# Patient Record
Sex: FEMALE | Race: WHITE | NOT HISPANIC OR LATINO | Employment: OTHER | URBAN - NONMETROPOLITAN AREA
[De-identification: names, ages, dates, MRNs, and addresses within clinical notes are randomized per-mention and may not be internally consistent; named-entity substitution may affect disease eponyms.]

---

## 2017-02-20 ENCOUNTER — LAB (OUTPATIENT)
Dept: LAB | Facility: HOSPITAL | Age: 67
End: 2017-02-20

## 2017-02-20 DIAGNOSIS — Z11.59 NEED FOR HEPATITIS C SCREENING TEST: ICD-10-CM

## 2017-02-20 PROCEDURE — 86803 HEPATITIS C AB TEST: CPT | Performed by: GENERAL PRACTICE

## 2017-02-20 PROCEDURE — 36415 COLL VENOUS BLD VENIPUNCTURE: CPT

## 2017-02-21 ENCOUNTER — OFFICE VISIT (OUTPATIENT)
Dept: FAMILY MEDICINE CLINIC | Facility: CLINIC | Age: 67
End: 2017-02-21

## 2017-02-21 VITALS
BODY MASS INDEX: 35.35 KG/M2 | OXYGEN SATURATION: 98 % | SYSTOLIC BLOOD PRESSURE: 132 MMHG | DIASTOLIC BLOOD PRESSURE: 80 MMHG | WEIGHT: 212.2 LBS | HEIGHT: 65 IN | HEART RATE: 91 BPM

## 2017-02-21 DIAGNOSIS — E78.2 MIXED HYPERLIPIDEMIA: ICD-10-CM

## 2017-02-21 DIAGNOSIS — I10 ESSENTIAL HYPERTENSION: Primary | ICD-10-CM

## 2017-02-21 DIAGNOSIS — M15.9 PRIMARY OSTEOARTHRITIS INVOLVING MULTIPLE JOINTS: ICD-10-CM

## 2017-02-21 DIAGNOSIS — K21.9 GERD WITHOUT ESOPHAGITIS: ICD-10-CM

## 2017-02-21 DIAGNOSIS — E11.9 TYPE 2 DIABETES MELLITUS WITHOUT COMPLICATION, WITHOUT LONG-TERM CURRENT USE OF INSULIN (HCC): ICD-10-CM

## 2017-02-21 LAB — HCV AB SER DONR QL: NEGATIVE

## 2017-02-21 PROCEDURE — 99214 OFFICE O/P EST MOD 30 MIN: CPT | Performed by: GENERAL PRACTICE

## 2017-02-21 RX ORDER — PANTOPRAZOLE SODIUM 40 MG/1
40 TABLET, DELAYED RELEASE ORAL DAILY PRN
Qty: 90 TABLET | Refills: 3 | Status: SHIPPED | OUTPATIENT
Start: 2017-02-21 | End: 2017-09-18 | Stop reason: SDUPTHER

## 2017-02-21 RX ORDER — HYDROCODONE BITARTRATE AND ACETAMINOPHEN 7.5; 325 MG/1; MG/1
1 TABLET ORAL EVERY 6 HOURS PRN
Qty: 120 TABLET | Refills: 0 | Status: SHIPPED | OUTPATIENT
Start: 2017-02-21 | End: 2017-06-16 | Stop reason: SDUPTHER

## 2017-02-21 RX ORDER — CELECOXIB 200 MG/1
200 CAPSULE ORAL DAILY PRN
COMMUNITY
End: 2017-09-18 | Stop reason: SDUPTHER

## 2017-02-21 NOTE — PROGRESS NOTES
Subjective   Bertha Hyde is a 66 y.o. female.     Chief Complaint   Patient presents with   • Follow-up   • Hypertension     Diabetes   She presents for her follow-up diabetic visit. She has type 2 diabetes mellitus. Her disease course has been stable. Pertinent negatives for hypoglycemia include no dizziness, headaches or nervousness/anxiousness. There are no diabetic associated symptoms. Pertinent negatives for diabetes include no chest pain, no fatigue and no weakness. There are no hypoglycemic complications. There are no diabetic complications. Current diabetic treatment includes oral agent (dual therapy). She is compliant with treatment all of the time. Her weight is stable. She is following a generally healthy diet. Meal planning includes avoidance of concentrated sweets. She participates in exercise intermittently. There is no change in her home blood glucose trend. An ACE inhibitor/angiotensin II receptor blocker is being taken.   Hypertension   This is a chronic problem. The current episode started more than 1 year ago. The problem is unchanged. The problem is controlled. Associated symptoms include anxiety. Pertinent negatives include no chest pain, headaches, neck pain, palpitations or shortness of breath. There are no known risk factors for coronary artery disease. Past treatments include ACE inhibitors and diuretics. The current treatment provides significant improvement. There are no compliance problems.    Hyperlipidemia   This is a chronic problem. The current episode started more than 1 year ago. The problem is controlled. Recent lipid tests were reviewed and are variable. Exacerbating diseases include diabetes. Pertinent negatives include no chest pain, myalgias or shortness of breath. Current antihyperlipidemic treatment includes statins. The current treatment provides significant improvement of lipids. There are no compliance problems.    Arthritis   She complains of pain and stiffness.  "She reports no joint swelling. The symptoms have been stable. Affected locations include the right knee and left knee. Her pain is at a severity of 8/10. Pertinent negatives include no diarrhea, dysuria, fatigue, fever or rash. Compliance with total regimen is %. Compliance with medications is %.      The following portions of the patient's history were reviewed and updated as appropriate: allergies, current medications, past social history and problem list.    Current Outpatient Prescriptions:   •  celecoxib (CeleBREX) 200 MG capsule, Take 200 mg by mouth Daily As Needed., Disp: , Rfl:   •  cyclobenzaprine (FLEXERIL) 10 MG tablet, Take 10 mg by mouth every evening. AT BEDTIME AS NEEDED, Disp: , Rfl:   •  DULoxetine (CYMBALTA) 60 MG capsule, Take 1 capsule by mouth Daily., Disp: 90 capsule, Rfl: 1  •  glimepiride (AMARYL) 4 MG tablet, Take 1 tablet by mouth Every Morning Before Breakfast., Disp: 90 tablet, Rfl: 3  •  HYDROcodone-acetaminophen (NORCO) 7.5-325 MG per tablet, Take 1 tablet by mouth Every 6 (Six) Hours As Needed for moderate pain (4-6)., Disp: 120 tablet, Rfl: 0  •  hyoscyamine (OSCIMIN) 0.125 MG SL tablet, Place 0.125-0.25 mg under the tongue every 4 (four) hours as needed. (max 12 per 24 hours), Disp: , Rfl:   •  lisinopril-hydrochlorothiazide (PRINZIDE,ZESTORETIC) 20-12.5 MG per tablet, Take 1 tablet by mouth Daily. Indication: Essential (primary) hypertension, Disp: 90 tablet, Rfl: 3  •  loratadine (CLARITIN) 10 MG tablet, Take 10 mg by mouth daily as needed. Indication: Allergic rhinitis, Disp: , Rfl:   •  metFORMIN XR (GLUCOPHAGE-XR) 500 MG 24 hr tablet, Take 500 mg by mouth 2 (two) times a day. \"metformin  mg tablet,extended release 24 hr\", Disp: , Rfl:   •  Multiple Vitamins-Minerals (DAILY MULTIVITAMIN PO), Take 1 tablet by mouth daily. Indication: supplement, Disp: , Rfl:   •  pantoprazole (PROTONIX) 40 MG EC tablet, Take 1 tablet by mouth Daily As Needed (heartburn)., Disp: " 90 tablet, Rfl: 3  •  pravastatin (PRAVACHOL) 10 MG tablet, Take 10 mg by mouth every night. at bedtime, Disp: , Rfl:   •  promethazine (PHENERGAN) 25 MG tablet, Take 0.5-1 tablets by mouth Every 6 (Six) Hours As Needed for nausea., Disp: 30 tablet, Rfl: 3  •  rizatriptan MLT (MAXALT-MLT) 10 MG disintegrating tablet, Take 1 tablet by mouth daily as needed for migraine. Take 1 tab at onset of headache, can repeat x 1 in 2 hrs prn, Disp: 27 tablet, Rfl: 3    Review of Systems   Constitutional: Negative.  Negative for activity change, appetite change, chills, fatigue, fever and unexpected weight change.   HENT: Negative.  Negative for congestion, ear pain, hearing loss, nosebleeds, rhinorrhea, sinus pressure, sneezing, sore throat, tinnitus and trouble swallowing.    Eyes: Negative.  Negative for pain, discharge, redness, itching and visual disturbance.   Respiratory: Negative.  Negative for apnea, cough, chest tightness, shortness of breath and wheezing.    Cardiovascular: Negative.  Negative for chest pain, palpitations and leg swelling.   Gastrointestinal: Negative.  Negative for abdominal distention, abdominal pain, constipation, diarrhea, nausea and vomiting.   Endocrine: Negative.    Genitourinary: Negative.  Negative for dysuria, frequency and urgency.   Musculoskeletal: Positive for arthritis and stiffness. Negative for arthralgias, back pain, gait problem, joint swelling, myalgias, neck pain and neck stiffness.   Skin: Negative.  Negative for color change and rash.   Allergic/Immunologic: Negative.    Neurological: Negative.  Negative for dizziness, weakness, light-headedness, numbness and headaches.   Hematological: Negative.  Negative for adenopathy.   Psychiatric/Behavioral: Negative.  Negative for dysphoric mood and sleep disturbance. The patient is not nervous/anxious.      Objective     Visit Vitals   • /80 (BP Location: Left arm, Patient Position: Sitting, Cuff Size: Adult)   • Pulse 91   • Ht  "64.5\" (163.8 cm)   • Wt 212 lb 3.2 oz (96.3 kg)   • SpO2 98%   • BMI 35.86 kg/m2     Physical Exam   Constitutional: She is oriented to person, place, and time. She appears well-developed and well-nourished. No distress.   HENT:   Head: Normocephalic and atraumatic.   Nose: Nose normal.   Mouth/Throat: Oropharynx is clear and moist.   Eyes: Conjunctivae and EOM are normal. Pupils are equal, round, and reactive to light. Right eye exhibits no discharge. Left eye exhibits no discharge.   Neck: No thyromegaly present.   Cardiovascular: Normal rate, regular rhythm, normal heart sounds and intact distal pulses.    Pulmonary/Chest: Effort normal and breath sounds normal.   Lymphadenopathy:     She has no cervical adenopathy.   Neurological: She is alert and oriented to person, place, and time.   Skin: Skin is warm and dry.   Psychiatric: She has a normal mood and affect.   Nursing note and vitals reviewed.    Results for orders placed or performed in visit on 02/20/17   Hepatitis C Antibody   Result Value Ref Range    Hepatitis C Ab Negative Negative     Assessment/Plan     Problem List Items Addressed This Visit        Cardiovascular and Mediastinum    Essential hypertension - Primary    Relevant Orders    Hemoglobin A1c    Comprehensive Metabolic Panel    Lipid Panel    LDL Cholesterol, Direct    Urinalysis With / Culture If Indicated    MicroAlbumin, Urine, Random    Hyperlipidemia    Relevant Orders    Hemoglobin A1c    Comprehensive Metabolic Panel    Lipid Panel    LDL Cholesterol, Direct    Urinalysis With / Culture If Indicated    MicroAlbumin, Urine, Random       Endocrine    Type 2 diabetes mellitus    Relevant Orders    Basic Metabolic Panel    Hemoglobin A1c    Comprehensive Metabolic Panel    Lipid Panel    LDL Cholesterol, Direct    Urinalysis With / Culture If Indicated    MicroAlbumin, Urine, Random       Musculoskeletal and Integument    Degenerative joint disease involving multiple joints      Other " Visit Diagnoses     GERD without esophagitis        Relevant Medications    pantoprazole (PROTONIX) 40 MG EC tablet        Chris reviewed and appropriate. Not recommended to drive or operate heavy equipment while taking potentially sedating meds. Recommended weight loss and exercise.      New Medications Ordered This Visit   Medications   • celecoxib (CeleBREX) 200 MG capsule     Sig: Take 200 mg by mouth Daily As Needed.   • HYDROcodone-acetaminophen (NORCO) 7.5-325 MG per tablet     Sig: Take 1 tablet by mouth Every 6 (Six) Hours As Needed for moderate pain (4-6).     Dispense:  120 tablet     Refill:  0   • pantoprazole (PROTONIX) 40 MG EC tablet     Sig: Take 1 tablet by mouth Daily As Needed (heartburn).     Dispense:  90 tablet     Refill:  3

## 2017-03-28 ENCOUNTER — OFFICE VISIT (OUTPATIENT)
Dept: ORTHOPEDIC SURGERY | Facility: CLINIC | Age: 67
End: 2017-03-28

## 2017-03-28 VITALS — BODY MASS INDEX: 34.99 KG/M2 | WEIGHT: 210 LBS | HEIGHT: 65 IN

## 2017-03-28 DIAGNOSIS — M75.41 SHOULDER IMPINGEMENT SYNDROME, RIGHT: ICD-10-CM

## 2017-03-28 DIAGNOSIS — M25.511 CHRONIC RIGHT SHOULDER PAIN: Primary | ICD-10-CM

## 2017-03-28 DIAGNOSIS — G89.29 CHRONIC RIGHT SHOULDER PAIN: Primary | ICD-10-CM

## 2017-03-28 PROCEDURE — 99203 OFFICE O/P NEW LOW 30 MIN: CPT | Performed by: NURSE PRACTITIONER

## 2017-03-28 PROCEDURE — 20610 DRAIN/INJ JOINT/BURSA W/O US: CPT | Performed by: NURSE PRACTITIONER

## 2017-03-28 RX ORDER — TRIAMCINOLONE ACETONIDE 40 MG/ML
40 INJECTION, SUSPENSION INTRA-ARTICULAR; INTRAMUSCULAR
Status: COMPLETED | OUTPATIENT
Start: 2017-03-28 | End: 2017-03-28

## 2017-03-28 RX ORDER — LIDOCAINE HYDROCHLORIDE 10 MG/ML
2 INJECTION, SOLUTION INFILTRATION; PERINEURAL
Status: COMPLETED | OUTPATIENT
Start: 2017-03-28 | End: 2017-03-28

## 2017-03-28 RX ADMIN — TRIAMCINOLONE ACETONIDE 40 MG: 40 INJECTION, SUSPENSION INTRA-ARTICULAR; INTRAMUSCULAR at 11:37

## 2017-03-28 RX ADMIN — LIDOCAINE HYDROCHLORIDE 2 ML: 10 INJECTION, SOLUTION INFILTRATION; PERINEURAL at 11:37

## 2017-03-28 NOTE — PROGRESS NOTES
Bertha Hyde is a 66 y.o. female   Primary provider:  Belinda Finn MD       Chief Complaint   Patient presents with   • Right Shoulder - Establish Care, Pain   • Results     xrays done today in office       HISTORY OF PRESENT ILLNESS:    Pain   This is a chronic problem. The current episode started more than 1 year ago. The problem occurs constantly. The problem has been unchanged. Associated symptoms comments: Sharp pain with dull ache and tingling. Exacerbated by: use of right arm. She has tried nothing for the symptoms.        CONCURRENT MEDICAL HISTORY:    Past Medical History:   Diagnosis Date   • Acute allergic reaction    • Acute gastroenteritis    • Allergic ileitis    • Allergic rhinitis    • Amaurosis fugax     both ? 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   • Colitis    • Common cold    • Cough    • Degenerative joint disease involving multiple joints    • Depressive disorder    • Diabetes mellitus    • Diverticulitis of colon    • Encounter for immunization    • Essential hypertension    • Fatigue    • Headache    • Hemorrhoids     internal & external      • History of fracture of vertebral column     Fracture of vertebral column - T11 compression      • History of mammogram 12/13/2011    Mammogram screen (1) - Benign , negative   • History of screening mammography     Screening mammography      • History of screening mammography 05/19/2016    SCREENING MAMMOGRAPHY DIGITAL  (Medicare) (2) - Ordered By: MICA FINN ()    • Hyperglycemia    • Hyperlipidemia    • Insomnia    • Itch of skin     Itching of skin   • Left lower quadrant pain    • Menopause    • Migraine    • Nausea and vomiting    • Nausea, vomiting and diarrhea    • Need for immunization against influenza     Needs influenza immunization   • Need for  "prophylactic vaccination against Streptococcus pneumoniae (pneumococcus)    • Nuclear senile cataract of left eye     Nuclear senile cataract - left   • Osteoarthritis    • Osteopenia    • Pain of left arm     Pain in left arm - STRAIN SECONDARY TO FALL      • Posterior subcapsular polar senile cataract of right eye     Posterior subcapsular polar senile cataract - right   • Rhinitis    • Scalp laceration    • Type 2 diabetes mellitus     no BDR   • Upper respiratory infection        No Known Allergies      Current Outpatient Prescriptions:   •  celecoxib (CeleBREX) 200 MG capsule, Take 200 mg by mouth Daily As Needed., Disp: , Rfl:   •  cyclobenzaprine (FLEXERIL) 10 MG tablet, Take 10 mg by mouth every evening. AT BEDTIME AS NEEDED, Disp: , Rfl:   •  DULoxetine (CYMBALTA) 60 MG capsule, Take 1 capsule by mouth Daily., Disp: 90 capsule, Rfl: 1  •  glimepiride (AMARYL) 4 MG tablet, Take 1 tablet by mouth Every Morning Before Breakfast., Disp: 90 tablet, Rfl: 3  •  HYDROcodone-acetaminophen (NORCO) 7.5-325 MG per tablet, Take 1 tablet by mouth Every 6 (Six) Hours As Needed for moderate pain (4-6)., Disp: 120 tablet, Rfl: 0  •  hyoscyamine (OSCIMIN) 0.125 MG SL tablet, Place 0.125-0.25 mg under the tongue every 4 (four) hours as needed. (max 12 per 24 hours), Disp: , Rfl:   •  lisinopril-hydrochlorothiazide (PRINZIDE,ZESTORETIC) 20-12.5 MG per tablet, Take 1 tablet by mouth Daily. Indication: Essential (primary) hypertension, Disp: 90 tablet, Rfl: 3  •  loratadine (CLARITIN) 10 MG tablet, Take 10 mg by mouth daily as needed. Indication: Allergic rhinitis, Disp: , Rfl:   •  metFORMIN XR (GLUCOPHAGE-XR) 500 MG 24 hr tablet, Take 500 mg by mouth 2 (two) times a day. \"metformin  mg tablet,extended release 24 hr\", Disp: , Rfl:   •  Multiple Vitamins-Minerals (DAILY MULTIVITAMIN PO), Take 1 tablet by mouth daily. Indication: supplement, Disp: , Rfl:   •  pantoprazole (PROTONIX) 40 MG EC tablet, Take 1 tablet by mouth " Daily As Needed (heartburn)., Disp: 90 tablet, Rfl: 3  •  pravastatin (PRAVACHOL) 10 MG tablet, Take 10 mg by mouth every night. at bedtime, Disp: , Rfl:   •  promethazine (PHENERGAN) 25 MG tablet, Take 0.5-1 tablets by mouth Every 6 (Six) Hours As Needed for nausea., Disp: 30 tablet, Rfl: 3  •  rizatriptan MLT (MAXALT-MLT) 10 MG disintegrating tablet, Take 1 tablet by mouth daily as needed for migraine. Take 1 tab at onset of headache, can repeat x 1 in 2 hrs prn, Disp: 27 tablet, Rfl: 3    Past Surgical History:   Procedure Laterality Date   • CARPAL TUNNEL RELEASE  1984    Carpal tunnel surgery (1) - Release of transverse carpal ligament, (2) Microneurolysis, right wrist; (3) Synovectomy, flexor tendons, right wrist   • CATARACT EXTRACTION Right 2015    Remove cataract, insert lens (1) - Right eye.   •  SECTION  1976     Section (2) - Low cervical section & Goldy tubal ligation   • CHOLECYSTECTOMY  10/19/2001    Cholecystectomy, laparoscopic (1) - Acute cholecystitis, (2) right ovarian cyst   • DILATATION AND CURETTAGE  1981    D&C (2) - Dysfunctional uterine bleeding   • ENDOSCOPY  10/11/2007    Colon endoscopy 08589 (1) - Colitis of colon. Multiple random biopsies obtained. A few medium scattered diverticula in sigmoid, descending, & transverse colon. Small internal & external hemorrhoids were present.   • EXCISION LESION Right 2003    Excision, breast lesion (1) - Excision of breast mass, right   • INJECTION OF MEDICATION  2012    B12 (1) - Ordered By: MICA ROWLAND ()    • INJECTION OF MEDICATION  2015    Phenergan (7) - Ordered By: JAYA POWER ([facility])    • INJECTION OF MEDICATION  2015    Toradol (7) - Ordered By: JAYA POWER ([facility])   • INJECTION OF MEDICATION  2014    Zofran (1) - Ordered By: GEENA NUNEZ ([facility])   • OOPHORECTOMY  1981    Oophorectomy (1) - one   • OTHER SURGICAL HISTORY      Tubal ligation (1)   •  "PAP SMEAR  10/30/2007    PAP SMEAR (1) - negative   • TOTAL KNEE ARTHROPLASTY Right 06/04/2012    Total knee replacement (1) - right    • VAGINAL HYSTERECTOMY  11/11/1981    Vaginal hysterectomy (1) - recurrent dysfunctional uterine bleeding       Family History   Problem Relation Age of Onset   • Coronary artery disease Other    • Hypertension Other         Social History     Social History   • Marital status:      Spouse name: N/A   • Number of children: N/A   • Years of education: N/A     Occupational History   • Not on file.     Social History Main Topics   • Smoking status: Never Smoker   • Smokeless tobacco: Not on file   • Alcohol use No   • Drug use: Not on file   • Sexual activity: Not on file      Comment: Marital status:      Other Topics Concern   • Not on file     Social History Narrative        Review of Systems   Musculoskeletal:        Pain and stiffness in joints   Psychiatric/Behavioral: Positive for dysphoric mood.   All other systems reviewed and are negative.      PHYSICAL EXAMINATION:       Ht 64.5\" (163.8 cm)  Wt 210 lb (95.3 kg)  BMI 35.49 kg/m2    Physical Exam   Constitutional: She is oriented to person, place, and time. Vital signs are normal. She appears well-developed and well-nourished. She is cooperative.   HENT:   Head: Normocephalic and atraumatic.   Neck: Trachea normal and phonation normal.   Pulmonary/Chest: Effort normal. No respiratory distress.   Abdominal: Soft. Normal appearance. She exhibits no distension.   Neurological: She is alert and oriented to person, place, and time. GCS eye subscore is 4. GCS verbal subscore is 5. GCS motor subscore is 6.   Skin: Skin is warm, dry and intact.   Psychiatric: She has a normal mood and affect. Her speech is normal and behavior is normal. Judgment and thought content normal. Cognition and memory are normal.   Vitals reviewed.      GAIT:     [x]  Normal  []  Antalgic    Assistive device: [x]  None  []  Walker     []  " Crutches  []  Cane     []  Wheelchair  []  Stretcher    Right Shoulder Exam     Tenderness   The patient is experiencing tenderness in the acromioclavicular joint.    Range of Motion   Active Abduction: abnormal   Extension: abnormal   Forward Flexion: abnormal   External Rotation: abnormal   Internal Rotation 90 degrees: abnormal     Muscle Strength   Abduction: 4/5   Internal Rotation: 4/5   External Rotation: 4/5   Supraspinatus: 4/5     Tests   Cross Arm: positive  Drop Arm: positive  Impingement: positive    Other   Erythema: absent  Scars: absent  Sensation: normal  Pulse: present      Left Shoulder Exam   Left shoulder exam is normal.        Large Joint Arthrocentesis  Date/Time: 3/28/2017 11:37 AM  Consent given by: patient  Timeout: Immediately prior to procedure a time out was called to verify the correct patient, procedure, equipment, support staff and site/side marked as required   Supporting Documentation  Indications: pain   Procedure Details  Location: shoulder - R subacromial bursa  Preparation: Patient was prepped and draped in the usual sterile fashion  Needle size: 22 G  Medications administered: 2 mL lidocaine 1 %; 40 mg triamcinolone acetonide 40 MG/ML  Patient tolerance: patient tolerated the procedure well with no immediate complications            Xr Shoulder 2+ View Right    Result Date: 3/28/2017  Impression: 3 views of the right shoulder reveals no acute bony abnormality.  There is degenerative changes at the acromial clavicular joint.    03/28/17 at 11:39 AM by PAYTON Boswell           ASSESSMENT:    Diagnoses and all orders for this visit:    Chronic right shoulder pain  -     Large Joint Arthrocentesis    Shoulder impingement syndrome, right  -     Large Joint Arthrocentesis          PLAN  Injected the shoulder today and recommend continued HEP and f/u 1 month for recheck.   If her pain continues then we will proceed with an MRI>   No Follow-up on file.    Miles Arnold  APRN

## 2017-04-18 ENCOUNTER — TELEPHONE (OUTPATIENT)
Dept: FAMILY MEDICINE CLINIC | Facility: CLINIC | Age: 67
End: 2017-04-18

## 2017-04-18 RX ORDER — CYCLOBENZAPRINE HCL 10 MG
10 TABLET ORAL EVERY EVENING
Qty: 30 TABLET | Refills: 1 | Status: SHIPPED | OUTPATIENT
Start: 2017-04-18 | End: 2018-03-19

## 2017-04-18 RX ORDER — DULOXETIN HYDROCHLORIDE 60 MG/1
60 CAPSULE, DELAYED RELEASE ORAL DAILY
Qty: 90 CAPSULE | Refills: 1 | Status: SHIPPED | OUTPATIENT
Start: 2017-04-18 | End: 2018-03-02 | Stop reason: SDUPTHER

## 2017-04-18 RX ORDER — RIZATRIPTAN BENZOATE 10 MG/1
10 TABLET, ORALLY DISINTEGRATING ORAL DAILY PRN
Qty: 27 TABLET | Refills: 3 | Status: SHIPPED | OUTPATIENT
Start: 2017-04-18 | End: 2019-01-07 | Stop reason: SDUPTHER

## 2017-04-18 NOTE — TELEPHONE ENCOUNTER
Requested Refills Sent      ----- Message from Luna Bellamy MA sent at 4/18/2017  3:38 PM CDT -----  Regarding: FW: refill  Contact: [phone]      ----- Message -----     From: Selena Dang     Sent: 4/18/2017   3:00 PM       To: Luna Bellamy MA  Subject: refill                                           PATIENT IS ASKING FOR A REFILL ON HER CYMBALTA 60 MG, MAXALT 10 MG, AND HER FLEXERIL 10 MG. [facility]. PLEASE CALL HER [phone]

## 2017-04-20 RX ORDER — CYCLOBENZAPRINE HCL 10 MG
TABLET ORAL
Qty: 90 TABLET | OUTPATIENT
Start: 2017-04-20

## 2017-06-12 ENCOUNTER — LAB (OUTPATIENT)
Dept: LAB | Facility: HOSPITAL | Age: 67
End: 2017-06-12

## 2017-06-12 DIAGNOSIS — E78.2 MIXED HYPERLIPIDEMIA: ICD-10-CM

## 2017-06-12 DIAGNOSIS — E11.9 TYPE 2 DIABETES MELLITUS WITHOUT COMPLICATION, WITHOUT LONG-TERM CURRENT USE OF INSULIN (HCC): ICD-10-CM

## 2017-06-12 DIAGNOSIS — I10 ESSENTIAL HYPERTENSION: ICD-10-CM

## 2017-06-12 LAB
ALBUMIN SERPL-MCNC: 4.4 G/DL (ref 3.4–4.8)
ALBUMIN UR-MCNC: 1.1 MG/L
ALBUMIN/GLOB SERPL: 1.2 G/DL (ref 1.1–1.8)
ALP SERPL-CCNC: 103 U/L (ref 38–126)
ALT SERPL W P-5'-P-CCNC: 32 U/L (ref 9–52)
ANION GAP SERPL CALCULATED.3IONS-SCNC: 13 MMOL/L (ref 5–15)
ARTICHOKE IGE QN: 142 MG/DL (ref 1–129)
AST SERPL-CCNC: 28 U/L (ref 14–36)
BILIRUB SERPL-MCNC: 0.5 MG/DL (ref 0.2–1.3)
BILIRUB UR QL STRIP: NEGATIVE
BUN BLD-MCNC: 18 MG/DL (ref 7–21)
BUN/CREAT SERPL: 24.3 (ref 7–25)
CALCIUM SPEC-SCNC: 9.6 MG/DL (ref 8.4–10.2)
CHLORIDE SERPL-SCNC: 97 MMOL/L (ref 95–110)
CHOLEST SERPL-MCNC: 241 MG/DL (ref 0–199)
CLARITY UR: CLEAR
CO2 SERPL-SCNC: 29 MMOL/L (ref 22–31)
COLOR UR: YELLOW
CREAT BLD-MCNC: 0.74 MG/DL (ref 0.5–1)
GFR SERPL CREATININE-BSD FRML MDRD: 79 ML/MIN/1.73 (ref 45–104)
GLOBULIN UR ELPH-MCNC: 3.7 GM/DL (ref 2.3–3.5)
GLUCOSE BLD-MCNC: 168 MG/DL (ref 60–100)
GLUCOSE UR STRIP-MCNC: NEGATIVE MG/DL
HBA1C MFR BLD: 9.08 % (ref 4–5.6)
HDLC SERPL-MCNC: 47 MG/DL (ref 60–200)
HGB UR QL STRIP.AUTO: NEGATIVE
KETONES UR QL STRIP: NEGATIVE
LDLC/HDLC SERPL: 2.81 {RATIO} (ref 0–3.22)
LEUKOCYTE ESTERASE UR QL STRIP.AUTO: NEGATIVE
NITRITE UR QL STRIP: NEGATIVE
PH UR STRIP.AUTO: 5.5 [PH] (ref 5–9)
POTASSIUM BLD-SCNC: 4 MMOL/L (ref 3.5–5.1)
PROT SERPL-MCNC: 8.1 G/DL (ref 6.3–8.6)
PROT UR QL STRIP: NEGATIVE
SODIUM BLD-SCNC: 139 MMOL/L (ref 137–145)
SP GR UR STRIP: 1.02 (ref 1–1.03)
TRIGL SERPL-MCNC: 309 MG/DL (ref 20–199)
UROBILINOGEN UR QL STRIP: NORMAL

## 2017-06-12 PROCEDURE — 83036 HEMOGLOBIN GLYCOSYLATED A1C: CPT | Performed by: GENERAL PRACTICE

## 2017-06-12 PROCEDURE — 82043 UR ALBUMIN QUANTITATIVE: CPT | Performed by: GENERAL PRACTICE

## 2017-06-12 PROCEDURE — 81003 URINALYSIS AUTO W/O SCOPE: CPT | Performed by: GENERAL PRACTICE

## 2017-06-12 PROCEDURE — 36415 COLL VENOUS BLD VENIPUNCTURE: CPT

## 2017-06-12 PROCEDURE — 80061 LIPID PANEL: CPT | Performed by: GENERAL PRACTICE

## 2017-06-12 PROCEDURE — 80053 COMPREHEN METABOLIC PANEL: CPT | Performed by: GENERAL PRACTICE

## 2017-06-15 DIAGNOSIS — Z12.31 ENCOUNTER FOR SCREENING MAMMOGRAM FOR MALIGNANT NEOPLASM OF BREAST: Primary | ICD-10-CM

## 2017-06-16 ENCOUNTER — OFFICE VISIT (OUTPATIENT)
Dept: FAMILY MEDICINE CLINIC | Facility: CLINIC | Age: 67
End: 2017-06-16

## 2017-06-16 ENCOUNTER — TELEPHONE (OUTPATIENT)
Dept: FAMILY MEDICINE CLINIC | Facility: CLINIC | Age: 67
End: 2017-06-16

## 2017-06-16 VITALS
OXYGEN SATURATION: 98 % | HEIGHT: 63 IN | WEIGHT: 211.3 LBS | DIASTOLIC BLOOD PRESSURE: 70 MMHG | BODY MASS INDEX: 37.44 KG/M2 | HEART RATE: 92 BPM | SYSTOLIC BLOOD PRESSURE: 130 MMHG

## 2017-06-16 DIAGNOSIS — I10 ESSENTIAL HYPERTENSION: Primary | Chronic | ICD-10-CM

## 2017-06-16 DIAGNOSIS — E11.9 DIABETES MELLITUS WITHOUT COMPLICATION (HCC): Primary | ICD-10-CM

## 2017-06-16 DIAGNOSIS — E78.2 MIXED HYPERLIPIDEMIA: Chronic | ICD-10-CM

## 2017-06-16 DIAGNOSIS — E11.9 TYPE 2 DIABETES MELLITUS WITHOUT COMPLICATION, WITHOUT LONG-TERM CURRENT USE OF INSULIN (HCC): Chronic | ICD-10-CM

## 2017-06-16 PROCEDURE — 99214 OFFICE O/P EST MOD 30 MIN: CPT | Performed by: GENERAL PRACTICE

## 2017-06-16 RX ORDER — LANCETS 33 GAUGE
EACH MISCELLANEOUS
Qty: 100 EACH | Refills: 3 | Status: SHIPPED | OUTPATIENT
Start: 2017-06-16

## 2017-06-16 RX ORDER — TRAZODONE HYDROCHLORIDE 50 MG/1
50 TABLET ORAL NIGHTLY
Qty: 30 TABLET | Refills: 2 | Status: SHIPPED | OUTPATIENT
Start: 2017-06-16 | End: 2017-09-18

## 2017-06-16 RX ORDER — GLIMEPIRIDE 4 MG/1
4 TABLET ORAL
Qty: 180 TABLET | Refills: 3 | Status: SHIPPED | OUTPATIENT
Start: 2017-06-16 | End: 2017-06-16 | Stop reason: SDUPTHER

## 2017-06-16 RX ORDER — PRAVASTATIN SODIUM 20 MG
20 TABLET ORAL DAILY
Qty: 90 TABLET | Refills: 3 | Status: SHIPPED | OUTPATIENT
Start: 2017-06-16 | End: 2017-10-24

## 2017-06-16 RX ORDER — DIPHENHYDRAMINE HYDROCHLORIDE 25 MG/1
CAPSULE, LIQUID FILLED ORAL
Qty: 1 EACH | Refills: 0 | Status: SHIPPED | OUTPATIENT
Start: 2017-06-16

## 2017-06-16 RX ORDER — GLIMEPIRIDE 4 MG/1
4 TABLET ORAL 2 TIMES DAILY
Qty: 180 TABLET | Refills: 3 | Status: SHIPPED | OUTPATIENT
Start: 2017-06-16 | End: 2017-09-25 | Stop reason: SDUPTHER

## 2017-06-16 RX ORDER — HYDROCODONE BITARTRATE AND ACETAMINOPHEN 7.5; 325 MG/1; MG/1
1 TABLET ORAL EVERY 6 HOURS PRN
Qty: 120 TABLET | Refills: 0 | Status: SHIPPED | OUTPATIENT
Start: 2017-06-16 | End: 2017-09-18 | Stop reason: SDUPTHER

## 2017-06-16 NOTE — PROGRESS NOTES
Subjective   Bertha Hyde is a 66 y.o. female.     Chief Complaint   Patient presents with   • Annual Exam     labs smamo   • Hypertension   • Diabetes   • Hyperlipidemia     For review and evaluation of management of chronic medical problems. Labs reviewed. Due for mammogram. Is moving to [place], has been very stressed. Having a lot anxiety and depression. Depression screen is high as a result. Not sleeping well. Also not watching her diet.   Hypertension   This is a chronic problem. The current episode started more than 1 year ago. The problem is unchanged. The problem is controlled. Associated symptoms include anxiety. Pertinent negatives include no chest pain, headaches, neck pain, palpitations or shortness of breath. There are no known risk factors for coronary artery disease. Past treatments include ACE inhibitors and diuretics. The current treatment provides significant improvement. There are no compliance problems.    Diabetes   She presents for her follow-up diabetic visit. She has type 2 diabetes mellitus. Her disease course has been stable. Pertinent negatives for hypoglycemia include no dizziness, headaches or nervousness/anxiousness. There are no diabetic associated symptoms. Pertinent negatives for diabetes include no chest pain, no fatigue and no weakness. There are no hypoglycemic complications. There are no diabetic complications. Current diabetic treatment includes oral agent (dual therapy). She is compliant with treatment all of the time. Her weight is stable. She is following a generally healthy diet. Meal planning includes avoidance of concentrated sweets. She participates in exercise intermittently. There is no change in her home blood glucose trend. An ACE inhibitor/angiotensin II receptor blocker is being taken.   Hyperlipidemia   This is a chronic problem. The current episode started more than 1 year ago. The problem is controlled. Recent lipid tests were reviewed and are  variable. Exacerbating diseases include diabetes. Pertinent negatives include no chest pain, myalgias or shortness of breath. Current antihyperlipidemic treatment includes statins. The current treatment provides significant improvement of lipids. There are no compliance problems.    Arthritis   She complains of pain and stiffness. She reports no joint swelling. The symptoms have been stable. Affected locations include the right knee and left knee. Her pain is at a severity of 8/10. Pertinent negatives include no diarrhea, dysuria, fatigue, fever or rash. Compliance with total regimen is %. Compliance with medications is %.      The following portions of the patient's history were reviewed and updated as appropriate: allergies, current medications, past family and social history and problem list.    Current Outpatient Prescriptions:   •  celecoxib (CeleBREX) 200 MG capsule, Take 200 mg by mouth Daily As Needed., Disp: , Rfl:   •  cyclobenzaprine (FLEXERIL) 10 MG tablet, Take 1 tablet by mouth Every Evening. AT BEDTIME AS NEEDED, Disp: 30 tablet, Rfl: 1  •  DULoxetine (CYMBALTA) 60 MG capsule, Take 1 capsule by mouth Daily., Disp: 90 capsule, Rfl: 1  •  glimepiride (AMARYL) 4 MG tablet, Take 1 tablet by mouth Every Morning Before Breakfast., Disp: 180 tablet, Rfl: 3  •  HYDROcodone-acetaminophen (NORCO) 7.5-325 MG per tablet, Take 1 tablet by mouth Every 6 (Six) Hours As Needed for Moderate Pain (4-6)., Disp: 120 tablet, Rfl: 0  •  hyoscyamine (OSCIMIN) 0.125 MG SL tablet, Place 0.125-0.25 mg under the tongue every 4 (four) hours as needed. (max 12 per 24 hours), Disp: , Rfl:   •  lisinopril-hydrochlorothiazide (PRINZIDE,ZESTORETIC) 20-12.5 MG per tablet, Take 1 tablet by mouth Daily. Indication: Essential (primary) hypertension, Disp: 90 tablet, Rfl: 3  •  loratadine (CLARITIN) 10 MG tablet, Take 10 mg by mouth daily as needed. Indication: Allergic rhinitis, Disp: , Rfl:   •  metFORMIN XR (GLUCOPHAGE-XR)  "500 MG 24 hr tablet, Take 500 mg by mouth 2 (two) times a day. \"metformin  mg tablet,extended release 24 hr\", Disp: , Rfl:   •  Multiple Vitamins-Minerals (DAILY MULTIVITAMIN PO), Take 1 tablet by mouth daily. Indication: supplement, Disp: , Rfl:   •  pantoprazole (PROTONIX) 40 MG EC tablet, Take 1 tablet by mouth Daily As Needed (heartburn)., Disp: 90 tablet, Rfl: 3  •  promethazine (PHENERGAN) 25 MG tablet, Take 0.5-1 tablets by mouth Every 6 (Six) Hours As Needed for nausea., Disp: 30 tablet, Rfl: 3  •  rizatriptan MLT (MAXALT-MLT) 10 MG disintegrating tablet, Take 1 tablet by mouth Daily As Needed for migraine. Take 1 tab at onset of headache, can repeat x 1 in 2 hrs prn, Disp: 27 tablet, Rfl: 3  •  pravastatin (PRAVACHOL) 20 MG tablet, Take 1 tablet by mouth Daily., Disp: 90 tablet, Rfl: 3  •  traZODone (DESYREL) 50 MG tablet, Take 1 tablet by mouth Every Night. For sleep, Disp: 30 tablet, Rfl: 2    Review of Systems   Constitutional: Negative.  Negative for activity change, appetite change, chills, fatigue, fever and unexpected weight change.   HENT: Negative.  Negative for congestion, ear pain, hearing loss, nosebleeds, rhinorrhea, sinus pressure, sneezing, sore throat, tinnitus and trouble swallowing.    Eyes: Negative.  Negative for pain, discharge, redness, itching and visual disturbance.   Respiratory: Negative.  Negative for apnea, cough, chest tightness, shortness of breath and wheezing.    Cardiovascular: Negative.  Negative for chest pain, palpitations and leg swelling.   Gastrointestinal: Negative.  Negative for abdominal distention, abdominal pain, constipation, diarrhea, nausea and vomiting.   Endocrine: Negative.    Genitourinary: Negative.  Negative for dysuria, frequency and urgency.   Musculoskeletal: Positive for arthralgias, arthritis, back pain and stiffness. Negative for gait problem, joint swelling, myalgias, neck pain and neck stiffness.   Skin: Negative.  Negative for color change " "and rash.   Allergic/Immunologic: Negative.    Neurological: Negative.  Negative for dizziness, weakness, light-headedness, numbness and headaches.   Hematological: Negative.  Negative for adenopathy.   Psychiatric/Behavioral: Negative.  Negative for dysphoric mood and sleep disturbance. The patient is not nervous/anxious.      Objective     Visit Vitals   • /70   • Pulse 92   • Ht 63\" (160 cm)   • Wt 211 lb 4.8 oz (95.8 kg)   • SpO2 98%   • BMI 37.43 kg/m2     Physical Exam   Constitutional: She is oriented to person, place, and time. She appears well-developed and well-nourished. No distress.   HENT:   Head: Normocephalic and atraumatic.   Nose: Nose normal.   Mouth/Throat: Oropharynx is clear and moist.   Eyes: Conjunctivae and EOM are normal. Pupils are equal, round, and reactive to light. Right eye exhibits no discharge. Left eye exhibits no discharge.   Neck: No tracheal deviation present. No thyromegaly present.   Cardiovascular: Normal rate, regular rhythm, normal heart sounds and intact distal pulses.    No murmur heard.  Pulmonary/Chest: Effort normal and breath sounds normal. No respiratory distress. She has no wheezes. She has no rales. She exhibits no tenderness. Right breast exhibits no inverted nipple, no mass, no nipple discharge, no skin change and no tenderness. Left breast exhibits no inverted nipple, no mass, no nipple discharge, no skin change and no tenderness.   Abdominal: Soft. Bowel sounds are normal. She exhibits no distension and no mass. There is no tenderness. No hernia.   Musculoskeletal: Normal range of motion. She exhibits no deformity.   Lymphadenopathy:     She has no cervical adenopathy.   Neurological: She is alert and oriented to person, place, and time. She has normal reflexes.   Skin: Skin is warm and dry.   Psychiatric: She has a normal mood and affect. Her behavior is normal. Judgment and thought content normal.   Nursing note and vitals reviewed.    Results for orders " placed or performed in visit on 06/12/17   Hemoglobin A1c   Result Value Ref Range    Hemoglobin A1C 9.08 (H) 4 - 5.6 %   Comprehensive Metabolic Panel   Result Value Ref Range    Glucose 168 (H) 60 - 100 mg/dL    BUN 18 7 - 21 mg/dL    Creatinine 0.74 0.50 - 1.00 mg/dL    Sodium 139 137 - 145 mmol/L    Potassium 4.0 3.5 - 5.1 mmol/L    Chloride 97 95 - 110 mmol/L    CO2 29.0 22.0 - 31.0 mmol/L    Calcium 9.6 8.4 - 10.2 mg/dL    Total Protein 8.1 6.3 - 8.6 g/dL    Albumin 4.40 3.40 - 4.80 g/dL    ALT (SGPT) 32 9 - 52 U/L    AST (SGOT) 28 14 - 36 U/L    Alkaline Phosphatase 103 38 - 126 U/L    Total Bilirubin 0.5 0.2 - 1.3 mg/dL    eGFR Non  Amer 79 45 - 104 mL/min/1.73    Globulin 3.7 (H) 2.3 - 3.5 gm/dL    A/G Ratio 1.2 1.1 - 1.8 g/dL    BUN/Creatinine Ratio 24.3 7.0 - 25.0    Anion Gap 13.0 5.0 - 15.0 mmol/L   Lipid Panel   Result Value Ref Range    Total Cholesterol 241 (H) 0 - 199 mg/dL    Triglycerides 309 (H) 20 - 199 mg/dL    HDL Cholesterol 47 (L) 60 - 200 mg/dL    LDL Cholesterol  142 (H) 1 - 129 mg/dL    LDL/HDL Ratio 2.81 0.00 - 3.22   Urinalysis With / Culture If Indicated   Result Value Ref Range    Color, UA Yellow Yellow, Straw, Dark Yellow, Mariela    Appearance, UA Clear Clear    pH, UA 5.5 5.0 - 9.0    Specific Gravity, UA 1.022 1.003 - 1.030    Glucose, UA Negative Negative    Ketones, UA Negative Negative    Bilirubin, UA Negative Negative    Blood, UA Negative Negative    Protein, UA Negative Negative    Leuk Esterase, UA Negative Negative    Nitrite, UA Negative Negative    Urobilinogen, UA 1.0 E.U./dL 0.2 - 1.0 E.U./dL   MicroAlbumin, Urine, Random   Result Value Ref Range    Microalbumin, Urine 1.1 mg/L      Assessment/Plan   Problem List Items Addressed This Visit        Cardiovascular and Mediastinum    Essential hypertension - Primary (Chronic)    Hyperlipidemia    Relevant Medications    pravastatin (PRAVACHOL) 20 MG tablet       Endocrine    Type 2 diabetes mellitus (Chronic)     Relevant Medications    glimepiride (AMARYL) 4 MG tablet        Will notify regarding results. Increase glimepiride. Increase pravastatin. Start Trazadone. Chris reviewed and appropriate. Not recommended to drive or operate heavy equipment while taking potentially sedating meds.      New Medications Ordered This Visit   Medications   • traZODone (DESYREL) 50 MG tablet     Sig: Take 1 tablet by mouth Every Night. For sleep     Dispense:  30 tablet     Refill:  2   • glimepiride (AMARYL) 4 MG tablet     Sig: Take 1 tablet by mouth Every Morning Before Breakfast.     Dispense:  180 tablet     Refill:  3   • pravastatin (PRAVACHOL) 20 MG tablet     Sig: Take 1 tablet by mouth Daily.     Dispense:  90 tablet     Refill:  3   • HYDROcodone-acetaminophen (NORCO) 7.5-325 MG per tablet     Sig: Take 1 tablet by mouth Every 6 (Six) Hours As Needed for Moderate Pain (4-6).     Dispense:  120 tablet     Refill:  0     Return for medicare wellness visit, Recheck.

## 2017-06-16 NOTE — TELEPHONE ENCOUNTER
I have called the patient, she doesn't know which one she wants or which one her Insurance will cover.    I will send a script for a glucometer and supplies and see if her Insurance will pay.

## 2017-06-16 NOTE — TELEPHONE ENCOUNTER
Pt needs diabetic supplies sent to [facility] in [place]. She needs a meter,test strips and lancelets. She usually just buys it over the counter but was told that insurance will cover it. She saw Huma this morning but forgot to mention it. Her phone is [phone].

## 2017-07-31 RX ORDER — PROMETHAZINE HYDROCHLORIDE 25 MG/1
12.5-25 TABLET ORAL EVERY 6 HOURS PRN
Qty: 30 TABLET | Refills: 3 | Status: SHIPPED | OUTPATIENT
Start: 2017-07-31 | End: 2017-10-10 | Stop reason: SDUPTHER

## 2017-08-28 ENCOUNTER — TELEPHONE (OUTPATIENT)
Dept: FAMILY MEDICINE CLINIC | Facility: CLINIC | Age: 67
End: 2017-08-28

## 2017-08-28 RX ORDER — LISINOPRIL AND HYDROCHLOROTHIAZIDE 20; 12.5 MG/1; MG/1
1 TABLET ORAL DAILY
Qty: 90 TABLET | Refills: 3 | Status: SHIPPED | OUTPATIENT
Start: 2017-08-28 | End: 2017-10-24

## 2017-08-28 NOTE — TELEPHONE ENCOUNTER
----- Message from Kinza Fox sent at 8/28/2017  1:23 PM CDT -----  Contact: patient  Huma pt./   Bertha is using [facility] in [place] now.  Needs a new rx for her Lisinopril sent in.

## 2017-09-15 ENCOUNTER — TELEPHONE (OUTPATIENT)
Dept: FAMILY MEDICINE CLINIC | Facility: CLINIC | Age: 67
End: 2017-09-15

## 2017-09-18 ENCOUNTER — OFFICE VISIT (OUTPATIENT)
Dept: FAMILY MEDICINE CLINIC | Facility: CLINIC | Age: 67
End: 2017-09-18

## 2017-09-18 ENCOUNTER — APPOINTMENT (OUTPATIENT)
Dept: LAB | Facility: HOSPITAL | Age: 67
End: 2017-09-18

## 2017-09-18 VITALS
DIASTOLIC BLOOD PRESSURE: 70 MMHG | HEART RATE: 104 BPM | BODY MASS INDEX: 38.38 KG/M2 | OXYGEN SATURATION: 98 % | HEIGHT: 63 IN | SYSTOLIC BLOOD PRESSURE: 130 MMHG | WEIGHT: 216.6 LBS

## 2017-09-18 DIAGNOSIS — Z23 NEED FOR VACCINATION: ICD-10-CM

## 2017-09-18 DIAGNOSIS — E11.9 TYPE 2 DIABETES MELLITUS WITHOUT COMPLICATION, WITHOUT LONG-TERM CURRENT USE OF INSULIN (HCC): Chronic | ICD-10-CM

## 2017-09-18 DIAGNOSIS — Z00.00 MEDICARE ANNUAL WELLNESS VISIT, INITIAL: Primary | ICD-10-CM

## 2017-09-18 DIAGNOSIS — M15.9 PRIMARY OSTEOARTHRITIS INVOLVING MULTIPLE JOINTS: ICD-10-CM

## 2017-09-18 LAB
ALBUMIN SERPL-MCNC: 4.5 G/DL (ref 3.4–4.8)
ALBUMIN/GLOB SERPL: 1.3 G/DL (ref 1.1–1.8)
ALP SERPL-CCNC: 97 U/L (ref 38–126)
ALT SERPL W P-5'-P-CCNC: 38 U/L (ref 9–52)
ANION GAP SERPL CALCULATED.3IONS-SCNC: 14 MMOL/L (ref 5–15)
AST SERPL-CCNC: 27 U/L (ref 14–36)
BILIRUB SERPL-MCNC: 0.5 MG/DL (ref 0.2–1.3)
BUN BLD-MCNC: 23 MG/DL (ref 7–21)
BUN/CREAT SERPL: 31.1 (ref 7–25)
CALCIUM SPEC-SCNC: 9.8 MG/DL (ref 8.4–10.2)
CHLORIDE SERPL-SCNC: 95 MMOL/L (ref 95–110)
CO2 SERPL-SCNC: 28 MMOL/L (ref 22–31)
CREAT BLD-MCNC: 0.74 MG/DL (ref 0.5–1)
GFR SERPL CREATININE-BSD FRML MDRD: 79 ML/MIN/1.73 (ref 45–104)
GLOBULIN UR ELPH-MCNC: 3.6 GM/DL (ref 2.3–3.5)
GLUCOSE BLD-MCNC: 205 MG/DL (ref 60–100)
HBA1C MFR BLD: 8.9 % (ref 4–5.6)
POTASSIUM BLD-SCNC: 4.1 MMOL/L (ref 3.5–5.1)
PROT SERPL-MCNC: 8.1 G/DL (ref 6.3–8.6)
SODIUM BLD-SCNC: 137 MMOL/L (ref 137–145)

## 2017-09-18 PROCEDURE — 83036 HEMOGLOBIN GLYCOSYLATED A1C: CPT | Performed by: GENERAL PRACTICE

## 2017-09-18 PROCEDURE — 90662 IIV NO PRSV INCREASED AG IM: CPT | Performed by: GENERAL PRACTICE

## 2017-09-18 PROCEDURE — 80053 COMPREHEN METABOLIC PANEL: CPT | Performed by: GENERAL PRACTICE

## 2017-09-18 PROCEDURE — 36415 COLL VENOUS BLD VENIPUNCTURE: CPT | Performed by: GENERAL PRACTICE

## 2017-09-18 PROCEDURE — 99213 OFFICE O/P EST LOW 20 MIN: CPT | Performed by: GENERAL PRACTICE

## 2017-09-18 PROCEDURE — G0438 PPPS, INITIAL VISIT: HCPCS | Performed by: GENERAL PRACTICE

## 2017-09-18 PROCEDURE — G0008 ADMIN INFLUENZA VIRUS VAC: HCPCS | Performed by: GENERAL PRACTICE

## 2017-09-18 RX ORDER — HYDROXYZINE HYDROCHLORIDE 25 MG/1
25 TABLET, FILM COATED ORAL 3 TIMES DAILY PRN
Qty: 30 TABLET | Refills: 2 | Status: SHIPPED | OUTPATIENT
Start: 2017-09-18 | End: 2018-03-19

## 2017-09-18 RX ORDER — HYDROCODONE BITARTRATE AND ACETAMINOPHEN 7.5; 325 MG/1; MG/1
1 TABLET ORAL EVERY 6 HOURS PRN
Qty: 120 TABLET | Refills: 0 | Status: SHIPPED | OUTPATIENT
Start: 2017-09-18 | End: 2017-12-18 | Stop reason: SDUPTHER

## 2017-09-18 RX ORDER — CELECOXIB 200 MG/1
200 CAPSULE ORAL DAILY PRN
Qty: 90 CAPSULE | Refills: 3 | Status: SHIPPED | OUTPATIENT
Start: 2017-09-18 | End: 2017-10-03 | Stop reason: HOSPADM

## 2017-09-18 RX ORDER — METFORMIN HYDROCHLORIDE 500 MG/1
500 TABLET, EXTENDED RELEASE ORAL 2 TIMES DAILY
Status: CANCELLED | OUTPATIENT
Start: 2017-09-18

## 2017-09-18 RX ORDER — PANTOPRAZOLE SODIUM 40 MG/1
40 TABLET, DELAYED RELEASE ORAL DAILY PRN
Qty: 90 TABLET | Refills: 3 | Status: SHIPPED | OUTPATIENT
Start: 2017-09-18 | End: 2018-11-07 | Stop reason: SDUPTHER

## 2017-09-18 NOTE — PROGRESS NOTES
Subjective   Bertha Hyde is a 66 y.o. female.   Chief Complaint   Patient presents with   • Leg Pain   • Hypertension     medicare wellness labs   • Diabetes     For review and evaluation of management of chronic medical problems. Labs pending.   Hypertension   This is a chronic problem. The current episode started more than 1 year ago. The problem is unchanged. The problem is controlled. Associated symptoms include anxiety. Pertinent negatives include no chest pain, headaches, neck pain, palpitations or shortness of breath. There are no known risk factors for coronary artery disease. Past treatments include ACE inhibitors and diuretics. The current treatment provides significant improvement. There are no compliance problems.    Diabetes   She presents for her follow-up diabetic visit. She has type 2 diabetes mellitus. Her disease course has been stable. Pertinent negatives for hypoglycemia include no dizziness, headaches or nervousness/anxiousness. There are no diabetic associated symptoms. Pertinent negatives for diabetes include no chest pain, no fatigue and no weakness. There are no hypoglycemic complications. There are no diabetic complications. Current diabetic treatment includes oral agent (dual therapy). She is compliant with treatment all of the time. Her weight is stable. She is following a generally healthy diet. Meal planning includes avoidance of concentrated sweets. She participates in exercise intermittently. There is no change in her home blood glucose trend. An ACE inhibitor/angiotensin II receptor blocker is being taken.   Arthritis   Presents for follow-up visit. She complains of pain and stiffness. She reports no joint swelling. The symptoms have been stable. Affected locations include the right knee and left knee. Her pain is at a severity of 3/10. Pertinent negatives include no diarrhea, dysuria, fatigue, fever or rash. Compliance with total regimen is %. Compliance with  "medications is %.      The following portions of the patient's history were reviewed and updated as appropriate: allergies, current medications, past social history and problem list.    Outpatient Medications Prior to Visit   Medication Sig Dispense Refill   • Blood Glucose Monitoring Suppl (BLOOD GLUCOSE MONITOR SYSTEM) W/DEVICE kit Test Blood Sugars Once Daily 1 each 0   • cyclobenzaprine (FLEXERIL) 10 MG tablet Take 1 tablet by mouth Every Evening. AT BEDTIME AS NEEDED 30 tablet 1   • DULoxetine (CYMBALTA) 60 MG capsule Take 1 capsule by mouth Daily. 90 capsule 1   • glimepiride (AMARYL) 4 MG tablet Take 1 tablet by mouth 2 (Two) Times a Day. 180 tablet 3   • glucose blood test strip Test Blood Sugars Once Daily 100 each 3   • hyoscyamine (OSCIMIN) 0.125 MG SL tablet Place 0.125-0.25 mg under the tongue every 4 (four) hours as needed. (max 12 per 24 hours)     • LANCETS MICRO THIN 33G misc Test Blood Sugars Once Daily 100 each 3   • lisinopril-hydrochlorothiazide (PRINZIDE,ZESTORETIC) 20-12.5 MG per tablet Take 1 tablet by mouth Daily. Indication: Essential (primary) hypertension 90 tablet 3   • loratadine (CLARITIN) 10 MG tablet Take 10 mg by mouth daily as needed. Indication: Allergic rhinitis     • metFORMIN XR (GLUCOPHAGE-XR) 500 MG 24 hr tablet Take 500 mg by mouth 2 (two) times a day. \"metformin  mg tablet,extended release 24 hr\"     • Multiple Vitamins-Minerals (DAILY MULTIVITAMIN PO) Take 1 tablet by mouth daily. Indication: supplement     • pravastatin (PRAVACHOL) 20 MG tablet Take 1 tablet by mouth Daily. 90 tablet 3   • promethazine (PHENERGAN) 25 MG tablet Take 0.5-1 tablets by mouth Every 6 (Six) Hours As Needed for Nausea. 30 tablet 3   • rizatriptan MLT (MAXALT-MLT) 10 MG disintegrating tablet Take 1 tablet by mouth Daily As Needed for migraine. Take 1 tab at onset of headache, can repeat x 1 in 2 hrs prn 27 tablet 3   • celecoxib (CeleBREX) 200 MG capsule Take 200 mg by mouth Daily As " "Needed.     • HYDROcodone-acetaminophen (NORCO) 7.5-325 MG per tablet Take 1 tablet by mouth Every 6 (Six) Hours As Needed for Moderate Pain (4-6). 120 tablet 0   • pantoprazole (PROTONIX) 40 MG EC tablet Take 1 tablet by mouth Daily As Needed (heartburn). 90 tablet 3   • traZODone (DESYREL) 50 MG tablet Take 1 tablet by mouth Every Night. For sleep 30 tablet 2     No facility-administered medications prior to visit.        Review of Systems   Constitutional: Negative.  Negative for chills, fatigue, fever and unexpected weight change.   HENT: Negative.  Negative for congestion, ear pain, hearing loss, nosebleeds, rhinorrhea, sneezing, sore throat and tinnitus.    Eyes: Negative.  Negative for discharge.   Respiratory: Negative.  Negative for cough, shortness of breath and wheezing.    Cardiovascular: Negative.  Negative for chest pain and palpitations.   Gastrointestinal: Negative.  Negative for abdominal pain, constipation, diarrhea, nausea and vomiting.   Endocrine: Negative.    Genitourinary: Negative.  Negative for dysuria, frequency and urgency.   Musculoskeletal: Positive for arthritis and stiffness. Negative for arthralgias, back pain, joint swelling, myalgias and neck pain.   Skin: Negative.  Negative for rash.   Allergic/Immunologic: Negative.    Neurological: Negative.  Negative for dizziness, weakness, numbness and headaches.   Hematological: Negative.  Negative for adenopathy.   Psychiatric/Behavioral: Negative.  Negative for dysphoric mood and sleep disturbance. The patient is not nervous/anxious.        Objective   Visit Vitals   • /70   • Pulse 104   • Ht 63\" (160 cm)   • Wt 216 lb 9.6 oz (98.2 kg)   • SpO2 98%   • BMI 38.37 kg/m2     Physical Exam   Constitutional: She is oriented to person, place, and time. She appears well-developed and well-nourished. No distress.   HENT:   Head: Normocephalic and atraumatic.   Nose: Nose normal.   Mouth/Throat: Oropharynx is clear and moist.   Eyes: " Conjunctivae and EOM are normal. Pupils are equal, round, and reactive to light. Right eye exhibits no discharge. Left eye exhibits no discharge.   Neck: No thyromegaly present.   Cardiovascular: Normal rate, regular rhythm, normal heart sounds and intact distal pulses.    Pulmonary/Chest: Effort normal and breath sounds normal.   Musculoskeletal:        Right knee: Tenderness found. Medial joint line and lateral joint line tenderness noted.        Left knee: Tenderness found. Medial joint line and lateral joint line tenderness noted.   Lymphadenopathy:     She has no cervical adenopathy.   Neurological: She is alert and oriented to person, place, and time.   Skin: Skin is warm and dry.   Psychiatric: She has a normal mood and affect.   Nursing note and vitals reviewed.      Assessment/Plan   Problem List Items Addressed This Visit        Endocrine    Type 2 diabetes mellitus (Chronic)    Relevant Orders    Hemoglobin A1c    Comprehensive Metabolic Panel    Hemoglobin A1c    Comprehensive Metabolic Panel    LDL Cholesterol, Direct       Musculoskeletal and Integument    Degenerative joint disease involving multiple joints      Other Visit Diagnoses     Medicare annual wellness visit, initial    -  Primary    Need for vaccination              Will notify regarding results. Recommended weight loss and exercise.      New Medications Ordered This Visit   Medications   • HYDROcodone-acetaminophen (NORCO) 7.5-325 MG per tablet     Sig: Take 1 tablet by mouth Every 6 (Six) Hours As Needed for Moderate Pain .     Dispense:  120 tablet     Refill:  0   • celecoxib (CeleBREX) 200 MG capsule     Sig: Take 1 capsule by mouth Daily As Needed for Moderate Pain .     Dispense:  90 capsule     Refill:  3   • pantoprazole (PROTONIX) 40 MG EC tablet     Sig: Take 1 tablet by mouth Daily As Needed (heartburn).     Dispense:  90 tablet     Refill:  3   • hydrOXYzine (ATARAX) 25 MG tablet     Sig: Take 1 tablet by mouth 3 (Three) Times  a Day As Needed for Anxiety.     Dispense:  30 tablet     Refill:  2     Return in about 3 months (around 12/18/2017) for Recheck.

## 2017-09-18 NOTE — PATIENT INSTRUCTIONS
Medicare Wellness  Personal Prevention Plan of Service     Date of Office Visit:  2017  Encounter Provider:  Belinda Finn MD  Place of Service:  [facility]  Patient Name: Bertha Hyde  :  1950    As part of the Medicare Wellness portion of your visit today, we are providing you with this personalized preventive plan of services (PPPS). This plan is based upon recommendations of the [place] Preventive Services Task Force (USPSTF) and the Advisory Committee on Immunization Practices (ACIP).    This lists the preventive care services that should be considered, and provides dates of when you are due. Items listed as completed are up-to-date and do not require any further intervention.    Health Maintenance   Topic Date Due   • INFLUENZA VACCINE  2017   • DIABETIC FOOT EXAM  2017   • DIABETIC EYE EXAM  10/05/2017   • COLONOSCOPY  10/11/2017   • HEMOGLOBIN A1C  2017   • LIPID PANEL  2018   • URINE MICROALBUMIN  2018   • MEDICARE ANNUAL WELLNESS  2018   • DXA SCAN  2019   • MAMMOGRAM  2019   • PNEUMOCOCCAL VACCINES (65+ LOW/MEDIUM RISK) (2 of 2 - PPSV23) 2020   • TDAP/TD VACCINES (2 - Td) 2027   • HEPATITIS C SCREENING  Completed   • ZOSTER VACCINE  Addressed       Orders Placed This Encounter   Procedures   • Hemoglobin A1c   • Comprehensive Metabolic Panel       Return in about 3 months (around 2017) for Recheck.

## 2017-09-18 NOTE — PROGRESS NOTES
QUICK REFERENCE INFORMATION:  The ABCs of the Annual Wellness Visit    Initial Medicare Wellness Visit    HEALTH RISK ASSESSMENT    1950    Recent Hospitalizations:  No hospitalization(s) within the last year..    Current Medical Providers:  Patient Care Team:  Belinda Finn MD as PCP - General  Belinda Finn MD as PCP - Claims Attributed  Yonatan rGeen MD as Consulting Physician (Ophthalmology)    Smoking Status:  History   Smoking Status   • Never Smoker   Smokeless Tobacco   • Never Used     Alcohol Consumption:  History   Alcohol Use No     Depression Screen:   PHQ-2/PHQ-9 Depression Screening 9/18/2017   Little interest or pleasure in doing things 2   Feeling down, depressed, or hopeless 2   Trouble falling or staying asleep, or sleeping too much 2   Feeling tired or having little energy 2   Poor appetite or overeating 1   Feeling bad about yourself - or that you are a failure or have let yourself or your family down 1   Trouble concentrating on things, such as reading the newspaper or watching television 1   Moving or speaking so slowly that other people could have noticed. Or the opposite - being so fidgety or restless that you have been moving around a lot more than usual 0   Thoughts that you would be better off dead, or of hurting yourself in some way 0   Total Score 11     Health Habits and Functional and Cognitive Screening:  Functional & Cognitive Status 9/18/2017   Do you have difficulty preparing food and eating? No   Do you have difficulty bathing yourself? No   Do you have difficulty getting dressed? No   Do you have difficulty using the toilet? No   Do you have difficulty moving around from place to place? No   In the past year have you fallen or experienced a near fall? Yes   Do you need help using the phone?  No   Are you deaf or do you have serious difficulty hearing?  No   Do you need help with transportation? No   Do you need help shopping? No   Do you need help  preparing meals?  No   Do you need help with housework?  No   Do you need help with laundry? No   Do you need help taking your medications? No   Do you need help managing money? No   Do you have difficulty concentrating, remembering or making decisions? No     Health Habits  Current Diet: Diabetic Diet  Dental Exam: Up to date  Eye Exam: Up to date  Exercise (times per week): 6 times per week  Current Exercise Activities Include: Walking    Does the patient have evidence of cognitive impairment? No    Asiprin use counseling: Taking ASA appropriately as indicated    Recent Lab Results:    Visual Acuity:  No exam data present    Age-appropriate Screening Schedule:  Refer to the list below for future screening recommendations based on patient's age, sex and/or medical conditions. Orders for these recommended tests are listed in the plan section. The patient has been provided with a written plan.    Health Maintenance   Topic Date Due   • INFLUENZA VACCINE  08/01/2017   • DIABETIC FOOT EXAM  08/18/2017   • DIABETIC EYE EXAM  10/05/2017   • COLONOSCOPY  10/11/2017   • HEMOGLOBIN A1C  12/12/2017   • LIPID PANEL  06/12/2018   • URINE MICROALBUMIN  06/12/2018   • DXA SCAN  01/31/2019   • MAMMOGRAM  06/16/2019   • PNEUMOCOCCAL VACCINES (65+ LOW/MEDIUM RISK) (2 of 2 - PPSV23) 05/06/2020   • TDAP/TD VACCINES (2 - Td) 06/16/2027   • ZOSTER VACCINE  Addressed        Subjective   History of Present Illness    Bertha Hyde is a 66 y.o. female who presents for an Annual Wellness Visit.    The following portions of the patient's history were reviewed and updated as appropriate: allergies, current medications, past family history, past medical history, past social history, past surgical history and problem list.    Outpatient Medications Prior to Visit   Medication Sig Dispense Refill   • Blood Glucose Monitoring Suppl (BLOOD GLUCOSE MONITOR SYSTEM) W/DEVICE kit Test Blood Sugars Once Daily 1 each 0   • cyclobenzaprine  "(FLEXERIL) 10 MG tablet Take 1 tablet by mouth Every Evening. AT BEDTIME AS NEEDED 30 tablet 1   • DULoxetine (CYMBALTA) 60 MG capsule Take 1 capsule by mouth Daily. 90 capsule 1   • glimepiride (AMARYL) 4 MG tablet Take 1 tablet by mouth 2 (Two) Times a Day. 180 tablet 3   • glucose blood test strip Test Blood Sugars Once Daily 100 each 3   • hyoscyamine (OSCIMIN) 0.125 MG SL tablet Place 0.125-0.25 mg under the tongue every 4 (four) hours as needed. (max 12 per 24 hours)     • LANCETS MICRO THIN 33G misc Test Blood Sugars Once Daily 100 each 3   • lisinopril-hydrochlorothiazide (PRINZIDE,ZESTORETIC) 20-12.5 MG per tablet Take 1 tablet by mouth Daily. Indication: Essential (primary) hypertension 90 tablet 3   • loratadine (CLARITIN) 10 MG tablet Take 10 mg by mouth daily as needed. Indication: Allergic rhinitis     • metFORMIN XR (GLUCOPHAGE-XR) 500 MG 24 hr tablet Take 500 mg by mouth 2 (two) times a day. \"metformin  mg tablet,extended release 24 hr\"     • Multiple Vitamins-Minerals (DAILY MULTIVITAMIN PO) Take 1 tablet by mouth daily. Indication: supplement     • pravastatin (PRAVACHOL) 20 MG tablet Take 1 tablet by mouth Daily. 90 tablet 3   • promethazine (PHENERGAN) 25 MG tablet Take 0.5-1 tablets by mouth Every 6 (Six) Hours As Needed for Nausea. 30 tablet 3   • rizatriptan MLT (MAXALT-MLT) 10 MG disintegrating tablet Take 1 tablet by mouth Daily As Needed for migraine. Take 1 tab at onset of headache, can repeat x 1 in 2 hrs prn 27 tablet 3   • celecoxib (CeleBREX) 200 MG capsule Take 200 mg by mouth Daily As Needed.     • HYDROcodone-acetaminophen (NORCO) 7.5-325 MG per tablet Take 1 tablet by mouth Every 6 (Six) Hours As Needed for Moderate Pain (4-6). 120 tablet 0   • pantoprazole (PROTONIX) 40 MG EC tablet Take 1 tablet by mouth Daily As Needed (heartburn). 90 tablet 3   • traZODone (DESYREL) 50 MG tablet Take 1 tablet by mouth Every Night. For sleep 30 tablet 2     No facility-administered " "medications prior to visit.        Patient Active Problem List   Diagnosis   • Degenerative joint disease involving multiple joints   • Depressive disorder   • Essential hypertension   • Hyperlipidemia   • Insomnia   • Migraine   • Type 2 diabetes mellitus   • Open-angle glaucoma   • Cataract   • Open-angle glaucoma of right eye   • Chronic right shoulder pain       Advance Care Planning:  has NO advance directive - information provided to the patient today    Identification of Risk Factors:  Risk factors include: weight , unhealthy diet, cardiovascular risk, inactivity, increased fall risk, chronic pain and depression.    Review of Systems    Compared to one year ago, the patient feels her physical health is the same.  Compared to one year ago, the patient feels her mental health is better.    Objective     Physical Exam   Constitutional: She is oriented to person, place, and time. She appears well-developed and well-nourished. No distress.   HENT:   Head: Normocephalic and atraumatic.   Nose: Nose normal.   Mouth/Throat: Oropharynx is clear and moist.   Eyes: Conjunctivae and EOM are normal. Pupils are equal, round, and reactive to light. Right eye exhibits no discharge. Left eye exhibits no discharge.   Neck: No thyromegaly present.   Cardiovascular: Normal rate, regular rhythm, normal heart sounds and intact distal pulses.    Pulmonary/Chest: Effort normal and breath sounds normal.   Lymphadenopathy:     She has no cervical adenopathy.   Neurological: She is alert and oriented to person, place, and time.   Skin: Skin is warm and dry.   Psychiatric: She has a normal mood and affect.   Nursing note and vitals reviewed.      Vitals:    09/18/17 1059   BP: 130/70   Pulse: 104   SpO2: 98%   Weight: 216 lb 9.6 oz (98.2 kg)   Height: 63\" (160 cm)   PainSc:   3   PainLoc: Leg       Body mass index is 38.37 kg/(m^2).  Discussed the patient's BMI with her. The BMI is above average; BMI management plan is " completed.    Assessment/Plan   Patient Self-Management and Personalized Health Advice  The patient has been provided with information about: diet, exercise, weight management, fall prevention and mental health concerns and preventive services including:   · Exercise counseling provided, Fall Risk assessment done, Fall Risk plan of care done, Influenza vaccine.    Visit Diagnoses:    ICD-10-CM ICD-9-CM   1. Medicare annual wellness visit, initial Z00.00 V70.0   2. Type 2 diabetes mellitus without complication, without long-term current use of insulin E11.9 250.00   3. Need for vaccination Z23 V05.9   4. Primary osteoarthritis involving multiple joints M15.0 715.09       Orders Placed This Encounter   Procedures   • Hemoglobin A1c   • Comprehensive Metabolic Panel   • Hemoglobin A1c     Standing Status:   Future     Standing Expiration Date:   9/18/2018   • Comprehensive Metabolic Panel     Standing Status:   Future     Standing Expiration Date:   9/18/2018   • LDL Cholesterol, Direct     Standing Status:   Future     Standing Expiration Date:   9/18/2018       Outpatient Encounter Prescriptions as of 9/18/2017   Medication Sig Dispense Refill   • Blood Glucose Monitoring Suppl (BLOOD GLUCOSE MONITOR SYSTEM) W/DEVICE kit Test Blood Sugars Once Daily 1 each 0   • celecoxib (CeleBREX) 200 MG capsule Take 1 capsule by mouth Daily As Needed for Moderate Pain . 90 capsule 3   • cyclobenzaprine (FLEXERIL) 10 MG tablet Take 1 tablet by mouth Every Evening. AT BEDTIME AS NEEDED 30 tablet 1   • DULoxetine (CYMBALTA) 60 MG capsule Take 1 capsule by mouth Daily. 90 capsule 1   • glimepiride (AMARYL) 4 MG tablet Take 1 tablet by mouth 2 (Two) Times a Day. 180 tablet 3   • glucose blood test strip Test Blood Sugars Once Daily 100 each 3   • HYDROcodone-acetaminophen (NORCO) 7.5-325 MG per tablet Take 1 tablet by mouth Every 6 (Six) Hours As Needed for Moderate Pain . 120 tablet 0   • hyoscyamine (OSCIMIN) 0.125 MG SL tablet Place  "0.125-0.25 mg under the tongue every 4 (four) hours as needed. (max 12 per 24 hours)     • LANCETS MICRO THIN 33G misc Test Blood Sugars Once Daily 100 each 3   • lisinopril-hydrochlorothiazide (PRINZIDE,ZESTORETIC) 20-12.5 MG per tablet Take 1 tablet by mouth Daily. Indication: Essential (primary) hypertension 90 tablet 3   • loratadine (CLARITIN) 10 MG tablet Take 10 mg by mouth daily as needed. Indication: Allergic rhinitis     • metFORMIN XR (GLUCOPHAGE-XR) 500 MG 24 hr tablet Take 500 mg by mouth 2 (two) times a day. \"metformin  mg tablet,extended release 24 hr\"     • Multiple Vitamins-Minerals (DAILY MULTIVITAMIN PO) Take 1 tablet by mouth daily. Indication: supplement     • pantoprazole (PROTONIX) 40 MG EC tablet Take 1 tablet by mouth Daily As Needed (heartburn). 90 tablet 3   • pravastatin (PRAVACHOL) 20 MG tablet Take 1 tablet by mouth Daily. 90 tablet 3   • promethazine (PHENERGAN) 25 MG tablet Take 0.5-1 tablets by mouth Every 6 (Six) Hours As Needed for Nausea. 30 tablet 3   • rizatriptan MLT (MAXALT-MLT) 10 MG disintegrating tablet Take 1 tablet by mouth Daily As Needed for migraine. Take 1 tab at onset of headache, can repeat x 1 in 2 hrs prn 27 tablet 3   • [DISCONTINUED] celecoxib (CeleBREX) 200 MG capsule Take 200 mg by mouth Daily As Needed.     • [DISCONTINUED] HYDROcodone-acetaminophen (NORCO) 7.5-325 MG per tablet Take 1 tablet by mouth Every 6 (Six) Hours As Needed for Moderate Pain (4-6). 120 tablet 0   • [DISCONTINUED] pantoprazole (PROTONIX) 40 MG EC tablet Take 1 tablet by mouth Daily As Needed (heartburn). 90 tablet 3   • hydrOXYzine (ATARAX) 25 MG tablet Take 1 tablet by mouth 3 (Three) Times a Day As Needed for Anxiety. 30 tablet 2   • [DISCONTINUED] traZODone (DESYREL) 50 MG tablet Take 1 tablet by mouth Every Night. For sleep 30 tablet 2     No facility-administered encounter medications on file as of 9/18/2017.        Reviewed use of high risk medication in the elderly: " yes  Reviewed for potential of harmful drug interactions in the elderly: not applicable    Follow Up:  Return in about 3 months (around 12/18/2017) for Recheck.     An After Visit Summary and PPPS with all of these plans were given to the patient.   Information has been scanned into chart.  Discussed importance of taking medications as prescribed. Encouraged healthy eating habits with low fat, low salt choices and working towards maintaining a healthy weight. Recommended regular exercise if able as well as care to prevent falls.

## 2017-09-25 ENCOUNTER — TELEPHONE (OUTPATIENT)
Dept: FAMILY MEDICINE CLINIC | Facility: CLINIC | Age: 67
End: 2017-09-25

## 2017-09-25 RX ORDER — GLIMEPIRIDE 4 MG/1
4 TABLET ORAL 2 TIMES DAILY
Qty: 180 TABLET | Refills: 3 | Status: SHIPPED | OUTPATIENT
Start: 2017-09-25 | End: 2017-10-10 | Stop reason: ALTCHOICE

## 2017-09-25 NOTE — TELEPHONE ENCOUNTER
Pr Dr. Finn, Ms. Hyde has been called with her recent lab results & recommendations.  Continue her current medications and follow-up as planned or sooner if any problems.    New Script Sent

## 2017-09-25 NOTE — TELEPHONE ENCOUNTER
Pr Dr. Finn, Ms. Hyde has been called with her recent lab results & recommendations.  Continue her current medications and follow-up as planned or sooner if any problems.    New Script Sent      ----- Message from Belinda Finn MD sent at 9/20/2017  3:57 PM CDT -----  Call and tell A1c is still high at 8.9, we need to add a medication to her metformin for her diabetes.  She should start on glimepiride 4 mg daily, send a prescription for her for 6 months

## 2017-09-25 NOTE — TELEPHONE ENCOUNTER
----- Message from Belinda Finn MD sent at 9/20/2017  3:57 PM CDT -----  Call and tell A1c is still high at 8.9, we need to add a medication to her metformin for her diabetes.  She should start on glimepiride 4 mg daily, send a prescription for her for 6 months

## 2017-09-27 ENCOUNTER — APPOINTMENT (OUTPATIENT)
Dept: CT IMAGING | Facility: HOSPITAL | Age: 67
End: 2017-09-27

## 2017-09-27 ENCOUNTER — APPOINTMENT (OUTPATIENT)
Dept: GENERAL RADIOLOGY | Facility: HOSPITAL | Age: 67
End: 2017-09-27

## 2017-09-27 ENCOUNTER — HOSPITAL ENCOUNTER (INPATIENT)
Facility: HOSPITAL | Age: 67
LOS: 6 days | Discharge: HOME HEALTH | End: 2017-10-03
Attending: PHYSICIAN ASSISTANT | Admitting: INTERNAL MEDICINE

## 2017-09-27 DIAGNOSIS — Z74.09 IMPAIRED PHYSICAL MOBILITY: ICD-10-CM

## 2017-09-27 DIAGNOSIS — K52.9 COLITIS: Primary | ICD-10-CM

## 2017-09-27 DIAGNOSIS — A41.9 SEPSIS, DUE TO UNSPECIFIED ORGANISM: ICD-10-CM

## 2017-09-27 DIAGNOSIS — Z78.9 IMPAIRED MOBILITY AND ADLS: ICD-10-CM

## 2017-09-27 DIAGNOSIS — M15.9 OSTEOARTHRITIS OF MULTIPLE JOINTS, UNSPECIFIED OSTEOARTHRITIS TYPE: ICD-10-CM

## 2017-09-27 DIAGNOSIS — Z74.09 IMPAIRED MOBILITY AND ADLS: ICD-10-CM

## 2017-09-27 LAB
ALBUMIN SERPL-MCNC: 3.6 G/DL (ref 3.4–4.8)
ALBUMIN/GLOB SERPL: 1.4 G/DL (ref 1.1–1.8)
ALP SERPL-CCNC: 105 U/L (ref 38–126)
ALT SERPL W P-5'-P-CCNC: 67 U/L (ref 9–52)
ANION GAP SERPL CALCULATED.3IONS-SCNC: 15 MMOL/L (ref 5–15)
ANION GAP SERPL CALCULATED.3IONS-SCNC: 17 MMOL/L (ref 5–15)
ARTERIAL PATENCY WRIST A: ABNORMAL
AST SERPL-CCNC: 40 U/L (ref 14–36)
ATMOSPHERIC PRESS: ABNORMAL MMHG
BACTERIA UR QL AUTO: ABNORMAL /HPF
BASE EXCESS BLDA CALC-SCNC: 1.9 MMOL/L (ref -2.4–2.4)
BASOPHILS # BLD AUTO: 0.01 10*3/MM3 (ref 0–0.2)
BASOPHILS NFR BLD AUTO: 0.1 % (ref 0–2)
BDY SITE: ABNORMAL
BILIRUB SERPL-MCNC: 0.7 MG/DL (ref 0.2–1.3)
BILIRUB UR QL STRIP: NEGATIVE
BUN BLD-MCNC: 20 MG/DL (ref 7–21)
BUN BLD-MCNC: 21 MG/DL (ref 7–21)
BUN/CREAT SERPL: 19.6 (ref 7–25)
BUN/CREAT SERPL: 19.8 (ref 7–25)
CA-I BLD-MCNC: 4.1 MG/DL (ref 4.5–4.9)
CALCIUM SPEC-SCNC: 7.9 MG/DL (ref 8.4–10.2)
CALCIUM SPEC-SCNC: 8.5 MG/DL (ref 8.4–10.2)
CHLORIDE SERPL-SCNC: 90 MMOL/L (ref 95–110)
CHLORIDE SERPL-SCNC: 96 MMOL/L (ref 95–110)
CLARITY UR: CLEAR
CO2 BLDA-SCNC: 25.9 MMOL/L (ref 23–27)
CO2 SERPL-SCNC: 21 MMOL/L (ref 22–31)
CO2 SERPL-SCNC: 22 MMOL/L (ref 22–31)
COLOR UR: YELLOW
CREAT BLD-MCNC: 1.02 MG/DL (ref 0.5–1)
CREAT BLD-MCNC: 1.06 MG/DL (ref 0.5–1)
D-LACTATE SERPL-SCNC: 2.5 MMOL/L (ref 0.5–2)
D-LACTATE SERPL-SCNC: 4.7 MMOL/L (ref 0.5–2)
DEPRECATED RDW RBC AUTO: 45.5 FL (ref 36.4–46.3)
EOSINOPHIL # BLD AUTO: 0 10*3/MM3 (ref 0–0.7)
EOSINOPHIL NFR BLD AUTO: 0 % (ref 0–7)
ERYTHROCYTE [DISTWIDTH] IN BLOOD BY AUTOMATED COUNT: 14 % (ref 11.5–14.5)
GFR SERPL CREATININE-BSD FRML MDRD: 52 ML/MIN/1.73 (ref 45–104)
GFR SERPL CREATININE-BSD FRML MDRD: 54 ML/MIN/1.73 (ref 45–104)
GLOBULIN UR ELPH-MCNC: 2.6 GM/DL (ref 2.3–3.5)
GLUCOSE BLD-MCNC: 338 MG/DL (ref 60–100)
GLUCOSE BLD-MCNC: 614 MG/DL (ref 60–100)
GLUCOSE BLDA-MCNC: 375 MMOL/L
GLUCOSE BLDC GLUCOMTR-MCNC: 267 MG/DL (ref 70–130)
GLUCOSE BLDC GLUCOMTR-MCNC: 330 MG/DL (ref 70–130)
GLUCOSE BLDC GLUCOMTR-MCNC: 330 MG/DL (ref 70–130)
GLUCOSE BLDC GLUCOMTR-MCNC: 371 MG/DL (ref 70–130)
GLUCOSE UR STRIP-MCNC: ABNORMAL MG/DL
HCO3 BLDA-SCNC: 24.9 MMOL/L (ref 22–26)
HCT VFR BLD AUTO: 37.5 % (ref 35–45)
HCT VFR BLD CALC: 36 % (ref 38–47)
HGB BLD-MCNC: 13.1 G/DL (ref 12–15.5)
HGB BLDA-MCNC: 12.2 G/DL (ref 12–16)
HGB UR QL STRIP.AUTO: ABNORMAL
HOLD SPECIMEN: NORMAL
HOLD SPECIMEN: NORMAL
HYALINE CASTS UR QL AUTO: ABNORMAL /LPF
IMM GRANULOCYTES # BLD: 0.23 10*3/MM3 (ref 0–0.02)
IMM GRANULOCYTES NFR BLD: 1.2 % (ref 0–0.5)
KETONES UR QL STRIP: NEGATIVE
LEUKOCYTE ESTERASE UR QL STRIP.AUTO: NEGATIVE
LIPASE SERPL-CCNC: <10 U/L (ref 23–300)
LYMPHOCYTES # BLD AUTO: 0.67 10*3/MM3 (ref 0.6–4.2)
LYMPHOCYTES NFR BLD AUTO: 3.4 % (ref 10–50)
MCH RBC QN AUTO: 30.8 PG (ref 26.5–34)
MCHC RBC AUTO-ENTMCNC: 34.9 G/DL (ref 31.4–36)
MCV RBC AUTO: 88.2 FL (ref 80–98)
MODALITY: ABNORMAL
MONOCYTES # BLD AUTO: 0.91 10*3/MM3 (ref 0–0.9)
MONOCYTES NFR BLD AUTO: 4.7 % (ref 0–12)
NEUTROPHILS # BLD AUTO: 17.71 10*3/MM3 (ref 2–8.6)
NEUTROPHILS NFR BLD AUTO: 90.6 % (ref 37–80)
NITRITE UR QL STRIP: NEGATIVE
NRBC BLD MANUAL-RTO: 0 /100 WBC (ref 0–0)
PCO2 BLDA: 33.6 MM HG (ref 35–45)
PH BLDA: 7.49 PH UNITS (ref 7.35–7.45)
PH UR STRIP.AUTO: 5.5 [PH] (ref 5–9)
PLATELET # BLD AUTO: 236 10*3/MM3 (ref 150–450)
PMV BLD AUTO: 10.3 FL (ref 8–12)
PO2 BLDA: 58 MM HG (ref 80–105)
POTASSIUM BLD-SCNC: 3.5 MMOL/L (ref 3.5–5.1)
POTASSIUM BLD-SCNC: 3.8 MMOL/L (ref 3.5–5.1)
POTASSIUM BLDA-SCNC: 3.53 MMOL/L (ref 3.6–4.9)
PROT SERPL-MCNC: 6.2 G/DL (ref 6.3–8.6)
PROT UR QL STRIP: ABNORMAL
RBC # BLD AUTO: 4.25 10*6/MM3 (ref 3.77–5.16)
RBC # UR: ABNORMAL /HPF
REF LAB TEST METHOD: ABNORMAL
SAO2 % BLDCOA: 92 % (ref 94–100)
SODIUM BLD-SCNC: 128 MMOL/L (ref 137–145)
SODIUM BLD-SCNC: 133 MMOL/L (ref 137–145)
SODIUM BLDA-SCNC: 134.1 MMOL/L (ref 138–146)
SP GR UR STRIP: 1.05 (ref 1–1.03)
SQUAMOUS #/AREA URNS HPF: ABNORMAL /HPF
UROBILINOGEN UR QL STRIP: ABNORMAL
WBC NRBC COR # BLD: 19.53 10*3/MM3 (ref 3.2–9.8)
WBC UR QL AUTO: ABNORMAL /HPF
WHOLE BLOOD HOLD SPECIMEN: NORMAL
WHOLE BLOOD HOLD SPECIMEN: NORMAL

## 2017-09-27 PROCEDURE — 82803 BLOOD GASES ANY COMBINATION: CPT | Performed by: FAMILY MEDICINE

## 2017-09-27 PROCEDURE — 36600 WITHDRAWAL OF ARTERIAL BLOOD: CPT

## 2017-09-27 PROCEDURE — 82962 GLUCOSE BLOOD TEST: CPT

## 2017-09-27 PROCEDURE — 71010 HC CHEST PA OR AP: CPT

## 2017-09-27 PROCEDURE — 25010000002 ONDANSETRON PER 1 MG: Performed by: INTERNAL MEDICINE

## 2017-09-27 PROCEDURE — 25010000002 LEVOFLOXACIN PER 250 MG: Performed by: PHYSICIAN ASSISTANT

## 2017-09-27 PROCEDURE — 87040 BLOOD CULTURE FOR BACTERIA: CPT | Performed by: EMERGENCY MEDICINE

## 2017-09-27 PROCEDURE — 0 IOPAMIDOL 61 % SOLUTION: Performed by: EMERGENCY MEDICINE

## 2017-09-27 PROCEDURE — 83690 ASSAY OF LIPASE: CPT | Performed by: EMERGENCY MEDICINE

## 2017-09-27 PROCEDURE — 81001 URINALYSIS AUTO W/SCOPE: CPT | Performed by: EMERGENCY MEDICINE

## 2017-09-27 PROCEDURE — 85025 COMPLETE CBC W/AUTO DIFF WBC: CPT | Performed by: EMERGENCY MEDICINE

## 2017-09-27 PROCEDURE — 83605 ASSAY OF LACTIC ACID: CPT | Performed by: EMERGENCY MEDICINE

## 2017-09-27 PROCEDURE — 25010000002 MORPHINE PER 10 MG: Performed by: EMERGENCY MEDICINE

## 2017-09-27 PROCEDURE — 94760 N-INVAS EAR/PLS OXIMETRY 1: CPT

## 2017-09-27 PROCEDURE — 87186 SC STD MICRODIL/AGAR DIL: CPT | Performed by: EMERGENCY MEDICINE

## 2017-09-27 PROCEDURE — 80053 COMPREHEN METABOLIC PANEL: CPT | Performed by: EMERGENCY MEDICINE

## 2017-09-27 PROCEDURE — 63710000001 INSULIN REGULAR HUMAN PER 5 UNITS: Performed by: FAMILY MEDICINE

## 2017-09-27 PROCEDURE — 25010000002 ONDANSETRON PER 1 MG: Performed by: EMERGENCY MEDICINE

## 2017-09-27 PROCEDURE — 83036 HEMOGLOBIN GLYCOSYLATED A1C: CPT | Performed by: FAMILY MEDICINE

## 2017-09-27 PROCEDURE — 74177 CT ABD & PELVIS W/CONTRAST: CPT

## 2017-09-27 PROCEDURE — 94799 UNLISTED PULMONARY SVC/PX: CPT

## 2017-09-27 PROCEDURE — 25010000002 CEFTRIAXONE: Performed by: FAMILY MEDICINE

## 2017-09-27 PROCEDURE — 25810000003 POTASSIUM CHLORIDE PER 2 MEQ: Performed by: FAMILY MEDICINE

## 2017-09-27 PROCEDURE — 99284 EMERGENCY DEPT VISIT MOD MDM: CPT

## 2017-09-27 PROCEDURE — 87077 CULTURE AEROBIC IDENTIFY: CPT | Performed by: EMERGENCY MEDICINE

## 2017-09-27 PROCEDURE — 87150 DNA/RNA AMPLIFIED PROBE: CPT | Performed by: EMERGENCY MEDICINE

## 2017-09-27 PROCEDURE — 25010000002 HEPARIN (PORCINE) PER 1000 UNITS: Performed by: FAMILY MEDICINE

## 2017-09-27 PROCEDURE — 87086 URINE CULTURE/COLONY COUNT: CPT | Performed by: EMERGENCY MEDICINE

## 2017-09-27 PROCEDURE — 63710000001 INSULIN REGULAR HUMAN PER 5 UNITS: Performed by: PHYSICIAN ASSISTANT

## 2017-09-27 RX ORDER — MORPHINE SULFATE 4 MG/ML
4 INJECTION, SOLUTION INTRAMUSCULAR; INTRAVENOUS ONCE
Status: COMPLETED | OUTPATIENT
Start: 2017-09-27 | End: 2017-09-27

## 2017-09-27 RX ORDER — SODIUM CHLORIDE 9 MG/ML
500 INJECTION, SOLUTION INTRAVENOUS ONCE
Status: COMPLETED | OUTPATIENT
Start: 2017-09-27 | End: 2017-09-27

## 2017-09-27 RX ORDER — ACETAMINOPHEN 500 MG
1000 TABLET ORAL ONCE
Status: COMPLETED | OUTPATIENT
Start: 2017-09-27 | End: 2017-09-27

## 2017-09-27 RX ORDER — POTASSIUM CHLORIDE 750 MG/1
20 CAPSULE, EXTENDED RELEASE ORAL AS NEEDED
Status: DISCONTINUED | OUTPATIENT
Start: 2017-09-27 | End: 2017-10-03 | Stop reason: HOSPADM

## 2017-09-27 RX ORDER — ONDANSETRON 2 MG/ML
4 INJECTION INTRAMUSCULAR; INTRAVENOUS EVERY 4 HOURS PRN
Status: DISCONTINUED | OUTPATIENT
Start: 2017-09-27 | End: 2017-10-03 | Stop reason: HOSPADM

## 2017-09-27 RX ORDER — DEXTROSE MONOHYDRATE 25 G/50ML
12.5 INJECTION, SOLUTION INTRAVENOUS
Status: DISCONTINUED | OUTPATIENT
Start: 2017-09-27 | End: 2017-10-03 | Stop reason: HOSPADM

## 2017-09-27 RX ORDER — SODIUM CHLORIDE 450 MG/100ML
250 INJECTION, SOLUTION INTRAVENOUS CONTINUOUS
Status: DISCONTINUED | OUTPATIENT
Start: 2017-09-27 | End: 2017-09-28

## 2017-09-27 RX ORDER — ONDANSETRON 2 MG/ML
4 INJECTION INTRAMUSCULAR; INTRAVENOUS ONCE
Status: COMPLETED | OUTPATIENT
Start: 2017-09-27 | End: 2017-09-27

## 2017-09-27 RX ORDER — DEXTROSE, SODIUM CHLORIDE, AND POTASSIUM CHLORIDE 5; .45; .15 G/100ML; G/100ML; G/100ML
150 INJECTION INTRAVENOUS CONTINUOUS PRN
Status: DISCONTINUED | OUTPATIENT
Start: 2017-09-27 | End: 2017-09-28

## 2017-09-27 RX ORDER — SODIUM CHLORIDE 0.9 % (FLUSH) 0.9 %
10 SYRINGE (ML) INJECTION AS NEEDED
Status: DISCONTINUED | OUTPATIENT
Start: 2017-09-27 | End: 2017-10-03 | Stop reason: HOSPADM

## 2017-09-27 RX ORDER — POTASSIUM CHLORIDE 1.5 G/1.77G
10 POWDER, FOR SOLUTION ORAL AS NEEDED
Status: DISCONTINUED | OUTPATIENT
Start: 2017-09-27 | End: 2017-10-03 | Stop reason: HOSPADM

## 2017-09-27 RX ORDER — POTASSIUM CHLORIDE 750 MG/1
40 CAPSULE, EXTENDED RELEASE ORAL AS NEEDED
Status: DISCONTINUED | OUTPATIENT
Start: 2017-09-27 | End: 2017-10-03 | Stop reason: HOSPADM

## 2017-09-27 RX ORDER — SODIUM CHLORIDE 0.9 % (FLUSH) 0.9 %
30 SYRINGE (ML) INJECTION ONCE AS NEEDED
Status: COMPLETED | OUTPATIENT
Start: 2017-09-27 | End: 2017-09-28

## 2017-09-27 RX ORDER — POTASSIUM CHLORIDE 1.5 G/1.77G
20 POWDER, FOR SOLUTION ORAL AS NEEDED
Status: DISCONTINUED | OUTPATIENT
Start: 2017-09-27 | End: 2017-10-03 | Stop reason: HOSPADM

## 2017-09-27 RX ORDER — ACETAMINOPHEN 650 MG/1
650 SUPPOSITORY RECTAL EVERY 4 HOURS PRN
Status: DISCONTINUED | OUTPATIENT
Start: 2017-09-27 | End: 2017-10-03 | Stop reason: HOSPADM

## 2017-09-27 RX ORDER — SODIUM CHLORIDE 9 MG/ML
30 INJECTION, SOLUTION INTRAVENOUS CONTINUOUS PRN
Status: DISCONTINUED | OUTPATIENT
Start: 2017-09-27 | End: 2017-09-27

## 2017-09-27 RX ORDER — POTASSIUM CHLORIDE 1.5 G/1.77G
40 POWDER, FOR SOLUTION ORAL AS NEEDED
Status: DISCONTINUED | OUTPATIENT
Start: 2017-09-27 | End: 2017-10-03 | Stop reason: HOSPADM

## 2017-09-27 RX ORDER — SODIUM CHLORIDE AND POTASSIUM CHLORIDE 150; 450 MG/100ML; MG/100ML
250 INJECTION, SOLUTION INTRAVENOUS CONTINUOUS PRN
Status: DISCONTINUED | OUTPATIENT
Start: 2017-09-27 | End: 2017-09-28

## 2017-09-27 RX ORDER — LEVOFLOXACIN 5 MG/ML
500 INJECTION, SOLUTION INTRAVENOUS ONCE
Status: COMPLETED | OUTPATIENT
Start: 2017-09-27 | End: 2017-09-27

## 2017-09-27 RX ORDER — POTASSIUM CHLORIDE 7.45 MG/ML
10 INJECTION INTRAVENOUS
Status: DISCONTINUED | OUTPATIENT
Start: 2017-09-27 | End: 2017-10-03 | Stop reason: HOSPADM

## 2017-09-27 RX ORDER — POTASSIUM CHLORIDE 750 MG/1
10 CAPSULE, EXTENDED RELEASE ORAL AS NEEDED
Status: DISCONTINUED | OUTPATIENT
Start: 2017-09-27 | End: 2017-10-03 | Stop reason: HOSPADM

## 2017-09-27 RX ORDER — SODIUM CHLORIDE 0.9 % (FLUSH) 0.9 %
1-10 SYRINGE (ML) INJECTION AS NEEDED
Status: DISCONTINUED | OUTPATIENT
Start: 2017-09-27 | End: 2017-10-03 | Stop reason: HOSPADM

## 2017-09-27 RX ORDER — HEPARIN SODIUM 5000 [USP'U]/ML
5000 INJECTION, SOLUTION INTRAVENOUS; SUBCUTANEOUS EVERY 8 HOURS SCHEDULED
Status: DISCONTINUED | OUTPATIENT
Start: 2017-09-27 | End: 2017-10-03 | Stop reason: HOSPADM

## 2017-09-27 RX ORDER — SODIUM CHLORIDE 9 MG/ML
125 INJECTION, SOLUTION INTRAVENOUS CONTINUOUS PRN
Status: DISCONTINUED | OUTPATIENT
Start: 2017-09-27 | End: 2017-09-28

## 2017-09-27 RX ORDER — DEXTROSE AND SODIUM CHLORIDE 5; .45 G/100ML; G/100ML
150 INJECTION, SOLUTION INTRAVENOUS CONTINUOUS PRN
Status: DISCONTINUED | OUTPATIENT
Start: 2017-09-27 | End: 2017-09-28

## 2017-09-27 RX ADMIN — METRONIDAZOLE 500 MG: 500 INJECTION, SOLUTION INTRAVENOUS at 14:06

## 2017-09-27 RX ADMIN — SODIUM CHLORIDE 2721 ML: 900 INJECTION, SOLUTION INTRAVENOUS at 15:28

## 2017-09-27 RX ADMIN — ACETAMINOPHEN 1000 MG: 500 TABLET ORAL at 14:31

## 2017-09-27 RX ADMIN — HEPARIN SODIUM 5000 UNITS: 5000 INJECTION, SOLUTION INTRAVENOUS; SUBCUTANEOUS at 23:20

## 2017-09-27 RX ADMIN — MORPHINE SULFATE 4 MG: 4 INJECTION, SOLUTION INTRAMUSCULAR; INTRAVENOUS at 15:36

## 2017-09-27 RX ADMIN — ONDANSETRON 4 MG: 2 INJECTION INTRAMUSCULAR; INTRAVENOUS at 13:05

## 2017-09-27 RX ADMIN — IOPAMIDOL 95 ML: 612 INJECTION, SOLUTION INTRAVENOUS at 14:39

## 2017-09-27 RX ADMIN — CEFTRIAXONE 1 G: 1 INJECTION, POWDER, FOR SOLUTION INTRAMUSCULAR; INTRAVENOUS at 21:36

## 2017-09-27 RX ADMIN — ONDANSETRON 4 MG: 2 INJECTION INTRAMUSCULAR; INTRAVENOUS at 21:49

## 2017-09-27 RX ADMIN — SODIUM CHLORIDE 0.1 UNITS/KG/HR: 9 INJECTION, SOLUTION INTRAVENOUS at 21:57

## 2017-09-27 RX ADMIN — SODIUM CHLORIDE 1000 ML: 9 INJECTION, SOLUTION INTRAVENOUS at 21:47

## 2017-09-27 RX ADMIN — HUMAN INSULIN 10 UNITS: 100 INJECTION, SOLUTION SUBCUTANEOUS at 13:53

## 2017-09-27 RX ADMIN — HUMAN INSULIN 10 UNITS: 100 INJECTION, SOLUTION SUBCUTANEOUS at 21:52

## 2017-09-27 RX ADMIN — SODIUM CHLORIDE 500 ML: 9 INJECTION, SOLUTION INTRAVENOUS at 13:05

## 2017-09-27 RX ADMIN — LEVOFLOXACIN 500 MG: 5 INJECTION, SOLUTION INTRAVENOUS at 15:28

## 2017-09-27 RX ADMIN — SODIUM CHLORIDE 1000 ML: 9 INJECTION, SOLUTION INTRAVENOUS at 13:50

## 2017-09-27 RX ADMIN — POTASSIUM CHLORIDE AND SODIUM CHLORIDE 250 ML/HR: 450; 150 INJECTION, SOLUTION INTRAVENOUS at 21:35

## 2017-09-27 RX ADMIN — SODIUM CHLORIDE 1000 ML/HR: 9 INJECTION, SOLUTION INTRAVENOUS at 14:19

## 2017-09-27 RX ADMIN — ACETAMINOPHEN 650 MG: 650 SUPPOSITORY RECTAL at 22:09

## 2017-09-28 ENCOUNTER — APPOINTMENT (OUTPATIENT)
Dept: ULTRASOUND IMAGING | Facility: HOSPITAL | Age: 67
End: 2017-09-28

## 2017-09-28 ENCOUNTER — APPOINTMENT (OUTPATIENT)
Dept: GENERAL RADIOLOGY | Facility: HOSPITAL | Age: 67
End: 2017-09-28

## 2017-09-28 ENCOUNTER — APPOINTMENT (OUTPATIENT)
Dept: CT IMAGING | Facility: HOSPITAL | Age: 67
End: 2017-09-28

## 2017-09-28 ENCOUNTER — APPOINTMENT (OUTPATIENT)
Dept: INTERVENTIONAL RADIOLOGY/VASCULAR | Facility: HOSPITAL | Age: 67
End: 2017-09-28

## 2017-09-28 LAB
ANION GAP SERPL CALCULATED.3IONS-SCNC: 11 MMOL/L (ref 5–15)
BACTERIA BLD CULT: ABNORMAL
BUN BLD-MCNC: 18 MG/DL (ref 7–21)
BUN BLD-MCNC: 20 MG/DL (ref 7–21)
BUN BLD-MCNC: 21 MG/DL (ref 7–21)
BUN/CREAT SERPL: 19.4 (ref 7–25)
BUN/CREAT SERPL: 20 (ref 7–25)
BUN/CREAT SERPL: 20.5 (ref 7–25)
CA-I BLD-MCNC: 3.7 MG/DL (ref 4.5–4.9)
CA-I BLD-MCNC: 3.7 MG/DL (ref 4.5–4.9)
CA-I BLD-MCNC: 3.8 MG/DL (ref 4.5–4.9)
CA-I BLD-MCNC: 4 MG/DL (ref 4.5–4.9)
CALCIUM SPEC-SCNC: 6.5 MG/DL (ref 8.4–10.2)
CALCIUM SPEC-SCNC: 7.4 MG/DL (ref 8.4–10.2)
CALCIUM SPEC-SCNC: 7.5 MG/DL (ref 8.4–10.2)
CHLORIDE SERPL-SCNC: 100 MMOL/L (ref 95–110)
CHLORIDE SERPL-SCNC: 104 MMOL/L (ref 95–110)
CHLORIDE SERPL-SCNC: 104 MMOL/L (ref 95–110)
CO2 SERPL-SCNC: 18 MMOL/L (ref 22–31)
CO2 SERPL-SCNC: 22 MMOL/L (ref 22–31)
CO2 SERPL-SCNC: 24 MMOL/L (ref 22–31)
CREAT BLD-MCNC: 0.88 MG/DL (ref 0.5–1)
CREAT BLD-MCNC: 1 MG/DL (ref 0.5–1)
CREAT BLD-MCNC: 1.08 MG/DL (ref 0.5–1)
DEPRECATED RDW RBC AUTO: 47.1 FL (ref 36.4–46.3)
ERYTHROCYTE [DISTWIDTH] IN BLOOD BY AUTOMATED COUNT: 14.3 % (ref 11.5–14.5)
GFR SERPL CREATININE-BSD FRML MDRD: 51 ML/MIN/1.73 (ref 60–104)
GFR SERPL CREATININE-BSD FRML MDRD: 55 ML/MIN/1.73 (ref 60–104)
GFR SERPL CREATININE-BSD FRML MDRD: 64 ML/MIN/1.73 (ref 60–104)
GLUCOSE BLD-MCNC: 153 MG/DL (ref 60–100)
GLUCOSE BLD-MCNC: 304 MG/DL (ref 60–100)
GLUCOSE BLD-MCNC: 331 MG/DL (ref 60–100)
GLUCOSE BLDC GLUCOMTR-MCNC: 157 MG/DL (ref 70–130)
GLUCOSE BLDC GLUCOMTR-MCNC: 166 MG/DL (ref 70–130)
GLUCOSE BLDC GLUCOMTR-MCNC: 167 MG/DL (ref 70–130)
GLUCOSE BLDC GLUCOMTR-MCNC: 196 MG/DL (ref 70–130)
GLUCOSE BLDC GLUCOMTR-MCNC: 254 MG/DL (ref 70–130)
GLUCOSE BLDC GLUCOMTR-MCNC: 296 MG/DL (ref 70–130)
GLUCOSE BLDC GLUCOMTR-MCNC: 304 MG/DL (ref 70–130)
GLUCOSE BLDC GLUCOMTR-MCNC: 360 MG/DL (ref 70–130)
HBA1C MFR BLD: 8.8 % (ref 4–5.6)
HCT VFR BLD AUTO: 32.2 % (ref 35–45)
HGB BLD-MCNC: 11.1 G/DL (ref 12–15.5)
HOLD SPECIMEN: NORMAL
HOLD SPECIMEN: NORMAL
MAGNESIUM SERPL-MCNC: 1.4 MG/DL (ref 1.6–2.3)
MAGNESIUM SERPL-MCNC: 1.8 MG/DL (ref 1.6–2.3)
MAGNESIUM SERPL-MCNC: 2.1 MG/DL (ref 1.6–2.3)
MCH RBC QN AUTO: 30.7 PG (ref 26.5–34)
MCHC RBC AUTO-ENTMCNC: 34.5 G/DL (ref 31.4–36)
MCV RBC AUTO: 89 FL (ref 80–98)
PHOSPHATE SERPL-MCNC: 1.2 MG/DL (ref 2.4–4.4)
PHOSPHATE SERPL-MCNC: 1.7 MG/DL (ref 2.4–4.4)
PHOSPHATE SERPL-MCNC: 2 MG/DL (ref 2.4–4.4)
PLATELET # BLD AUTO: 171 10*3/MM3 (ref 150–450)
PMV BLD AUTO: 10.3 FL (ref 8–12)
POTASSIUM BLD-SCNC: 3.4 MMOL/L (ref 3.5–5.1)
POTASSIUM BLD-SCNC: 3.6 MMOL/L (ref 3.5–5.1)
POTASSIUM BLD-SCNC: 3.8 MMOL/L (ref 3.5–5.1)
RBC # BLD AUTO: 3.62 10*6/MM3 (ref 3.77–5.16)
SODIUM BLD-SCNC: 133 MMOL/L (ref 137–145)
SODIUM BLD-SCNC: 133 MMOL/L (ref 137–145)
SODIUM BLD-SCNC: 139 MMOL/L (ref 137–145)
WBC NRBC COR # BLD: 23.97 10*3/MM3 (ref 3.2–9.8)
WHOLE BLOOD HOLD SPECIMEN: NORMAL
WHOLE BLOOD HOLD SPECIMEN: NORMAL

## 2017-09-28 PROCEDURE — 25010000002 CALCIUM GLUCONATE PER 10 ML: Performed by: FAMILY MEDICINE

## 2017-09-28 PROCEDURE — 82962 GLUCOSE BLOOD TEST: CPT

## 2017-09-28 PROCEDURE — 80048 BASIC METABOLIC PNL TOTAL CA: CPT | Performed by: INTERNAL MEDICINE

## 2017-09-28 PROCEDURE — B548ZZA ULTRASONOGRAPHY OF SUPERIOR VENA CAVA, GUIDANCE: ICD-10-PCS | Performed by: INTERNAL MEDICINE

## 2017-09-28 PROCEDURE — C1751 CATH, INF, PER/CENT/MIDLINE: HCPCS

## 2017-09-28 PROCEDURE — 63710000001 INSULIN ASPART PER 5 UNITS: Performed by: FAMILY MEDICINE

## 2017-09-28 PROCEDURE — 25010000002 ONDANSETRON PER 1 MG: Performed by: INTERNAL MEDICINE

## 2017-09-28 PROCEDURE — 94799 UNLISTED PULMONARY SVC/PX: CPT

## 2017-09-28 PROCEDURE — 76937 US GUIDE VASCULAR ACCESS: CPT

## 2017-09-28 PROCEDURE — 85027 COMPLETE CBC AUTOMATED: CPT | Performed by: FAMILY MEDICINE

## 2017-09-28 PROCEDURE — 25010000003 POTASSIUM CHLORIDE 10 MEQ/100ML SOLUTION: Performed by: FAMILY MEDICINE

## 2017-09-28 PROCEDURE — 82330 ASSAY OF CALCIUM: CPT | Performed by: INTERNAL MEDICINE

## 2017-09-28 PROCEDURE — 94760 N-INVAS EAR/PLS OXIMETRY 1: CPT

## 2017-09-28 PROCEDURE — 83735 ASSAY OF MAGNESIUM: CPT | Performed by: INTERNAL MEDICINE

## 2017-09-28 PROCEDURE — 25010000002 PIPERACILLIN SOD-TAZOBACTAM PER 1 G: Performed by: FAMILY MEDICINE

## 2017-09-28 PROCEDURE — 63710000001 INSULIN ASPART PER 5 UNITS: Performed by: INTERNAL MEDICINE

## 2017-09-28 PROCEDURE — 25010000002 MAGNESIUM SULFATE 2 GM/50ML SOLUTION: Performed by: FAMILY MEDICINE

## 2017-09-28 PROCEDURE — 25010000002 MAGNESIUM SULFATE IN D5W 1G/100ML (PREMIX) 1-5 GM/100ML-% SOLUTION: Performed by: INTERNAL MEDICINE

## 2017-09-28 PROCEDURE — 63710000001 INSULIN DETEMIR PER 5 UNITS: Performed by: FAMILY MEDICINE

## 2017-09-28 PROCEDURE — 72125 CT NECK SPINE W/O DYE: CPT

## 2017-09-28 PROCEDURE — 02HV33Z INSERTION OF INFUSION DEVICE INTO SUPERIOR VENA CAVA, PERCUTANEOUS APPROACH: ICD-10-PCS | Performed by: INTERNAL MEDICINE

## 2017-09-28 PROCEDURE — 25010000002 HEPARIN (PORCINE) PER 1000 UNITS: Performed by: FAMILY MEDICINE

## 2017-09-28 PROCEDURE — 84100 ASSAY OF PHOSPHORUS: CPT | Performed by: INTERNAL MEDICINE

## 2017-09-28 RX ORDER — DEXTROSE MONOHYDRATE 25 G/50ML
25 INJECTION, SOLUTION INTRAVENOUS
Status: DISCONTINUED | OUTPATIENT
Start: 2017-09-28 | End: 2017-10-03 | Stop reason: HOSPADM

## 2017-09-28 RX ORDER — SODIUM CHLORIDE 9 MG/ML
125 INJECTION, SOLUTION INTRAVENOUS CONTINUOUS
Status: DISCONTINUED | OUTPATIENT
Start: 2017-09-28 | End: 2017-10-03 | Stop reason: HOSPADM

## 2017-09-28 RX ORDER — CALCIUM GLUCONATE 94 MG/ML
1 INJECTION, SOLUTION INTRAVENOUS ONCE
Status: DISCONTINUED | OUTPATIENT
Start: 2017-09-28 | End: 2017-09-28

## 2017-09-28 RX ORDER — MAGNESIUM SULFATE HEPTAHYDRATE 40 MG/ML
2 INJECTION, SOLUTION INTRAVENOUS ONCE
Status: COMPLETED | OUTPATIENT
Start: 2017-09-28 | End: 2017-09-28

## 2017-09-28 RX ORDER — DEXTROSE MONOHYDRATE 25 G/50ML
25 INJECTION, SOLUTION INTRAVENOUS
Status: DISCONTINUED | OUTPATIENT
Start: 2017-09-28 | End: 2017-09-28 | Stop reason: SDUPTHER

## 2017-09-28 RX ORDER — NICOTINE POLACRILEX 4 MG
15 LOZENGE BUCCAL
Status: DISCONTINUED | OUTPATIENT
Start: 2017-09-28 | End: 2017-10-03 | Stop reason: HOSPADM

## 2017-09-28 RX ORDER — HYDROCODONE BITARTRATE AND ACETAMINOPHEN 7.5; 325 MG/1; MG/1
1 TABLET ORAL EVERY 6 HOURS PRN
Status: DISCONTINUED | OUTPATIENT
Start: 2017-09-28 | End: 2017-10-03 | Stop reason: HOSPADM

## 2017-09-28 RX ORDER — MAGNESIUM SULFATE 1 G/100ML
1 INJECTION INTRAVENOUS ONCE
Status: COMPLETED | OUTPATIENT
Start: 2017-09-28 | End: 2017-09-28

## 2017-09-28 RX ORDER — CALCIUM GLUCONATE 94 MG/ML
1 INJECTION, SOLUTION INTRAVENOUS ONCE
Status: COMPLETED | OUTPATIENT
Start: 2017-09-28 | End: 2017-09-28

## 2017-09-28 RX ORDER — NICOTINE POLACRILEX 4 MG
15 LOZENGE BUCCAL
Status: DISCONTINUED | OUTPATIENT
Start: 2017-09-28 | End: 2017-09-28 | Stop reason: SDUPTHER

## 2017-09-28 RX ADMIN — SODIUM CHLORIDE 125 ML/HR: 900 INJECTION, SOLUTION INTRAVENOUS at 18:35

## 2017-09-28 RX ADMIN — Medication 30 ML: at 08:15

## 2017-09-28 RX ADMIN — POTASSIUM PHOSPHATE, MONOBASIC AND POTASSIUM PHOSPHATE, DIBASIC 30 MMOL: 224; 236 INJECTION, SOLUTION INTRAVENOUS at 03:46

## 2017-09-28 RX ADMIN — MAGNESIUM SULFATE HEPTAHYDRATE 1 G: 1 INJECTION, SOLUTION INTRAVENOUS at 03:47

## 2017-09-28 RX ADMIN — HYDROCODONE BITARTRATE AND ACETAMINOPHEN 1 TABLET: 7.5; 325 TABLET ORAL at 17:37

## 2017-09-28 RX ADMIN — HEPARIN SODIUM 5000 UNITS: 5000 INJECTION, SOLUTION INTRAVENOUS; SUBCUTANEOUS at 13:00

## 2017-09-28 RX ADMIN — CALCIUM GLUCONATE 1 G: 94 INJECTION, SOLUTION INTRAVENOUS at 12:59

## 2017-09-28 RX ADMIN — INSULIN ASPART 7 UNITS: 100 INJECTION, SOLUTION INTRAVENOUS; SUBCUTANEOUS at 11:51

## 2017-09-28 RX ADMIN — HEPARIN SODIUM 5000 UNITS: 5000 INJECTION, SOLUTION INTRAVENOUS; SUBCUTANEOUS at 05:26

## 2017-09-28 RX ADMIN — TAZOBACTAM SODIUM AND PIPERACILLIN SODIUM 4.5 G: 500; 4 INJECTION, SOLUTION INTRAVENOUS at 20:59

## 2017-09-28 RX ADMIN — TAZOBACTAM SODIUM AND PIPERACILLIN SODIUM 4.5 G: 500; 4 INJECTION, SOLUTION INTRAVENOUS at 12:59

## 2017-09-28 RX ADMIN — INSULIN ASPART 2 UNITS: 100 INJECTION, SOLUTION INTRAVENOUS; SUBCUTANEOUS at 08:30

## 2017-09-28 RX ADMIN — HEPARIN SODIUM 5000 UNITS: 5000 INJECTION, SOLUTION INTRAVENOUS; SUBCUTANEOUS at 21:00

## 2017-09-28 RX ADMIN — INSULIN DETEMIR 20 UNITS: 100 INJECTION, SOLUTION SUBCUTANEOUS at 21:01

## 2017-09-28 RX ADMIN — ONDANSETRON 4 MG: 2 INJECTION INTRAMUSCULAR; INTRAVENOUS at 03:39

## 2017-09-28 RX ADMIN — HYDROCODONE BITARTRATE AND ACETAMINOPHEN 1 TABLET: 7.5; 325 TABLET ORAL at 11:51

## 2017-09-28 RX ADMIN — MAGNESIUM SULFATE HEPTAHYDRATE 2 G: 40 INJECTION, SOLUTION INTRAVENOUS at 12:59

## 2017-09-28 RX ADMIN — POTASSIUM CHLORIDE, DEXTROSE MONOHYDRATE AND SODIUM CHLORIDE 150 ML/HR: 150; 5; 450 INJECTION, SOLUTION INTRAVENOUS at 01:24

## 2017-09-28 RX ADMIN — ONDANSETRON 4 MG: 2 INJECTION INTRAMUSCULAR; INTRAVENOUS at 17:37

## 2017-09-28 RX ADMIN — INSULIN ASPART 20 UNITS: 100 INJECTION, SOLUTION INTRAVENOUS; SUBCUTANEOUS at 21:01

## 2017-09-28 RX ADMIN — POTASSIUM CHLORIDE 10 MEQ: 7.46 INJECTION, SOLUTION INTRAVENOUS at 00:11

## 2017-09-28 RX ADMIN — ONDANSETRON 4 MG: 2 INJECTION INTRAMUSCULAR; INTRAVENOUS at 07:18

## 2017-09-29 LAB
ALBUMIN SERPL-MCNC: 2.5 G/DL (ref 3.4–4.8)
ALBUMIN/GLOB SERPL: 0.9 G/DL (ref 1.1–1.8)
ALP SERPL-CCNC: 64 U/L (ref 38–126)
ALT SERPL W P-5'-P-CCNC: 36 U/L (ref 9–52)
ANION GAP SERPL CALCULATED.3IONS-SCNC: 11 MMOL/L (ref 5–15)
AST SERPL-CCNC: 21 U/L (ref 14–36)
BACTERIA SPEC AEROBE CULT: NORMAL
BILIRUB SERPL-MCNC: 0.5 MG/DL (ref 0.2–1.3)
BUN BLD-MCNC: 17 MG/DL (ref 7–21)
BUN/CREAT SERPL: 20 (ref 7–25)
CALCIUM SPEC-SCNC: 6.5 MG/DL (ref 8.4–10.2)
CHLORIDE SERPL-SCNC: 106 MMOL/L (ref 95–110)
CO2 SERPL-SCNC: 20 MMOL/L (ref 22–31)
CREAT BLD-MCNC: 0.85 MG/DL (ref 0.5–1)
CRP SERPL-MCNC: 3 MG/DL (ref 0–1)
DEPRECATED RDW RBC AUTO: 46.1 FL (ref 36.4–46.3)
ERYTHROCYTE [DISTWIDTH] IN BLOOD BY AUTOMATED COUNT: 14.2 % (ref 11.5–14.5)
GFR SERPL CREATININE-BSD FRML MDRD: 67 ML/MIN/1.73 (ref 60–104)
GLOBULIN UR ELPH-MCNC: 2.8 GM/DL (ref 2.3–3.5)
GLUCOSE BLD-MCNC: 186 MG/DL (ref 60–100)
GLUCOSE BLDC GLUCOMTR-MCNC: 160 MG/DL (ref 70–130)
GLUCOSE BLDC GLUCOMTR-MCNC: 168 MG/DL (ref 70–130)
GLUCOSE BLDC GLUCOMTR-MCNC: 231 MG/DL (ref 70–130)
GLUCOSE BLDC GLUCOMTR-MCNC: 239 MG/DL (ref 70–130)
GLUCOSE BLDC GLUCOMTR-MCNC: 400 MG/DL (ref 70–130)
HCT VFR BLD AUTO: 26.6 % (ref 35–45)
HCT VFR BLD AUTO: 30.5 % (ref 35–45)
HGB BLD-MCNC: 10.3 G/DL (ref 12–15.5)
HGB BLD-MCNC: 9.1 G/DL (ref 12–15.5)
HOLD SPECIMEN: NORMAL
MCH RBC QN AUTO: 30.1 PG (ref 26.5–34)
MCHC RBC AUTO-ENTMCNC: 34.2 G/DL (ref 31.4–36)
MCV RBC AUTO: 88.1 FL (ref 80–98)
PLATELET # BLD AUTO: 158 10*3/MM3 (ref 150–450)
PMV BLD AUTO: 10.2 FL (ref 8–12)
POTASSIUM BLD-SCNC: 3.4 MMOL/L (ref 3.5–5.1)
POTASSIUM BLD-SCNC: 4 MMOL/L (ref 3.5–5.1)
PROT SERPL-MCNC: 5.3 G/DL (ref 6.3–8.6)
RBC # BLD AUTO: 3.02 10*6/MM3 (ref 3.77–5.16)
SODIUM BLD-SCNC: 137 MMOL/L (ref 137–145)
WBC NRBC COR # BLD: 12.19 10*3/MM3 (ref 3.2–9.8)
WHOLE BLOOD HOLD SPECIMEN: NORMAL

## 2017-09-29 PROCEDURE — 63710000001 INSULIN DETEMIR PER 5 UNITS: Performed by: FAMILY MEDICINE

## 2017-09-29 PROCEDURE — 80053 COMPREHEN METABOLIC PANEL: CPT | Performed by: FAMILY MEDICINE

## 2017-09-29 PROCEDURE — 87040 BLOOD CULTURE FOR BACTERIA: CPT | Performed by: FAMILY MEDICINE

## 2017-09-29 PROCEDURE — 82962 GLUCOSE BLOOD TEST: CPT

## 2017-09-29 PROCEDURE — 63710000001 INSULIN ASPART PER 5 UNITS: Performed by: FAMILY MEDICINE

## 2017-09-29 PROCEDURE — 85018 HEMOGLOBIN: CPT | Performed by: FAMILY MEDICINE

## 2017-09-29 PROCEDURE — 25010000002 HEPARIN (PORCINE) PER 1000 UNITS: Performed by: FAMILY MEDICINE

## 2017-09-29 PROCEDURE — 85027 COMPLETE CBC AUTOMATED: CPT | Performed by: FAMILY MEDICINE

## 2017-09-29 PROCEDURE — 84132 ASSAY OF SERUM POTASSIUM: CPT | Performed by: FAMILY MEDICINE

## 2017-09-29 PROCEDURE — 25010000002 PIPERACILLIN SOD-TAZOBACTAM PER 1 G: Performed by: FAMILY MEDICINE

## 2017-09-29 PROCEDURE — 85014 HEMATOCRIT: CPT | Performed by: FAMILY MEDICINE

## 2017-09-29 PROCEDURE — 25010000002 ONDANSETRON PER 1 MG: Performed by: INTERNAL MEDICINE

## 2017-09-29 PROCEDURE — 86140 C-REACTIVE PROTEIN: CPT | Performed by: FAMILY MEDICINE

## 2017-09-29 RX ADMIN — TAZOBACTAM SODIUM AND PIPERACILLIN SODIUM 4.5 G: 500; 4 INJECTION, SOLUTION INTRAVENOUS at 21:55

## 2017-09-29 RX ADMIN — INSULIN DETEMIR 20 UNITS: 100 INJECTION, SOLUTION SUBCUTANEOUS at 21:55

## 2017-09-29 RX ADMIN — HYDROCODONE BITARTRATE AND ACETAMINOPHEN 1 TABLET: 7.5; 325 TABLET ORAL at 00:09

## 2017-09-29 RX ADMIN — TAZOBACTAM SODIUM AND PIPERACILLIN SODIUM 4.5 G: 500; 4 INJECTION, SOLUTION INTRAVENOUS at 05:15

## 2017-09-29 RX ADMIN — HEPARIN SODIUM 5000 UNITS: 5000 INJECTION, SOLUTION INTRAVENOUS; SUBCUTANEOUS at 21:54

## 2017-09-29 RX ADMIN — HEPARIN SODIUM 5000 UNITS: 5000 INJECTION, SOLUTION INTRAVENOUS; SUBCUTANEOUS at 13:30

## 2017-09-29 RX ADMIN — POTASSIUM CHLORIDE 20 MEQ: 750 CAPSULE, EXTENDED RELEASE ORAL at 05:14

## 2017-09-29 RX ADMIN — INSULIN ASPART 8 UNITS: 100 INJECTION, SOLUTION INTRAVENOUS; SUBCUTANEOUS at 18:00

## 2017-09-29 RX ADMIN — INSULIN ASPART 4 UNITS: 100 INJECTION, SOLUTION INTRAVENOUS; SUBCUTANEOUS at 11:09

## 2017-09-29 RX ADMIN — ONDANSETRON 4 MG: 2 INJECTION INTRAMUSCULAR; INTRAVENOUS at 00:14

## 2017-09-29 RX ADMIN — HEPARIN SODIUM 5000 UNITS: 5000 INJECTION, SOLUTION INTRAVENOUS; SUBCUTANEOUS at 05:14

## 2017-09-29 RX ADMIN — Medication 10 ML: at 08:35

## 2017-09-29 RX ADMIN — HYDROCODONE BITARTRATE AND ACETAMINOPHEN 1 TABLET: 7.5; 325 TABLET ORAL at 08:41

## 2017-09-29 RX ADMIN — INSULIN ASPART 4 UNITS: 100 INJECTION, SOLUTION INTRAVENOUS; SUBCUTANEOUS at 08:35

## 2017-09-29 RX ADMIN — POTASSIUM CHLORIDE 20 MEQ: 750 CAPSULE, EXTENDED RELEASE ORAL at 01:35

## 2017-09-29 RX ADMIN — TAZOBACTAM SODIUM AND PIPERACILLIN SODIUM 4.5 G: 500; 4 INJECTION, SOLUTION INTRAVENOUS at 13:30

## 2017-09-29 RX ADMIN — HYDROCODONE BITARTRATE AND ACETAMINOPHEN 1 TABLET: 7.5; 325 TABLET ORAL at 16:26

## 2017-09-29 RX ADMIN — SODIUM CHLORIDE 125 ML/HR: 900 INJECTION, SOLUTION INTRAVENOUS at 08:35

## 2017-09-29 RX ADMIN — INSULIN ASPART 4 UNITS: 100 INJECTION, SOLUTION INTRAVENOUS; SUBCUTANEOUS at 21:54

## 2017-09-30 LAB
ALBUMIN SERPL-MCNC: 2.8 G/DL (ref 3.4–4.8)
ALBUMIN/GLOB SERPL: 0.9 G/DL (ref 1.1–1.8)
ALP SERPL-CCNC: 129 U/L (ref 38–126)
ALT SERPL W P-5'-P-CCNC: 41 U/L (ref 9–52)
ANION GAP SERPL CALCULATED.3IONS-SCNC: 9 MMOL/L (ref 5–15)
AST SERPL-CCNC: 30 U/L (ref 14–36)
BACTERIA SPEC AEROBE CULT: ABNORMAL
BILIRUB SERPL-MCNC: 0.6 MG/DL (ref 0.2–1.3)
BUN BLD-MCNC: 17 MG/DL (ref 7–21)
BUN/CREAT SERPL: 17.7 (ref 7–25)
CALCIUM SPEC-SCNC: 7.6 MG/DL (ref 8.4–10.2)
CHLORIDE SERPL-SCNC: 103 MMOL/L (ref 95–110)
CO2 SERPL-SCNC: 22 MMOL/L (ref 22–31)
CREAT BLD-MCNC: 0.96 MG/DL (ref 0.5–1)
DEPRECATED RDW RBC AUTO: 46.7 FL (ref 36.4–46.3)
ERYTHROCYTE [DISTWIDTH] IN BLOOD BY AUTOMATED COUNT: 14.3 % (ref 11.5–14.5)
GFR SERPL CREATININE-BSD FRML MDRD: 58 ML/MIN/1.73 (ref 45–104)
GLOBULIN UR ELPH-MCNC: 3 GM/DL (ref 2.3–3.5)
GLUCOSE BLD-MCNC: 215 MG/DL (ref 60–100)
GLUCOSE BLDC GLUCOMTR-MCNC: 137 MG/DL (ref 70–130)
GLUCOSE BLDC GLUCOMTR-MCNC: 155 MG/DL (ref 70–130)
GLUCOSE BLDC GLUCOMTR-MCNC: 192 MG/DL (ref 70–130)
GLUCOSE BLDC GLUCOMTR-MCNC: 233 MG/DL (ref 70–130)
GRAM STN SPEC: ABNORMAL
HCT VFR BLD AUTO: 28.5 % (ref 35–45)
HGB BLD-MCNC: 9.5 G/DL (ref 12–15.5)
HOLD SPECIMEN: NORMAL
ISOLATED FROM: ABNORMAL
MCH RBC QN AUTO: 29.4 PG (ref 26.5–34)
MCHC RBC AUTO-ENTMCNC: 33.3 G/DL (ref 31.4–36)
MCV RBC AUTO: 88.2 FL (ref 80–98)
PLATELET # BLD AUTO: 175 10*3/MM3 (ref 150–450)
PMV BLD AUTO: 10.4 FL (ref 8–12)
POTASSIUM BLD-SCNC: 3.5 MMOL/L (ref 3.5–5.1)
PROT SERPL-MCNC: 5.8 G/DL (ref 6.3–8.6)
RBC # BLD AUTO: 3.23 10*6/MM3 (ref 3.77–5.16)
SODIUM BLD-SCNC: 134 MMOL/L (ref 137–145)
WBC NRBC COR # BLD: 10.96 10*3/MM3 (ref 3.2–9.8)

## 2017-09-30 PROCEDURE — 80053 COMPREHEN METABOLIC PANEL: CPT | Performed by: FAMILY MEDICINE

## 2017-09-30 PROCEDURE — 25010000002 PIPERACILLIN SOD-TAZOBACTAM PER 1 G: Performed by: FAMILY MEDICINE

## 2017-09-30 PROCEDURE — 82962 GLUCOSE BLOOD TEST: CPT

## 2017-09-30 PROCEDURE — 63710000001 INSULIN ASPART PER 5 UNITS: Performed by: FAMILY MEDICINE

## 2017-09-30 PROCEDURE — 25010000002 HEPARIN (PORCINE) PER 1000 UNITS: Performed by: FAMILY MEDICINE

## 2017-09-30 PROCEDURE — 85027 COMPLETE CBC AUTOMATED: CPT | Performed by: FAMILY MEDICINE

## 2017-09-30 PROCEDURE — 99222 1ST HOSP IP/OBS MODERATE 55: CPT | Performed by: INTERNAL MEDICINE

## 2017-09-30 PROCEDURE — 63710000001 INSULIN DETEMIR PER 5 UNITS: Performed by: FAMILY MEDICINE

## 2017-09-30 RX ORDER — LANCING DEVICE
EACH MISCELLANEOUS
Qty: 1 EACH | Refills: 1 | Status: SHIPPED | OUTPATIENT
Start: 2017-09-30 | End: 2018-03-19 | Stop reason: SDUPTHER

## 2017-09-30 RX ORDER — GLUCOSAMINE HCL/CHONDROITIN SU 500-400 MG
CAPSULE ORAL
Qty: 120 EACH | Refills: 11 | Status: SHIPPED | OUTPATIENT
Start: 2017-09-30 | End: 2018-03-19 | Stop reason: SDUPTHER

## 2017-09-30 RX ADMIN — HYDROCODONE BITARTRATE AND ACETAMINOPHEN 1 TABLET: 7.5; 325 TABLET ORAL at 23:41

## 2017-09-30 RX ADMIN — TAZOBACTAM SODIUM AND PIPERACILLIN SODIUM 4.5 G: 500; 4 INJECTION, SOLUTION INTRAVENOUS at 13:30

## 2017-09-30 RX ADMIN — HEPARIN SODIUM 5000 UNITS: 5000 INJECTION, SOLUTION INTRAVENOUS; SUBCUTANEOUS at 21:57

## 2017-09-30 RX ADMIN — TAZOBACTAM SODIUM AND PIPERACILLIN SODIUM 4.5 G: 500; 4 INJECTION, SOLUTION INTRAVENOUS at 05:02

## 2017-09-30 RX ADMIN — SODIUM CHLORIDE 125 ML/HR: 900 INJECTION, SOLUTION INTRAVENOUS at 04:46

## 2017-09-30 RX ADMIN — HYDROCODONE BITARTRATE AND ACETAMINOPHEN 1 TABLET: 7.5; 325 TABLET ORAL at 00:01

## 2017-09-30 RX ADMIN — HYDROCODONE BITARTRATE AND ACETAMINOPHEN 1 TABLET: 7.5; 325 TABLET ORAL at 17:41

## 2017-09-30 RX ADMIN — HEPARIN SODIUM 5000 UNITS: 5000 INJECTION, SOLUTION INTRAVENOUS; SUBCUTANEOUS at 06:19

## 2017-09-30 RX ADMIN — HYDROCODONE BITARTRATE AND ACETAMINOPHEN 1 TABLET: 7.5; 325 TABLET ORAL at 08:33

## 2017-09-30 RX ADMIN — TAZOBACTAM SODIUM AND PIPERACILLIN SODIUM 4.5 G: 500; 4 INJECTION, SOLUTION INTRAVENOUS at 20:18

## 2017-09-30 RX ADMIN — INSULIN DETEMIR 20 UNITS: 100 INJECTION, SOLUTION SUBCUTANEOUS at 20:17

## 2017-09-30 RX ADMIN — INSULIN ASPART 4 UNITS: 100 INJECTION, SOLUTION INTRAVENOUS; SUBCUTANEOUS at 08:34

## 2017-09-30 RX ADMIN — INSULIN ASPART 8 UNITS: 100 INJECTION, SOLUTION INTRAVENOUS; SUBCUTANEOUS at 20:18

## 2017-10-01 LAB
ALBUMIN SERPL-MCNC: 2.9 G/DL (ref 3.4–4.8)
ALBUMIN/GLOB SERPL: 0.9 G/DL (ref 1.1–1.8)
ALP SERPL-CCNC: 83 U/L (ref 38–126)
ALT SERPL W P-5'-P-CCNC: 41 U/L (ref 9–52)
ANION GAP SERPL CALCULATED.3IONS-SCNC: 13 MMOL/L (ref 5–15)
AST SERPL-CCNC: 41 U/L (ref 14–36)
BACTERIA UR QL AUTO: ABNORMAL /HPF
BILIRUB SERPL-MCNC: 0.8 MG/DL (ref 0.2–1.3)
BILIRUB UR QL STRIP: NEGATIVE
BUN BLD-MCNC: 14 MG/DL (ref 7–21)
BUN/CREAT SERPL: 15.9 (ref 7–25)
CALCIUM SPEC-SCNC: 7.7 MG/DL (ref 8.4–10.2)
CHLORIDE SERPL-SCNC: 103 MMOL/L (ref 95–110)
CLARITY UR: CLEAR
CO2 SERPL-SCNC: 21 MMOL/L (ref 22–31)
COLOR UR: YELLOW
CREAT BLD-MCNC: 0.88 MG/DL (ref 0.5–1)
CRP SERPL-MCNC: 4.5 MG/DL (ref 0–1)
DEPRECATED RDW RBC AUTO: 46.3 FL (ref 36.4–46.3)
ERYTHROCYTE [DISTWIDTH] IN BLOOD BY AUTOMATED COUNT: 14.2 % (ref 11.5–14.5)
GFR SERPL CREATININE-BSD FRML MDRD: 64 ML/MIN/1.73 (ref 60–104)
GLOBULIN UR ELPH-MCNC: 3.3 GM/DL (ref 2.3–3.5)
GLUCOSE BLD-MCNC: 170 MG/DL (ref 60–100)
GLUCOSE BLDC GLUCOMTR-MCNC: 127 MG/DL (ref 70–130)
GLUCOSE BLDC GLUCOMTR-MCNC: 128 MG/DL (ref 70–130)
GLUCOSE BLDC GLUCOMTR-MCNC: 157 MG/DL (ref 70–130)
GLUCOSE BLDC GLUCOMTR-MCNC: 166 MG/DL (ref 70–130)
GLUCOSE BLDC GLUCOMTR-MCNC: 201 MG/DL (ref 70–130)
GLUCOSE UR STRIP-MCNC: ABNORMAL MG/DL
HCT VFR BLD AUTO: 28.5 % (ref 35–45)
HEMOCCULT STL QL: NEGATIVE
HGB BLD-MCNC: 9.7 G/DL (ref 12–15.5)
HGB UR QL STRIP.AUTO: NEGATIVE
HYALINE CASTS UR QL AUTO: ABNORMAL /LPF
KETONES UR QL STRIP: NEGATIVE
LEUKOCYTE ESTERASE UR QL STRIP.AUTO: ABNORMAL
MCH RBC QN AUTO: 29.9 PG (ref 26.5–34)
MCHC RBC AUTO-ENTMCNC: 34 G/DL (ref 31.4–36)
MCV RBC AUTO: 88 FL (ref 80–98)
NITRITE UR QL STRIP: NEGATIVE
PH UR STRIP.AUTO: 6 [PH] (ref 5–9)
PLATELET # BLD AUTO: 219 10*3/MM3 (ref 150–450)
PMV BLD AUTO: 9.9 FL (ref 8–12)
POTASSIUM BLD-SCNC: 3.2 MMOL/L (ref 3.5–5.1)
PROT SERPL-MCNC: 6.2 G/DL (ref 6.3–8.6)
PROT UR QL STRIP: ABNORMAL
RBC # BLD AUTO: 3.24 10*6/MM3 (ref 3.77–5.16)
RBC # UR: ABNORMAL /HPF
REF LAB TEST METHOD: ABNORMAL
SODIUM BLD-SCNC: 137 MMOL/L (ref 137–145)
SP GR UR STRIP: 1.01 (ref 1–1.03)
SQUAMOUS #/AREA URNS HPF: ABNORMAL /HPF
UROBILINOGEN UR QL STRIP: ABNORMAL
WBC NRBC COR # BLD: 14.96 10*3/MM3 (ref 3.2–9.8)
WBC UR QL AUTO: ABNORMAL /HPF

## 2017-10-01 PROCEDURE — 97162 PT EVAL MOD COMPLEX 30 MIN: CPT

## 2017-10-01 PROCEDURE — 97116 GAIT TRAINING THERAPY: CPT

## 2017-10-01 PROCEDURE — 85027 COMPLETE CBC AUTOMATED: CPT | Performed by: FAMILY MEDICINE

## 2017-10-01 PROCEDURE — 81001 URINALYSIS AUTO W/SCOPE: CPT | Performed by: FAMILY MEDICINE

## 2017-10-01 PROCEDURE — 25010000002 LEVOFLOXACIN PER 250 MG: Performed by: FAMILY MEDICINE

## 2017-10-01 PROCEDURE — 25010000002 PIPERACILLIN SOD-TAZOBACTAM PER 1 G: Performed by: FAMILY MEDICINE

## 2017-10-01 PROCEDURE — 80053 COMPREHEN METABOLIC PANEL: CPT | Performed by: FAMILY MEDICINE

## 2017-10-01 PROCEDURE — 63710000001 INSULIN ASPART PER 5 UNITS: Performed by: INTERNAL MEDICINE

## 2017-10-01 PROCEDURE — 86140 C-REACTIVE PROTEIN: CPT | Performed by: FAMILY MEDICINE

## 2017-10-01 PROCEDURE — G8979 MOBILITY GOAL STATUS: HCPCS

## 2017-10-01 PROCEDURE — 82962 GLUCOSE BLOOD TEST: CPT

## 2017-10-01 PROCEDURE — 97530 THERAPEUTIC ACTIVITIES: CPT

## 2017-10-01 PROCEDURE — 25010000002 HEPARIN (PORCINE) PER 1000 UNITS: Performed by: FAMILY MEDICINE

## 2017-10-01 PROCEDURE — 87086 URINE CULTURE/COLONY COUNT: CPT | Performed by: FAMILY MEDICINE

## 2017-10-01 PROCEDURE — 82272 OCCULT BLD FECES 1-3 TESTS: CPT | Performed by: FAMILY MEDICINE

## 2017-10-01 PROCEDURE — G8978 MOBILITY CURRENT STATUS: HCPCS

## 2017-10-01 PROCEDURE — 63710000001 INSULIN DETEMIR PER 5 UNITS: Performed by: FAMILY MEDICINE

## 2017-10-01 PROCEDURE — 63710000001 INSULIN ASPART PER 5 UNITS: Performed by: FAMILY MEDICINE

## 2017-10-01 RX ORDER — PANTOPRAZOLE SODIUM 40 MG/1
40 TABLET, DELAYED RELEASE ORAL
Status: DISCONTINUED | OUTPATIENT
Start: 2017-10-01 | End: 2017-10-03 | Stop reason: HOSPADM

## 2017-10-01 RX ORDER — HYDROXYZINE PAMOATE 25 MG/1
25 CAPSULE ORAL 3 TIMES DAILY PRN
Status: DISCONTINUED | OUTPATIENT
Start: 2017-10-01 | End: 2017-10-03 | Stop reason: HOSPADM

## 2017-10-01 RX ORDER — FERROUS SULFATE TAB EC 324 MG (65 MG FE EQUIVALENT) 324 (65 FE) MG
324 TABLET DELAYED RESPONSE ORAL
Status: DISCONTINUED | OUTPATIENT
Start: 2017-10-01 | End: 2017-10-03 | Stop reason: HOSPADM

## 2017-10-01 RX ORDER — POTASSIUM CHLORIDE 1.5 G/1.77G
40 POWDER, FOR SOLUTION ORAL ONCE
Status: COMPLETED | OUTPATIENT
Start: 2017-10-01 | End: 2017-10-01

## 2017-10-01 RX ORDER — CYCLOBENZAPRINE HCL 10 MG
10 TABLET ORAL NIGHTLY PRN
Status: DISCONTINUED | OUTPATIENT
Start: 2017-10-01 | End: 2017-10-03 | Stop reason: HOSPADM

## 2017-10-01 RX ORDER — LEVOFLOXACIN 5 MG/ML
500 INJECTION, SOLUTION INTRAVENOUS EVERY 24 HOURS
Status: DISCONTINUED | OUTPATIENT
Start: 2017-10-01 | End: 2017-10-03 | Stop reason: HOSPADM

## 2017-10-01 RX ADMIN — SODIUM CHLORIDE 125 ML/HR: 900 INJECTION, SOLUTION INTRAVENOUS at 04:36

## 2017-10-01 RX ADMIN — TAZOBACTAM SODIUM AND PIPERACILLIN SODIUM 4.5 G: 500; 4 INJECTION, SOLUTION INTRAVENOUS at 13:50

## 2017-10-01 RX ADMIN — INSULIN DETEMIR 20 UNITS: 100 INJECTION, SOLUTION SUBCUTANEOUS at 20:14

## 2017-10-01 RX ADMIN — CYCLOBENZAPRINE HYDROCHLORIDE 10 MG: 10 TABLET, FILM COATED ORAL at 23:34

## 2017-10-01 RX ADMIN — HYDROXYZINE PAMOATE 25 MG: 25 CAPSULE ORAL at 22:12

## 2017-10-01 RX ADMIN — HEPARIN SODIUM 5000 UNITS: 5000 INJECTION, SOLUTION INTRAVENOUS; SUBCUTANEOUS at 20:20

## 2017-10-01 RX ADMIN — HYDROCODONE BITARTRATE AND ACETAMINOPHEN 1 TABLET: 7.5; 325 TABLET ORAL at 13:50

## 2017-10-01 RX ADMIN — INSULIN ASPART 3 UNITS: 100 INJECTION, SOLUTION INTRAVENOUS; SUBCUTANEOUS at 18:33

## 2017-10-01 RX ADMIN — INSULIN ASPART 8 UNITS: 100 INJECTION, SOLUTION INTRAVENOUS; SUBCUTANEOUS at 13:12

## 2017-10-01 RX ADMIN — HYDROCODONE BITARTRATE AND ACETAMINOPHEN 1 TABLET: 7.5; 325 TABLET ORAL at 20:13

## 2017-10-01 RX ADMIN — TAZOBACTAM SODIUM AND PIPERACILLIN SODIUM 4.5 G: 500; 4 INJECTION, SOLUTION INTRAVENOUS at 20:13

## 2017-10-01 RX ADMIN — HEPARIN SODIUM 5000 UNITS: 5000 INJECTION, SOLUTION INTRAVENOUS; SUBCUTANEOUS at 13:50

## 2017-10-01 RX ADMIN — LEVOFLOXACIN 500 MG: 5 INJECTION, SOLUTION INTRAVENOUS at 09:53

## 2017-10-01 RX ADMIN — PANTOPRAZOLE SODIUM 40 MG: 40 TABLET, DELAYED RELEASE ORAL at 22:12

## 2017-10-01 RX ADMIN — INSULIN ASPART 3 UNITS: 100 INJECTION, SOLUTION INTRAVENOUS; SUBCUTANEOUS at 13:12

## 2017-10-01 RX ADMIN — HYDROCODONE BITARTRATE AND ACETAMINOPHEN 1 TABLET: 7.5; 325 TABLET ORAL at 06:43

## 2017-10-01 RX ADMIN — HEPARIN SODIUM 5000 UNITS: 5000 INJECTION, SOLUTION INTRAVENOUS; SUBCUTANEOUS at 05:08

## 2017-10-01 RX ADMIN — INSULIN ASPART 3 UNITS: 100 INJECTION, SOLUTION INTRAVENOUS; SUBCUTANEOUS at 08:10

## 2017-10-01 RX ADMIN — TAZOBACTAM SODIUM AND PIPERACILLIN SODIUM 4.5 G: 500; 4 INJECTION, SOLUTION INTRAVENOUS at 05:08

## 2017-10-01 RX ADMIN — INSULIN ASPART 4 UNITS: 100 INJECTION, SOLUTION INTRAVENOUS; SUBCUTANEOUS at 20:13

## 2017-10-01 RX ADMIN — FERROUS SULFATE TAB EC 324 MG (65 MG FE EQUIVALENT) 324 MG: 324 (65 FE) TABLET DELAYED RESPONSE at 13:51

## 2017-10-01 RX ADMIN — POTASSIUM CHLORIDE 40 MEQ: 1.5 POWDER, FOR SOLUTION ORAL at 13:57

## 2017-10-01 NOTE — PLAN OF CARE
Problem: Patient Care Overview (Adult)  Goal: Plan of Care Review  Outcome: Ongoing (interventions implemented as appropriate)    10/01/17 1340   Coping/Psychosocial Response Interventions   Plan Of Care Reviewed With patient   Outcome Evaluation   Outcome Summary/Follow up Plan Initial PT evaluation complete. Patient is alert and cooperative. Patient demonstrates (I) with bed mobility with HOB elevated, requires CGA with transfers and gait, ambulating 10'x1 with a FWW. Tinetti assessed: 20/28 or moderate fall risk, poor stepping response. Bed alarm activated, patient encouraged to call for assistance. Continue skilled PT.          Problem: Inpatient Physical Therapy  Goal: Bed Mobility Goal STG- PT  Outcome: Ongoing (interventions implemented as appropriate)    10/01/17 1340   Bed Mobility PT STG   Bed Mobility PT STG, Date Established 10/01/17   Bed Mobility PT STG, Time to Achieve 2 days   Bed Mobility PT STG, Activity Type all bed mobility   Bed Mobility PT STG, [place] Level conditional independence   Bed Mobility PT STG, Additional Goal HOB flat, no bed rails.    Bed Mobility PT STG, Date Goal Reviewed 10/01/17   Bed Mobility PT STG, Outcome goal ongoing       Goal: Transfer Training Goal 1 STG- PT  Outcome: Ongoing (interventions implemented as appropriate)    10/01/17 1340   Transfer Training PT STG   Transfer Training PT STG, Date Established 10/01/17   Transfer Training PT STG, Time to Achieve 2 days   Transfer Training PT STG, Activity Type bed to chair /chair to bed;sit to stand/stand to sit   Transfer Training PT STG, [place] Level conditional independence   Transfer Training PT STG, Assist Device walker, rolling   Transfer Training PT STG, Date Goal Reviewed 10/01/17   Transfer Training PT STG, Outcome goal ongoing       Goal: Gait Training Goal LTG- PT  Outcome: Ongoing (interventions implemented as appropriate)    10/01/17 1340   Gait Training PT LTG   Gait Training Goal PT LTG, Date  Established 10/01/17   Gait Training Goal PT LTG, Time to Achieve by discharge   Gait Training Goal PT LTG, [place] Level conditional independence   Gait Training Goal PT LTG, Assist Device walker, rolling   Gait Training Goal PT LTG, Distance to Achieve 150' x 1   Gait Training Goal PT LTG, Date Goal Reviewed 10/01/17   Gait Training Goal PT LTG, Outcome goal ongoing       Goal: Physical Therapy Goal LTG- PT  Outcome: Ongoing (interventions implemented as appropriate)    10/01/17 1340   Physical Therapy PT LTG   Physical Therapy PT LTG, Date Established 10/01/17   Physical Therapy PT LTG, Time to Achieve by discharge   Physical Therapy PT LTG, Activity Type Tinetti fall risk assessment.    Physical Therapy PT LTG, Addisional Goal Score 24/28 or low fall risk.    Physical Therapy PT LTG, Date Goal Reviewed 10/01/17   Physical Therapy PT LTG, Outcome goal ongoing

## 2017-10-01 NOTE — PLAN OF CARE
Problem: Patient Care Overview (Adult)  Goal: Plan of Care Review  Outcome: Ongoing (interventions implemented as appropriate)    10/01/17 1449   Coping/Psychosocial Response Interventions   Plan Of Care Reviewed With patient   Patient Care Overview   Progress no change   Outcome Evaluation   Outcome Summary/Follow up Plan pt. started on leavquin' urine samplesent to lab       Goal: Adult Individualization and Mutuality  Outcome: Ongoing (interventions implemented as appropriate)  Goal: Discharge Needs Assessment  Outcome: Ongoing (interventions implemented as appropriate)    Problem: Diabetes, Type 2 (Adult)  Goal: Signs and Symptoms of Listed Potential Problems Will be Absent or Manageable (Diabetes, Type 2)  Outcome: Ongoing (interventions implemented as appropriate)    Problem: Sepsis (Adult)  Goal: Signs and Symptoms of Listed Potential Problems Will be Absent or Manageable (Sepsis)  Outcome: Ongoing (interventions implemented as appropriate)

## 2017-10-01 NOTE — THERAPY EVALUATION
Acute Care - Physical Therapy Initial Evaluation  [facility]     Patient Name: Bertha Hyde  : 1950  MRN: [id number]  Today's Date: 10/1/2017   Onset of Illness/Injury or Date of Surgery Date: 17  Date of Referral to PT: 10/01/17  Referring Physician: DEEPTHI Jean-Baptiste MD.      Admit Date: 2017     Visit Dx:    ICD-10-CM ICD-9-CM   1. Colitis K52.9 558.9   2. Impaired physical mobility Z74.09 781.99     Patient Active Problem List   Diagnosis   • Degenerative joint disease involving multiple joints   • Depressive disorder   • Essential hypertension   • Hyperlipidemia   • Insomnia   • Migraine   • Type 2 diabetes mellitus with hyperglycemia, without long-term current use of insulin   • Open-angle glaucoma   • Cataract   • Open-angle glaucoma of right eye   • Chronic right shoulder pain   • Colitis     Past Medical History:   Diagnosis Date   • Acute allergic reaction    • Acute gastroenteritis    • Allergic ileitis    • Allergic rhinitis    • Amaurosis fugax     both ? 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   • Colitis    • Common cold    • Cough    • Degenerative joint disease involving multiple joints    • Depressive disorder    • Diabetes mellitus    • Diverticulitis of colon    • Encounter for immunization    • Essential hypertension    • Fatigue    • Headache    • Hemorrhoids     internal & external      • History of fracture of vertebral column     Fracture of vertebral column - T11 compression      • History of mammogram 2011    Mammogram screen (1) - Benign , negative   • History of screening mammography     Screening mammography      • History of screening mammography 2016    SCREENING MAMMOGRAPHY DIGITAL  (Medicare) (2) - Ordered By: MICA ROWLAND (HUMAIRA)    • History of transfusion    •  Hyperglycemia    • Hyperlipidemia    • Insomnia    • Itch of skin     Itching of skin   • Left lower quadrant pain    • Menopause    • Migraine    • Nausea and vomiting    • Nausea, vomiting and diarrhea    • Need for immunization against influenza     Needs influenza immunization   • Need for prophylactic vaccination against Streptococcus pneumoniae (pneumococcus)    • Nuclear senile cataract of left eye     Nuclear senile cataract - left   • Osteoarthritis    • Osteopenia    • Pain of left arm     Pain in left arm - STRAIN SECONDARY TO FALL      • Posterior subcapsular polar senile cataract of right eye     Posterior subcapsular polar senile cataract - right   • Rhinitis    • Scalp laceration    • Type 2 diabetes mellitus     no BDR   • Upper respiratory infection      Past Surgical History:   Procedure Laterality Date   • BREAST BIOPSY     • CARPAL TUNNEL RELEASE  1984    Carpal tunnel surgery (1) - Release of transverse carpal ligament, (2) Microneurolysis, right wrist; (3) Synovectomy, flexor tendons, right wrist   • CATARACT EXTRACTION Right 2015    Remove cataract, insert lens (1) - Right eye.   •  SECTION  1976     Section (2) - Low cervical section & Goldy tubal ligation   • CHOLECYSTECTOMY  10/19/2001    Cholecystectomy, laparoscopic (1) - Acute cholecystitis, (2) right ovarian cyst   • DILATATION AND CURETTAGE  1981    D&C (2) - Dysfunctional uterine bleeding   • ENDOSCOPY  10/11/2007    Colon endoscopy 63023 (1) - Colitis of colon. Multiple random biopsies obtained. A few medium scattered diverticula in sigmoid, descending, & transverse colon. Small internal & external hemorrhoids were present.   • EXCISION LESION Right 2003    Excision, breast lesion (1) - Excision of breast mass, right   • INJECTION OF MEDICATION  2012    B12 (1) - Ordered By: MICA ROWLAND ()    • INJECTION OF MEDICATION  2015    Phenergan (7) - Ordered By: JAYA P[facility] ([facility])    • INJECTION OF MEDICATION  01/23/2015    Toradol (7) - Ordered By: JAYA POWER ([facility]   • INJECTION OF MEDICATION  05/31/2014    Zofran (1) - Ordered By: GEENA NUNEZ ([facility]   • OOPHORECTOMY  11/11/1981    Oophorectomy (1) - one   • OTHER SURGICAL HISTORY      Tubal ligation (1)   • PAP SMEAR  10/30/2007    PAP SMEAR (1) - negative   • TOTAL KNEE ARTHROPLASTY Right 06/04/2012    Total knee replacement (1) - right    • VAGINAL HYSTERECTOMY  11/11/1981    Vaginal hysterectomy (1) - recurrent dysfunctional uterine bleeding          PT ASSESSMENT (last 72 hours)      PT Evaluation       10/01/17 1340       Rehab Evaluation    Document Type evaluation  -CZ     Subjective Information agree to therapy;complains of;pain  -CZ     Patient Effort, Rehab Treatment good  -CZ     Symptoms Noted During/After Treatment none  -CZ     General Information    Patient Profile Review yes  -CZ     Onset of Illness/Injury or Date of Surgery Date 09/27/17  -CZ     Referring Physician DEEPTHI Jean-Baptiste MD.  -CZ     General Observations Supine in bed, alert, cooperative, on RA, cardiac monitor, no apparent distress.   -CZ     Pertinent History Of Current Problem To ED with nausea and vomiting x 1 week.  -CZ     Precautions/Limitations fall precautions  -CZ     Prior Level of Function independent:;all household mobility;community mobility  -CZ     Equipment Currently Used at Home none  -CZ     Plans/Goals Discussed With patient  -CZ     Benefits Reviewed patient:;improve function;increase independence;increase strength;increase balance  -CZ     Living Environment    Lives With alone  -CZ     Living Arrangements apartment  -CZ     Home Accessibility no concerns   has elevator  -CZ     Living Environment Comment Lives in a halfway community  -CZ     Clinical Impression    Date of Referral to PT 10/01/17  -CZ     PT Diagnosis impaired physical mobility  -CZ     Prognosis good  -CZ     Functional Level At Time Of Evaluation (I)  with bed, CGA with transfers and gait.  -CZ     Criteria for Skilled Therapeutic Interventions Met yes;treatment indicated  -CZ     Pathology/Pathophysiology Noted (Describe Specifically for Each System) musculoskeletal  -CZ     Impairments Found (describe specific impairments) aerobic capacity/endurance;gait, locomotion, and balance  -CZ     Rehab Potential good, to achieve stated therapy goals  -CZ     Predicted Duration of Therapy Intervention (days/wks) 1-3 days  -CZ     Vital Signs    Pre Systolic BP Rehab 137  -CZ     Pre Treatment Diastolic BP 61  -CZ     Post Systolic BP Rehab 150  -CZ     Post Treatment Diastolic BP 70  -CZ     Pretreatment Heart Rate (beats/min) 81  -CZ     Posttreatment Heart Rate (beats/min) 79  -CZ     Pre SpO2 (%) 99  -CZ     O2 Delivery Pre Treatment room air  -CZ     Post SpO2 (%) 96  -CZ     O2 Delivery Post Treatment room air  -CZ     Pre Patient Position Supine  -CZ     Post Patient Position Supine  -CZ     Pain Assessment    Pain Assessment 0-10  -CZ     Pain Score 8  -CZ     Post Pain Score 7  -CZ     Pain Type Chronic pain  -CZ     Pain Location Back  -CZ     Pain Intervention(s) Medication (See MAR);Repositioned;Distraction  -CZ     Vision Assessment/Intervention    Visual Impairment WFL  -CZ     Cognitive Assessment/Intervention    Current Cognitive/Communication Assessment functional  -CZ     Orientation Status oriented x 4  -CZ     Follows Commands/Answers Questions 100% of the time  -CZ     Personal Safety WNL/WFL  -CZ     Bed Mobility, Assessment/Treatment    Bed Mobility, Assistive Device bed rails;head of bed elevated  -CZ     Bed Mob, Supine to Sit, [place] conditional independence  -CZ     Bed Mob, Sit to Supine, [place] conditional independence  -CZ     Bed Mobility, Comment has flat bed at home  -CZ     Transfer Assessment/Treatment    Transfers, Sit-Stand [place] contact guard assist  -CZ     Transfers, Stand-Sit [place] contact guard assist   -CZ     Transfers, Sit-Stand-Sit, Assist Device rolling walker  -CZ     Gait Assessment/Treatment    Gait, [place] Level contact guard assist  -CZ     Gait, Assistive Device rolling walker  -CZ     Gait, Distance (Feet) 10  -CZ     Gait, Gait Pattern Analysis swing-through gait  -CZ     Sensory Assessment/Intervention    Light Touch LLE;RLE  -CZ     LLE Light Touch mild impairment  -CZ     RLE Light Touch mild impairment  -CZ     Positioning and Restraints    Pre-Treatment Position in bed  -CZ     Post Treatment Position bed  -CZ     In Bed supine;call light within reach;exit alarm on  -CZ       User Key  (r) = Recorded By, (t) = Taken By, (c) = Cosigned By    Initials Name Provider Type    CZ Bassam Dickey, MARCEL Physical Therapist          Physical Therapy Education     Title: PT OT SLP Therapies (Active)     Topic: Physical Therapy (Active)     Point: Precautions (Active)    Learning Progress Summary    Learner Readiness Method Response Comment Documented by Status   Patient Acceptance E NR Tinetti assessed: 20/28 or moderate fall risk. Encouraged patient to utilize call light for assistance to maintain her safety.  Bed alarm activated.  10/01/17 1505 Active                      User Key     Initials Effective Dates Name Provider Type Discipline     02/17/17 -  Bassam Dickey, MARCEL Physical Therapist PT                PT Recommendation and Plan  Anticipated Discharge Disposition: home with home health  Planned Therapy Interventions: balance training, bed mobility training, gait training, home exercise program, strengthening, transfer training  PT Frequency: other (see comments) (5-14x/week)  Plan of Care Review  Plan Of Care Reviewed With: patient  Outcome Summary/Follow up Plan: Initial PT evaluation complete. Patient is alert and cooperative. Patient demonstrates (I) with bed mobility with HOB elevated, requires CGA with transfers and gait, ambulating 10'x1 with a FWW. Tinetti assessed: 20/28 or moderate  fall risk, poor stepping response. Bed alarm activated, patient encouraged to call for assistance.  Continue skilled PT.           IP PT Goals       10/01/17 1340          Bed Mobility PT STG    Bed Mobility PT STG, Date Established 10/01/17  -CZ      Bed Mobility PT STG, Time to Achieve 2 days  -CZ      Bed Mobility PT STG, Activity Type all bed mobility  -CZ      Bed Mobility PT STG, [place] Level conditional independence  -CZ      Bed Mobility PT STG, Additional Goal HOB flat, no bed rails.   -CZ      Bed Mobility PT STG, Date Goal Reviewed 10/01/17  -CZ      Bed Mobility PT STG, Outcome goal ongoing  -CZ      Transfer Training PT STG    Transfer Training PT STG, Date Established 10/01/17  -CZ      Transfer Training PT STG, Time to Achieve 2 days  -CZ      Transfer Training PT STG, Activity Type bed to chair /chair to bed;sit to stand/stand to sit  -CZ      Transfer Training PT STG, [place] Level conditional independence  -CZ      Transfer Training PT STG, Assist Device walker, rolling  -CZ      Transfer Training PT STG, Date Goal Reviewed 10/01/17  -CZ      Transfer Training PT STG, Outcome goal ongoing  -CZ      Gait Training PT LTG    Gait Training Goal PT LTG, Date Established 10/01/17  -CZ      Gait Training Goal PT LTG, Time to Achieve by discharge  -CZ      Gait Training Goal PT LTG, [place] Level conditional independence  -CZ      Gait Training Goal PT LTG, Assist Device walker, rolling  -CZ      Gait Training Goal PT LTG, Distance to Achieve 150' x 1  -CZ      Gait Training Goal PT LTG, Date Goal Reviewed 10/01/17  -CZ      Gait Training Goal PT LTG, Outcome goal ongoing  -CZ      Physical Therapy PT LTG    Physical Therapy PT LTG, Date Established 10/01/17  -CZ      Physical Therapy PT LTG, Time to Achieve by discharge  -CZ      Physical Therapy PT LTG, Activity Type Tinetti fall risk assessment.   -CZ      Physical Therapy PT LTG, Addisional Goal Score 24/28 or low fall risk.   -CZ       "Physical Therapy PT LTG, Date Goal Reviewed 10/01/17  -CZ      Physical Therapy PT LTG, Outcome goal ongoing  -CZ        User Key  (r) = Recorded By, (t) = Taken By, (c) = Cosigned By    Initials Name Provider Type    CZ Bassam Dickey, PT Physical Therapist                Outcome Measures       10/01/17 1340          How much help from another person do you currently need...    Turning from your back to your side while in flat bed without using bedrails? 4  -CZ      Moving from lying on back to sitting on the side of a flat bed without bedrails? 4  -CZ      Moving to and from a bed to a chair (including a wheelchair)? 3  -CZ      Standing up from a chair using your arms (e.g., wheelchair, bedside chair)? 3  -CZ      Climbing 3-5 steps with a railing? 2  -CZ      To walk in hospital room? 3  -CZ      AM-PAC 6 Clicks Score 19  -CZ      Tinetti Assessment    Tinetti Assessment yes  -CZ      Sitting Balance 1  -CZ      Arises 2  -CZ      Attempts to Rise 2  -CZ      Immediate Standing Balance (first 5 sec) 2  -CZ      Standing Balance 1  -CZ      Sternal Nudge (feet close together) 0  -CZ      Eyes Closed (feet close together) 0  -CZ      Turning 360 Degrees- Steps 1  -CZ      Turning 360 Degrees- Steadiness 1  -CZ      Sitting Down 2  -CZ      Tinetti Balance Score 12  -CZ      Gait Initiation (immediate after told \"go\") 1  -CZ      Step Length- Right Swing 1  -CZ      Step Length- Left Swing 1  -CZ      Foot Clearance- Right Foot 1  -CZ      Foot Clearance- Left Foot 1  -CZ      Step Symmetry 1  -CZ      Step Continuity 1  -CZ      Path (excursion) 1  -CZ      Trunk 0  -CZ      Base of Support 0  -CZ      Gait Score 8  -CZ      Tinetti Total Score 20  -CZ      Tinetti Assistive Device rolling walker  -CZ      Tinetti Assessment Comments poor stepping response  -CZ      Functional Assessment    Outcome Measure Options Tinetti;AM-PAC 6 Clicks Basic Mobility (PT)  -CZ        User Key  (r) = Recorded By, (t) = Taken By, " (c) = Cosigned By    Initials Name Provider Type    CZ Bassam Dickey, PT Physical Therapist           Time Calculation:         PT Charges       10/01/17 1514          Time Calculation    Start Time 1340  -CZ      Stop Time 1418  -CZ      Time Calculation (min) 38 min  -CZ      PT Received On 10/01/17  -CZ      PT Goal Re-Cert Due Date 10/14/17  -CZ      Time Calculation- PT    Total Timed Code Minutes- PT 23 minute(s)  -CZ        User Key  (r) = Recorded By, (t) = Taken By, (c) = Cosigned By    Initials Name Provider Type    CZ Bassam Dickey, PT Physical Therapist          Therapy Charges for Today     Code Description Service Date Service Provider Modifiers Qty    85428234472 HC PT MOBILITY CURRENT 10/1/2017 Bassam Dickey, PT GP, CJ 1    70724545572 HC PT MOBILITY PROJECTED 10/1/2017 Bassam Dickey, PT GP, CI 1    92393349303 HC PT THERAPEUTIC ACT EA 15 MIN 10/1/2017 Bassam Dickey, PT GP 1    54555664266 HC PT EVAL MOD COMPLEXITY 1 10/1/2017 Bassam Dickey, PT GP 1    60562478857 HC GAIT TRAINING EA 15 MIN 10/1/2017 Bassam Dickey, PT GP 1          PT G-Codes  PT Professional Judgement Used?: Yes  Outcome Measure Options: ANDREY Alvarado 6 Clicks Basic Mobility (PT)  Score: 20  Functional Limitation: Mobility: Walking and moving around  Mobility: Walking and Moving Around Current Status (): At least 20 percent but less than 40 percent impaired, limited or restricted  Mobility: Walking and Moving Around Goal Status (): At least 1 percent but less than 20 percent impaired, limited or restricted      Bassam Dickey, PT  10/1/2017

## 2017-10-01 NOTE — PLAN OF CARE
Problem: Patient Care Overview (Adult)  Goal: Plan of Care Review  Outcome: Ongoing (interventions implemented as appropriate)    09/30/17 1031 09/30/17 2024 10/01/17 0338   Coping/Psychosocial Response Interventions   Plan Of Care Reviewed With --  patient --    Patient Care Overview   Progress no change --  --    Outcome Evaluation   Outcome Summary/Follow up Plan --  --  Pt rested well most of night.       Goal: Adult Individualization and Mutuality  Outcome: Ongoing (interventions implemented as appropriate)  Goal: Discharge Needs Assessment  Outcome: Ongoing (interventions implemented as appropriate)    Problem: Diabetes, Type 2 (Adult)  Goal: Signs and Symptoms of Listed Potential Problems Will be Absent or Manageable (Diabetes, Type 2)  Outcome: Ongoing (interventions implemented as appropriate)    Problem: Sepsis (Adult)  Goal: Signs and Symptoms of Listed Potential Problems Will be Absent or Manageable (Sepsis)  Outcome: Ongoing (interventions implemented as appropriate)

## 2017-10-01 NOTE — PROGRESS NOTES
[facility] Medicine Services  INPATIENT PROGRESS NOTE    Length of Stay: 4  Date of Admission: 9/27/2017  Primary Care Physician: Belinda Finn MD    Subjective     Chief Complaint   Patient presents with   • Vomiting     HPI:  A 66-year-old female with a history of type 2 diabetes name mellitus uncontrolled presented to the ER with 1 week history of nausea and vomiting.  Patient said that she has no history of recent travel, no sick contacts.  Patient said that she has vomited multiple times to the point that she cannot even count.  There was no blood in the vomit.  Patient also had some episodes of diarrhea as well.  Patient is status post cholecystectomy cystectomy, 3 C-sections and hysterectomy.  Patient said the last time she was a able to eat was 1 week ago.    9/29/2017: Pt is still having fever, however she is feeling better.     9/30 asymptomatic . Feels better and wants to eat . Denies abd pain N/V/D     10/1 patient feels much better, tolerated full liquids and wishes to try soft diet.denies abd pain N/v/D    Review of Systems   Constitutional: Negative for chills, fatigue and fever.   HENT: Negative for sinus pressure.    Cardiovascular: Negative for chest pain, palpitations and leg swelling.   Gastrointestinal: Negative for abdominal pain.   Genitourinary: Negative for dysuria and urgency.   Neurological: Negative for dizziness and light-headedness.   Hematological: Negative for adenopathy. Does not bruise/bleed easily.   Psychiatric/Behavioral: Negative for agitation.        All pertinent negatives and positives are as above. All other systems have been reviewed and are negative unless otherwise stated.     Objective      Vital Sign Min/Max for last 24 hours  Temp  Min: 97.8 °F (36.6 °C)  Max: 100.1 °F (37.8 °C)   BP  Min: 132/67  Max: 154/72   Pulse  Min: 76  Max: 90   Resp  Min: 18  Max: 18   SpO2  Min: 93 %  Max: 99 %   No Data Recorded   No Data  Recorded         Physical Exam   Constitutional: She is oriented to person, place, and time. She appears well-developed.   HENT:   Head: Normocephalic.   Eyes: EOM are normal. Pupils are equal, round, and reactive to light.   Neck: Normal range of motion.   Cardiovascular: Normal rate, regular rhythm and normal heart sounds.    Pulmonary/Chest: Effort normal and breath sounds normal.   Abdominal: Soft. There is no tenderness.   Musculoskeletal: Normal range of motion.   Neurological: She is alert and oriented to person, place, and time.   Skin: Skin is warm.   Vitals reviewed.      Current Facility-Administered Medications   Medication Dose Route Frequency Provider Last Rate Last Dose   • acetaminophen (TYLENOL) suppository 650 mg  650 mg Rectal Q4H PRN Paige Donahue MD   650 mg at 09/27/17 2209   • dextrose (D50W) solution 12.5 g  12.5 g Intravenous Q15 Min PRN Lance Augustin MD       • dextrose (D50W) solution 25 g  25 g Intravenous Q15 Min PRN Paige Donahue MD       • dextrose (D50W) solution 25 g  25 g Intravenous Q15 Min PRN Lance Augustin MD       • dextrose (GLUTOSE) oral gel 15 g  15 g Oral Q15 Min PRN Paige Donahue MD       • dextrose (GLUTOSE) oral gel 15 g  15 g Oral Q15 Min PRN Lance Augustin MD       • ferrous sulfate EC tablet 324 mg  324 mg Oral Daily With Breakfast Eugene Jean-Baptiste MD       • glucagon (human recombinant) (GLUCAGEN DIAGNOSTIC) injection 1 mg  1 mg Subcutaneous Q15 Min PRN Paige Donahue MD       • glucagon (human recombinant) (GLUCAGEN DIAGNOSTIC) injection 1 mg  1 mg Subcutaneous Q15 Min PRN Lance Augustin MD       • heparin (porcine) 5000 UNIT/ML injection 5,000 Units  5,000 Units Subcutaneous Q8H Lance Augustin MD   5,000 Units at 10/01/17 0508   • HYDROcodone-acetaminophen (NORCO) 7.5-325 MG per tablet 1 tablet  1 tablet Oral Q6H PRN Lance Augustin MD   1 tablet at 10/01/17 0643   • insulin aspart (novoLOG) injection 0-24 Units  0-24  Units Subcutaneous 4x Daily AC & at Bedtime Lance Augustin MD   8 Units at 09/30/17 2018   • insulin aspart (novoLOG) injection 2-8 Units  2-8 Units Subcutaneous TID With Meals Richar Haney MD   3 Units at 10/01/17 0810   • insulin detemir (LEVEMIR) injection 20 Units  20 Units Subcutaneous Nightly Lance Augustin MD   20 Units at 09/30/17 2017   • levoFLOXacin (LEVAQUIN) 500 mg/100 mL D5W (premix) (LEVAQUIN) 500 mg  500 mg Intravenous Q24H Eugene Jean-Baptiste MD   500 mg at 10/01/17 0953   • ondansetron (ZOFRAN) injection 4 mg  4 mg Intravenous Q4H PRN Paige Donahue MD   4 mg at 09/29/17 0014   • Pharmacy to Dose Zosyn   Does not apply Continuous PRN Lance Augustin MD       • piperacillin-tazobactam (ZOSYN) 4.5 g in iso-osmotic dextrose 100 mL IVPB (premix)  4.5 g Intravenous Q8H Lance Augustin MD   4.5 g at 10/01/17 0508   • potassium chloride (MICRO-K) CR capsule 10 mEq  10 mEq Oral PRN Lance Augustin MD        Or   • potassium chloride (KLOR-CON) packet 10 mEq  10 mEq Oral PRN Lance Augustin MD        Or   • potassium chloride 10 mEq in 100 mL IVPB  10 mEq Intravenous Q1H PRN Lance Augustin MD       • potassium chloride (MICRO-K) CR capsule 20 mEq  20 mEq Oral PRN Lance Augustin MD   20 mEq at 09/29/17 0514    Or   • potassium chloride (KLOR-CON) packet 20 mEq  20 mEq Oral PRN Lance Augustin MD        Or   • potassium chloride 10 mEq in 100 mL IVPB  10 mEq Intravenous Q1H PRN Lance Augustin  mL/hr at 09/28/17 0011 10 mEq at 09/28/17 0011   • potassium chloride (MICRO-K) CR capsule 40 mEq  40 mEq Oral PRN Lance Augustin MD        Or   • potassium chloride (KLOR-CON) packet 40 mEq  40 mEq Oral PRN Lance Augustin MD        Or   • potassium chloride 10 mEq in 100 mL IVPB  10 mEq Intravenous Q1H PRN Lance Augustin MD       • sodium chloride 0.9 % flush 1-10 mL  1-10 mL Intravenous PRN Lance Augustin MD       • sodium  chloride 0.9 % flush 10 mL  10 mL Intravenous PRN Talon Rivera MD   10 mL at 09/29/17 0835   • sodium chloride 0.9 % infusion  125 mL/hr Intravenous Continuous Lance Augustin  mL/hr at 10/01/17 0436 125 mL/hr at 10/01/17 0436           Results Review:  I have reviewed the labs, radiology results, and diagnostic studies.    Laboratory Data:     Results from last 7 days  Lab Units 10/01/17  0800 09/30/17  0546 09/29/17  0647 09/29/17  0250   SODIUM mmol/L 137 134*  --  137   POTASSIUM mmol/L 3.2* 3.5 4.0 3.4*   CHLORIDE mmol/L 103 103  --  106   CO2 mmol/L 21.0* 22.0  --  20.0*   BUN mg/dL 14 17  --  17   CREATININE mg/dL 0.88 0.96  --  0.85   GLUCOSE mg/dL 170* 215*  --  186*   CALCIUM mg/dL 7.7* 7.6*  --  6.5*   BILIRUBIN mg/dL 0.8 0.6  --  0.5   ALK PHOS U/L 83 129*  --  64   ALT (SGPT) U/L 41 41  --  36   AST (SGOT) U/L 41* 30  --  21   ANION GAP mmol/L 13.0 9.0  --  11.0     Estimated Creatinine Clearance: 75.6 mL/min (by C-G formula based on Cr of 0.88).    Results from last 7 days  Lab Units 09/28/17  1926 09/28/17  1641 09/28/17  0206   MAGNESIUM mg/dL 1.8 2.1 1.4*   PHOSPHORUS mg/dL 2.0* 1.7* 1.2*           Results from last 7 days  Lab Units 10/01/17  0800 09/30/17  0546 09/29/17  2201 09/29/17  0250 09/28/17  0703 09/27/17  1233   WBC 10*3/mm3 14.96* 10.96*  --  12.19* 23.97* 19.53*   HEMOGLOBIN g/dL 9.7* 9.5* 10.3* 9.1* 11.1* 13.1   HEMATOCRIT % 28.5* 28.5* 30.5* 26.6* 32.2* 37.5   PLATELETS 10*3/mm3 219 175  --  158 171 236           Results from last 7 days  Lab Units 09/29/17  0647   CRP mg/dL 3.00*       Culture Data:   Blood Culture   Date Value Ref Range Status   09/29/2017 No growth at 24 hours  Preliminary     No results found for: URINECX  No results found for: RESPCX  No results found for: WOUNDCX  No results found for: STOOLCX  No components found for: BODYFLD      Radiology Data:   Imaging Results (last 24 hours)     ** No results found for the last 24 hours. **          I have  reviewed the patient current medications.     Assessment/Plan     Active Hospital Problems (** Indicates Principal Problem)    Diagnosis Date Noted   • Colitis [K52.9] 09/27/2017      Resolved Hospital Problems    Diagnosis Date Noted Date Resolved   No resolved problems to display.     -- Sepsis and Escherichia coli bacteremia   Likely secondry to pyelonephritis and colitis   Clinically improving   WBC 23.9--12.19--10.9--14.9  Continue with Zosyn.  Add IV levaquin today  Repeat UA  Will dc on oral Levaquin possible in 1- 2 days    --colitis  Improving  WBC as above  Tolerated Full liquids. Advance to soft    --pyelonephrisits  CT abdomen and pelvis as above  Resolving  IVAB as above    --hypokalemia  3.2  Likely due to decrease oral intake  Kdur 40meq x 1     --Hyperglycemia secondary to diabetes mellitus type 2: Currently blood sugar is well-controlled.  Patient is currently on insulin doing very well.    -- Acute kidney injury:  resolved    --Hypertension:  Continue with labetalol.  Blood pressure well controlled.    --  Anemia iron deficiency:  Likely delusional.     DVT Prophylaxis: heparin     This document has been electronically signed by Eugene Jean-Baptiste MD on October 1, 2017 12:32 PM

## 2017-10-02 ENCOUNTER — TELEPHONE (OUTPATIENT)
Dept: ENDOCRINOLOGY | Facility: CLINIC | Age: 67
End: 2017-10-02

## 2017-10-02 LAB
ALBUMIN SERPL-MCNC: 2.5 G/DL (ref 3.4–4.8)
ALBUMIN/GLOB SERPL: 0.8 G/DL (ref 1.1–1.8)
ALP SERPL-CCNC: 95 U/L (ref 38–126)
ALT SERPL W P-5'-P-CCNC: 28 U/L (ref 9–52)
ANION GAP SERPL CALCULATED.3IONS-SCNC: 11 MMOL/L (ref 5–15)
ANISOCYTOSIS BLD QL: ABNORMAL
AST SERPL-CCNC: 29 U/L (ref 14–36)
BACTERIA SPEC AEROBE CULT: NORMAL
BACTERIA SPEC AEROBE CULT: NORMAL
BASOPHILS # BLD AUTO: 0.16 10*3/MM3 (ref 0–0.2)
BASOPHILS NFR BLD AUTO: 1.1 % (ref 0–2)
BILIRUB SERPL-MCNC: 0.4 MG/DL (ref 0.2–1.3)
BUN BLD-MCNC: 15 MG/DL (ref 7–21)
BUN/CREAT SERPL: 16.9 (ref 7–25)
CALCIUM SPEC-SCNC: 7.7 MG/DL (ref 8.4–10.2)
CHLORIDE SERPL-SCNC: 105 MMOL/L (ref 95–110)
CO2 SERPL-SCNC: 23 MMOL/L (ref 22–31)
CREAT BLD-MCNC: 0.89 MG/DL (ref 0.5–1)
DEPRECATED RDW RBC AUTO: 48.6 FL (ref 36.4–46.3)
EOSINOPHIL # BLD AUTO: 0.14 10*3/MM3 (ref 0–0.7)
EOSINOPHIL NFR BLD AUTO: 1 % (ref 0–7)
ERYTHROCYTE [DISTWIDTH] IN BLOOD BY AUTOMATED COUNT: 15 % (ref 11.5–14.5)
GFR SERPL CREATININE-BSD FRML MDRD: 63 ML/MIN/1.73 (ref 60–104)
GLOBULIN UR ELPH-MCNC: 3 GM/DL (ref 2.3–3.5)
GLUCOSE BLD-MCNC: 160 MG/DL (ref 60–100)
GLUCOSE BLDC GLUCOMTR-MCNC: 129 MG/DL (ref 70–130)
GLUCOSE BLDC GLUCOMTR-MCNC: 138 MG/DL (ref 70–130)
GLUCOSE BLDC GLUCOMTR-MCNC: 161 MG/DL (ref 70–130)
GLUCOSE BLDC GLUCOMTR-MCNC: 170 MG/DL (ref 70–130)
GLUCOSE BLDC GLUCOMTR-MCNC: 181 MG/DL (ref 70–130)
HCT VFR BLD AUTO: 26.4 % (ref 35–45)
HGB BLD-MCNC: 9 G/DL (ref 12–15.5)
IMM GRANULOCYTES # BLD: 0.59 10*3/MM3 (ref 0–0.02)
IMM GRANULOCYTES NFR BLD: 4 % (ref 0–0.5)
IRON 24H UR-MRATE: <10 MCG/DL (ref 37–170)
IRON SATN MFR SERPL: ABNORMAL % (ref 15–50)
LYMPHOCYTES # BLD AUTO: 2.81 10*3/MM3 (ref 0.6–4.2)
LYMPHOCYTES # BLD MANUAL: 1.91 10*3/MM3 (ref 0.6–4.2)
LYMPHOCYTES NFR BLD AUTO: 19.1 % (ref 10–50)
LYMPHOCYTES NFR BLD MANUAL: 13 % (ref 10–50)
LYMPHOCYTES NFR BLD MANUAL: 6 % (ref 0–12)
MCH RBC QN AUTO: 30 PG (ref 26.5–34)
MCHC RBC AUTO-ENTMCNC: 34.1 G/DL (ref 31.4–36)
MCV RBC AUTO: 88 FL (ref 80–98)
METAMYELOCYTES NFR BLD MANUAL: 1 % (ref 0–0)
MONOCYTES # BLD AUTO: 0.88 10*3/MM3 (ref 0–0.9)
MONOCYTES # BLD AUTO: 1.09 10*3/MM3 (ref 0–0.9)
MONOCYTES NFR BLD AUTO: 7.4 % (ref 0–12)
MYELOCYTES NFR BLD MANUAL: 1 % (ref 0–0)
NEUTROPHILS # BLD AUTO: 10.74 10*3/MM3 (ref 2–8.6)
NEUTROPHILS # BLD AUTO: 9.92 10*3/MM3 (ref 2–8.6)
NEUTROPHILS NFR BLD AUTO: 67.4 % (ref 37–80)
NEUTROPHILS NFR BLD MANUAL: 70 % (ref 37–80)
NEUTS BAND NFR BLD MANUAL: 3 % (ref 0–5)
NRBC BLD MANUAL-RTO: 0 /100 WBC (ref 0–0)
PLATELET # BLD AUTO: 339 10*3/MM3 (ref 150–450)
PMV BLD AUTO: 9.7 FL (ref 8–12)
POTASSIUM BLD-SCNC: 3.2 MMOL/L (ref 3.5–5.1)
POTASSIUM BLD-SCNC: 3.4 MMOL/L (ref 3.5–5.1)
PROT SERPL-MCNC: 5.5 G/DL (ref 6.3–8.6)
RBC # BLD AUTO: 3 10*6/MM3 (ref 3.77–5.16)
SMALL PLATELETS BLD QL SMEAR: ADEQUATE
SODIUM BLD-SCNC: 139 MMOL/L (ref 137–145)
TIBC SERPL-MCNC: 173 MCG/DL (ref 265–497)
VARIANT LYMPHS NFR BLD MANUAL: 6 % (ref 0–5)
WBC MORPH BLD: NORMAL
WBC NRBC COR # BLD: 14.71 10*3/MM3 (ref 3.2–9.8)

## 2017-10-02 PROCEDURE — 80053 COMPREHEN METABOLIC PANEL: CPT | Performed by: FAMILY MEDICINE

## 2017-10-02 PROCEDURE — 25010000002 PIPERACILLIN SOD-TAZOBACTAM PER 1 G: Performed by: FAMILY MEDICINE

## 2017-10-02 PROCEDURE — 25010000002 HEPARIN (PORCINE) PER 1000 UNITS: Performed by: FAMILY MEDICINE

## 2017-10-02 PROCEDURE — 97535 SELF CARE MNGMENT TRAINING: CPT

## 2017-10-02 PROCEDURE — 97166 OT EVAL MOD COMPLEX 45 MIN: CPT

## 2017-10-02 PROCEDURE — G8988 SELF CARE GOAL STATUS: HCPCS

## 2017-10-02 PROCEDURE — 97110 THERAPEUTIC EXERCISES: CPT

## 2017-10-02 PROCEDURE — 83550 IRON BINDING TEST: CPT | Performed by: FAMILY MEDICINE

## 2017-10-02 PROCEDURE — 82962 GLUCOSE BLOOD TEST: CPT

## 2017-10-02 PROCEDURE — 85007 BL SMEAR W/DIFF WBC COUNT: CPT | Performed by: FAMILY MEDICINE

## 2017-10-02 PROCEDURE — 84132 ASSAY OF SERUM POTASSIUM: CPT | Performed by: FAMILY MEDICINE

## 2017-10-02 PROCEDURE — G8987 SELF CARE CURRENT STATUS: HCPCS

## 2017-10-02 PROCEDURE — 83540 ASSAY OF IRON: CPT | Performed by: FAMILY MEDICINE

## 2017-10-02 PROCEDURE — 63710000001 INSULIN ASPART PER 5 UNITS: Performed by: FAMILY MEDICINE

## 2017-10-02 PROCEDURE — 25010000002 LEVOFLOXACIN PER 250 MG: Performed by: FAMILY MEDICINE

## 2017-10-02 PROCEDURE — 63710000001 INSULIN ASPART PER 5 UNITS: Performed by: INTERNAL MEDICINE

## 2017-10-02 PROCEDURE — 63710000001 INSULIN DETEMIR PER 5 UNITS: Performed by: FAMILY MEDICINE

## 2017-10-02 PROCEDURE — 85025 COMPLETE CBC W/AUTO DIFF WBC: CPT | Performed by: FAMILY MEDICINE

## 2017-10-02 RX ADMIN — INSULIN ASPART 4 UNITS: 100 INJECTION, SOLUTION INTRAVENOUS; SUBCUTANEOUS at 11:53

## 2017-10-02 RX ADMIN — POTASSIUM CHLORIDE 20 MEQ: 750 CAPSULE, EXTENDED RELEASE ORAL at 09:57

## 2017-10-02 RX ADMIN — HEPARIN SODIUM 5000 UNITS: 5000 INJECTION, SOLUTION INTRAVENOUS; SUBCUTANEOUS at 13:44

## 2017-10-02 RX ADMIN — TAZOBACTAM SODIUM AND PIPERACILLIN SODIUM 4.5 G: 500; 4 INJECTION, SOLUTION INTRAVENOUS at 21:12

## 2017-10-02 RX ADMIN — CYCLOBENZAPRINE HYDROCHLORIDE 10 MG: 10 TABLET, FILM COATED ORAL at 18:56

## 2017-10-02 RX ADMIN — HYDROCODONE BITARTRATE AND ACETAMINOPHEN 1 TABLET: 7.5; 325 TABLET ORAL at 13:45

## 2017-10-02 RX ADMIN — POTASSIUM CHLORIDE 40 MEQ: 750 CAPSULE, EXTENDED RELEASE ORAL at 21:06

## 2017-10-02 RX ADMIN — INSULIN DETEMIR 20 UNITS: 100 INJECTION, SOLUTION SUBCUTANEOUS at 21:12

## 2017-10-02 RX ADMIN — TAZOBACTAM SODIUM AND PIPERACILLIN SODIUM 4.5 G: 500; 4 INJECTION, SOLUTION INTRAVENOUS at 05:50

## 2017-10-02 RX ADMIN — INSULIN ASPART 2 UNITS: 100 INJECTION, SOLUTION INTRAVENOUS; SUBCUTANEOUS at 08:46

## 2017-10-02 RX ADMIN — HYDROCODONE BITARTRATE AND ACETAMINOPHEN 1 TABLET: 7.5; 325 TABLET ORAL at 06:34

## 2017-10-02 RX ADMIN — HEPARIN SODIUM 5000 UNITS: 5000 INJECTION, SOLUTION INTRAVENOUS; SUBCUTANEOUS at 05:49

## 2017-10-02 RX ADMIN — SODIUM CHLORIDE 125 ML/HR: 900 INJECTION, SOLUTION INTRAVENOUS at 01:21

## 2017-10-02 RX ADMIN — LEVOFLOXACIN 500 MG: 5 INJECTION, SOLUTION INTRAVENOUS at 11:54

## 2017-10-02 RX ADMIN — INSULIN ASPART 4 UNITS: 100 INJECTION, SOLUTION INTRAVENOUS; SUBCUTANEOUS at 11:54

## 2017-10-02 RX ADMIN — TAZOBACTAM SODIUM AND PIPERACILLIN SODIUM 4.5 G: 500; 4 INJECTION, SOLUTION INTRAVENOUS at 13:45

## 2017-10-02 RX ADMIN — INSULIN ASPART 4 UNITS: 100 INJECTION, SOLUTION INTRAVENOUS; SUBCUTANEOUS at 21:05

## 2017-10-02 RX ADMIN — FERROUS SULFATE TAB EC 324 MG (65 MG FE EQUIVALENT) 324 MG: 324 (65 FE) TABLET DELAYED RESPONSE at 08:47

## 2017-10-02 RX ADMIN — INSULIN ASPART 3 UNITS: 100 INJECTION, SOLUTION INTRAVENOUS; SUBCUTANEOUS at 18:22

## 2017-10-02 RX ADMIN — HYDROCODONE BITARTRATE AND ACETAMINOPHEN 1 TABLET: 7.5; 325 TABLET ORAL at 19:43

## 2017-10-02 RX ADMIN — HEPARIN SODIUM 5000 UNITS: 5000 INJECTION, SOLUTION INTRAVENOUS; SUBCUTANEOUS at 21:06

## 2017-10-02 RX ADMIN — PANTOPRAZOLE SODIUM 40 MG: 40 TABLET, DELAYED RELEASE ORAL at 05:50

## 2017-10-02 RX ADMIN — HYDROCODONE BITARTRATE AND ACETAMINOPHEN 1 TABLET: 7.5; 325 TABLET ORAL at 01:27

## 2017-10-02 RX ADMIN — SODIUM CHLORIDE 125 ML/HR: 900 INJECTION, SOLUTION INTRAVENOUS at 19:43

## 2017-10-02 RX ADMIN — INSULIN ASPART 4 UNITS: 100 INJECTION, SOLUTION INTRAVENOUS; SUBCUTANEOUS at 08:46

## 2017-10-02 NOTE — SIGNIFICANT NOTE
"   10/02/17 1410   Rehab Treatment   Discipline physical therapy assistant   Treatment Not Performed patient/family declined treatment  (Pt stated, \"I just finished w/therapy.\" PTA explained diference but pt still declined.)   Recommendation   PT - Next Appointment 10/03/17     "

## 2017-10-02 NOTE — PLAN OF CARE
Problem: Patient Care Overview (Adult)  Goal: Discharge Needs Assessment  Outcome: Ongoing (interventions implemented as appropriate)    10/02/17 7683   Discharge Needs Assessment   Discharge Disposition home healthcare service

## 2017-10-02 NOTE — THERAPY EVALUATION
Acute Care - Occupational Therapy Initial Evaluation  [facility]     Patient Name: Bertha Hyde  : 1950  MRN: [id number]  Today's Date: 10/2/2017  Onset of Illness/Injury or Date of Surgery Date: 17  Date of Referral to OT: 10/01/17  Referring Physician: Dr. Jean-Baptiste    Admit Date: 2017       ICD-10-CM ICD-9-CM   1. Colitis K52.9 558.9   2. Impaired physical mobility Z74.09 781.99   3. Impaired mobility and ADLs Z74.09 799.89     Patient Active Problem List   Diagnosis   • Degenerative joint disease involving multiple joints   • Depressive disorder   • Essential hypertension   • Hyperlipidemia   • Insomnia   • Migraine   • Type 2 diabetes mellitus with hyperglycemia, without long-term current use of insulin   • Open-angle glaucoma   • Cataract   • Open-angle glaucoma of right eye   • Chronic right shoulder pain   • Colitis     Past Medical History:   Diagnosis Date   • Acute allergic reaction    • Acute gastroenteritis    • Allergic ileitis    • Allergic rhinitis    • Amaurosis fugax     both ? 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   • Colitis    • Common cold    • Cough    • Degenerative joint disease involving multiple joints    • Depressive disorder    • Diabetes mellitus    • Diverticulitis of colon    • Encounter for immunization    • Essential hypertension    • Fatigue    • Headache    • Hemorrhoids     internal & external      • History of fracture of vertebral column     Fracture of vertebral column - T11 compression      • History of mammogram 2011    Mammogram screen (1) - Benign , negative   • History of screening mammography     Screening mammography      • History of screening mammography 2016    SCREENING MAMMOGRAPHY DIGITAL  (Medicare) (2) - Ordered By: MICA ROWLAND ()    •  History of transfusion    • Hyperglycemia    • Hyperlipidemia    • Insomnia    • Itch of skin     Itching of skin   • Left lower quadrant pain    • Menopause    • Migraine    • Nausea and vomiting    • Nausea, vomiting and diarrhea    • Need for immunization against influenza     Needs influenza immunization   • Need for prophylactic vaccination against Streptococcus pneumoniae (pneumococcus)    • Nuclear senile cataract of left eye     Nuclear senile cataract - left   • Osteoarthritis    • Osteopenia    • Pain of left arm     Pain in left arm - STRAIN SECONDARY TO FALL      • Posterior subcapsular polar senile cataract of right eye     Posterior subcapsular polar senile cataract - right   • Rhinitis    • Scalp laceration    • Type 2 diabetes mellitus     no BDR   • Upper respiratory infection      Past Surgical History:   Procedure Laterality Date   • BREAST BIOPSY     • CARPAL TUNNEL RELEASE  1984    Carpal tunnel surgery (1) - Release of transverse carpal ligament, (2) Microneurolysis, right wrist; (3) Synovectomy, flexor tendons, right wrist   • CATARACT EXTRACTION Right 2015    Remove cataract, insert lens (1) - Right eye.   •  SECTION  1976     Section (2) - Low cervical section & Goldy tubal ligation   • CHOLECYSTECTOMY  10/19/2001    Cholecystectomy, laparoscopic (1) - Acute cholecystitis, (2) right ovarian cyst   • DILATATION AND CURETTAGE  1981    D&C (2) - Dysfunctional uterine bleeding   • ENDOSCOPY  10/11/2007    Colon endoscopy 98952 (1) - Colitis of colon. Multiple random biopsies obtained. A few medium scattered diverticula in sigmoid, descending, & transverse colon. Small internal & external hemorrhoids were present.   • EXCISION LESION Right 2003    Excision, breast lesion (1) - Excision of breast mass, right   • INJECTION OF MEDICATION  2012    B12 (1) - Ordered By: MICA ROWLAND ([facility])    • INJECTION OF MEDICATION  2015    Phenergan (7) -  Ordered By: JAYA POWER ([facility])    • INJECTION OF MEDICATION  01/23/2015    Toradol (7) - Ordered By: JAYA POWER ([facility])   • INJECTION OF MEDICATION  05/31/2014    Zofran (1) - Ordered By: GEENA NUNEZ ([facility])   • OOPHORECTOMY  11/11/1981    Oophorectomy (1) - one   • OTHER SURGICAL HISTORY      Tubal ligation (1)   • PAP SMEAR  10/30/2007    PAP SMEAR (1) - negative   • TOTAL KNEE ARTHROPLASTY Right 06/04/2012    Total knee replacement (1) - right    • VAGINAL HYSTERECTOMY  11/11/1981    Vaginal hysterectomy (1) - recurrent dysfunctional uterine bleeding          OT ASSESSMENT FLOWSHEET (last 72 hours)      OT Evaluation       10/02/17 0232 10/02/17 0131 10/01/17 1340          Rehab Evaluation    Document Type evaluation  -SG evaluation  -SG evaluation  -CZ      Subjective Information  agree to therapy;pain  -SG agree to therapy;complains of;pain  -CZ      Patient Effort, Rehab Treatment  good  -SG good  -CZ      Symptoms Noted During/After Treatment   none  -CZ      General Information    Patient Profile Review  yes  -SG yes  -CZ      Onset of Illness/Injury or Date of Surgery Date  09/27/17  -SG 09/27/17  -CZ      Referring Physician  Dr. Jean-Baptiste  -SG DEEPTHI Jean-Baptiste MD.  -CZ      General Observations  --   supine in bed, alert and cooperative, edema throughout  -SG Supine in bed, alert, cooperative, on RA, cardiac monitor, no apparent distress.   -CZ      Pertinent History Of Current Problem  admitted due ot nausea and vomitting for l week  -SG To ED with nausea and vomiting x 1 week.  -CZ      Precautions/Limitations  fall precautions  -SG fall precautions  -CZ      Prior Level of Function  independent:;all household mobility;gait;transfer;bed mobility;ADL's;feeding;grooming;dressing;bathing;driving;cooking;home management  -SG independent:;all household mobility;community mobility  -CZ      Equipment Currently Used at Home  none  -SG none  -CZ      Plans/Goals Discussed With  patient  -SG patient   -CZ      Benefits Reviewed  patient:;improve function;increase knowledge;increase strength;increase independence  -SG patient:;improve function;increase independence;increase strength;increase balance  -CZ      Living Environment    Lives With  alone  -SG alone  -CZ      Living Arrangements  apartment  -SG apartment  -CZ      Home Accessibility  no concerns   elevator  -SG no concerns   has elevator  -CZ      Stair Railings at Home  none  -SG       Living Environment Comment   Lives in a [facility] community  -CZ      Clinical Impression    Date of Referral to OT  10/01/17  -SG       OT Diagnosis  impaired mobility and ADL's  -SG       Functional Level At Time Of Evaluation  feeling better able to complete Bed mobility,  sit EOB,  BUE edema.  -SG       Impairments Found (describe specific impairments)  aerobic capacity/endurance;arousal, attention, and cognition;gait, locomotion, and balance;joint integrity and mobility;motor function;neuromotor development and sensory integration  -SG       Criteria for Skilled Therapeutic Interventions Met  yes  -SG       Rehab Potential  good, to achieve stated therapy goals  -SG       Therapy Frequency  --   3/14 times/wk  -SG       Predicted Duration of Therapy Intervention (days/wks)  2 weeks  -SG       Anticipated Equipment Needs At Discharge  bathing equipment  -SG       Anticipated Discharge Disposition  home with home health  -SG       Functional Level Prior    Ambulation  0-->independent  -SG       Transferring  0-->independent  -SG       Toileting  0-->independent  -SG       Bathing  0-->independent  -SG       Dressing  0-->independent  -SG       Eating  0-->independent  -SG       Communication  0-->understands/communicates without difficulty  -SG       Swallowing  0-->swallows foods/liquids without difficulty  -SG       Vital Signs    Pre Systolic BP Rehab  172  -  -CZ      Pre Treatment Diastolic BP  71  -SG 61  -CZ      Post Systolic BP Rehab  158  -  -CZ       Post Treatment Diastolic BP  75  -SG 70  -CZ      Pretreatment Heart Rate (beats/min)  79  -SG 81  -CZ      Posttreatment Heart Rate (beats/min)  75  -SG 79  -CZ      Pre SpO2 (%)  95  -SG 99  -CZ      O2 Delivery Pre Treatment  room air  -SG room air  -CZ      Post SpO2 (%)  96  -SG 96  -CZ      O2 Delivery Post Treatment  room air  -SG room air  -CZ      Pre Patient Position  Supine  -SG Supine  -CZ      Intra Patient Position  Sitting  -SG       Post Patient Position  Supine  -SG Supine  -CZ      Pain Assessment    Pain Assessment   0-10  -CZ      Pain Score   8  -CZ      Post Pain Score   7  -CZ      Pain Type   Chronic pain  -CZ      Pain Location   Back  -CZ      Pain Intervention(s)   Medication (See MAR);Repositioned;Distraction  -CZ      Vision Assessment/Intervention    Visual Impairment  WFL with corrective lenses  -SG WFL  -CZ      Cognitive Assessment/Intervention    Current Cognitive/Communication Assessment  functional  -SG functional  -CZ      Orientation Status  oriented x 4  -SG oriented x 4  -CZ      Follows Commands/Answers Questions  100% of the time  -% of the time  -CZ      Personal Safety  WNL/WFL  -SG WNL/WFL  -CZ      ROM (Range of Motion)    General ROM  no range of motion deficits identified  -SG       MMT (Manual Muscle Testing)    General MMT Assessment  upper extremity strength deficits identified   hand weaks and with Edema, history of bilateral carpal tunne  -SG       Upper Extremity    Upper Ext Manual Muscle Testing Detail  --   4/5 throughout, except grasp  -SG       Bed Mobility, Assessment/Treatment    Bed Mobility, Assistive Device  bed rails;head of bed elevated  - bed rails;head of bed elevated  -      Bed Mob, Supine to Sit, [place]  conditional independence  -SG conditional independence  -CZ      Bed Mob, Sit to Supine, Nada  conditional independence  -SG conditional independence  -CZ      Bed Mobility, Comment   has flat bed at home  -CZ       Transfer Assessment/Treatment    Transfers, Sit-Stand [place]   contact guard assist  -CZ      Transfers, Stand-Sit [place]   contact guard assist  -CZ      Transfers, Sit-Stand-Sit, Assist Device   rolling walker  -CZ      Therapy Exercises    Bilateral Upper Extremity  AROM:;20 reps;supine;elbow flexion/extension;hand pumps;pronation/supination;shoulder abduction/adduction;shoulder extension/flexion  -SG       Sensory Assessment/Intervention    Light Touch   LLE;RLE  -CZ      LLE Light Touch   mild impairment  -CZ      RLE Light Touch   mild impairment  -CZ      General Therapy Interventions    Planned Therapy Interventions  activity intolerance;adaptive equipment training;ADL retraining;balance training;bed mobility training;energy conservation;home exercise program;neuromuscular re-education;ROM (Range of Motion);strengthening;stretching  -SG       Positioning and Restraints    Pre-Treatment Position  in bed  -SG in bed  -CZ      Post Treatment Position  bed  -SG bed  -CZ      In Bed  supine;call light within reach;encouraged to call for assist  -SG supine;call light within reach;exit alarm on  -CZ        User Key  (r) = Recorded By, (t) = Taken By, (c) = Cosigned By    Initials Name Effective Dates    CZ Bassam Dickey, PT 02/17/17 -     SG Radha Aviles OT 08/03/17 -            Occupational Therapy Education     Title: PT OT SLP Therapies (Active)     Topic: Occupational Therapy (Done)     Point: ADL training (Done)    Description: Instruct learner(s) on proper safety adaptation and remediation techniques during self care or transfers.   Instruct in proper use of assistive devices.    Learning Progress Summary    Learner Readiness Method Response Comment Documented by Status   Patient Eager E VU Educated on use of call light. SG 10/02/17 1554 Done               Point: Home exercise program (Done)    Description: Instruct learner(s) on appropriate technique for monitoring, assisting and/or progressing  therapeutic exercises/activities.    Learning Progress Summary    Learner Readiness Method Response Comment Documented by Status   Patient Eager E VU Educated on use of call light.  10/02/17 1554 Done               Point: Precautions (Done)    Description: Instruct learner(s) on prescribed precautions during self-care and functional transfers.    Learning Progress Summary    Learner Readiness Method Response Comment Documented by Status   Patient Eager E VU Educated on use of call light.  10/02/17 1554 Done               Point: Body mechanics (Done)    Description: Instruct learner(s) on proper positioning and spine alignment during self-care, functional mobility activities and/or exercises.    Learning Progress Summary    Learner Readiness Method Response Comment Documented by Status   Patient Eager E VU Educated on use of call light.  10/02/17 1554 Done                      User Key     Initials Effective Dates Name Provider Type Discipline     08/03/17 -  Radha Aviles OT Occupational Therapist OT                  OT Recommendation and Plan  Anticipated Equipment Needs At Discharge: bathing equipment  Anticipated Discharge Disposition: home with home health  Planned Therapy Interventions: activity intolerance, adaptive equipment training, ADL retraining, balance training, bed mobility training, energy conservation, home exercise program, neuromuscular re-education, ROM (Range of Motion), strengthening, stretching  Therapy Frequency:  (3/14 times/wk)  Plan of Care Review  Plan Of Care Reviewed With: patient  Progress: no change  Outcome Summary/Follow up Plan: Evaluation complete and treatment initiated this date.  Resident will edema throughout body decreasing her grasp etc.  Continued skilled OT services are required for resident to increase safety and independence for return home safely.  Home Health for transition home.          OT Goals       10/02/17 1553          Dynamic Standing Balance OT STG     Dynamic Standing Balance OT STG, Date Established 10/02/17  -SG      Dynamic Standing Balance OT STG, Time to Achieve 5 - 7 days  -SG      Dynamic Standing Balance OT STG, [place] Level conditional independence  -SG      Dynamic Standing Balance OT STG, Additional Goal for in room retrieval of clothing and ADL's items.  -SG      Patient Education OT STG    Patient Education OT STG, Date Established 10/02/17  -SG      Patient Education OT STG, Time to Achieve 5 - 7 days  -SG      Patient Education OT STG, Education Type HEP  -SG      Patient Education OT STG, Additional Goal EC/WS  -SG      ADL OT LTG    ADL OT LTG, Date Established 10/02/17  -SG      ADL OT LTG, Time to Achieve 2 wks  -SG      ADL OT LTG, [place] Level independent with device  -SG      ADL OT LTG, Additional Goal dressing, toileting and bathing.  -SG      Endurance OT STG    Endurance Goal OT STG, Date Established 10/02/17  -SG      Endurance Goal OT STG, Time to Achieve 5 - 7 days  -SG      Endurance Goal OT STG, Additional Goal increase activity tolerance to 5 min x3 to complete ADL's.  -SG        User Key  (r) = Recorded By, (t) = Taken By, (c) = Cosigned By    Initials Name Provider Type    STEPHANIE Aviles OT Occupational Therapist                Outcome Measures       10/02/17 0132 10/01/17 1340       How much help from another person do you currently need...    Turning from your back to your side while in flat bed without using bedrails?  4  -CZ     Moving from lying on back to sitting on the side of a flat bed without bedrails?  4  -CZ     Moving to and from a bed to a chair (including a wheelchair)?  3  -CZ     Standing up from a chair using your arms (e.g., wheelchair, bedside chair)?  3  -CZ     Climbing 3-5 steps with a railing?  2  -CZ     To walk in hospital room?  3  -CZ     AM-PAC 6 Clicks Score  19  -CZ     How much help from another is currently needed...    Putting on and taking off regular lower body clothing? 3  -SG    "   Bathing (including washing, rinsing, and drying) 3  -SG      Toileting (which includes using toilet bed pan or urinal) 3  -SG      Putting on and taking off regular upper body clothing 3  -SG      Taking care of personal grooming (such as brushing teeth) 4  -SG      Eating meals 4  -SG      Score 20  -SG      Tinetti Assessment    Tinetti Assessment  yes  -CZ     Sitting Balance  1  -CZ     Arises  2  -CZ     Attempts to Rise  2  -CZ     Immediate Standing Balance (first 5 sec)  2  -CZ     Standing Balance  1  -CZ     Sternal Nudge (feet close together)  0  -CZ     Eyes Closed (feet close together)  0  -CZ     Turning 360 Degrees- Steps  1  -CZ     Turning 360 Degrees- Steadiness  1  -CZ     Sitting Down  2  -CZ     Tinetti Balance Score  12  -CZ     Gait Initiation (immediate after told \"go\")  1  -CZ     Step Length- Right Swing  1  -CZ     Step Length- Left Swing  1  -CZ     Foot Clearance- Right Foot  1  -CZ     Foot Clearance- Left Foot  1  -CZ     Step Symmetry  1  -CZ     Step Continuity  1  -CZ     Path (excursion)  1  -CZ     Trunk  0  -CZ     Base of Support  0  -CZ     Gait Score  8  -CZ     Tinetti Total Score  20  -CZ     T[place]etti Assistive Device  rolling walker  -CZ     Tinetti Assessment Comments  poor stepping response  -CZ     Functional Assessment    Outcome Measure Options AM-PAC 6 Clicks Daily Activity (OT)  -SG [place];AM-PAC 6 Clicks Basic Mobility (PT)  -CZ       User Key  (r) = Recorded By, (t) = Taken By, (c) = Cosigned By    Initials Name Provider Type    ANDREA Dickey, PT Physical Therapist     Radha Aviles OT Occupational Therapist          Time Calculation:   OT Start Time: 1332  OT Stop Time: 1412  OT Time Calculation (min): 40 min    Therapy Charges for Today     Code Description Service Date Service Provider Modifiers Qty    57574339913  OT SELFCARE CURRENT 10/2/2017 BERNARDA Keller,  1    [id number]  OT SELFCARE PROJECTED 10/2/2017 BERNARDA Keller, CI 1    " [id number]  OT EVAL MOD COMPLEXITY 1 10/2/2017 She Stefan OT GO 1    05002512281 HC OT SELF CARE/MGMT/TRAIN EA 15 MIN 10/2/2017 She Stefan OT GO 1    12541508157 HC OT THER PROC EA 15 MIN 10/2/2017 She BERNARDA Aviles GO 1          OT G-codes  OT Professional Judgement Used?: Yes  OT Functional Scales Options: AM-PAC 6 Clicks Daily Activity (OT)  Score: 20  Functional Limitation: Self care  Self Care Current Status (): At least 20 percent but less than 40 percent impaired, limited or restricted  Self Care Goal Status (): At least 1 percent but less than 20 percent impaired, limited or restricted    Radha Aviles OT  10/2/2017

## 2017-10-02 NOTE — PROGRESS NOTES
[facility]  INPATIENT PROGRESS NOTE    Length of Stay: 5  Date of Admission: 9/27/2017  Primary Care Physician: Belinda Finn MD    Subjective     Chief Complaint   Patient presents with   • Vomiting     HPI:  A 66-year-old female with a history of type 2 diabetes name mellitus uncontrolled presented to the ER with 1 week history of nausea and vomiting.  Patient said that she has no history of recent travel, no sick contacts.  Patient said that she has vomited multiple times to the point that she cannot even count.  There was no blood in the vomit.  Patient also had some episodes of diarrhea as well.  Patient is status post cholecystectomy cystectomy, 3 C-sections and hysterectomy.  Patient said the last time she was a able to eat was 1 week ago.    9/29/2017: Pt is still having fever, however she is feeling better.     9/30 asymptomatic . Feels better and wants to eat . Denies abd pain N/V/D     10/1 patient feels much better, tolerated full liquids and wishes to try soft diet.denies abd pain N/v/D    10/2 patient feels better , only complains is intermittent anxiety     Review of Systems   Constitutional: Negative for chills, fatigue and fever.   HENT: Negative for sinus pressure.    Cardiovascular: Negative for chest pain, palpitations and leg swelling.   Gastrointestinal: Negative for abdominal pain.   Genitourinary: Negative for dysuria and urgency.   Neurological: Negative for dizziness and light-headedness.   Hematological: Negative for adenopathy. Does not bruise/bleed easily.   Psychiatric/Behavioral: Negative for agitation.        All pertinent negatives and positives are as above. All other systems have been reviewed and are negative unless otherwise stated.     Objective      Vital Sign Min/Max for last 24 hours  Temp  Min: 97.8 °F (36.6 °C)  Max: 99.5 °F (37.5 °C)   BP  Min: 135/62  Max: 148/74   Pulse  Min: 75  Max: 85   Resp  Min: 18   Max: 18   SpO2  Min: 94 %  Max: 98 %   No Data Recorded   No Data Recorded         Physical Exam   Constitutional: She is oriented to person, place, and time. She appears well-developed.   HENT:   Head: Normocephalic.   Eyes: EOM are normal. Pupils are equal, round, and reactive to light.   Neck: Normal range of motion.   Cardiovascular: Normal rate, regular rhythm and normal heart sounds.    Pulmonary/Chest: Effort normal and breath sounds normal.   Abdominal: Soft. There is no tenderness.   Musculoskeletal: Normal range of motion.   Neurological: She is alert and oriented to person, place, and time.   Skin: Skin is warm.   Vitals reviewed.      Current Facility-Administered Medications   Medication Dose Route Frequency Provider Last Rate Last Dose   • acetaminophen (TYLENOL) suppository 650 mg  650 mg Rectal Q4H PRN Paige Donahue MD   650 mg at 09/27/17 2209   • cyclobenzaprine (FLEXERIL) tablet 10 mg  10 mg Oral Nightly PRN Paige Donahue MD   10 mg at 10/01/17 2334   • dextrose (D50W) solution 12.5 g  12.5 g Intravenous Q15 Min PRN Lance Augustin MD       • dextrose (D50W) solution 25 g  25 g Intravenous Q15 Min PRN Paige Donahue MD       • dextrose (D50W) solution 25 g  25 g Intravenous Q15 Min PRN Lance Augustin MD       • dextrose (GLUTOSE) oral gel 15 g  15 g Oral Q15 Min PRN Paige Donahue MD       • dextrose (GLUTOSE) oral gel 15 g  15 g Oral Q15 Min PRN Lance Augustin MD       • ferrous sulfate EC tablet 324 mg  324 mg Oral Daily With Breakfast Eugene Jean-Baptiste MD   324 mg at 10/02/17 0847   • glucagon (human recombinant) (GLUCAGEN DIAGNOSTIC) injection 1 mg  1 mg Subcutaneous Q15 Min PRN Paige Donahue MD       • glucagon (human recombinant) (GLUCAGEN DIAGNOSTIC) injection 1 mg  1 mg Subcutaneous Q15 Min PRN Lance Augustin MD       • heparin (porcine) 5000 UNIT/ML injection 5,000 Units  5,000 Units Subcutaneous Q8H Lance Augustin MD   5,000 Units at 10/02/17 0549    • HYDROcodone-acetaminophen (NORCO) 7.5-325 MG per tablet 1 tablet  1 tablet Oral Q6H PRN Lance Augustin MD   1 tablet at 10/02/17 0634   • hydrOXYzine (VISTARIL) capsule 25 mg  25 mg Oral TID PRN Paige Donahue MD   25 mg at 10/01/17 2212   • insulin aspart (novoLOG) injection 0-24 Units  0-24 Units Subcutaneous 4x Daily AC & at Bedtime Lance Augustin MD   4 Units at 10/02/17 1154   • insulin aspart (novoLOG) injection 2-8 Units  2-8 Units Subcutaneous TID With Meals Rcihar Haney MD   4 Units at 10/02/17 1153   • insulin detemir (LEVEMIR) injection 20 Units  20 Units Subcutaneous Nightly Lance Augustin MD   20 Units at 10/01/17 2014   • levoFLOXacin (LEVAQUIN) 500 mg/100 mL D5W (premix) (LEVAQUIN) 500 mg  500 mg Intravenous Q24H Eugene Jean-Baptiste MD   500 mg at 10/02/17 1154   • ondansetron (ZOFRAN) injection 4 mg  4 mg Intravenous Q4H PRN Paige Donahue MD   4 mg at 09/29/17 0014   • pantoprazole (PROTONIX) EC tablet 40 mg  40 mg Oral Q AM Paige Donahue MD   40 mg at 10/02/17 0550   • Pharmacy to Dose Zosyn   Does not apply Continuous PRN Lance Augustin MD       • piperacillin-tazobactam (ZOSYN) 4.5 g in iso-osmotic dextrose 100 mL IVPB (premix)  4.5 g Intravenous Q8H Haresh Suresh MD   4.5 g at 10/02/17 0550   • [START ON 10/3/2017] piperacillin-tazobactam (ZOSYN) 4.5 g/100 mL 0.9% NS IVPB (mbp)  4.5 g Intravenous Q8H Haresh Suresh MD       • potassium chloride (MICRO-K) CR capsule 10 mEq  10 mEq Oral PRN Lance Augustin MD        Or   • potassium chloride (KLOR-CON) packet 10 mEq  10 mEq Oral PRN Lance Augustin MD        Or   • potassium chloride 10 mEq in 100 mL IVPB  10 mEq Intravenous Q1H PRN Lance Augustin MD       • potassium chloride (MICRO-K) CR capsule 20 mEq  20 mEq Oral PRN Lance Augustin MD   20 mEq at 10/02/17 0957    Or   • potassium chloride (KLOR-CON) packet 20 mEq  20 mEq Oral PRN Lance Augustin MD        Or   • potassium  chloride 10 mEq in 100 mL IVPB  10 mEq Intravenous Q1H PRN Lance Augustin  mL/hr at 09/28/17 0011 10 mEq at 09/28/17 0011   • potassium chloride (MICRO-K) CR capsule 40 mEq  40 mEq Oral PRN Lance Augustin MD        Or   • potassium chloride (KLOR-CON) packet 40 mEq  40 mEq Oral PRN Lance Augustin MD        Or   • potassium chloride 10 mEq in 100 mL IVPB  10 mEq Intravenous Q1H PRN Lance Augustin MD       • sodium chloride 0.9 % flush 1-10 mL  1-10 mL Intravenous PRN Lance Augustin MD       • sodium chloride 0.9 % flush 10 mL  10 mL Intravenous PRN Talon Rivera MD   10 mL at 09/29/17 0835   • sodium chloride 0.9 % infusion  125 mL/hr Intravenous Continuous Lance Augustin  mL/hr at 10/02/17 0121 125 mL/hr at 10/02/17 0121           Results Review:  I have reviewed the labs, radiology results, and diagnostic studies.    Laboratory Data:     Results from last 7 days  Lab Units 10/02/17  0700 10/01/17  0800 09/30/17  0546   SODIUM mmol/L 139 137 134*   POTASSIUM mmol/L 3.4* 3.2* 3.5   CHLORIDE mmol/L 105 103 103   CO2 mmol/L 23.0 21.0* 22.0   BUN mg/dL 15 14 17   CREATININE mg/dL 0.89 0.88 0.96   GLUCOSE mg/dL 160* 170* 215*   CALCIUM mg/dL 7.7* 7.7* 7.6*   BILIRUBIN mg/dL 0.4 0.8 0.6   ALK PHOS U/L 95 83 129*   ALT (SGPT) U/L 28 41 41   AST (SGOT) U/L 29 41* 30   ANION GAP mmol/L 11.0 13.0 9.0     Estimated Creatinine Clearance: 74.8 mL/min (by C-G formula based on Cr of 0.89).    Results from last 7 days  Lab Units 09/28/17  1926 09/28/17  1641 09/28/17  0206   MAGNESIUM mg/dL 1.8 2.1 1.4*   PHOSPHORUS mg/dL 2.0* 1.7* 1.2*           Results from last 7 days  Lab Units 10/02/17  0700 10/01/17  0800 09/30/17  0546 09/29/17  2201 09/29/17  0250 09/28/17  0703   WBC 10*3/mm3 14.71* 14.96* 10.96*  --  12.19* 23.97*   HEMOGLOBIN g/dL 9.0* 9.7* 9.5* 10.3* 9.1* 11.1*   HEMATOCRIT % 26.4* 28.5* 28.5* 30.5* 26.6* 32.2*   PLATELETS 10*3/mm3 339 219 175  --  158 171            Results from last 7 days  Lab Units 10/01/17  0800 09/29/17  0647   CRP mg/dL 4.50* 3.00*       Culture Data:   Blood Culture   Date Value Ref Range Status   09/29/2017 No growth at 2 days  Preliminary     Urine Culture   Date Value Ref Range Status   10/01/2017 Mixed Shellie Isolated  Final     No results found for: RESPCX  No results found for: WOUNDCX  No results found for: STOOLCX  No components found for: BODYFLD      Radiology Data:   Imaging Results (last 24 hours)     ** No results found for the last 24 hours. **          I have reviewed the patient current medications.     Assessment/Plan     Active Hospital Problems (** Indicates Principal Problem)    Diagnosis Date Noted   • Colitis [K52.9] 09/27/2017      Resolved Hospital Problems    Diagnosis Date Noted Date Resolved   No resolved problems to display.     -- Sepsis and Escherichia coli bacteremia   Likely secondry to pyelonephritis and colitis   Clinically improving   WBC 23.9--12.19--10.9--14.9--14.7  Continue with Zosyn  And levaquin   Repeat UA yesterday shows leuk est  Will dc on oral Levaquin possible in 1- 2 days    --colitis  Improving  WBC as above  Tolerated Full liquids. Advance to soft    --pyelonephrisits  CT abdomen and pelvis as above  Resolving  IVAB as above    --Anxiety  Ativan PRN      --hypokalemia  Likely due to decrease oral intake  Kdur 40meq x 1     --Hyperglycemia secondary to diabetes mellitus type 2: Currently blood sugar is well-controlled.  Patient is currently on insulin doing very well.    -- Acute kidney injury:  resolved    Anemia  Hb 9  Stable  Feso4  Outpatient follow up    --Hypertension:  Continue with labetalol.  Blood pressure well controlled.    --  Anemia iron deficiency:  Likely delusional.     DVT Prophylaxis: heparin     This document has been electronically signed by Eugene Jean-Baptiste MD on October 2, 2017 12:33 PM

## 2017-10-02 NOTE — PLAN OF CARE
Problem: Patient Care Overview (Adult)  Goal: Plan of Care Review  Outcome: Ongoing (interventions implemented as appropriate)    10/02/17 1557   Coping/Psychosocial Response Interventions   Plan Of Care Reviewed With patient   Patient Care Overview   Progress no change   Outcome Evaluation   Outcome Summary/Follow up Plan Evaluation complete and treatment initiated this date. Resident will edema throughout body decreasing her grasp etc. Continued skilled OT services are required for resident to increase safety and independence for return home safely. Home Health for transition home.         Problem: Inpatient Occupational Therapy  Goal: Dymanic Standing Balance Goal STG- OT  Outcome: Ongoing (interventions implemented as appropriate)    10/02/17 1557   Dynamic Standing Balance OT STG   Dynamic Standing Balance OT STG, Date Established 10/02/17   Dynamic Standing Balance OT STG, Time to Achieve 5 - 7 days   Dynamic Standing Balance OT STG, [place] Level conditional independence   Dynamic Standing Balance OT STG, Additional Goal for in room retrieval of clothing and ADL's items.       Goal: Patient Education Goal STG- OT  Outcome: Ongoing (interventions implemented as appropriate)    10/02/17 1557   Patient Education OT STG   Patient Education OT STG, Date Established 10/02/17   Patient Education OT STG, Time to Achieve 5 - 7 days   Patient Education OT STG, Education Type HEP   Patient Education OT STG, Additional Goal EC/WS       Goal: ADL Goal LTG- OT  Outcome: Ongoing (interventions implemented as appropriate)    10/02/17 1557   ADL OT LTG   ADL OT LTG, Date Established 10/02/17   ADL OT LTG, Time to Achieve 2 wks   ADL OT LTG, [place] Level independent with device   ADL OT LTG, Additional Goal dressing, toileting and bathing.       Goal: Endurance Goal STG- OT  Outcome: Ongoing (interventions implemented as appropriate)    10/02/17 1557   Endurance OT STG   Endurance Goal OT STG, Date Established  10/02/17   Endurance Goal OT STG, Time to Achieve 5 - 7 days   Endurance Goal OT STG, Additional Goal increase activity tolerance to 5 min x3 to complete ADL's.

## 2017-10-02 NOTE — PLAN OF CARE
Problem: Patient Care Overview (Adult)  Goal: Plan of Care Review  Outcome: Ongoing (interventions implemented as appropriate)  Goal: Adult Individualization and Mutuality  Outcome: Ongoing (interventions implemented as appropriate)  Goal: Discharge Needs Assessment  Outcome: Ongoing (interventions implemented as appropriate)    Problem: Diabetes, Type 2 (Adult)  Goal: Signs and Symptoms of Listed Potential Problems Will be Absent or Manageable (Diabetes, Type 2)  Outcome: Ongoing (interventions implemented as appropriate)    Problem: Sepsis (Adult)  Goal: Signs and Symptoms of Listed Potential Problems Will be Absent or Manageable (Sepsis)  Outcome: Ongoing (interventions implemented as appropriate)

## 2017-10-03 VITALS
BODY MASS INDEX: 43.75 KG/M2 | WEIGHT: 246.91 LBS | OXYGEN SATURATION: 97 % | TEMPERATURE: 97.2 F | DIASTOLIC BLOOD PRESSURE: 74 MMHG | SYSTOLIC BLOOD PRESSURE: 171 MMHG | HEIGHT: 63 IN | RESPIRATION RATE: 18 BRPM | HEART RATE: 80 BPM

## 2017-10-03 LAB
ALBUMIN SERPL-MCNC: 2.8 G/DL (ref 3.4–4.8)
ALBUMIN/GLOB SERPL: 0.9 G/DL (ref 1.1–1.8)
ALP SERPL-CCNC: 99 U/L (ref 38–126)
ALT SERPL W P-5'-P-CCNC: 31 U/L (ref 9–52)
ANION GAP SERPL CALCULATED.3IONS-SCNC: 10 MMOL/L (ref 5–15)
AST SERPL-CCNC: 46 U/L (ref 14–36)
BASOPHILS # BLD AUTO: 0.06 10*3/MM3 (ref 0–0.2)
BASOPHILS NFR BLD AUTO: 0.5 % (ref 0–2)
BILIRUB SERPL-MCNC: 0.4 MG/DL (ref 0.2–1.3)
BUN BLD-MCNC: 13 MG/DL (ref 7–21)
BUN/CREAT SERPL: 13.3 (ref 7–25)
CALCIUM SPEC-SCNC: 8.1 MG/DL (ref 8.4–10.2)
CHLORIDE SERPL-SCNC: 106 MMOL/L (ref 95–110)
CO2 SERPL-SCNC: 24 MMOL/L (ref 22–31)
CREAT BLD-MCNC: 0.98 MG/DL (ref 0.5–1)
DEPRECATED RDW RBC AUTO: 48.4 FL (ref 36.4–46.3)
EOSINOPHIL # BLD AUTO: 0.13 10*3/MM3 (ref 0–0.7)
EOSINOPHIL NFR BLD AUTO: 1 % (ref 0–7)
ERYTHROCYTE [DISTWIDTH] IN BLOOD BY AUTOMATED COUNT: 14.8 % (ref 11.5–14.5)
GFR SERPL CREATININE-BSD FRML MDRD: 57 ML/MIN/1.73 (ref 60–104)
GLOBULIN UR ELPH-MCNC: 3.2 GM/DL (ref 2.3–3.5)
GLUCOSE BLD-MCNC: 253 MG/DL (ref 60–100)
GLUCOSE BLDC GLUCOMTR-MCNC: 230 MG/DL (ref 70–130)
GLUCOSE BLDC GLUCOMTR-MCNC: 249 MG/DL (ref 70–130)
HCT VFR BLD AUTO: 27.6 % (ref 35–45)
HGB BLD-MCNC: 8.9 G/DL (ref 12–15.5)
IMM GRANULOCYTES # BLD: 0.48 10*3/MM3 (ref 0–0.02)
IMM GRANULOCYTES NFR BLD: 3.9 % (ref 0–0.5)
LYMPHOCYTES # BLD AUTO: 2.61 10*3/MM3 (ref 0.6–4.2)
LYMPHOCYTES NFR BLD AUTO: 20.9 % (ref 10–50)
MCH RBC QN AUTO: 28.9 PG (ref 26.5–34)
MCHC RBC AUTO-ENTMCNC: 32.2 G/DL (ref 31.4–36)
MCV RBC AUTO: 89.6 FL (ref 80–98)
MONOCYTES # BLD AUTO: 0.81 10*3/MM3 (ref 0–0.9)
MONOCYTES NFR BLD AUTO: 6.5 % (ref 0–12)
NEUTROPHILS # BLD AUTO: 8.37 10*3/MM3 (ref 2–8.6)
NEUTROPHILS NFR BLD AUTO: 67.2 % (ref 37–80)
NRBC BLD MANUAL-RTO: 0 /100 WBC (ref 0–0)
PLATELET # BLD AUTO: 361 10*3/MM3 (ref 150–450)
PMV BLD AUTO: 9.4 FL (ref 8–12)
POTASSIUM BLD-SCNC: 3.6 MMOL/L (ref 3.5–5.1)
POTASSIUM BLD-SCNC: 3.8 MMOL/L (ref 3.5–5.1)
POTASSIUM BLD-SCNC: 4 MMOL/L (ref 3.5–5.1)
POTASSIUM BLD-SCNC: 4 MMOL/L (ref 3.5–5.1)
PROT SERPL-MCNC: 6 G/DL (ref 6.3–8.6)
RBC # BLD AUTO: 3.08 10*6/MM3 (ref 3.77–5.16)
SODIUM BLD-SCNC: 140 MMOL/L (ref 137–145)
WBC NRBC COR # BLD: 12.46 10*3/MM3 (ref 3.2–9.8)

## 2017-10-03 PROCEDURE — 25010000002 LEVOFLOXACIN PER 250 MG: Performed by: FAMILY MEDICINE

## 2017-10-03 PROCEDURE — 84132 ASSAY OF SERUM POTASSIUM: CPT | Performed by: FAMILY MEDICINE

## 2017-10-03 PROCEDURE — 25010000002 HEPARIN (PORCINE) PER 1000 UNITS: Performed by: FAMILY MEDICINE

## 2017-10-03 PROCEDURE — 80053 COMPREHEN METABOLIC PANEL: CPT | Performed by: FAMILY MEDICINE

## 2017-10-03 PROCEDURE — 97110 THERAPEUTIC EXERCISES: CPT

## 2017-10-03 PROCEDURE — 84132 ASSAY OF SERUM POTASSIUM: CPT | Performed by: INTERNAL MEDICINE

## 2017-10-03 PROCEDURE — 97755 ASSISTIVE TECHNOLOGY ASSESS: CPT

## 2017-10-03 PROCEDURE — 25010000002 PIPERACILLIN SOD-TAZOBACTAM PER 1 G: Performed by: FAMILY MEDICINE

## 2017-10-03 PROCEDURE — 63710000001 INSULIN ASPART PER 5 UNITS: Performed by: INTERNAL MEDICINE

## 2017-10-03 PROCEDURE — 85025 COMPLETE CBC W/AUTO DIFF WBC: CPT | Performed by: FAMILY MEDICINE

## 2017-10-03 PROCEDURE — 97530 THERAPEUTIC ACTIVITIES: CPT

## 2017-10-03 PROCEDURE — 82962 GLUCOSE BLOOD TEST: CPT

## 2017-10-03 PROCEDURE — 63710000001 INSULIN ASPART PER 5 UNITS: Performed by: FAMILY MEDICINE

## 2017-10-03 RX ORDER — METRONIDAZOLE 500 MG/1
500 TABLET ORAL 3 TIMES DAILY
Qty: 21 TABLET | Refills: 0 | Status: SHIPPED | OUTPATIENT
Start: 2017-10-03 | End: 2017-10-10

## 2017-10-03 RX ORDER — LEVOFLOXACIN 500 MG/1
500 TABLET, FILM COATED ORAL DAILY
Qty: 7 TABLET | Refills: 0 | Status: SHIPPED | OUTPATIENT
Start: 2017-10-03 | End: 2017-10-10

## 2017-10-03 RX ADMIN — LEVOFLOXACIN 500 MG: 5 INJECTION, SOLUTION INTRAVENOUS at 10:44

## 2017-10-03 RX ADMIN — POTASSIUM CHLORIDE 20 MEQ: 750 CAPSULE, EXTENDED RELEASE ORAL at 08:05

## 2017-10-03 RX ADMIN — FERROUS SULFATE TAB EC 324 MG (65 MG FE EQUIVALENT) 324 MG: 324 (65 FE) TABLET DELAYED RESPONSE at 08:06

## 2017-10-03 RX ADMIN — INSULIN ASPART 3 UNITS: 100 INJECTION, SOLUTION INTRAVENOUS; SUBCUTANEOUS at 08:06

## 2017-10-03 RX ADMIN — TAZOBACTAM SODIUM AND PIPERACILLIN SODIUM 4.5 G: 500; 4 INJECTION, SOLUTION INTRAVENOUS at 05:30

## 2017-10-03 RX ADMIN — INSULIN ASPART 4 UNITS: 100 INJECTION, SOLUTION INTRAVENOUS; SUBCUTANEOUS at 08:06

## 2017-10-03 RX ADMIN — PANTOPRAZOLE SODIUM 40 MG: 40 TABLET, DELAYED RELEASE ORAL at 05:30

## 2017-10-03 RX ADMIN — POTASSIUM CHLORIDE 20 MEQ: 750 CAPSULE, EXTENDED RELEASE ORAL at 03:17

## 2017-10-03 RX ADMIN — INSULIN ASPART 2 UNITS: 100 INJECTION, SOLUTION INTRAVENOUS; SUBCUTANEOUS at 12:49

## 2017-10-03 RX ADMIN — HEPARIN SODIUM 5000 UNITS: 5000 INJECTION, SOLUTION INTRAVENOUS; SUBCUTANEOUS at 05:30

## 2017-10-03 RX ADMIN — INSULIN ASPART 8 UNITS: 100 INJECTION, SOLUTION INTRAVENOUS; SUBCUTANEOUS at 12:49

## 2017-10-03 RX ADMIN — HYDROCODONE BITARTRATE AND ACETAMINOPHEN 1 TABLET: 7.5; 325 TABLET ORAL at 01:05

## 2017-10-03 RX ADMIN — HYDROCODONE BITARTRATE AND ACETAMINOPHEN 1 TABLET: 7.5; 325 TABLET ORAL at 08:06

## 2017-10-03 NOTE — PLAN OF CARE
Problem: Inpatient Physical Therapy  Goal: Bed Mobility Goal STG- PT  Outcome: Unable to achieve outcome(s) by discharge Date Met:  10/03/17    10/01/17 1340 10/03/17 1603   Bed Mobility PT STG   Bed Mobility PT STG, Date Established 10/01/17 --    Bed Mobility PT STG, Time to Achieve 2 days --    Bed Mobility PT STG, Activity Type all bed mobility --    Bed Mobility PT STG, [place] Level conditional independence --    Bed Mobility PT STG, Additional Goal HOB flat, no bed rails.  --    Bed Mobility PT STG, Date Goal Reviewed --  10/03/17   Bed Mobility PT STG, Outcome --  goal not met   Bed Mobility PT STG, Reason Goal Not Met --  discharged from facility       Goal: Transfer Training Goal 1 STG- PT  Outcome: Unable to achieve outcome(s) by discharge Date Met:  10/03/17    10/01/17 1340 10/03/17 1603   Transfer Training PT STG   Transfer Training PT STG, Date Established 10/01/17 --    Transfer Training PT STG, Time to Achieve 2 days --    Transfer Training PT STG, Activity Type bed to chair /chair to bed;sit to stand/stand to sit --    Transfer Training PT STG, [place] Level conditional independence --    Transfer Training PT STG, Assist Device walker, rolling --    Transfer Training PT STG, Date Goal Reviewed --  10/03/17   Transfer Training PT STG, Outcome --  goal not met   Transfer Training PT STG, Reason Goal Not Met --  discharged from facility       Goal: Gait Training Goal LTG- PT  Outcome: Unable to achieve outcome(s) by discharge Date Met:  10/03/17    10/01/17 1340 10/03/17 1603   Gait Training PT LTG   Gait Training Goal PT LTG, Date Established 10/01/17 --    Gait Training Goal PT LTG, Time to Achieve by discharge --    Gait Training Goal PT LTG, [place] Level conditional independence --    Gait Training Goal PT LTG, Assist Device walker, rolling --    Gait Training Goal PT LTG, Distance to Achieve 150' x 1 --    Gait Training Goal PT LTG, Date Goal Reviewed --  10/03/17   Gait Training  Goal PT LTG, Outcome --  goal not met   Gait Training Goal PT LTG, Reason Goal Not Met --  discharged from facility       Goal: Physical Therapy Goal LTG- PT  Outcome: Unable to achieve outcome(s) by discharge Date Met:  10/03/17    10/01/17 1340 10/03/17 1603   Physical Therapy PT LTG   Physical Therapy PT LTG, Date Established 10/01/17 --    Physical Therapy PT LTG, Time to Achieve by discharge --    Physical Therapy PT LTG, Activity Type Tinetti fall risk assessment.  --    Physical Therapy PT LTG, Addisional Goal Score 24/28 or low fall risk.  --    Physical Therapy PT LTG, Date Goal Reviewed --  10/03/17   Physical Therapy PT LTG, Outcome --  goal not met   Physical Therapy PT LTG, Reason Goal Not Met --  discharged from facility

## 2017-10-03 NOTE — CONSULTS
"Adult Nutrition  Assessment    Patient Name:  Bertha Hyde  YOB: 1950  MRN: [id number]  Admit Date:  9/27/2017    Assessment Date:  10/3/2017    Comments:  Pt indicated her appetite had improved and was good.  Voiced no food preferences.  Intake 75% - 1x, 50% - 2x, 25% - 3x.  Blood glucose - elevated.  Levemir insulin, sliding scale novolog prescribed.  Hgb, Hct- low.  Ferrous Sulfate prescribed.  Pt discharged.            Reason for Assessment       10/03/17 1452    Reason for Assessment    Reason For Assessment/Visit follow up protocol                  Anthropometrics       10/03/17 1452    Anthropometrics    Height 160 cm (62.99\")    Weight 112 kg (246 lb 14.6 oz)    Ideal Body Weight (IBW)    Ideal Body Weight (IBW), Female 53.1    % Ideal Body Weight 211.36    Body Mass Index (BMI)    BMI (kg/m2) 43.84            Labs/Tests/Procedures/Meds       10/03/17 1452    Labs/Tests/Procedures/Meds    Labs/Tests Review Glucose;Alb;Hgb Hct    Medication Review Iron;Reviewed, pertinent            Physical Findings       10/03/17 1454    Physical Appearance    Overall Physical Appearance obese              Nutrition Prescription Ordered       10/03/17 1454    Nutrition Prescription PO    Current PO Diet Regular    Common Modifiers Cardiac;Consistent Carbohydrate            Evaluation of Received Nutrient/Fluid Intake       10/03/17 1454    PO Evaluation    Number of Meals 6    % PO Intake 75% - 1x, 50% - 2x, 25% - 3x            Electronically signed by:  Sandy Balderrama RD  10/03/17 2:55 PM  "

## 2017-10-03 NOTE — PLAN OF CARE
Problem: Patient Care Overview (Adult)  Goal: Plan of Care Review  Outcome: Ongoing (interventions implemented as appropriate)    10/03/17 0445   Coping/Psychosocial Response Interventions   Plan Of Care Reviewed With patient   Patient Care Overview   Progress improving   Outcome Evaluation   Outcome Summary/Follow up Plan pt rested some during the night; two doses of K+ given       Goal: Adult Individualization and Mutuality  Outcome: Ongoing (interventions implemented as appropriate)  Goal: Discharge Needs Assessment  Outcome: Ongoing (interventions implemented as appropriate)    Problem: Diabetes, Type 2 (Adult)  Goal: Signs and Symptoms of Listed Potential Problems Will be Absent or Manageable (Diabetes, Type 2)  Outcome: Ongoing (interventions implemented as appropriate)    Problem: Sepsis (Adult)  Goal: Signs and Symptoms of Listed Potential Problems Will be Absent or Manageable (Sepsis)  Outcome: Ongoing (interventions implemented as appropriate)

## 2017-10-03 NOTE — DISCHARGE SUMMARY
[facility] Medicine Services  DISCHARGE SUMMARY       Date of Admission: 9/27/2017  Date of Discharge:  10/3/2017  Primary Care Physician: Belinda Finn MD    Presenting Problem/History of Present Illness:  Colitis [K52.9]     Final Discharge Diagnoses:  Sepsis resolved  Bacteremia resolved  pyelnephritis resolved  Colitis resolved  Anxiety  hypokalmeia resolved  DM  Anemia ( outpatient follow up ) continue Feso4  HTN  Hospital Problem List     Colitis          Consults:   Consults     Date and Time Order Name Status Description    9/27/2017 2030 Inpatient Consult to Endocrinology Completed           Procedures Performed:                 Pertinent Test Results:    Laboratory Data:     Results from last 7 days  Lab Units 10/03/17  0940 10/03/17  0533 10/03/17  0112  10/02/17  0700 10/01/17  0800   SODIUM mmol/L  --  140  --   --  139 137   POTASSIUM mmol/L 3.8 4.0  4.0 3.6  < > 3.4* 3.2*   CHLORIDE mmol/L  --  106  --   --  105 103   CO2 mmol/L  --  24.0  --   --  23.0 21.0*   BUN mg/dL  --  13  --   --  15 14   CREATININE mg/dL  --  0.98  --   --  0.89 0.88   GLUCOSE mg/dL  --  253*  --   --  160* 170*   CALCIUM mg/dL  --  8.1*  --   --  7.7* 7.7*   BILIRUBIN mg/dL  --  0.4  --   --  0.4 0.8   ALK PHOS U/L  --  99  --   --  95 83   ALT (SGPT) U/L  --  31  --   --  28 41   AST (SGOT) U/L  --  46*  --   --  29 41*   ANION GAP mmol/L  --  10.0  --   --  11.0 13.0   < > = values in this interval not displayed.  Estimated Creatinine Clearance: 67.9 mL/min (by C-G formula based on Cr of 0.98).    Results from last 7 days  Lab Units 09/28/17  1926 09/28/17  1641 09/28/17  0206   MAGNESIUM mg/dL 1.8 2.1 1.4*   PHOSPHORUS mg/dL 2.0* 1.7* 1.2*           Results from last 7 days  Lab Units 10/03/17  0533 10/02/17  0700 10/01/17  0800 09/30/17  0546 09/29/17  2201 09/29/17  0250   WBC 10*3/mm3 12.46* 14.71* 14.96* 10.96*  --  12.19*   HEMOGLOBIN g/dL 8.9* 9.0* 9.7* 9.5* 10.3* 9.1*    HEMATOCRIT % 27.6* 26.4* 28.5* 28.5* 30.5* 26.6*   PLATELETS 10*3/mm3 361 339 219 175  --  158           Culture Data:   No results found for: BLOODCX  Urine Culture   Date Value Ref Range Status   10/01/2017 Mixed Shellie Isolated  Final     No results found for: RESPCX  No results found for: WOUNDCX  No results found for: STOOLCX  No components found for: BODYFLD    Micobiology  Blood Culture   Date Value Ref Range Status   09/29/2017 No growth at 3 days  Preliminary     BCID, PCR   Date Value Ref Range Status   09/27/2017 Escherichia coli. Identification by BCID PCR. (C) No organism detected by BCID PCR. Final                 Urine Culture   Date Value Ref Range Status   10/01/2017 Mixed Shellie Isolated  Final          Radiology Data:   Imaging Results (all)     Procedure Component Value Units Date/Time    XR Chest 1 View [[id number]] Collected:  09/27/17 1312     Updated:  09/27/17 1334    Narrative:       Chest single view       CLINICAL INDICATION: Shortness of breath    COMPARISON: February 25, 2016.    FINDINGS: Cardiac silhouette is normal in size. Pulmonary  vascularity is unremarkable.     No focal infiltrate or consolidation.  No pleural effusion.  No  pneumothorax.      Impression:       CONCLUSION: No evidence of active disease.    Electronically signed by:  Shane Gomez MD  9/27/2017 1:33 PM CDT  Workstation: [id number]    CT Abdomen Pelvis With Contrast [[id number]] Collected:  09/27/17 1435     Updated:  09/27/17 1519    Narrative:         [facility]    Patient Name: RENEE MOJICA    ORDERING: FLORIN NIEVES    ATTENDING: WESTLEY MARROQUIN     REFERRING: FLORIN NIEVES    -----------------------  PROCEDURE: CT abdomen and pelvis with intravenous contrast    HISTORY: LLQ abdominal pain, nausea, vomiting x 4 days, hx of  diverticulitis    Dose length product: 585.6  This exam was performed using radiation doses that are as low as  reasonably achievable (ALARA).  This exam was  performed according to our departmental dose  optimization program, which includes automated exposure control,  adjustment of the mA and/or KV according to patient size and/or  use of iterative reconstruction technique.    COMPARISON: No comparison    CONTRAST:   Oral and 100 cc intravenous Isovue 300     TECHNIQUE: Multiple contiguous contrast enhanced axial images are  obtained of the abdomen and pelvis.     FINDINGS:     LOWER CHEST: Unremarkable.    HEPATOBILIARY: Gallbladder has been resected. No suspicious  hepatic mass.  SPLEEN: Unremarkable.  PANCREAS: Unremarkable  ADRENAL GLANDS: Unremarkable.  KIDNEYS/URETERS: No evidence of hydronephrosis or suspicious  mass. There is 5.4 cm left renal cyst. There is nonspecific  perinephric fat stranding. There is trace fluid in the right  paracolic gutter.    GASTROINTESTINAL: Unremarkable  REPRODUCTIVE ORGANS: Post hysterectomy.  URINARY BLADDER: Unremarkable    VASCULAR: There are circumaortic left renal veins.  LYMPH NODES: No pathologically enlarged nodes by size criteria.  PERITONEUM/RETROPERITONEUM: Unremarkable.     OSSEOUS STRUCTURES: There are multilevel degenerative changes of  the spine, manifested by disc space narrowing, most pronounced at  L2-3, and multilevel marginal osteophytes.  There is wedging of  the T11 vertebral body, age indeterminate.        Impression:       CONCLUSION:   There is nonspecific perinephric inflammation.  There is wedging of the T11 vertebral body, age indeterminate.  Otherwise unremarkable exam.    Electronically signed by:  Kishor Pinzon MD  9/27/2017 3:18 PM CDT  Workstation: [id number]    CT Cervical Spine Without Contrast [[id number]] Collected:  09/28/17 0919     Updated:  09/28/17 1044    Narrative:       Procedure: CT CERVICAL SPINE WO CONTRAST    Indication:  Right upper extremity numbness and tingling     Technique: Axial, coronal, sagittal . This exam was performed  according to our departmental dose-optimization program  which  includes automated exposure control, adjustment of the mA and/or  kV according to patient size and/or use of iterative  reconstruction technique.      Prior Relevant Exam: None available     Degenerative changes noted with disc space narrowing and anterior  and posterior osteophyte production at C4-C5 C5-C6 resulting in  mild encroachment upon the neural foramina bilaterally  particularly at C4-C5.    No prevertebral soft tissue swelling or widening spinous process.    Osteopenia noted.    Visualized upper lung bilaterally demonstrate no abnormal  nodularity or density.         Impression:       CONCLUSION:    1. Degenerative changes mid to lower cervical spine without acute  abnormality. If clinical symptoms persist, suggest MRI.     Electronically signed by:  Marques Reese MD  9/28/2017 10:43 AM  CDT Workstation: [employer]    IR PICC wo fluoro guidance [[id number]] Resulted:  09/28/17 1647     Updated:  09/28/17 1647    Narrative:       This procedure was auto-finalized with no dictation required.    XR Chest Post CVA Port [[id number]] Collected:  09/28/17 1643     Updated:  09/28/17 1653    Narrative:       Patient Name:  MRS. RENEE MOJICA  Patient ID:  [id number]   Ordering:  BRYAN SNELL  Attending:  BETTY DENG  Referring:  BRYAN SNELL  ------------------------------------------------    PORTABLE CHEST    HISTORY: PICC line placement    Portable AP upright film of the chest was obtained at 4:40 PM.  COMPARISON: September 27, 2017    EKG leads.  Right PICC line now present with tip projected over the proximal  superior vena cava.  The lungs are clear of an acute process.  The heart is not enlarged.  The pulmonary vasculature is not increased.  No pleural effusion.  No pneumothorax.  No acute osseous abnormality.  Degenerative changes are present in the thoracic spine and left  shoulder.      Impression:       CONCLUSION:  Right PICC line now present with tip projected over  the proximal  superior vena cava.    [id number]    Electronically signed by:  Scott Lord MD  9/28/2017 4:52 PM CDT  Workstation: [employer]    US Guided Vascular Access [[id number]] Resulted:  09/28/17 1726     Updated:  09/28/17 1726    Narrative:       This procedure was auto-finalized with no dictation required.          Chief Complaint on Day of Discharge: none    HPI:A 66-year-old female with a history of type 2 diabetes name mellitus uncontrolled presented to the ER with 1 week history of nausea and vomiting.  Patient said that she has no history of recent travel, no sick contacts.  Patient said that she has vomited multiple times to the point that she cannot even count.  There was no blood in the vomit.  Patient also had some episodes of diarrhea as well.  Patient is status post cholecystectomy cystectomy, 3 C-sections and hysterectomy.  Patient said the last time she was a able to eat was 1 week ago.     Patient was found to have a blood glucose of 600.  Patient was given for boluses of normal saline and she is continued on 125 cc's per hour.  Patient is being chest or to stepdown unit.  Hospital Course: patient was admitted for sepsis , bacteremia , pyelonephritis , and colitis. Patient was put on zosyn and added Levaquin. Symptoms resolved. Patient discharged home on Levaquin and flagyl .patient to follow up with PCP in 2-3 days. Patient also to follow up with PCP in 2-3 days for Anemia .    Condition on Discharge:  Improved and Stable    Physical Exam on Discharge:  Temp:  [97.1 °F (36.2 °C)-99.1 °F (37.3 °C)] 97.6 °F (36.4 °C)  Heart Rate:  [73-90] 86  Resp:  [18] 18  BP: (141-167)/(65-71) 148/70    Constitutional: Patient is AAO x 3. Patient appears well-developed and well-nourished.   Cardiovascular: Normal rate, regular rhythm, normal heart sounds and intact distal pulses.  Exam reveals no gallop and no friction rub.  No murmur heard.  Pulmonary/Chest: Effort normal and breath sounds normal. No  respiratory distress. No wheezes, rales or ronchi.  Abdominal: Soft. Bowel sounds are normal. No distension, no tenderness. There is no rebound and no guarding. No hernia.   Musculoskeletal: No Cyanosis clubbing or edema.    Discharge Disposition:  Home or Self Care    Discharge Medications:   Bertha Hyde Tresa   Home Medication Instructions YAJAIRA:[id number]    Printed on:10/03/17 7150   Medication Information                      Alcohol Swabs (ALCOHOL WIPES) pads  Use 4 x daily             Blood Glucose Monitoring Suppl (BLOOD GLUCOSE MONITOR SYSTEM) W/DEVICE kit  Test Blood Sugars Once Daily             Blood Glucose Monitoring Suppl w/Device kit  USE AS INDICATED, ANY MONITOR             cyclobenzaprine (FLEXERIL) 10 MG tablet  Take 1 tablet by mouth Every Evening. AT BEDTIME AS NEEDED             Dulaglutide 0.75 MG/0.5ML solution pen-injector  Inject 0.75 mg under the skin 1 (One) Time Per Week.             DULoxetine (CYMBALTA) 60 MG capsule  Take 1 capsule by mouth Daily.             glimepiride (AMARYL) 4 MG tablet  Take 1 tablet by mouth 2 (Two) Times a Day.             Glucose Blood (BLOOD GLUCOSE TEST) strip  Use 4 x daily, use any brand covered by insurance or same brand as before             glucose blood test strip  Test Blood Sugars Once Daily             HYDROcodone-acetaminophen (NORCO) 7.5-325 MG per tablet  Take 1 tablet by mouth Every 6 (Six) Hours As Needed for Moderate Pain .             hydrOXYzine (ATARAX) 25 MG tablet  Take 1 tablet by mouth 3 (Three) Times a Day As Needed for Anxiety.             hyoscyamine (OSCIMIN) 0.125 MG SL tablet  Place 0.125-0.25 mg under the tongue every 4 (four) hours as needed. (max 12 per 24 hours)             Insulin Glargine (TOUJEO SOLOSTAR) 300 UNIT/ML solution pen-injector  Inject 50 Units under the skin every night at bedtime.             Insulin Pen Needle (B-D UF III MINI PEN NEEDLES) 31G X 5 MM misc  Use 4 times daily             Lancet Devices  "(LANCING DEVICE) misc  USE AS INDICATED TO CORRELATE WITH STRIPS AND METER             Lancets 30G misc  USE 4 X DAILY             LANCETS MICRO THIN 33G misc  Test Blood Sugars Once Daily             levoFLOXacin (LEVAQUIN) 500 MG tablet  Take 1 tablet by mouth Daily for 7 days.             lisinopril-hydrochlorothiazide (PRINZIDE,ZESTORETIC) 20-12.5 MG per tablet  Take 1 tablet by mouth Daily. Indication: Essential (primary) hypertension             loratadine (CLARITIN) 10 MG tablet  Take 10 mg by mouth daily as needed. Indication: Allergic rhinitis             metFORMIN XR (GLUCOPHAGE-XR) 500 MG 24 hr tablet  Take 500 mg by mouth 2 (two) times a day. \"metformin  mg tablet,extended release 24 hr\"             metroNIDAZOLE (FLAGYL) 500 MG tablet  Take 1 tablet by mouth 3 (Three) Times a Day for 7 days.             pantoprazole (PROTONIX) 40 MG EC tablet  Take 1 tablet by mouth Daily As Needed (heartburn).             pravastatin (PRAVACHOL) 20 MG tablet  Take 1 tablet by mouth Daily.             promethazine (PHENERGAN) 25 MG tablet  Take 0.5-1 tablets by mouth Every 6 (Six) Hours As Needed for Nausea.             rizatriptan MLT (MAXALT-MLT) 10 MG disintegrating tablet  Take 1 tablet by mouth Daily As Needed for migraine. Take 1 tab at onset of headache, can repeat x 1 in 2 hrs prn                 Discharge Diet:   Diet Instructions     Advance Diet As Tolerated           Diet: Consistent Carbohydrate, Cardiac       Discharge Diet:   Consistent Carbohydrate  Cardiac                    Activity at Discharge:   Activity Instructions     Activity as Tolerated                     All the abnormal findings were discussed with the patient in details and the patient verbalized understanding of needing to follow up with PCP and and other specialities of  outpatient follow up for further evaluation and management.    Discharge Care Plan/Instructions: follow up with PCP in 2-3 days    Follow-up Appointments:   Future " Appointments  Date Time Provider Department Center   12/18/2017 10:15 AM Belinda Finn MD [facility] None       Test Results Pending at Discharge:  Order Current Status    Blood Culture - Blood, Preliminary result           This document has been electronically signed by Eugene Jean-Baptiste MD on October 3, 2017 11:02 AM

## 2017-10-03 NOTE — THERAPY TREATMENT NOTE
Acute Care - Occupational Therapy Treatment Note  [facility]     Patient Name: Bertha Hyde  : 1950  MRN: [id number]  Today's Date: 10/3/2017  Onset of Illness/Injury or Date of Surgery Date: 17  Date of Referral to OT: 10/01/17  Referring Physician: Dr. Jean-Baptiste      Admit Date: 2017    Visit Dx:     ICD-10-CM ICD-9-CM   1. Colitis K52.9 558.9   2. Impaired physical mobility Z74.09 781.99   3. Impaired mobility and ADLs Z74.09 799.89   4. Sepsis, due to unspecified organism A41.9 038.9     995.91   5. Osteoarthritis of multiple joints, unspecified osteoarthritis type M15.9 715.89     Patient Active Problem List   Diagnosis   • Degenerative joint disease involving multiple joints   • Depressive disorder   • Essential hypertension   • Hyperlipidemia   • Insomnia   • Migraine   • Type 2 diabetes mellitus with hyperglycemia, without long-term current use of insulin   • Open-angle glaucoma   • Cataract   • Open-angle glaucoma of right eye   • Chronic right shoulder pain   • Colitis             Adult Rehabilitation Note       10/03/17 0915          Rehab Assessment/Intervention    Discipline occupational therapy assistant  -      Document Type therapy note (daily note)  -      Subjective Information agree to therapy  -      Patient Effort, Rehab Treatment good  -      Recorded by [] THANG Sheikh/RAJAT      Vital Signs    Pretreatment Heart Rate (beats/min) 78  -JH      Posttreatment Heart Rate (beats/min) 77  -      Pre SpO2 (%) 98  -JH      O2 Delivery Pre Treatment room air  -      Intra SpO2 (%) 99  -JH      O2 Delivery Intra Treatment room air  -      Post SpO2 (%) 98  -      O2 Delivery Post Treatment room air  -      Pre Patient Position Supine  -      Intra Patient Position Sitting  -      Post Patient Position Supine  -      Recorded by [] THANG Sheikh/L      Pain Assessment    Pain Assessment No/denies pain  -      Recorded by [AUSTIN] Anabel MURDOCK  JENNIFER Whiting      Cognitive Assessment/Intervention    Current Cognitive/Communication Assessment functional  -      Orientation Status oriented x 4  -      Follows Commands/Answers Questions 100% of the time  -JH      Recorded by [] JENNIFER Sheikh      Bed Mobility, Assessment/Treatment    Bed Mobility, Scoot/Bridge, [place] independent  -JH      Bed Mob, Supine to Sit, [place] independent  -JH      Bed Mob, Sit to Supine, [place] independent  -JH      Recorded by [] JENNIFER Sheikh      Transfer Assessment/Treatment    Transfers, Sit-Stand [place] independent  -      Transfers, Stand-Sit [place] independent  -JH      Recorded by [] JENNIFER Sheikh      Therapy Exercises    Bilateral Upper Extremity AROM:;15 reps;supine;sitting;elbow flexion/extension;hand pumps;pronation/supination;shoulder abduction/adduction;shoulder extension/flexion  -      BUE Resistance manual resistance- minimal  -JH      Recorded by [] JENNIFER Sheikh      Positioning and Restraints    Pre-Treatment Position in bed  -      Post Treatment Position bed  -      In Bed sitting  -      Recorded by [] JENNIFER Sheikh        User Key  (r) = Recorded By, (t) = Taken By, (c) = Cosigned By    Initials Name Effective Dates     JENNIFER Sheikh 10/17/16 -                 OT Goals       10/03/17 0915 10/02/17 1557       Dynamic Standing Balance OT STG    Dynamic Standing Balance OT STG, Date Established  10/02/17  -     Dynamic Standing Balance OT STG, Time to Achieve  5 - 7 days  -     Dynamic Standing Balance OT STG, [place] Level  conditional independence  -     Dynamic Standing Balance OT STG, Additional Goal  for in room retrieval of clothing and ADL's items.  -SG     Dynamic Standing Balance OT STG, Date Goal Reviewed 10/03/17  -      Patient Education OT STG    Patient Education OT STG, Date Established  10/02/17  -     Patient Education  OT STG, Time to Achieve  5 - 7 days  -SG     Patient Education OT STG, Education Type  HEP  -SG     Patient Education OT STG, Additional Goal  EC/WS  -SG     Patient Education OT STG, Date Goal Reviewed 10/03/17  -      ADL OT LTG    ADL OT LTG, Date Established  10/02/17  -     ADL OT LTG, Time to Achieve  2 wks  -SG     ADL OT LTG, [place] Level  independent with device  -     ADL OT LTG, Additional Goal  dressing, toileting and bathing.  -     ADL OT LTG, Date Goal Reviewed 10/03/17  -      ADL OT LTG, Outcome goal ongoing  -      Endurance OT STG    Endurance Goal OT STG, Date Established  10/02/17  -     Endurance Goal OT STG, Time to Achieve  5 - 7 days  -SG     Endurance Goal OT STG, Additional Goal  increase activity tolerance to 5 min x3 to complete ADL's.  -     Endurance Goal OT STG, Date Goal Reviewed 10/03/17  -        User Key  (r) = Recorded By, (t) = Taken By, (c) = Cosigned By    Initials Name Provider Type     THANG Sheikh/L Occupational Therapy Assistant     Radha Aviles OT Occupational Therapist          Occupational Therapy Education     Title: PT OT SLP Therapies (Active)     Topic: Occupational Therapy (Done)     Point: ADL training (Done)    Description: Instruct learner(s) on proper safety adaptation and remediation techniques during self care or transfers.   Instruct in proper use of assistive devices.    Learning Progress Summary    Learner Readiness Method Response Comment Documented by Status   Patient Eager E VU Educated on use of call light.  10/02/17 4787 Done               Point: Home exercise program (Done)    Description: Instruct learner(s) on appropriate technique for monitoring, assisting and/or progressing therapeutic exercises/activities.    Learning Progress Summary    Learner Readiness Method Response Comment Documented by Status   Patient Eager E VU Educated on use of call light.  10/02/17 3085 Done               Point: Precautions  (Done)    Description: Instruct learner(s) on prescribed precautions during self-care and functional transfers.    Learning Progress Summary    Learner Readiness Method Response Comment Documented by Status   Patient Sherry BROWN Educated on use of call light.  10/02/17 1554 Done               Point: Body mechanics (Done)    Description: Instruct learner(s) on proper positioning and spine alignment during self-care, functional mobility activities and/or exercises.    Learning Progress Summary    Learner Readiness Method Response Comment Documented by Status   Patient Eager E VU Educated on use of call light.  10/02/17 1554 Done                      User Key     Initials Effective Dates Name Provider Type Discipline     08/03/17 -  Radha Aviles OT Occupational Therapist OT                  OT Recommendation and Plan  Anticipated Equipment Needs At Discharge: bathing equipment  Anticipated Discharge Disposition: home with home health  Planned Therapy Interventions: activity intolerance, adaptive equipment training, ADL retraining, balance training, bed mobility training, energy conservation, home exercise program, neuromuscular re-education, ROM (Range of Motion), strengthening, stretching  Therapy Frequency:  (3/14 times/wk)           Outcome Measures       10/02/17 0132 10/01/17 1340       How much help from another person do you currently need...    Turning from your back to your side while in flat bed without using bedrails?  4  -CZ     Moving from lying on back to sitting on the side of a flat bed without bedrails?  4  -CZ     Moving to and from a bed to a chair (including a wheelchair)?  3  -CZ     Standing up from a chair using your arms (e.g., wheelchair, bedside chair)?  3  -CZ     Climbing 3-5 steps with a railing?  2  -CZ     To walk in hospital room?  3  -CZ     AM-PAC 6 Clicks Score  19  -CZ     How much help from another is currently needed...    Putting on and taking off regular lower body clothing?  "3  -SG      Bathing (including washing, rinsing, and drying) 3  -SG      Toileting (which includes using toilet bed pan or urinal) 3  -SG      Putting on and taking off regular upper body clothing 3  -SG      Taking care of personal grooming (such as brushing teeth) 4  -SG      Eating meals 4  -SG      Score 20  -SG      Tinetti Assessment    Tinetti Assessment  yes  -CZ     Sitting Balance  1  -CZ     Arises  2  -CZ     Attempts to Rise  2  -CZ     Immediate Standing Balance (first 5 sec)  2  -CZ     Standing Balance  1  -CZ     Sternal Nudge (feet close together)  0  -CZ     Eyes Closed (feet close together)  0  -CZ     Turning 360 Degrees- Steps  1  -CZ     Turning 360 Degrees- Steadiness  1  -CZ     Sitting Down  2  -CZ     Tinetti Balance Score  12  -CZ     Gait Initiation (immediate after told \"go\")  1  -CZ     Step Length- Right Swing  1  -CZ     Step Length- Left Swing  1  -CZ     Foot Clearance- Right Foot  1  -CZ     Foot Clearance- Left Foot  1  -CZ     Step Symmetry  1  -CZ     Step Continuity  1  -CZ     Path (excursion)  1  -CZ     Trunk  0  -CZ     Base of Support  0  -CZ     Gait Score  8  -CZ     Tinetti Total Score  20  -CZ     Tinetti Assistive Device  rolling walker  -CZ     Tinetti Assessment Comments  poor stepping response  -CZ     Functional Assessment    Outcome Measure Options AM-PAC 6 Clicks Daily Activity (OT)  -SG Tinetti;AM-PAC 6 Clicks Basic Mobility (PT)  -CZ       User Key  (r) = Recorded By, (t) = Taken By, (c) = Cosigned By    Initials Name Provider Type     Bassam Dickey, PT Physical Therapist    STEPHANIE Aviles OT Occupational Therapist           Time Calculation:         Time Calculation- OT       10/03/17 1208          Time Calculation- OT    OT Start Time 0915  -      OT Stop Time 1000  -      OT Time Calculation (min) 45 min  -        User Key  (r) = Recorded By, (t) = Taken By, (c) = Cosigned By    Initials Name Provider Type     THANG Sheikh/RAJAT " Occupational Therapy Assistant           Therapy Charges for Today     Code Description Service Date Service Provider Modifiers Qty    63261380076 HC OT ASSTV TECHNICAL ASSMT-15 MIN 10/3/2017 THANG Sheikh/L  1    97996693605 HC OT THERAPEUTIC ACT EA 15 MIN 10/3/2017 THANG Sheikh/L GO 1    97116932673 HC OT THER PROC EA 15 MIN 10/3/2017 THANG Sheikh/L GO 1    63678887499 HC OT THER SUPP EA 15 MIN 10/3/2017 THANG Sheikh/L GO 1          OT G-codes  OT Professional Judgement Used?: Yes  OT Functional Scales Options: AM-PAC 6 Clicks Daily Activity (OT)  Score: 20  Functional Limitation: Self care  Self Care Current Status (): At least 20 percent but less than 40 percent impaired, limited or restricted  Self Care Goal Status (): At least 1 percent but less than 20 percent impaired, limited or restricted    THANG Sheikh/RAJAT  10/3/2017

## 2017-10-03 NOTE — PLAN OF CARE
Problem: Inpatient Occupational Therapy  Goal: Dymanic Standing Balance Goal STG- OT  Outcome: Ongoing (interventions implemented as appropriate)    10/02/17 1557 10/03/17 0915   Dynamic Standing Balance OT STG   Dynamic Standing Balance OT STG, Date Established 10/02/17 --    Dynamic Standing Balance OT STG, Time to Achieve 5 - 7 days --    Dynamic Standing Balance OT STG, [place] Level conditional independence --    Dynamic Standing Balance OT STG, Additional Goal for in room retrieval of clothing and ADL's items. --    Dynamic Standing Balance OT STG, Date Goal Reviewed --  10/03/17       Goal: Patient Education Goal STG- OT  Outcome: Ongoing (interventions implemented as appropriate)    10/02/17 1557 10/03/17 0915   Patient Education OT STG   Patient Education OT STG, Date Established 10/02/17 --    Patient Education OT STG, Time to Achieve 5 - 7 days --    Patient Education OT STG, Education Type HEP --    Patient Education OT STG, Additional Goal EC/WS --    Patient Education OT STG, Date Goal Reviewed --  10/03/17       Goal: ADL Goal LTG- OT  Outcome: Ongoing (interventions implemented as appropriate)    10/02/17 1557 10/03/17 0915   ADL OT LTG   ADL OT LTG, Date Established 10/02/17 --    ADL OT LTG, Time to Achieve 2 wks --    ADL OT LTG, [place] Level independent with device --    ADL OT LTG, Additional Goal dressing, toileting and bathing. --    ADL OT LTG, Date Goal Reviewed --  10/03/17   ADL OT LTG, Outcome --  goal ongoing       Goal: Endurance Goal STG- OT  Outcome: Ongoing (interventions implemented as appropriate)    10/02/17 1557 10/03/17 0915   Endurance OT STG   Endurance Goal OT STG, Date Established 10/02/17 --    Endurance Goal OT STG, Time to Achieve 5 - 7 days --    Endurance Goal OT STG, Additional Goal increase activity tolerance to 5 min x3 to complete ADL's. --    Endurance Goal OT STG, Date Goal Reviewed --  10/03/17

## 2017-10-03 NOTE — THERAPY DISCHARGE NOTE
Acute Care - Physical Therapy Discharge Summary  [facility]       Patient Name: Bertha Hyde  : 1950  MRN: [id number]    Today's Date: 10/3/2017  Onset of Illness/Injury or Date of Surgery Date: 17    Date of Referral to PT: 10/01/17  Referring Physician: Dr. Jean-Baptiste      Admit Date: 2017      PT Recommendation and Plan    Visit Dx:    ICD-10-CM ICD-9-CM   1. Colitis K52.9 558.9   2. Impaired physical mobility Z74.09 781.99   3. Impaired mobility and ADLs Z74.09 799.89   4. Sepsis, due to unspecified organism A41.9 038.9     995.91   5. Osteoarthritis of multiple joints, unspecified osteoarthritis type M15.9 715.89             Outcome Measures       10/02/17 0132 10/01/17 1340       How much help from another person do you currently need...    Turning from your back to your side while in flat bed without using bedrails?  4  -CZ     Moving from lying on back to sitting on the side of a flat bed without bedrails?  4  -CZ     Moving to and from a bed to a chair (including a wheelchair)?  3  -CZ     Standing up from a chair using your arms (e.g., wheelchair, bedside chair)?  3  -CZ     Climbing 3-5 steps with a railing?  2  -CZ     To walk in hospital room?  3  -CZ     AM-PAC 6 Clicks Score  19  -CZ     How much help from another is currently needed...    Putting on and taking off regular lower body clothing? 3  -SG      Bathing (including washing, rinsing, and drying) 3  -SG      Toileting (which includes using toilet bed pan or urinal) 3  -SG      Putting on and taking off regular upper body clothing 3  -SG      Taking care of personal grooming (such as brushing teeth) 4  -SG      Eating meals 4  -SG      Score 20  -SG      Tinetti Assessment    Tinetti Assessment  yes  -CZ     Sitting Balance  1  -CZ     Arises  2  -CZ     Attempts to Rise  2  -CZ     Immediate Standing Balance (first 5 sec)  2  -CZ     Standing Balance  1  -CZ     Sternal Nudge (feet close together)  0  -CZ     Eyes  "Closed (feet close together)  0  -CZ     Turning 360 Degrees- Steps  1  -CZ     Turning 360 Degrees- Steadiness  1  -CZ     Sitting Down  2  -CZ     Tinetti Balance Score  12  -CZ     Gait Initiation (immediate after told \"go\")  1  -CZ     Step Length- Right Swing  1  -CZ     Step Length- Left Swing  1  -CZ     Foot Clearance- Right Foot  1  -CZ     Foot Clearance- Left Foot  1  -CZ     Step Symmetry  1  -CZ     Step Continuity  1  -CZ     Path (excursion)  1  -CZ     Trunk  0  -CZ     Base of Support  0  -CZ     Gait Score  8  -CZ     Tinetti Total Score  20  -CZ     Tinetti Assistive Device  rolling walker  -CZ     Tinetti Assessment Comments  poor stepping response  -CZ     Functional Assessment    Outcome Measure Options AM-PAC 6 Clicks Daily Activity (OT)  -SG Tinetti;AM-PAC 6 Clicks Basic Mobility (PT)  -CZ       User Key  (r) = Recorded By, (t) = Taken By, (c) = Cosigned By    Initials Name Provider Type     Bassam Dickey, PT Physical Therapist    STEPHANIE Aviles, OT Occupational Therapist                      IP PT Goals       10/03/17 1603 10/01/17 1340       Bed Mobility PT STG    Bed Mobility PT STG, Date Established  10/01/17  -CZ     Bed Mobility PT STG, Time to Achieve  2 days  -CZ     Bed Mobility PT STG, Activity Type  all bed mobility  -CZ     Bed Mobility PT STG, [place] Level  conditional independence  -CZ     Bed Mobility PT STG, Additional Goal  HOB flat, no bed rails.   -CZ     Bed Mobility PT STG, Date Goal Reviewed 10/03/17  -MN 10/01/17  -CZ     Bed Mobility PT STG, Outcome goal not met  -MN goal ongoing  -CZ     Bed Mobility PT STG, Reason Goal Not Met discharged from facility  -MN      Transfer Training PT STG    Transfer Training PT STG, Date Established  10/01/17  -CZ     Transfer Training PT STG, Time to Achieve  2 days  -CZ     Transfer Training PT STG, Activity Type  bed to chair /chair to bed;sit to stand/stand to sit  -CZ     Transfer Training PT STG, [place] Level  " conditional independence  -CZ     Transfer Training PT STG, Assist Device  walker, rolling  -CZ     Transfer Training PT STG, Date Goal Reviewed 10/03/17  -MN 10/01/17  -CZ     Transfer Training PT STG, Outcome goal not met  -MN goal ongoing  -CZ     Transfer Training PT STG, Reason Goal Not Met discharged from facility  -MN      Gait Training PT LTG    Gait Training Goal PT LTG, Date Established  10/01/17  -CZ     Gait Training Goal PT LTG, Time to Achieve  by discharge  -CZ     Gait Training Goal PT LTG, [place] Level  conditional independence  -CZ     Gait Training Goal PT LTG, Assist Device  walker, rolling  -CZ     Gait Training Goal PT LTG, Distance to Achieve  150' x 1  -CZ     Gait Training Goal PT LTG, Date Goal Reviewed 10/03/17  -MN 10/01/17  -CZ     Gait Training Goal PT LTG, Outcome goal not met  -MN goal ongoing  -CZ     Gait Training Goal PT LTG, Reason Goal Not Met discharged from facility  -MN      Physical Therapy PT LTG    Physical Therapy PT LTG, Date Established  10/01/17  -CZ     Physical Therapy PT LTG, Time to Achieve  by discharge  -CZ     Physical Therapy PT LTG, Activity Type  Tinetti fall risk assessment.   -CZ     Physical Therapy PT LTG, Addisional Goal  Score 24/28 or low fall risk.   -CZ     Physical Therapy PT LTG, Date Goal Reviewed 10/03/17  -MN 10/01/17  -CZ     Physical Therapy PT LTG, Outcome goal not met  -MN goal ongoing  -CZ     Physical Therapy PT LTG, Reason Goal Not Met discharged from facility  -MN        User Key  (r) = Recorded By, (t) = Taken By, (c) = Cosigned By    Initials Name Provider Type    PETR Toro, PT Physical Therapist    CZ Bassam Dickey, PT Physical Therapist              PT Discharge Summary  Anticipated Discharge Disposition: home with home health  Reason for Discharge: Discharge from facility, Per MD order  Outcomes Achieved: Unable to make functional progress toward goals at this time  Discharge Destination: Home      Clarice Toro,  PT   10/3/2017

## 2017-10-04 LAB — BACTERIA SPEC AEROBE CULT: NORMAL

## 2017-10-04 NOTE — THERAPY DISCHARGE NOTE
Acute Care - Occupational Therapy Discharge Summary  [facility]     Patient Name: Bertha Hyde  : 1950  MRN: [id number]    Today's Date: 10/4/2017  Onset of Illness/Injury or Date of Surgery Date: 17    Date of Referral to OT: 10/01/17  Referring Physician: Dr. Jean-Baptiste      Admit Date: 2017        OT Recommendation and Plan    Visit Dx:    ICD-10-CM ICD-9-CM   1. Colitis K52.9 558.9   2. Impaired physical mobility Z74.09 781.99   3. Impaired mobility and ADLs Z74.09 799.89   4. Sepsis, due to unspecified organism A41.9 038.9     995.91   5. Osteoarthritis of multiple joints, unspecified osteoarthritis type M15.9 715.89                     OT Goals       10/04/17 0744 10/03/17 0915 10/02/17 1557    Dynamic Standing Balance OT STG    Dynamic Standing Balance OT STG, Date Established   10/02/17  -    Dynamic Standing Balance OT STG, Time to Achieve   5 - 7 days  -SG    Dynamic Standing Balance OT STG, [place] Level   conditional independence  -    Dynamic Standing Balance OT STG, Additional Goal   for in room retrieval of clothing and ADL's items.  -    Dynamic Standing Balance OT STG, Date Goal Reviewed 10/04/17  - 10/03/17  -     Dynamic Standing Balance OT STG, Outcome goal not met  -      Dynamic Standing Balance OT STG, Reason Goal Not Met discharged from facility  -      Patient Education OT STG    Patient Education OT STG, Date Established   10/02/17  -    Patient Education OT STG, Time to Achieve   5 - 7 days  -SG    Patient Education OT STG, Education Type   HEP  -SG    Patient Education OT STG, Additional Goal   EC/WS  -SG    Patient Education OT STG, Date Goal Reviewed 10/04/17  - 10/03/17  -     Patient Education OT STG Outcome goal not met  -      Patient Education OT STG, Reason Goal Not Met discharged from facility  -      ADL OT LTG    ADL OT LTG, Date Established   10/02/17  -    ADL OT LTG, Time to Achieve   2 wks  -SG    ADL OT LTG,  [place] Level   independent with device  -    ADL OT LTG, Additional Goal   dressing, toileting and bathing.  -    ADL OT LTG, Date Goal Reviewed 10/04/17  - 10/03/17  -     ADL OT LTG, Outcome goal not met  - goal ongoing  -     ADL OT LTG, Reason Goal Not Met discharged from facility  -      Endurance OT STG    Endurance Goal OT STG, Date Established   10/02/17  -    Endurance Goal OT STG, Time to Achieve   5 - 7 days  -    Endurance Goal OT STG, Additional Goal   increase activity tolerance to 5 min x3 to complete ADL's.  -    Endurance Goal OT STG, Date Goal Reviewed 10/04/17  - 10/03/17  -     Endurance Goal OT STG, Outcome goal not met  -      Endurance Goal OT STG, Reason Goal Not Met discharged from facility  -        User Key  (r) = Recorded By, (t) = Taken By, (c) = Cosigned By    Initials Name Provider Type     CAMRYN rG/L Occupational Therapist     THANG Sheikh/L Occupational Therapy Assistant     Radha Aviles OT Occupational Therapist                Outcome Measures       10/02/17 0132 10/01/17 1340       How much help from another person do you currently need...    Turning from your back to your side while in flat bed without using bedrails?  4  -CZ     Moving from lying on back to sitting on the side of a flat bed without bedrails?  4  -CZ     Moving to and from a bed to a chair (including a wheelchair)?  3  -CZ     Standing up from a chair using your arms (e.g., wheelchair, bedside chair)?  3  -CZ     Climbing 3-5 steps with a railing?  2  -CZ     To walk in hospital room?  3  -CZ     AM-PAC 6 Clicks Score  19  -CZ     How much help from another is currently needed...    Putting on and taking off regular lower body clothing? 3  -SG      Bathing (including washing, rinsing, and drying) 3  -SG      Toileting (which includes using toilet bed pan or urinal) 3  -SG      Putting on and taking off regular upper body clothing 3  -SG      Taking care of  "personal grooming (such as brushing teeth) 4  -SG      Eating meals 4  -SG      Score 20  -SG      Tinetti Assessment    Tinetti Assessment  yes  -CZ     Sitting Balance  1  -CZ     Arises  2  -CZ     Attempts to Rise  2  -CZ     Immediate Standing Balance (first 5 sec)  2  -CZ     Standing Balance  1  -CZ     Sternal Nudge (feet close together)  0  -CZ     Eyes Closed (feet close together)  0  -CZ     Turning 360 Degrees- Steps  1  -CZ     Turning 360 Degrees- Steadiness  1  -CZ     Sitting Down  2  -CZ     Tinetti Balance Score  12  -CZ     Gait Initiation (immediate after told \"go\")  1  -CZ     Step Length- Right Swing  1  -CZ     Step Length- Left Swing  1  -CZ     Foot Clearance- Right Foot  1  -CZ     Foot Clearance- Left Foot  1  -CZ     Step Symmetry  1  -CZ     Step Continuity  1  -CZ     Path (excursion)  1  -CZ     Trunk  0  -CZ     Base of Support  0  -CZ     Gait Score  8  -CZ     Tinetti Total Score  20  -CZ     Tinetti Assistive Device  rolling walker  -CZ     Tinetti Assessment Comments  poor stepping response  -CZ     Functional Assessment    Outcome Measure Options AM-PAC 6 Clicks Daily Activity (OT)  -SG Tinetti;AM-PAC 6 Clicks Basic Mobility (PT)  -CZ       User Key  (r) = Recorded By, (t) = Taken By, (c) = Cosigned By    Initials Name Provider Type    ANDREA Dickey PT Physical Therapist    STEPHANIE Aviles OT Occupational Therapist              OT Discharge Summary  Anticipated Discharge Disposition: home with home health  Reason for Discharge: Discharge from facility, Per MD order  Outcomes Achieved: Refer to plan of care for updates on goals achieved  Discharge Destination: Home      CAMRYN Gr/L  10/4/2017   "

## 2017-10-04 NOTE — PLAN OF CARE
Problem: Inpatient Occupational Therapy  Goal: Dymanic Standing Balance Goal STG- OT  Outcome: Unable to achieve outcome(s) by discharge Date Met:  10/04/17    10/02/17 1557 10/04/17 0744   Dynamic Standing Balance OT STG   Dynamic Standing Balance OT STG, Date Established 10/02/17 --    Dynamic Standing Balance OT STG, Time to Achieve 5 - 7 days --    Dynamic Standing Balance OT STG, [place] Level conditional independence --    Dynamic Standing Balance OT STG, Additional Goal for in room retrieval of clothing and ADL's items. --    Dynamic Standing Balance OT STG, Date Goal Reviewed --  10/04/17   Dynamic Standing Balance OT STG, Outcome --  goal not met   Dynamic Standing Balance OT STG, Reason Goal Not Met --  discharged from facility       Goal: Patient Education Goal STG- OT  Outcome: Unable to achieve outcome(s) by discharge Date Met:  10/04/17    10/02/17 1557 10/04/17 0744   Patient Education OT STG   Patient Education OT STG, Date Established 10/02/17 --    Patient Education OT STG, Time to Achieve 5 - 7 days --    Patient Education OT STG, Education Type HEP --    Patient Education OT STG, Additional Goal EC/WS --    Patient Education OT STG, Date Goal Reviewed --  10/04/17   Patient Education OT STG Outcome --  goal not met   Patient Education OT STG, Reason Goal Not Met --  discharged from facility       Goal: ADL Goal LTG- OT  Outcome: Unable to achieve outcome(s) by discharge Date Met:  10/04/17    10/02/17 1557 10/04/17 0744   ADL OT LTG   ADL OT LTG, Date Established 10/02/17 --    ADL OT LTG, Time to Achieve 2 wks --    ADL OT LTG, [place] Level independent with device --    ADL OT LTG, Additional Goal dressing, toileting and bathing. --    ADL OT LTG, Date Goal Reviewed --  10/04/17   ADL OT LTG, Outcome --  goal not met   ADL OT LTG, Reason Goal Not Met --  discharged from facility       Goal: Endurance Goal STG- OT  Outcome: Unable to achieve outcome(s) by discharge Date Met:   10/04/17    10/02/17 1557 10/04/17 0744   Endurance OT STG   Endurance Goal OT STG, Date Established 10/02/17 --    Endurance Goal OT STG, Time to Achieve 5 - 7 days --    Endurance Goal OT STG, Additional Goal increase activity tolerance to 5 min x3 to complete ADL's. --    Endurance Goal OT STG, Date Goal Reviewed --  10/04/17   Endurance Goal OT STG, Outcome --  goal not met   Endurance Goal OT STG, Reason Goal Not Met --  discharged from facility

## 2017-10-05 ENCOUNTER — EPISODE CHANGES (OUTPATIENT)
Dept: CASE MANAGEMENT | Facility: OTHER | Age: 67
End: 2017-10-05

## 2017-10-10 ENCOUNTER — OFFICE VISIT (OUTPATIENT)
Dept: FAMILY MEDICINE CLINIC | Facility: CLINIC | Age: 67
End: 2017-10-10

## 2017-10-10 ENCOUNTER — APPOINTMENT (OUTPATIENT)
Dept: LAB | Facility: HOSPITAL | Age: 67
End: 2017-10-10

## 2017-10-10 ENCOUNTER — TELEPHONE (OUTPATIENT)
Dept: FAMILY MEDICINE CLINIC | Facility: CLINIC | Age: 67
End: 2017-10-10

## 2017-10-10 VITALS
SYSTOLIC BLOOD PRESSURE: 132 MMHG | BODY MASS INDEX: 36.94 KG/M2 | HEART RATE: 105 BPM | DIASTOLIC BLOOD PRESSURE: 85 MMHG | OXYGEN SATURATION: 98 % | WEIGHT: 208.5 LBS | HEIGHT: 63 IN

## 2017-10-10 DIAGNOSIS — R11.0 NAUSEA: ICD-10-CM

## 2017-10-10 DIAGNOSIS — D64.9 ANEMIA, UNSPECIFIED TYPE: ICD-10-CM

## 2017-10-10 DIAGNOSIS — A41.51 SEPSIS DUE TO ESCHERICHIA COLI (HCC): Primary | ICD-10-CM

## 2017-10-10 DIAGNOSIS — K52.9 COLITIS: ICD-10-CM

## 2017-10-10 DIAGNOSIS — R63.0 ANOREXIA: ICD-10-CM

## 2017-10-10 LAB
ALBUMIN SERPL-MCNC: 4.2 G/DL (ref 3.4–4.8)
ALBUMIN/GLOB SERPL: 0.9 G/DL (ref 1.1–1.8)
ALP SERPL-CCNC: 93 U/L (ref 38–126)
ALT SERPL W P-5'-P-CCNC: 24 U/L (ref 9–52)
ANION GAP SERPL CALCULATED.3IONS-SCNC: 17 MMOL/L (ref 5–15)
AST SERPL-CCNC: 40 U/L (ref 14–36)
BILIRUB SERPL-MCNC: 0.5 MG/DL (ref 0.2–1.3)
BUN BLD-MCNC: 24 MG/DL (ref 7–21)
BUN/CREAT SERPL: 21.6 (ref 7–25)
CALCIUM SPEC-SCNC: 10.3 MG/DL (ref 8.4–10.2)
CHLORIDE SERPL-SCNC: 96 MMOL/L (ref 95–110)
CO2 SERPL-SCNC: 28 MMOL/L (ref 22–31)
CREAT BLD-MCNC: 1.11 MG/DL (ref 0.5–1)
DEPRECATED RDW RBC AUTO: 49.6 FL (ref 36.4–46.3)
EOSINOPHIL # BLD MANUAL: 0.11 10*3/MM3 (ref 0–0.7)
EOSINOPHIL NFR BLD MANUAL: 1 % (ref 0–7)
ERYTHROCYTE [DISTWIDTH] IN BLOOD BY AUTOMATED COUNT: 14.8 % (ref 11.5–14.5)
GFR SERPL CREATININE-BSD FRML MDRD: 49 ML/MIN/1.73 (ref 60–104)
GLOBULIN UR ELPH-MCNC: 4.6 GM/DL (ref 2.3–3.5)
GLUCOSE BLD-MCNC: 150 MG/DL (ref 60–100)
HCT VFR BLD AUTO: 38.1 % (ref 35–45)
HGB BLD-MCNC: 12.4 G/DL (ref 12–15.5)
LYMPHOCYTES # BLD MANUAL: 2.59 10*3/MM3 (ref 0.6–4.2)
LYMPHOCYTES NFR BLD MANUAL: 23 % (ref 10–50)
LYMPHOCYTES NFR BLD MANUAL: 6 % (ref 0–12)
MCH RBC QN AUTO: 29.8 PG (ref 26.5–34)
MCHC RBC AUTO-ENTMCNC: 32.5 G/DL (ref 31.4–36)
MCV RBC AUTO: 91.6 FL (ref 80–98)
MONOCYTES # BLD AUTO: 0.68 10*3/MM3 (ref 0–0.9)
NEUTROPHILS # BLD AUTO: 7.89 10*3/MM3 (ref 2–8.6)
NEUTROPHILS NFR BLD MANUAL: 70 % (ref 37–80)
PLATELET # BLD AUTO: 664 10*3/MM3 (ref 150–450)
PMV BLD AUTO: 9.3 FL (ref 8–12)
POTASSIUM BLD-SCNC: 4.9 MMOL/L (ref 3.5–5.1)
PROT SERPL-MCNC: 8.8 G/DL (ref 6.3–8.6)
RBC # BLD AUTO: 4.16 10*6/MM3 (ref 3.77–5.16)
RBC MORPH BLD: NORMAL
SMALL PLATELETS BLD QL SMEAR: NORMAL
SODIUM BLD-SCNC: 141 MMOL/L (ref 137–145)
WBC MORPH BLD: NORMAL
WBC NRBC COR # BLD: 11.27 10*3/MM3 (ref 3.2–9.8)

## 2017-10-10 PROCEDURE — 80053 COMPREHEN METABOLIC PANEL: CPT | Performed by: GENERAL PRACTICE

## 2017-10-10 PROCEDURE — 99214 OFFICE O/P EST MOD 30 MIN: CPT | Performed by: GENERAL PRACTICE

## 2017-10-10 PROCEDURE — 85007 BL SMEAR W/DIFF WBC COUNT: CPT | Performed by: GENERAL PRACTICE

## 2017-10-10 PROCEDURE — 36415 COLL VENOUS BLD VENIPUNCTURE: CPT | Performed by: GENERAL PRACTICE

## 2017-10-10 PROCEDURE — 85025 COMPLETE CBC W/AUTO DIFF WBC: CPT | Performed by: GENERAL PRACTICE

## 2017-10-10 RX ORDER — PROMETHAZINE HYDROCHLORIDE 25 MG/1
12.5-25 TABLET ORAL EVERY 6 HOURS PRN
Qty: 60 TABLET | Refills: 3 | Status: SHIPPED | OUTPATIENT
Start: 2017-10-10 | End: 2018-03-02 | Stop reason: SDUPTHER

## 2017-10-10 NOTE — PROGRESS NOTES
Subjective   Bertha Hyde is a 66 y.o. female.   Chief Complaint   Patient presents with   • Follow-up     hospital follow up   • Nausea     History of Present Illness     Recent hospitalization for E.coli sepsis, blood culture was positive. Urine culture was negative. Was having LLQ pain and thought she had diverticultis. Labs and xrays reviewed. Medications reviewed and reconciled. Still weak. Still having a lot of nausea and having trouble eating and drinking. Abdominal pain is better. No diarrhea now. Current outpatient and discharge medications have been reconciled for the patient.     The following portions of the patient's history were reviewed and updated as appropriate: allergies, current medications, past social history and problem list.    Outpatient Medications Prior to Visit   Medication Sig Dispense Refill   • Alcohol Swabs (ALCOHOL WIPES) pads Use 4 x daily 120 each 11   • Blood Glucose Monitoring Suppl (BLOOD GLUCOSE MONITOR SYSTEM) W/DEVICE kit Test Blood Sugars Once Daily 1 each 0   • Blood Glucose Monitoring Suppl w/Device kit USE AS INDICATED, ANY MONITOR 1 each 1   • cyclobenzaprine (FLEXERIL) 10 MG tablet Take 1 tablet by mouth Every Evening. AT BEDTIME AS NEEDED 30 tablet 1   • Dulaglutide 0.75 MG/0.5ML solution pen-injector Inject 0.75 mg under the skin 1 (One) Time Per Week. 4 pen 11   • DULoxetine (CYMBALTA) 60 MG capsule Take 1 capsule by mouth Daily. 90 capsule 1   • Glucose Blood (BLOOD GLUCOSE TEST) strip Use 4 x daily, use any brand covered by insurance or same brand as before 120 each 11   • glucose blood test strip Test Blood Sugars Once Daily 100 each 3   • HYDROcodone-acetaminophen (NORCO) 7.5-325 MG per tablet Take 1 tablet by mouth Every 6 (Six) Hours As Needed for Moderate Pain . 120 tablet 0   • hydrOXYzine (ATARAX) 25 MG tablet Take 1 tablet by mouth 3 (Three) Times a Day As Needed for Anxiety. 30 tablet 2   • hyoscyamine (OSCIMIN) 0.125 MG SL tablet Place 0.125-0.25  "mg under the tongue every 4 (four) hours as needed. (max 12 per 24 hours)     • Insulin Glargine (TOUJEO SOLOSTAR) 300 UNIT/ML solution pen-injector Inject 50 Units under the skin every night at bedtime. 5 pen 11   • Insulin Pen Needle (B-D UF III MINI PEN NEEDLES) 31G X 5 MM misc Use 4 times daily 120 each 11   • Lancet Devices (LANCING DEVICE) misc USE AS INDICATED TO CORRELATE WITH STRIPS AND METER 1 each 1   • Lancets 30G misc USE 4 X DAILY 120 each 11   • LANCETS MICRO THIN 33G misc Test Blood Sugars Once Daily 100 each 3   • levoFLOXacin (LEVAQUIN) 500 MG tablet Take 1 tablet by mouth Daily for 7 days. 7 tablet 0   • lisinopril-hydrochlorothiazide (PRINZIDE,ZESTORETIC) 20-12.5 MG per tablet Take 1 tablet by mouth Daily. Indication: Essential (primary) hypertension 90 tablet 3   • loratadine (CLARITIN) 10 MG tablet Take 10 mg by mouth daily as needed. Indication: Allergic rhinitis     • metroNIDAZOLE (FLAGYL) 500 MG tablet Take 1 tablet by mouth 3 (Three) Times a Day for 7 days. 21 tablet 0   • pantoprazole (PROTONIX) 40 MG EC tablet Take 1 tablet by mouth Daily As Needed (heartburn). 90 tablet 3   • pravastatin (PRAVACHOL) 20 MG tablet Take 1 tablet by mouth Daily. 90 tablet 3   • rizatriptan MLT (MAXALT-MLT) 10 MG disintegrating tablet Take 1 tablet by mouth Daily As Needed for migraine. Take 1 tab at onset of headache, can repeat x 1 in 2 hrs prn 27 tablet 3   • metFORMIN XR (GLUCOPHAGE-XR) 500 MG 24 hr tablet Take 500 mg by mouth 2 (two) times a day. \"metformin  mg tablet,extended release 24 hr\"     • promethazine (PHENERGAN) 25 MG tablet Take 0.5-1 tablets by mouth Every 6 (Six) Hours As Needed for Nausea. 30 tablet 3   • glimepiride (AMARYL) 4 MG tablet Take 1 tablet by mouth 2 (Two) Times a Day. 180 tablet 3     No facility-administered medications prior to visit.        Review of Systems   Constitutional: Negative for chills, fatigue, fever and unexpected weight change.   HENT: Negative.  Negative " "for congestion, ear pain, hearing loss, nosebleeds, rhinorrhea, sneezing, sore throat and tinnitus.    Eyes: Negative.  Negative for discharge.   Respiratory: Negative.  Negative for cough, shortness of breath and wheezing.    Cardiovascular: Negative.  Negative for chest pain and palpitations.   Gastrointestinal: Positive for nausea and vomiting. Negative for abdominal pain, constipation and diarrhea.   Endocrine: Negative.    Genitourinary: Negative.  Negative for dysuria, frequency and urgency.   Musculoskeletal: Negative.  Negative for arthralgias, back pain, joint swelling, myalgias and neck pain.   Skin: Negative.  Negative for rash.   Allergic/Immunologic: Negative.    Neurological: Positive for weakness. Negative for dizziness, numbness and headaches.   Hematological: Negative.  Negative for adenopathy.   Psychiatric/Behavioral: Negative.  Negative for dysphoric mood and sleep disturbance. The patient is not nervous/anxious.        Objective   Visit Vitals   • /85 (BP Location: Left arm, Patient Position: Sitting, Cuff Size: Adult)   • Pulse 105   • Ht 62.9\" (159.8 cm)   • Wt 208 lb 8 oz (94.6 kg)   • SpO2 98%   • BMI 37.05 kg/m2     Physical Exam   Constitutional: She is oriented to person, place, and time. She appears well-developed and well-nourished. No distress.   HENT:   Head: Normocephalic and atraumatic.   Nose: Nose normal.   Mouth/Throat: Oropharynx is clear and moist.   Eyes: Conjunctivae and EOM are normal. Pupils are equal, round, and reactive to light. Right eye exhibits no discharge. Left eye exhibits no discharge.   Neck: No thyromegaly present.   Cardiovascular: Normal rate, regular rhythm, normal heart sounds and intact distal pulses.    Pulmonary/Chest: Effort normal and breath sounds normal.   Lymphadenopathy:     She has no cervical adenopathy.   Neurological: She is alert and oriented to person, place, and time.   Skin: Skin is warm and dry.   Psychiatric: She has a normal mood " and affect.   Nursing note and vitals reviewed.      Assessment/Plan   Problem List Items Addressed This Visit        Digestive    Colitis    Relevant Orders    Comprehensive Metabolic Panel (Completed)      Other Visit Diagnoses     Sepsis due to Escherichia coli    -  Primary    Anemia, unspecified type        Relevant Orders    CBC & Differential (Completed)    CBC Auto Differential (Completed)    Manual Differential (Completed)    Nausea        Anorexia              Will notify regarding results. Follow up as scheduled.     New Medications Ordered This Visit   Medications   • promethazine (PHENERGAN) 25 MG tablet     Sig: Take 0.5-1 tablets by mouth Every 6 (Six) Hours As Needed for Nausea.     Dispense:  60 tablet     Refill:  3     Return for Next scheduled follow up.

## 2017-10-10 NOTE — PROGRESS NOTES
Pr Dr. Finn, Ms. Hyde has been called with her recent lab results & recommendations.  Continue her current medications and follow-up as planned or sooner if any problems.

## 2017-10-10 NOTE — TELEPHONE ENCOUNTER
Pr Dr. Finn, Ms. Hyde has been called with her recent lab results & recommendations.  Continue her current medications and follow-up as planned or sooner if any problems.      ----- Message from Belinda Finn MD sent at 10/10/2017  5:07 PM CDT -----  Call and tell labs are looking better, is a little dehydrated, needs to continue to try to push her fluids

## 2017-10-24 ENCOUNTER — OFFICE VISIT (OUTPATIENT)
Dept: ENDOCRINOLOGY | Facility: CLINIC | Age: 67
End: 2017-10-24

## 2017-10-24 VITALS
WEIGHT: 205.4 LBS | BODY MASS INDEX: 37.8 KG/M2 | HEIGHT: 62 IN | SYSTOLIC BLOOD PRESSURE: 132 MMHG | DIASTOLIC BLOOD PRESSURE: 80 MMHG | HEART RATE: 112 BPM

## 2017-10-24 DIAGNOSIS — E11.59 HYPERTENSION ASSOCIATED WITH DIABETES (HCC): ICD-10-CM

## 2017-10-24 DIAGNOSIS — E55.9 VITAMIN D DEFICIENCY: ICD-10-CM

## 2017-10-24 DIAGNOSIS — I15.2 HYPERTENSION ASSOCIATED WITH DIABETES (HCC): ICD-10-CM

## 2017-10-24 DIAGNOSIS — E10.69 MIXED DIABETIC HYPERLIPIDEMIA ASSOCIATED WITH TYPE 1 DIABETES MELLITUS (HCC): ICD-10-CM

## 2017-10-24 DIAGNOSIS — E53.8 B12 DEFICIENCY: ICD-10-CM

## 2017-10-24 DIAGNOSIS — E78.2 MIXED DIABETIC HYPERLIPIDEMIA ASSOCIATED WITH TYPE 1 DIABETES MELLITUS (HCC): ICD-10-CM

## 2017-10-24 DIAGNOSIS — E11.65 TYPE 2 DIABETES MELLITUS WITH HYPERGLYCEMIA, WITHOUT LONG-TERM CURRENT USE OF INSULIN (HCC): Primary | ICD-10-CM

## 2017-10-24 LAB — GLUCOSE BLDC GLUCOMTR-MCNC: 144 MG/DL (ref 70–130)

## 2017-10-24 PROCEDURE — 99214 OFFICE O/P EST MOD 30 MIN: CPT | Performed by: INTERNAL MEDICINE

## 2017-10-24 PROCEDURE — 82962 GLUCOSE BLOOD TEST: CPT | Performed by: INTERNAL MEDICINE

## 2017-10-24 RX ORDER — ROSUVASTATIN CALCIUM 20 MG/1
20 TABLET, COATED ORAL NIGHTLY
Qty: 30 TABLET | Refills: 11 | Status: SHIPPED | OUTPATIENT
Start: 2017-10-24 | End: 2018-11-02 | Stop reason: SDUPTHER

## 2017-10-24 RX ORDER — ONDANSETRON 4 MG/1
4 TABLET, ORALLY DISINTEGRATING ORAL EVERY 8 HOURS PRN
Qty: 60 TABLET | Refills: 11 | Status: SHIPPED | OUTPATIENT
Start: 2017-10-24 | End: 2019-04-08 | Stop reason: SDUPTHER

## 2017-10-24 RX ORDER — LISINOPRIL 20 MG/1
20 TABLET ORAL DAILY
Qty: 30 TABLET | Refills: 11 | Status: SHIPPED | OUTPATIENT
Start: 2017-10-24 | End: 2018-09-14

## 2017-10-24 NOTE — PROGRESS NOTES
" Bertha Hyde is a 66 y.o. female who presents for  evaluation of   Chief Complaint   Patient presents with   • Diabetes       Referring provider    No referring provider defined for this encounter.    Primary Care Provider    Belinda Finn MD    Duration since age 64 years     Timing - Diabetes is Constant    Quality -  improved    Severity -  moderate    Complications - none    Current symptoms/problems  nausea     Alleviating Factors: Compliance      Side Effects  metformin    Current diet  in general, a \"healthy\" diet      Current exercise walking    Current monitoring regimen: home blood tests - 3 times daily  Home blood sugar records: 100s    Hypoglycemia nocturnal    Past Medical History:   Diagnosis Date   • Acute allergic reaction    • Acute gastroenteritis    • Allergic ileitis    • Allergic rhinitis    • Amaurosis fugax     both ? 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   • Colitis    • Common cold    • Cough    • Degenerative joint disease involving multiple joints    • Depressive disorder    • Diabetes mellitus    • Diverticulitis of colon    • Encounter for immunization    • Essential hypertension    • Fatigue    • Headache    • Hemorrhoids     internal & external      • History of fracture of vertebral column     Fracture of vertebral column - T11 compression      • History of mammogram 12/13/2011    Mammogram screen (1) - Benign , negative   • History of screening mammography     Screening mammography      • History of screening mammography 05/19/2016    SCREENING MAMMOGRAPHY DIGITAL  (Medicare) (2) - Ordered By: MICA FINN ()    • History of transfusion    • Hyperglycemia    • Hyperlipidemia    • Insomnia    • Itch of skin     Itching of skin   • Left lower quadrant pain    • Menopause    • Migraine    • " Nausea and vomiting    • Nausea, vomiting and diarrhea    • Need for immunization against influenza     Needs influenza immunization   • Need for prophylactic vaccination against Streptococcus pneumoniae (pneumococcus)    • Nuclear senile cataract of left eye     Nuclear senile cataract - left   • Osteoarthritis    • Osteopenia    • Pain of left arm     Pain in left arm - STRAIN SECONDARY TO FALL      • Posterior subcapsular polar senile cataract of right eye     Posterior subcapsular polar senile cataract - right   • Rhinitis    • Scalp laceration    • Type 2 diabetes mellitus     no BDR   • Upper respiratory infection      Family History   Problem Relation Age of Onset   • Coronary artery disease Other    • Hypertension Other    • Heart disease Mother    • Hypertension Mother    • Osteoporosis Mother    • Stroke Mother    • Arthritis Father    • Migraines Maternal Grandmother    • Obesity Maternal Grandmother      Social History   Substance Use Topics   • Smoking status: Never Smoker   • Smokeless tobacco: Never Used   • Alcohol use No         Current Outpatient Prescriptions:   •  Alcohol Swabs (ALCOHOL WIPES) pads, Use 4 x daily, Disp: 120 each, Rfl: 11  •  Blood Glucose Monitoring Suppl (BLOOD GLUCOSE MONITOR SYSTEM) W/DEVICE kit, Test Blood Sugars Once Daily, Disp: 1 each, Rfl: 0  •  Blood Glucose Monitoring Suppl w/Device kit, USE AS INDICATED, ANY MONITOR, Disp: 1 each, Rfl: 1  •  cyclobenzaprine (FLEXERIL) 10 MG tablet, Take 1 tablet by mouth Every Evening. AT BEDTIME AS NEEDED, Disp: 30 tablet, Rfl: 1  •  Dulaglutide 0.75 MG/0.5ML solution pen-injector, Inject 0.75 mg under the skin 1 (One) Time Per Week., Disp: 4 pen, Rfl: 11  •  DULoxetine (CYMBALTA) 60 MG capsule, Take 1 capsule by mouth Daily., Disp: 90 capsule, Rfl: 1  •  Glucose Blood (BLOOD GLUCOSE TEST) strip, Use 4 x daily, use any brand covered by insurance or same brand as before, Disp: 120 each, Rfl: 11  •  glucose blood test strip, Test Blood  Sugars Once Daily, Disp: 100 each, Rfl: 3  •  HYDROcodone-acetaminophen (NORCO) 7.5-325 MG per tablet, Take 1 tablet by mouth Every 6 (Six) Hours As Needed for Moderate Pain ., Disp: 120 tablet, Rfl: 0  •  hydrOXYzine (ATARAX) 25 MG tablet, Take 1 tablet by mouth 3 (Three) Times a Day As Needed for Anxiety., Disp: 30 tablet, Rfl: 2  •  hyoscyamine (OSCIMIN) 0.125 MG SL tablet, Place 0.125-0.25 mg under the tongue every 4 (four) hours as needed. (max 12 per 24 hours), Disp: , Rfl:   •  Insulin Glargine (TOUJEO SOLOSTAR) 300 UNIT/ML solution pen-injector, Inject 50 Units under the skin every night at bedtime., Disp: 5 pen, Rfl: 11  •  Insulin Pen Needle (B-D UF III MINI PEN NEEDLES) 31G X 5 MM misc, Use 4 times daily, Disp: 120 each, Rfl: 11  •  Lancet Devices (LANCING DEVICE) misc, USE AS INDICATED TO CORRELATE WITH STRIPS AND METER, Disp: 1 each, Rfl: 1  •  Lancets 30G misc, USE 4 X DAILY, Disp: 120 each, Rfl: 11  •  LANCETS MICRO THIN 33G misc, Test Blood Sugars Once Daily, Disp: 100 each, Rfl: 3  •  lisinopril-hydrochlorothiazide (PRINZIDE,ZESTORETIC) 20-12.5 MG per tablet, Take 1 tablet by mouth Daily. Indication: Essential (primary) hypertension, Disp: 90 tablet, Rfl: 3  •  pantoprazole (PROTONIX) 40 MG EC tablet, Take 1 tablet by mouth Daily As Needed (heartburn)., Disp: 90 tablet, Rfl: 3  •  pravastatin (PRAVACHOL) 20 MG tablet, Take 1 tablet by mouth Daily., Disp: 90 tablet, Rfl: 3  •  promethazine (PHENERGAN) 25 MG tablet, Take 0.5-1 tablets by mouth Every 6 (Six) Hours As Needed for Nausea., Disp: 60 tablet, Rfl: 3  •  rizatriptan MLT (MAXALT-MLT) 10 MG disintegrating tablet, Take 1 tablet by mouth Daily As Needed for migraine. Take 1 tab at onset of headache, can repeat x 1 in 2 hrs prn, Disp: 27 tablet, Rfl: 3  •  loratadine (CLARITIN) 10 MG tablet, Take 10 mg by mouth daily as needed. Indication: Allergic rhinitis, Disp: , Rfl:     Review of Systems    Review of Systems   Constitutional: Positive for  "unexpected weight change. Negative for activity change, appetite change, chills, diaphoresis, fatigue and fever.   HENT: Negative for congestion, dental problem, drooling, ear discharge, ear pain, facial swelling, mouth sores, postnasal drip, rhinorrhea, sinus pressure, sore throat, tinnitus, trouble swallowing and voice change.    Eyes: Negative for photophobia, pain, discharge, redness, itching and visual disturbance.   Respiratory: Negative for apnea, cough, choking, chest tightness, shortness of breath, wheezing and stridor.    Cardiovascular: Negative for chest pain, palpitations and leg swelling.   Gastrointestinal: Negative for abdominal distention, abdominal pain, constipation, diarrhea, nausea and vomiting.   Endocrine: Negative for cold intolerance, heat intolerance, polydipsia, polyphagia and polyuria.   Genitourinary: Negative for decreased urine volume, difficulty urinating, dysuria, flank pain, frequency, hematuria and urgency.   Musculoskeletal: Negative for arthralgias, back pain, gait problem, joint swelling, myalgias, neck pain and neck stiffness.   Skin: Negative for color change, pallor, rash and wound.   Allergic/Immunologic: Negative for immunocompromised state.   Neurological: Positive for weakness. Negative for dizziness, tremors, seizures, syncope, facial asymmetry, speech difficulty, light-headedness, numbness and headaches.   Hematological: Negative for adenopathy.   Psychiatric/Behavioral: Negative for agitation, behavioral problems, confusion, decreased concentration, dysphoric mood, hallucinations, self-injury, sleep disturbance and suicidal ideas. The patient is not nervous/anxious and is not hyperactive.         Objective:   /80 (BP Location: Right arm, Patient Position: Sitting, Cuff Size: Adult)  Pulse 112  Ht 62\" (157.5 cm)  Wt 205 lb 6.4 oz (93.2 kg)  BMI 37.57 kg/m2    Physical Exam   Constitutional: She is oriented to person, place, and time. She appears " well-developed.   HENT:   Head: Normocephalic.   Right Ear: External ear normal.   Left Ear: External ear normal.   Nose: Nose normal.   Eyes: Conjunctivae and EOM are normal. No scleral icterus.   Neck: Normal range of motion. Neck supple. No tracheal deviation present. No thyromegaly present.   Cardiovascular: Normal rate, regular rhythm, normal heart sounds and intact distal pulses.  Exam reveals no gallop and no friction rub.    No murmur heard.  Pulmonary/Chest: Effort normal and breath sounds normal. No stridor. No respiratory distress. She has no wheezes. She has no rales. She exhibits no tenderness.   Abdominal: Soft. Bowel sounds are normal. She exhibits no distension and no mass. There is no tenderness. There is no rebound and no guarding.   Musculoskeletal: Normal range of motion. She exhibits no tenderness or deformity.   Lymphadenopathy:     She has no cervical adenopathy.   Neurological: She is alert and oriented to person, place, and time. She displays normal reflexes. She exhibits normal muscle tone. Coordination normal.   Skin: No rash noted. No erythema. No pallor.   Psychiatric: She has a normal mood and affect. Her behavior is normal. Judgment and thought content normal.       Lab Review    Results for orders placed or performed in visit on 10/24/17   POC Glucose Fingerstick   Result Value Ref Range    Glucose 144 (A) 70 - 130 mg/dL           Assessment/Plan       ICD-10-CM ICD-9-CM   1. Type 2 diabetes mellitus with hyperglycemia, without long-term current use of insulin E11.65 250.00     790.29       Glycemic Management:   Lab Results   Component Value Date    HGBA1C 8.8 (H) 09/27/2017    HGBA1C 8.9 (H) 09/18/2017    HGBA1C 9.08 (H) 06/12/2017     Lab Results   Component Value Date    GLUCOSE 150 (H) 10/10/2017    BUN 24 (H) 10/10/2017    CREATININE 1.11 (H) 10/10/2017    EGFRIFNONA 49 (L) 10/10/2017    BCR 21.6 10/10/2017    K 4.9 10/10/2017    CO2 28.0 10/10/2017    CALCIUM 10.3 (H)  10/10/2017    ALBUMIN 4.20 10/10/2017    LABIL2 0.9 (L) 10/10/2017    AST 40 (H) 10/10/2017    ALT 24 10/10/2017    ANIONGAP 17.0 (H) 10/10/2017     Lab Results   Component Value Date    WBC 11.27 (H) 10/10/2017    HGB 12.4 10/10/2017    HCT 38.1 10/10/2017    MCV 91.6 10/10/2017     (H) 10/10/2017     Type 2 Diabetes Mellitus With Hyperglycemia, Without Long-Term Current Use of Insulin     no BDR      Diarrhea on metformin    toujeo 50 -40    trulicity 0.75  Having nausea, alternate phenergan with zofran     Add jardiance     Lipid Management  Lab Results   Component Value Date    CHOL 241 (H) 06/12/2017     Lab Results   Component Value Date    TRIG 309 (H) 06/12/2017    TRIG 296 (H) 08/18/2016    TRIG 697 (H) 05/16/2016     Lab Results   Component Value Date    HDL 47 (L) 06/12/2017    HDL 45 (L) 08/18/2016    HDL 44 (L) 05/16/2016     Lab Results   Component Value Date    LDLCALC 134 (H) 08/18/2016    LDLCALC 106 05/05/2015    LDLCALC 101 10/27/2014     No results found for: LDL  Lab Results   Component Value Date    LDLDIRECT 142 (H) 06/12/2017    LDLDIRECT 137 (H) 11/23/2016    LDLDIRECT 138 (H) 08/18/2016     On pravastatin 40 mg qhs, not enough    Change to crestor 20 mg qhs       Blood Pressure Management:    Vitals:    10/24/17 1328   BP: 132/80   Pulse: 112     Lab Results   Component Value Date    GLUCOSE 150 (H) 10/10/2017    CALCIUM 10.3 (H) 10/10/2017     10/10/2017    K 4.9 10/10/2017    CO2 28.0 10/10/2017    CL 96 10/10/2017    BUN 24 (H) 10/10/2017    CREATININE 1.11 (H) 10/10/2017    EGFRIFNONA 49 (L) 10/10/2017    BCR 21.6 10/10/2017    ANIONGAP 17.0 (H) 10/10/2017         bp nl     On lisinopril - hctz , change to lisinopril 20 mg only since adding jardiance     Microvascular Complication Monitoring:      Eye Exam Evaluation    Within 1 year   -----------    Last Microalbumin-Proteinuria Assessment    Lab Results   Component Value Date    JON 82 (H) 05/16/2016     JON 11 05/06/2015       No results found for: UTPCR    -----------      Neuropathy, none       Weight Related:   Wt Readings from Last 3 Encounters:   10/24/17 205 lb 6.4 oz (93.2 kg)   10/10/17 208 lb 8 oz (94.6 kg)   10/03/17 246 lb 14.6 oz (112 kg)     Body mass index is 37.57 kg/(m^2).        Diet interventions: moderate (500 kCal/d) deficit diet.      Bone Health    Lab Results   Component Value Date    CALCIUM 10.3 (H) 10/10/2017    PHOS 2.0 (L) 09/28/2017    [id number] 34.3 10/27/2014       Thyroid Health    No results found for: TSH        Other Diabetes Related Aspects       No results found for: [id number]        I reviewed and summarized records from Belinda Finn MD from 2017 and I reviewed / ordered labs.     Orders Placed This Encounter   Procedures   • POC Glucose Fingerstick         A copy of my note was sent to Belinda Finn MD    Please see my above opinion and suggestions.

## 2017-12-11 LAB
BASOPHILS # BLD AUTO: 0.02 10*3/MM3 (ref 0–0.2)
BASOPHILS NFR BLD AUTO: 0.2 % (ref 0–2)
DEPRECATED RDW RBC AUTO: 47.9 FL (ref 36.4–46.3)
EOSINOPHIL # BLD AUTO: 0.12 10*3/MM3 (ref 0–0.7)
EOSINOPHIL NFR BLD AUTO: 1.1 % (ref 0–7)
ERYTHROCYTE [DISTWIDTH] IN BLOOD BY AUTOMATED COUNT: 14.2 % (ref 11.5–14.5)
HCT VFR BLD AUTO: 39.8 % (ref 35–45)
HGB BLD-MCNC: 12.9 G/DL (ref 12–15.5)
IMM GRANULOCYTES # BLD: 0.04 10*3/MM3 (ref 0–0.02)
IMM GRANULOCYTES NFR BLD: 0.4 % (ref 0–0.5)
LYMPHOCYTES # BLD AUTO: 3.09 10*3/MM3 (ref 0.6–4.2)
LYMPHOCYTES NFR BLD AUTO: 29.1 % (ref 10–50)
MCH RBC QN AUTO: 29.7 PG (ref 26.5–34)
MCHC RBC AUTO-ENTMCNC: 32.4 G/DL (ref 31.4–36)
MCV RBC AUTO: 91.5 FL (ref 80–98)
MONOCYTES # BLD AUTO: 0.55 10*3/MM3 (ref 0–0.9)
MONOCYTES NFR BLD AUTO: 5.2 % (ref 0–12)
NEUTROPHILS # BLD AUTO: 6.79 10*3/MM3 (ref 2–8.6)
NEUTROPHILS NFR BLD AUTO: 64 % (ref 37–80)
PLATELET # BLD AUTO: 336 10*3/MM3 (ref 150–450)
PMV BLD AUTO: 10.1 FL (ref 8–12)
RBC # BLD AUTO: 4.35 10*6/MM3 (ref 3.77–5.16)
WBC NRBC COR # BLD: 10.61 10*3/MM3 (ref 3.2–9.8)

## 2017-12-11 PROCEDURE — 82570 ASSAY OF URINE CREATININE: CPT | Performed by: INTERNAL MEDICINE

## 2017-12-11 PROCEDURE — 83036 HEMOGLOBIN GLYCOSYLATED A1C: CPT | Performed by: INTERNAL MEDICINE

## 2017-12-11 PROCEDURE — 82043 UR ALBUMIN QUANTITATIVE: CPT | Performed by: INTERNAL MEDICINE

## 2017-12-11 PROCEDURE — 80053 COMPREHEN METABOLIC PANEL: CPT | Performed by: INTERNAL MEDICINE

## 2017-12-11 PROCEDURE — 80061 LIPID PANEL: CPT | Performed by: INTERNAL MEDICINE

## 2017-12-11 PROCEDURE — 82607 VITAMIN B-12: CPT | Performed by: INTERNAL MEDICINE

## 2017-12-11 PROCEDURE — 84443 ASSAY THYROID STIM HORMONE: CPT | Performed by: INTERNAL MEDICINE

## 2017-12-11 PROCEDURE — 36415 COLL VENOUS BLD VENIPUNCTURE: CPT | Performed by: INTERNAL MEDICINE

## 2017-12-11 PROCEDURE — 85025 COMPLETE CBC W/AUTO DIFF WBC: CPT | Performed by: INTERNAL MEDICINE

## 2017-12-11 PROCEDURE — 82306 VITAMIN D 25 HYDROXY: CPT | Performed by: INTERNAL MEDICINE

## 2017-12-12 LAB
25(OH)D3 SERPL-MCNC: 33.7 NG/ML (ref 30–100)
ALBUMIN SERPL-MCNC: 4.5 G/DL (ref 3.4–4.8)
ALBUMIN UR-MCNC: 1.1 MG/L
ALBUMIN/GLOB SERPL: 1.3 G/DL (ref 1.1–1.8)
ALP SERPL-CCNC: 82 U/L (ref 38–126)
ALT SERPL W P-5'-P-CCNC: 34 U/L (ref 9–52)
ANION GAP SERPL CALCULATED.3IONS-SCNC: 16 MMOL/L (ref 5–15)
ARTICHOKE IGE QN: 77 MG/DL (ref 1–129)
AST SERPL-CCNC: 19 U/L (ref 14–36)
BILIRUB SERPL-MCNC: 0.3 MG/DL (ref 0.2–1.3)
BUN BLD-MCNC: 18 MG/DL (ref 7–21)
BUN/CREAT SERPL: 19.6 (ref 7–25)
CALCIUM SPEC-SCNC: 10.2 MG/DL (ref 8.4–10.2)
CHLORIDE SERPL-SCNC: 101 MMOL/L (ref 95–110)
CHOLEST SERPL-MCNC: 151 MG/DL (ref 0–199)
CO2 SERPL-SCNC: 24 MMOL/L (ref 22–31)
CREAT BLD-MCNC: 0.92 MG/DL (ref 0.5–1)
CREAT UR-MCNC: 88.3 MG/DL
GFR SERPL CREATININE-BSD FRML MDRD: 61 ML/MIN/1.73 (ref 45–104)
GLOBULIN UR ELPH-MCNC: 3.5 GM/DL (ref 2.3–3.5)
GLUCOSE BLD-MCNC: 153 MG/DL (ref 60–100)
HBA1C MFR BLD: 7.5 % (ref 4–5.6)
HDLC SERPL-MCNC: 52 MG/DL (ref 60–200)
LDLC/HDLC SERPL: 1.23 {RATIO} (ref 0–3.22)
MICROALBUMIN/CREAT UR: 12.5 MG/G (ref 0–30)
POTASSIUM BLD-SCNC: 4.9 MMOL/L (ref 3.5–5.1)
PROT SERPL-MCNC: 8 G/DL (ref 6.3–8.6)
SODIUM BLD-SCNC: 141 MMOL/L (ref 137–145)
TRIGL SERPL-MCNC: 176 MG/DL (ref 20–199)
TSH SERPL DL<=0.05 MIU/L-ACNC: 0.9 MIU/ML (ref 0.46–4.68)
VIT B12 BLD-MCNC: 349 PG/ML (ref 239–931)

## 2017-12-18 ENCOUNTER — OFFICE VISIT (OUTPATIENT)
Dept: FAMILY MEDICINE CLINIC | Facility: CLINIC | Age: 67
End: 2017-12-18

## 2017-12-18 VITALS
HEART RATE: 101 BPM | BODY MASS INDEX: 37.54 KG/M2 | HEIGHT: 62 IN | DIASTOLIC BLOOD PRESSURE: 82 MMHG | WEIGHT: 204 LBS | OXYGEN SATURATION: 98 % | SYSTOLIC BLOOD PRESSURE: 134 MMHG

## 2017-12-18 DIAGNOSIS — E78.2 MIXED HYPERLIPIDEMIA: ICD-10-CM

## 2017-12-18 DIAGNOSIS — E11.65 TYPE 2 DIABETES MELLITUS WITH HYPERGLYCEMIA, WITHOUT LONG-TERM CURRENT USE OF INSULIN (HCC): Primary | ICD-10-CM

## 2017-12-18 DIAGNOSIS — Z12.11 SCREEN FOR COLON CANCER: ICD-10-CM

## 2017-12-18 DIAGNOSIS — M15.9 PRIMARY OSTEOARTHRITIS INVOLVING MULTIPLE JOINTS: ICD-10-CM

## 2017-12-18 PROCEDURE — 99214 OFFICE O/P EST MOD 30 MIN: CPT | Performed by: GENERAL PRACTICE

## 2017-12-18 RX ORDER — HYDROCODONE BITARTRATE AND ACETAMINOPHEN 7.5; 325 MG/1; MG/1
1 TABLET ORAL EVERY 6 HOURS PRN
Qty: 120 TABLET | Refills: 0 | Status: SHIPPED | OUTPATIENT
Start: 2017-12-18 | End: 2018-03-19 | Stop reason: SDUPTHER

## 2017-12-18 NOTE — PROGRESS NOTES
Subjective   Bertha Hyde is a 67 y.o. female.   Chief Complaint   Patient presents with   • Knee Pain   • Med Refill   • Hypertension   • Diabetes     labs     For review and evaluation of management of chronic medical problems. Labs reviewed. Needs refills. Pain is controlled with medications. No side effects.   Hypertension   This is a chronic problem. The current episode started more than 1 year ago. The problem is unchanged. The problem is controlled. Associated symptoms include anxiety. Pertinent negatives include no chest pain, headaches, neck pain, palpitations or shortness of breath. There are no known risk factors for coronary artery disease. Past treatments include ACE inhibitors and diuretics. The current treatment provides significant improvement. There are no compliance problems.    Diabetes   She presents for her follow-up diabetic visit. She has type 2 diabetes mellitus. Her disease course has been stable. Pertinent negatives for hypoglycemia include no dizziness, headaches or nervousness/anxiousness. There are no diabetic associated symptoms. Pertinent negatives for diabetes include no chest pain, no fatigue and no weakness. There are no hypoglycemic complications. There are no diabetic complications. Current diabetic treatment includes intensive insulin program and oral agent (monotherapy). She is compliant with treatment all of the time. Her weight is stable. She is following a generally healthy diet. Meal planning includes avoidance of concentrated sweets. She participates in exercise intermittently. There is no change in her home blood glucose trend. An ACE inhibitor/angiotensin II receptor blocker is being taken.   Arthritis   Presents for follow-up visit. She complains of pain and stiffness. She reports no joint swelling. The symptoms have been stable. Affected locations include the right knee and left knee. Her pain is at a severity of 7/10. Pertinent negatives include no diarrhea,  dysuria, fatigue, fever or rash. Compliance with total regimen is %. Compliance with medications is %.   Hyperlipidemia   This is a chronic problem. The current episode started more than 1 year ago. The problem is controlled. Recent lipid tests were reviewed and are variable. Exacerbating diseases include diabetes. Pertinent negatives include no chest pain, myalgias or shortness of breath. Current antihyperlipidemic treatment includes statins. The current treatment provides significant improvement of lipids. There are no compliance problems.       The following portions of the patient's history were reviewed and updated as appropriate: allergies, current medications, past social history and problem list.    Outpatient Medications Prior to Visit   Medication Sig Dispense Refill   • Alcohol Swabs (ALCOHOL WIPES) pads Use 4 x daily 120 each 11   • Blood Glucose Monitoring Suppl (BLOOD GLUCOSE MONITOR SYSTEM) W/DEVICE kit Test Blood Sugars Once Daily 1 each 0   • Blood Glucose Monitoring Suppl w/Device kit USE AS INDICATED, ANY MONITOR 1 each 1   • cyclobenzaprine (FLEXERIL) 10 MG tablet Take 1 tablet by mouth Every Evening. AT BEDTIME AS NEEDED 30 tablet 1   • DULoxetine (CYMBALTA) 60 MG capsule Take 1 capsule by mouth Daily. 90 capsule 1   • Glucose Blood (BLOOD GLUCOSE TEST) strip Use 4 x daily, use any brand covered by insurance or same brand as before 120 each 11   • glucose blood test strip Test Blood Sugars Once Daily 100 each 3   • hydrOXYzine (ATARAX) 25 MG tablet Take 1 tablet by mouth 3 (Three) Times a Day As Needed for Anxiety. 30 tablet 2   • hyoscyamine (OSCIMIN) 0.125 MG SL tablet Place 0.125-0.25 mg under the tongue every 4 (four) hours as needed. (max 12 per 24 hours)     • Insulin Pen Needle (B-D UF III MINI PEN NEEDLES) 31G X 5 MM misc Use 4 times daily 120 each 11   • Lancet Devices (LANCING DEVICE) misc USE AS INDICATED TO CORRELATE WITH STRIPS AND METER 1 each 1   • LANCETS MICRO THIN  33G misc Test Blood Sugars Once Daily 100 each 3   • lisinopril (PRINIVIL,ZESTRIL) 20 MG tablet Take 1 tablet by mouth Daily. 30 tablet 11   • loratadine (CLARITIN) 10 MG tablet Take 10 mg by mouth daily as needed. Indication: Allergic rhinitis     • ondansetron ODT (ZOFRAN ODT) 4 MG disintegrating tablet Take 1 tablet by mouth Every 8 (Eight) Hours As Needed for Nausea or Vomiting. 60 tablet 11   • pantoprazole (PROTONIX) 40 MG EC tablet Take 1 tablet by mouth Daily As Needed (heartburn). 90 tablet 3   • promethazine (PHENERGAN) 25 MG tablet Take 0.5-1 tablets by mouth Every 6 (Six) Hours As Needed for Nausea. 60 tablet 3   • rizatriptan MLT (MAXALT-MLT) 10 MG disintegrating tablet Take 1 tablet by mouth Daily As Needed for migraine. Take 1 tab at onset of headache, can repeat x 1 in 2 hrs prn 27 tablet 3   • rosuvastatin (CRESTOR) 20 MG tablet Take 1 tablet by mouth Every Night. 30 tablet 11   • Dulaglutide 0.75 MG/0.5ML solution pen-injector Inject 0.75 mg under the skin 1 (One) Time Per Week. 4 pen 11   • Empagliflozin (JARDIANCE) 10 MG tablet Take 1 tablet by mouth Daily. Before bkfast 30 tablet 11   • HYDROcodone-acetaminophen (NORCO) 7.5-325 MG per tablet Take 1 tablet by mouth Every 6 (Six) Hours As Needed for Moderate Pain . 120 tablet 0   • Insulin Glargine (TOUJEO SOLOSTAR) 300 UNIT/ML solution pen-injector Inject 50 Units under the skin every night at bedtime. 5 pen 11   • Lancets 30G misc USE 4 X DAILY 120 each 11     No facility-administered medications prior to visit.        Review of Systems   Constitutional: Negative.  Negative for chills, fatigue, fever and unexpected weight change.   HENT: Negative.  Negative for congestion, ear pain, hearing loss, nosebleeds, rhinorrhea, sneezing, sore throat and tinnitus.    Eyes: Negative.  Negative for discharge.   Respiratory: Negative.  Negative for cough, shortness of breath and wheezing.    Cardiovascular: Negative.  Negative for chest pain and  "palpitations.   Gastrointestinal: Negative.  Negative for abdominal pain, constipation, diarrhea, nausea and vomiting.   Endocrine: Negative.    Genitourinary: Negative.  Negative for dysuria, frequency and urgency.   Musculoskeletal: Positive for arthritis and stiffness. Negative for arthralgias, back pain, joint swelling, myalgias and neck pain.   Skin: Negative.  Negative for rash.   Allergic/Immunologic: Negative.    Neurological: Negative.  Negative for dizziness, weakness and headaches.   Hematological: Negative.  Negative for adenopathy.   Psychiatric/Behavioral: Negative.  Negative for dysphoric mood and sleep disturbance. The patient is not nervous/anxious.        Objective   Visit Vitals   • /82   • Pulse 101   • Ht 157.5 cm (62\")   • Wt 92.5 kg (204 lb)   • SpO2 98%   • BMI 37.31 kg/m2     Physical Exam   Constitutional: She is oriented to person, place, and time. She appears well-developed and well-nourished. No distress.   HENT:   Head: Normocephalic and atraumatic.   Nose: Nose normal.   Mouth/Throat: Oropharynx is clear and moist.   Eyes: Conjunctivae and EOM are normal. Pupils are equal, round, and reactive to light. Right eye exhibits no discharge. Left eye exhibits no discharge.   Neck: No thyromegaly present.   Cardiovascular: Normal rate, regular rhythm, normal heart sounds and intact distal pulses.    Pulmonary/Chest: Effort normal and breath sounds normal.   Musculoskeletal:        Right knee: Tenderness found. Medial joint line and lateral joint line tenderness noted.        Left knee: Tenderness found. Medial joint line and lateral joint line tenderness noted.    Bertha had a diabetic foot exam performed today.   During the foot exam she had a monofilament test performed.    Vascular Status -  Her exam exhibits right foot vasculature normal. Her exam exhibits no right foot edema. Her exam exhibits left foot vasculature normal. Her exam exhibits no left foot edema.   Skin Integrity  -  " Her right foot skin is intact.     Bertha 's left foot skin is intact. .  Lymphadenopathy:     She has no cervical adenopathy.   Neurological: She is alert and oriented to person, place, and time.   Skin: Skin is warm and dry.   Psychiatric: She has a normal mood and affect.   Nursing note and vitals reviewed.    Results for orders placed or performed in visit on 10/24/17   CBC Auto Differential   Result Value Ref Range    WBC 10.61 (H) 3.20 - 9.80 10*3/mm3    RBC 4.35 3.77 - 5.16 10*6/mm3    Hemoglobin 12.9 12.0 - 15.5 g/dL    Hematocrit 39.8 35.0 - 45.0 %    MCV 91.5 80.0 - 98.0 fL    MCH 29.7 26.5 - 34.0 pg    MCHC 32.4 31.4 - 36.0 g/dL    RDW 14.2 11.5 - 14.5 %    RDW-SD 47.9 (H) 36.4 - 46.3 fl    MPV 10.1 8.0 - 12.0 fL    Platelets 336 150 - 450 10*3/mm3    Neutrophil % 64.0 37.0 - 80.0 %    Lymphocyte % 29.1 10.0 - 50.0 %    Monocyte % 5.2 0.0 - 12.0 %    Eosinophil % 1.1 0.0 - 7.0 %    Basophil % 0.2 0.0 - 2.0 %    Immature Grans % 0.4 0.0 - 0.5 %    Neutrophils, Absolute 6.79 2.00 - 8.60 10*3/mm3    Lymphocytes, Absolute 3.09 0.60 - 4.20 10*3/mm3    Monocytes, Absolute 0.55 0.00 - 0.90 10*3/mm3    Eosinophils, Absolute 0.12 0.00 - 0.70 10*3/mm3    Basophils, Absolute 0.02 0.00 - 0.20 10*3/mm3    Immature Grans, Absolute 0.04 (H) 0.00 - 0.02 10*3/mm3   Comprehensive Metabolic Panel   Result Value Ref Range    Glucose 153 (H) 60 - 100 mg/dL    BUN 18 7 - 21 mg/dL    Creatinine 0.92 0.50 - 1.00 mg/dL    Sodium 141 137 - 145 mmol/L    Potassium 4.9 3.5 - 5.1 mmol/L    Chloride 101 95 - 110 mmol/L    CO2 24.0 22.0 - 31.0 mmol/L    Calcium 10.2 8.4 - 10.2 mg/dL    Total Protein 8.0 6.3 - 8.6 g/dL    Albumin 4.50 3.40 - 4.80 g/dL    ALT (SGPT) 34 9 - 52 U/L    AST (SGOT) 19 14 - 36 U/L    Alkaline Phosphatase 82 38 - 126 U/L    Total Bilirubin 0.3 0.2 - 1.3 mg/dL    eGFR Non  Amer 61 45 - 104 mL/min/1.73    Globulin 3.5 2.3 - 3.5 gm/dL    A/G Ratio 1.3 1.1 - 1.8 g/dL    BUN/Creatinine Ratio 19.6 7.0 - 25.0     Anion Gap 16.0 (H) 5.0 - 15.0 mmol/L   Hemoglobin A1c   Result Value Ref Range    Hemoglobin A1C 7.5 (H) 4 - 5.6 %   Lipid Panel   Result Value Ref Range    Total Cholesterol 151 0 - 199 mg/dL    Triglycerides 176 20 - 199 mg/dL    HDL Cholesterol 52 (L) 60 - 200 mg/dL    LDL Cholesterol  77 1 - 129 mg/dL    LDL/HDL Ratio 1.23 0.00 - 3.22   Microalbumin / Creatinine Urine Ratio - Urine, Clean Catch   Result Value Ref Range    Microalbumin/Creatinine Ratio 12.5 0.0 - 30.0 mg/g    Creatinine, Urine 88.3 mg/dL    Microalbumin, Urine 1.1 mg/L   TSH   Result Value Ref Range    TSH 0.900 0.460 - 4.680 mIU/mL   Vitamin B12   Result Value Ref Range    Vitamin B-12 349 239 - 931 pg/mL   Vitamin D 25 Hydroxy   Result Value Ref Range    25 Hydroxy, Vitamin D 33.7 30.0 - 100.0 ng/ml   POC Glucose Fingerstick   Result Value Ref Range    Glucose 144 (A) 70 - 130 mg/dL       Assessment/Plan   Problem List Items Addressed This Visit        Cardiovascular and Mediastinum    Hyperlipidemia       Endocrine    Type 2 diabetes mellitus with hyperglycemia, without long-term current use of insulin - Primary    Relevant Medications    Insulin Glargine (TOUJEO SOLOSTAR) 300 UNIT/ML solution pen-injector    Empagliflozin (JARDIANCE) 10 MG tablet    Dulaglutide 0.75 MG/0.5ML solution pen-injector    Other Relevant Orders    Hemoglobin A1c    Comprehensive Metabolic Panel       Musculoskeletal and Integument    Degenerative joint disease involving multiple joints      Other Visit Diagnoses     Screen for colon cancer        Relevant Orders    Amb referral for Screening Colonoscopy          Continue current treatment. Chris reviewed and appropriate. Not recommended to drive or operate heavy equipment while taking potentially sedating meds.  Patient understands the risks associated with this controlled medication, including tolerance and addiction. They also agree to obtain this medication only from me, and not from a another provider,  unless that provider is covering for me in my absence. They also agree to be compliant in dosing, and not self adjust the dose of medication.  A signed controlled substance agreement is on file, and they have received a controlled substance education sheet at this or a previous visit. They have also signed a consent for treatment with a controlled substance as per [facility] policy.      New Medications Ordered This Visit   Medications   • HYDROcodone-acetaminophen (NORCO) 7.5-325 MG per tablet     Sig: Take 1 tablet by mouth Every 6 (Six) Hours As Needed for Moderate Pain .     Dispense:  120 tablet     Refill:  0   • Insulin Glargine (TOUJEO SOLOSTAR) 300 UNIT/ML solution pen-injector     Sig: Inject 50 Units under the skin every night at bedtime.     Dispense:  1 pen     Refill:  0     [id number], 0/6/19   • Empagliflozin (JARDIANCE) 10 MG tablet     Sig: Take 1 tablet by mouth Daily. Before bkfast     Dispense:  28 tablet     Refill:  0     [id number], 07/19   • Dulaglutide 0.75 MG/0.5ML solution pen-injector     Sig: Inject 0.75 mg under the skin 1 (One) Time Per Week.     Dispense:  2 pen     Refill:  0     [id number], 1/19     Return in about 3 months (around 3/18/2018) for Recheck.

## 2018-01-31 ENCOUNTER — OFFICE VISIT (OUTPATIENT)
Dept: ENDOCRINOLOGY | Facility: CLINIC | Age: 68
End: 2018-01-31

## 2018-01-31 VITALS
BODY MASS INDEX: 37.91 KG/M2 | HEIGHT: 62 IN | HEART RATE: 96 BPM | SYSTOLIC BLOOD PRESSURE: 126 MMHG | DIASTOLIC BLOOD PRESSURE: 80 MMHG | WEIGHT: 206 LBS

## 2018-01-31 DIAGNOSIS — E55.9 VITAMIN D DEFICIENCY: ICD-10-CM

## 2018-01-31 DIAGNOSIS — I10 ESSENTIAL HYPERTENSION: ICD-10-CM

## 2018-01-31 DIAGNOSIS — E11.65 TYPE 2 DIABETES MELLITUS WITH HYPERGLYCEMIA, WITHOUT LONG-TERM CURRENT USE OF INSULIN (HCC): Primary | ICD-10-CM

## 2018-01-31 DIAGNOSIS — E78.2 MIXED HYPERLIPIDEMIA: ICD-10-CM

## 2018-01-31 PROCEDURE — 99214 OFFICE O/P EST MOD 30 MIN: CPT | Performed by: NURSE PRACTITIONER

## 2018-01-31 RX ORDER — INSULIN DEGLUDEC INJECTION 100 U/ML
INJECTION, SOLUTION SUBCUTANEOUS
Refills: 11 | COMMUNITY
Start: 2017-12-04 | End: 2019-01-07

## 2018-01-31 NOTE — PROGRESS NOTES
"  Subjective    Bertha Hyde is a 67 y.o. female. she is here today for follow-up.    History of Present Illness       Primary Care Provider     Belinda Finn MD     Duration since age 64 years      Timing - Diabetes is Constant     Quality -  improved     Severity -  moderate     Complications - none     Current symptoms/problems  nausea      Alleviating Factors: Compliance       Side Effects  metformin     Current diet  in general, a \"healthy\" diet       Current exercise walking     Current monitoring regimen: home blood tests - 3 times daily    Lab Results   Component Value Date    HGBA1C 7.5 (H) 12/11/2017       Home blood sugar records: 100s     Hypoglycemia nocturnal          The following portions of the patient's history were reviewed and updated as appropriate:   Past Medical History:   Diagnosis Date   • Acute allergic reaction    • Acute gastroenteritis    • Allergic ileitis    • Allergic rhinitis    • Amaurosis fugax     both ? 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   • Colitis    • Common cold    • Cough    • Degenerative joint disease involving multiple joints    • Depressive disorder    • Diabetes mellitus    • Diverticulitis of colon    • Encounter for immunization    • Essential hypertension    • Fatigue    • Headache    • Hemorrhoids     internal & external      • History of fracture of vertebral column     Fracture of vertebral column - T11 compression      • History of mammogram 12/13/2011    Mammogram screen (1) - Benign , negative   • History of screening mammography     Screening mammography      • History of screening mammography 05/19/2016    SCREENING MAMMOGRAPHY DIGITAL  (Medicare) (2) - Ordered By: MICA FINN ()    • History of transfusion    • Hyperglycemia    • Hyperlipidemia    • Insomnia    • " Itch of skin     Itching of skin   • Left lower quadrant pain    • Menopause    • Migraine    • Nausea and vomiting    • Nausea, vomiting and diarrhea    • Need for immunization against influenza     Needs influenza immunization   • Need for prophylactic vaccination against Streptococcus pneumoniae (pneumococcus)    • Nuclear senile cataract of left eye     Nuclear senile cataract - left   • Osteoarthritis    • Osteopenia    • Pain of left arm     Pain in left arm - STRAIN SECONDARY TO FALL      • Posterior subcapsular polar senile cataract of right eye     Posterior subcapsular polar senile cataract - right   • Rhinitis    • Scalp laceration    • Type 2 diabetes mellitus     no BDR   • Upper respiratory infection      Past Surgical History:   Procedure Laterality Date   • BREAST BIOPSY     • CARPAL TUNNEL RELEASE  1984    Carpal tunnel surgery (1) - Release of transverse carpal ligament, (2) Microneurolysis, right wrist; (3) Synovectomy, flexor tendons, right wrist   • CATARACT EXTRACTION Right 2015    Remove cataract, insert lens (1) - Right eye.   •  SECTION  1976     Section (2) - Low cervical section & Goldy tubal ligation   • CHOLECYSTECTOMY  10/19/2001    Cholecystectomy, laparoscopic (1) - Acute cholecystitis, (2) right ovarian cyst   • DILATATION AND CURETTAGE  1981    D&C (2) - Dysfunctional uterine bleeding   • ENDOSCOPY  10/11/2007    Colon endoscopy 33741 (1) - Colitis of colon. Multiple random biopsies obtained. A few medium scattered diverticula in sigmoid, descending, & transverse colon. Small internal & external hemorrhoids were present.   • EXCISION LESION Right 2003    Excision, breast lesion (1) - Excision of breast mass, right   • INJECTION OF MEDICATION  2012    B12 (1) - Ordered By: MICA ROWLAND ()    • INJECTION OF MEDICATION  2015    Phenergan (7) - Ordered By: JAYA POWER ([facility]    • INJECTION OF MEDICATION  2015     Toradol (7) - Ordered By: JAYA POWER ([facility])   • INJECTION OF MEDICATION  2014    Zofran (1) - Ordered By: GEENA NUNEZ ([facility])   • OOPHORECTOMY  1981    Oophorectomy (1) - one   • OTHER SURGICAL HISTORY      Tubal ligation (1)   • PAP SMEAR  10/30/2007    PAP SMEAR (1) - negative   • TOTAL KNEE ARTHROPLASTY Right 2012    Total knee replacement (1) - right    • VAGINAL HYSTERECTOMY  1981    Vaginal hysterectomy (1) - recurrent dysfunctional uterine bleeding     Family History   Problem Relation Age of Onset   • Coronary artery disease Other    • Hypertension Other    • Heart disease Mother    • Hypertension Mother    • Osteoporosis Mother    • Stroke Mother    • Arthritis Father    • Migraines Maternal Grandmother    • Obesity Maternal Grandmother      OB History      Para Term  AB Living    3 3 3       SAB TAB Ectopic Multiple Live Births                Current Outpatient Prescriptions   Medication Sig Dispense Refill   • Alcohol Swabs (ALCOHOL WIPES) pads Use 4 x daily 120 each 11   • Blood Glucose Monitoring Suppl (BLOOD GLUCOSE MONITOR SYSTEM) W/DEVICE kit Test Blood Sugars Once Daily 1 each 0   • Blood Glucose Monitoring Suppl w/Device kit USE AS INDICATED, ANY MONITOR 1 each 1   • cyclobenzaprine (FLEXERIL) 10 MG tablet Take 1 tablet by mouth Every Evening. AT BEDTIME AS NEEDED 30 tablet 1   • Dulaglutide 0.75 MG/0.5ML solution pen-injector Inject 0.75 mg under the skin 1 (One) Time Per Week. 2 pen 0   • DULoxetine (CYMBALTA) 60 MG capsule Take 1 capsule by mouth Daily. 90 capsule 1   • Empagliflozin (JARDIANCE) 10 MG tablet Take 1 tablet by mouth Daily. Before bkfast 28 tablet 0   • Glucose Blood (BLOOD GLUCOSE TEST) strip Use 4 x daily, use any brand covered by insurance or same brand as before 120 each 11   • glucose blood test strip Test Blood Sugars Once Daily 100 each 3   • HYDROcodone-acetaminophen (NORCO) 7.5-325 MG per tablet Take 1 tablet by mouth Every  6 (Six) Hours As Needed for Moderate Pain . 120 tablet 0   • hydrOXYzine (ATARAX) 25 MG tablet Take 1 tablet by mouth 3 (Three) Times a Day As Needed for Anxiety. 30 tablet 2   • hyoscyamine (OSCIMIN) 0.125 MG SL tablet Place 0.125-0.25 mg under the tongue every 4 (four) hours as needed. (max 12 per 24 hours)     • Insulin Glargine (TOUJEO SOLOSTAR) 300 UNIT/ML solution pen-injector Inject 50 Units under the skin every night at bedtime. 1 pen 0   • Insulin Pen Needle (B-D UF III MINI PEN NEEDLES) 31G X 5 MM misc Use 4 times daily 120 each 11   • Lancet Devices (LANCING DEVICE) misc USE AS INDICATED TO CORRELATE WITH STRIPS AND METER 1 each 1   • LANCETS MICRO THIN 33G misc Test Blood Sugars Once Daily 100 each 3   • lisinopril (PRINIVIL,ZESTRIL) 20 MG tablet Take 1 tablet by mouth Daily. 30 tablet 11   • loratadine (CLARITIN) 10 MG tablet Take 10 mg by mouth daily as needed. Indication: Allergic rhinitis     • ondansetron ODT (ZOFRAN ODT) 4 MG disintegrating tablet Take 1 tablet by mouth Every 8 (Eight) Hours As Needed for Nausea or Vomiting. 60 tablet 11   • pantoprazole (PROTONIX) 40 MG EC tablet Take 1 tablet by mouth Daily As Needed (heartburn). 90 tablet 3   • promethazine (PHENERGAN) 25 MG tablet Take 0.5-1 tablets by mouth Every 6 (Six) Hours As Needed for Nausea. 60 tablet 3   • rizatriptan MLT (MAXALT-MLT) 10 MG disintegrating tablet Take 1 tablet by mouth Daily As Needed for migraine. Take 1 tab at onset of headache, can repeat x 1 in 2 hrs prn 27 tablet 3   • rosuvastatin (CRESTOR) 20 MG tablet Take 1 tablet by mouth Every Night. 30 tablet 11   • TRESIBA FLEXTOUCH 100 UNIT/ML solution pen-injector injection INJECT 50 UNITS UNDER THE SKIN EVERY NIGHT AT BEDTIME.  11     No current facility-administered medications for this visit.      No Known Allergies  Social History     Social History   • Marital status:      Spouse name: N/A   • Number of children: N/A   • Years of education: N/A     Social  "History Main Topics   • Smoking status: Never Smoker   • Smokeless tobacco: Never Used   • Alcohol use No   • Drug use: No   • Sexual activity: Defer      Comment: Marital status:      Other Topics Concern   • None     Social History Narrative       Review of Systems  Review of Systems   Constitutional: Negative for activity change, appetite change, diaphoresis and fatigue.   HENT: Negative for congestion, dental problem, facial swelling, sneezing, sore throat, tinnitus, trouble swallowing and voice change.    Eyes: Negative for photophobia, pain, discharge, redness, itching and visual disturbance.   Respiratory: Negative for apnea, cough, choking, chest tightness and shortness of breath.    Cardiovascular: Negative for chest pain, palpitations and leg swelling.   Gastrointestinal: Negative for abdominal distention, abdominal pain, constipation, diarrhea, nausea and vomiting.   Endocrine: Negative for cold intolerance, heat intolerance, polydipsia, polyphagia and polyuria.   Genitourinary: Negative for difficulty urinating, dysuria, frequency, hematuria and urgency.   Musculoskeletal: Negative for arthralgias, back pain, gait problem, joint swelling, myalgias, neck pain and neck stiffness.   Skin: Negative for color change, pallor, rash and wound.   Neurological: Negative for dizziness, tremors, weakness, light-headedness, numbness and headaches.   Hematological: Negative for adenopathy. Does not bruise/bleed easily.   Psychiatric/Behavioral: Negative for behavioral problems, confusion and sleep disturbance.        Objective    /80 (BP Location: Right arm, Patient Position: Sitting, Cuff Size: Adult)  Pulse 96  Ht 157.5 cm (62\")  Wt 93.4 kg (206 lb)  BMI 37.68 kg/m2  Physical Exam   Constitutional: She is oriented to person, place, and time. She appears well-developed and well-nourished. No distress.   HENT:   Head: Normocephalic and atraumatic.   Right Ear: External ear normal.   Left Ear: " External ear normal.   Nose: Nose normal.   Eyes: Conjunctivae and EOM are normal. Pupils are equal, round, and reactive to light.   Neck: Normal range of motion. Neck supple. No tracheal deviation present. No thyromegaly present.   Cardiovascular: Normal rate, regular rhythm and normal heart sounds.    No murmur heard.  Pulmonary/Chest: Effort normal and breath sounds normal. No respiratory distress. She has no wheezes.   Abdominal: Soft. Bowel sounds are normal. There is no tenderness. There is no rebound and no guarding.   Musculoskeletal: Normal range of motion. She exhibits no edema, tenderness or deformity.   Neurological: She is alert and oriented to person, place, and time. No cranial nerve deficit.   Skin: Skin is warm and dry. No rash noted.   Psychiatric: She has a normal mood and affect. Her behavior is normal. Judgment and thought content normal.       Lab Review  Glucose (mg/dL)   Date Value   12/11/2017 153 (H)   10/10/2017 150 (H)   10/03/2017 253 (H)     Glucose, Arterial (mmol/L)   Date Value   09/27/2017 375     Sodium (mmol/L)   Date Value   12/11/2017 141   10/10/2017 141   10/03/2017 140     Potassium (mmol/L)   Date Value   12/11/2017 4.9   10/10/2017 4.9   10/03/2017 3.8     Chloride (mmol/L)   Date Value   12/11/2017 101   10/10/2017 96   10/03/2017 106     CO2 (mmol/L)   Date Value   12/11/2017 24.0   10/10/2017 28.0   10/03/2017 24.0     BUN (mg/dL)   Date Value   12/11/2017 18   10/10/2017 24 (H)   10/03/2017 13     Creatinine (mg/dL)   Date Value   12/11/2017 0.92   10/10/2017 1.11 (H)   10/03/2017 0.98     Hemoglobin A1C (%)   Date Value   12/11/2017 7.5 (H)   09/27/2017 8.8 (H)   09/18/2017 8.9 (H)     Triglycerides   Date Value   12/11/2017 176 mg/dL   06/12/2017 309 mg/dL (H)   08/18/2016 296 mg/dl (H)   05/16/2016 697 mg/dl (H)   05/05/2015 486 mg/dl (H)       Assessment/Plan      1. Type 2 diabetes mellitus with hyperglycemia, without long-term current use of insulin    2.  Essential hypertension    3. Mixed hyperlipidemia    4. Vitamin D deficiency    .    Medications prescribed:  Outpatient Encounter Prescriptions as of 1/31/2018   Medication Sig Dispense Refill   • Alcohol Swabs (ALCOHOL WIPES) pads Use 4 x daily 120 each 11   • Blood Glucose Monitoring Suppl (BLOOD GLUCOSE MONITOR SYSTEM) W/DEVICE kit Test Blood Sugars Once Daily 1 each 0   • Blood Glucose Monitoring Suppl w/Device kit USE AS INDICATED, ANY MONITOR 1 each 1   • cyclobenzaprine (FLEXERIL) 10 MG tablet Take 1 tablet by mouth Every Evening. AT BEDTIME AS NEEDED 30 tablet 1   • Dulaglutide 0.75 MG/0.5ML solution pen-injector Inject 0.75 mg under the skin 1 (One) Time Per Week. 2 pen 0   • DULoxetine (CYMBALTA) 60 MG capsule Take 1 capsule by mouth Daily. 90 capsule 1   • Empagliflozin (JARDIANCE) 10 MG tablet Take 1 tablet by mouth Daily. Before bkfast 28 tablet 0   • Glucose Blood (BLOOD GLUCOSE TEST) strip Use 4 x daily, use any brand covered by insurance or same brand as before 120 each 11   • glucose blood test strip Test Blood Sugars Once Daily 100 each 3   • HYDROcodone-acetaminophen (NORCO) 7.5-325 MG per tablet Take 1 tablet by mouth Every 6 (Six) Hours As Needed for Moderate Pain . 120 tablet 0   • hydrOXYzine (ATARAX) 25 MG tablet Take 1 tablet by mouth 3 (Three) Times a Day As Needed for Anxiety. 30 tablet 2   • hyoscyamine (OSCIMIN) 0.125 MG SL tablet Place 0.125-0.25 mg under the tongue every 4 (four) hours as needed. (max 12 per 24 hours)     • Insulin Glargine (TOUJEO SOLOSTAR) 300 UNIT/ML solution pen-injector Inject 50 Units under the skin every night at bedtime. 1 pen 0   • Insulin Pen Needle (B-D UF III MINI PEN NEEDLES) 31G X 5 MM misc Use 4 times daily 120 each 11   • Lancet Devices (LANCING DEVICE) misc USE AS INDICATED TO CORRELATE WITH STRIPS AND METER 1 each 1   • LANCETS MICRO THIN 33G misc Test Blood Sugars Once Daily 100 each 3   • lisinopril (PRINIVIL,ZESTRIL) 20 MG tablet Take 1 tablet by  mouth Daily. 30 tablet 11   • loratadine (CLARITIN) 10 MG tablet Take 10 mg by mouth daily as needed. Indication: Allergic rhinitis     • ondansetron ODT (ZOFRAN ODT) 4 MG disintegrating tablet Take 1 tablet by mouth Every 8 (Eight) Hours As Needed for Nausea or Vomiting. 60 tablet 11   • pantoprazole (PROTONIX) 40 MG EC tablet Take 1 tablet by mouth Daily As Needed (heartburn). 90 tablet 3   • promethazine (PHENERGAN) 25 MG tablet Take 0.5-1 tablets by mouth Every 6 (Six) Hours As Needed for Nausea. 60 tablet 3   • rizatriptan MLT (MAXALT-MLT) 10 MG disintegrating tablet Take 1 tablet by mouth Daily As Needed for migraine. Take 1 tab at onset of headache, can repeat x 1 in 2 hrs prn 27 tablet 3   • rosuvastatin (CRESTOR) 20 MG tablet Take 1 tablet by mouth Every Night. 30 tablet 11   • TRESIBA FLEXTOUCH 100 UNIT/ML solution pen-injector injection INJECT 50 UNITS UNDER THE SKIN EVERY NIGHT AT BEDTIME.  11     No facility-administered encounter medications on file as of 1/31/2018.        Orders placed during this encounter include:  Orders Placed This Encounter   Procedures   • Comprehensive Metabolic Panel   • Hemoglobin A1c   • Lipid Panel   • Vitamin D 25 Hydroxy   • TSH   • Protein / Creatinine Ratio, Urine - Urine, Clean Catch   • CBC & Differential     Order Specific Question:   Manual Differential     Answer:   No     Glycemic Management:      Lab Results   Component Value Date    HGBA1C 7.5 (H) 12/11/2017           Diarrhea on metformin     toujeo --- 25 units      trulicity 0.75      Having nausea, alternate phenergan with zofran      Add jardiance      Lipid Management  Total Cholesterol   Date Value Ref Range Status   12/11/2017 151 0 - 199 mg/dL Final     Triglycerides   Date Value Ref Range Status   12/11/2017 176 20 - 199 mg/dL Final     HDL Cholesterol   Date Value Ref Range Status   12/11/2017 52 (L) 60 - 200 mg/dL Final     LDL Cholesterol    Date Value Ref Range Status   12/11/2017 77 1 - 129 mg/dL  Final        On pravastatin 40 mg qhs, not enough     Change to crestor 20 mg qhs            Blood Pressure Management       bp nl      On lisinopril - hctz , change to lisinopril 20 mg only since adding jardiance      Microvascular Complication Monitoring:       Eye Exam Evaluation     Within 1 year   -----------     Last Microalbumin-Proteinuria Assessment           Lab Results   Component Value Date     MALBCRERASALVADORO 82 (H) 05/16/2016     MALBCRERATIO 11 05/06/2015         No results found for: UTPCR     -----------        Neuropathy, none         Weight Related:     Body mass index is 37.57 kg/(m^2).           Diet interventions: moderate (500 kCal/d) deficit diet.    Exercise -- start slow try walking and gradually increase the time and days         Bone Health           Lab Results   Component Value Date     CALCIUM 10.3 (H) 10/10/2017     PHOS 2.0 (L) 09/28/2017     [id number] 34.3 10/27/2014         Thyroid Health     Lab Results   Component Value Date    TSH 0.900 12/11/2017                Other Diabetes Related Aspects         Lab Results   Component Value Date    [id number] 349 12/11/2017                 4. Follow-up: Return in about 3 months (around 4/30/2018) for Recheck.

## 2018-03-02 ENCOUNTER — TELEPHONE (OUTPATIENT)
Dept: FAMILY MEDICINE CLINIC | Facility: CLINIC | Age: 68
End: 2018-03-02

## 2018-03-02 RX ORDER — DULOXETIN HYDROCHLORIDE 60 MG/1
60 CAPSULE, DELAYED RELEASE ORAL DAILY
Qty: 90 CAPSULE | Refills: 1 | Status: SHIPPED | OUTPATIENT
Start: 2018-03-02 | End: 2018-10-22 | Stop reason: SDUPTHER

## 2018-03-02 RX ORDER — PROMETHAZINE HYDROCHLORIDE 25 MG/1
12.5-25 TABLET ORAL EVERY 6 HOURS PRN
Qty: 60 TABLET | Refills: 1 | Status: SHIPPED | OUTPATIENT
Start: 2018-03-02 | End: 2018-06-22 | Stop reason: SDUPTHER

## 2018-03-02 NOTE — TELEPHONE ENCOUNTER
PATIENT NEEDS REFILLS ON HER DULoxetine (CYMBALTA) 60 MG capsule AND HER promethazine (PHENERGAN) 25 MG tablet. SAVE MORE DRUG

## 2018-03-13 ENCOUNTER — LAB (OUTPATIENT)
Dept: LAB | Facility: CLINIC | Age: 68
End: 2018-03-13

## 2018-03-13 DIAGNOSIS — E11.65 TYPE 2 DIABETES MELLITUS WITH HYPERGLYCEMIA, WITHOUT LONG-TERM CURRENT USE OF INSULIN (HCC): ICD-10-CM

## 2018-03-13 DIAGNOSIS — E11.9 TYPE 2 DIABETES MELLITUS WITHOUT COMPLICATION, WITHOUT LONG-TERM CURRENT USE OF INSULIN (HCC): Chronic | ICD-10-CM

## 2018-03-13 LAB
BASOPHILS # BLD AUTO: 0.03 10*3/MM3 (ref 0–0.2)
BASOPHILS NFR BLD AUTO: 0.3 % (ref 0–2)
CREAT UR-MCNC: 95.3 MG/DL
DEPRECATED RDW RBC AUTO: 46.4 FL (ref 36.4–46.3)
EOSINOPHIL # BLD AUTO: 0.18 10*3/MM3 (ref 0–0.7)
EOSINOPHIL NFR BLD AUTO: 1.6 % (ref 0–7)
ERYTHROCYTE [DISTWIDTH] IN BLOOD BY AUTOMATED COUNT: 14.1 % (ref 11.5–14.5)
HBA1C MFR BLD: 7.9 % (ref 4–5.6)
HCT VFR BLD AUTO: 38.4 % (ref 35–45)
HGB BLD-MCNC: 12.7 G/DL (ref 12–15.5)
IMM GRANULOCYTES # BLD: 0.03 10*3/MM3 (ref 0–0.02)
IMM GRANULOCYTES NFR BLD: 0.3 % (ref 0–0.5)
LYMPHOCYTES # BLD AUTO: 2.42 10*3/MM3 (ref 0.6–4.2)
LYMPHOCYTES NFR BLD AUTO: 21.1 % (ref 10–50)
MCH RBC QN AUTO: 29.8 PG (ref 26.5–34)
MCHC RBC AUTO-ENTMCNC: 33.1 G/DL (ref 31.4–36)
MCV RBC AUTO: 90.1 FL (ref 80–98)
MONOCYTES # BLD AUTO: 0.65 10*3/MM3 (ref 0–0.9)
MONOCYTES NFR BLD AUTO: 5.7 % (ref 0–12)
NEUTROPHILS # BLD AUTO: 8.14 10*3/MM3 (ref 2–8.6)
NEUTROPHILS NFR BLD AUTO: 71 % (ref 37–80)
PLATELET # BLD AUTO: 315 10*3/MM3 (ref 150–450)
PMV BLD AUTO: 10.1 FL (ref 8–12)
PROT UR-MCNC: 9.1 MG/DL
PROT/CREAT UR: 95.5 MG/G CREA (ref 0–200)
RBC # BLD AUTO: 4.26 10*6/MM3 (ref 3.77–5.16)
WBC NRBC COR # BLD: 11.45 10*3/MM3 (ref 3.2–9.8)

## 2018-03-13 PROCEDURE — 84443 ASSAY THYROID STIM HORMONE: CPT | Performed by: NURSE PRACTITIONER

## 2018-03-13 PROCEDURE — 84156 ASSAY OF PROTEIN URINE: CPT | Performed by: NURSE PRACTITIONER

## 2018-03-13 PROCEDURE — 82570 ASSAY OF URINE CREATININE: CPT | Performed by: NURSE PRACTITIONER

## 2018-03-13 PROCEDURE — 82306 VITAMIN D 25 HYDROXY: CPT | Performed by: NURSE PRACTITIONER

## 2018-03-13 PROCEDURE — 36415 COLL VENOUS BLD VENIPUNCTURE: CPT | Performed by: FAMILY MEDICINE

## 2018-03-13 PROCEDURE — 85025 COMPLETE CBC W/AUTO DIFF WBC: CPT | Performed by: NURSE PRACTITIONER

## 2018-03-13 PROCEDURE — 80061 LIPID PANEL: CPT | Performed by: NURSE PRACTITIONER

## 2018-03-13 PROCEDURE — 80053 COMPREHEN METABOLIC PANEL: CPT | Performed by: NURSE PRACTITIONER

## 2018-03-13 PROCEDURE — 83036 HEMOGLOBIN GLYCOSYLATED A1C: CPT | Performed by: GENERAL PRACTICE

## 2018-03-14 LAB
25(OH)D3 SERPL-MCNC: 31 NG/ML (ref 30–100)
ALBUMIN SERPL-MCNC: 4.2 G/DL (ref 3.4–4.8)
ALBUMIN/GLOB SERPL: 1.4 G/DL (ref 1.1–1.8)
ALP SERPL-CCNC: 85 U/L (ref 38–126)
ALT SERPL W P-5'-P-CCNC: 30 U/L (ref 9–52)
ANION GAP SERPL CALCULATED.3IONS-SCNC: 17 MMOL/L (ref 5–15)
ARTICHOKE IGE QN: 58 MG/DL (ref 1–129)
AST SERPL-CCNC: 18 U/L (ref 14–36)
BILIRUB SERPL-MCNC: 0.2 MG/DL (ref 0.2–1.3)
BUN BLD-MCNC: 19 MG/DL (ref 7–21)
BUN/CREAT SERPL: 22.6 (ref 7–25)
CALCIUM SPEC-SCNC: 10 MG/DL (ref 8.4–10.2)
CHLORIDE SERPL-SCNC: 99 MMOL/L (ref 95–110)
CHOLEST SERPL-MCNC: 136 MG/DL (ref 0–199)
CO2 SERPL-SCNC: 27 MMOL/L (ref 22–31)
CREAT BLD-MCNC: 0.84 MG/DL (ref 0.5–1)
GFR SERPL CREATININE-BSD FRML MDRD: 68 ML/MIN/1.73 (ref 45–104)
GLOBULIN UR ELPH-MCNC: 2.9 GM/DL (ref 2.3–3.5)
GLUCOSE BLD-MCNC: 126 MG/DL (ref 60–100)
HDLC SERPL-MCNC: 42 MG/DL (ref 60–200)
LDLC/HDLC SERPL: 1.58 {RATIO} (ref 0–3.22)
POTASSIUM BLD-SCNC: 5.1 MMOL/L (ref 3.5–5.1)
PROT SERPL-MCNC: 7.1 G/DL (ref 6.3–8.6)
SODIUM BLD-SCNC: 143 MMOL/L (ref 137–145)
TRIGL SERPL-MCNC: 139 MG/DL (ref 20–199)
TSH SERPL DL<=0.05 MIU/L-ACNC: 2.72 MIU/ML (ref 0.46–4.68)

## 2018-03-19 ENCOUNTER — OFFICE VISIT (OUTPATIENT)
Dept: FAMILY MEDICINE CLINIC | Facility: CLINIC | Age: 68
End: 2018-03-19

## 2018-03-19 VITALS
SYSTOLIC BLOOD PRESSURE: 122 MMHG | DIASTOLIC BLOOD PRESSURE: 60 MMHG | HEIGHT: 62 IN | WEIGHT: 204 LBS | HEART RATE: 102 BPM | BODY MASS INDEX: 37.54 KG/M2 | OXYGEN SATURATION: 96 %

## 2018-03-19 DIAGNOSIS — Z12.11 SCREEN FOR COLON CANCER: ICD-10-CM

## 2018-03-19 DIAGNOSIS — M51.36 DEGENERATIVE DISC DISEASE, LUMBAR: ICD-10-CM

## 2018-03-19 DIAGNOSIS — I10 ESSENTIAL HYPERTENSION: Chronic | ICD-10-CM

## 2018-03-19 DIAGNOSIS — M15.9 PRIMARY OSTEOARTHRITIS INVOLVING MULTIPLE JOINTS: Primary | ICD-10-CM

## 2018-03-19 PROCEDURE — 99213 OFFICE O/P EST LOW 20 MIN: CPT | Performed by: GENERAL PRACTICE

## 2018-03-19 RX ORDER — HYDROCODONE BITARTRATE AND ACETAMINOPHEN 7.5; 325 MG/1; MG/1
1 TABLET ORAL EVERY 6 HOURS PRN
Qty: 120 TABLET | Refills: 0 | Status: SHIPPED | OUTPATIENT
Start: 2018-03-19 | End: 2018-06-22 | Stop reason: SDUPTHER

## 2018-03-19 NOTE — PROGRESS NOTES
Subjective   Bertha Hyde is a 67 y.o. female.   Chief Complaint   Patient presents with   • Diabetes   • Med Refill   • Pain   • Hypertension   • Hyperlipidemia     For review and evaluation of management of chronic medical problems. Labs reviewed. Has had some lower back problem, saw Dr. Calderón and had MRI of L-spine and showed some degenerative disc disease, will see a specialist for possible steroid shots in back.   Hypertension   This is a chronic problem. The current episode started more than 1 year ago. The problem is unchanged. The problem is controlled. Associated symptoms include anxiety. Pertinent negatives include no headaches, neck pain or palpitations. There are no known risk factors for coronary artery disease. Past treatments include ACE inhibitors and diuretics. The current treatment provides significant improvement. There are no compliance problems.  Current antihypertension treatment includes ACE inhibitors and diuretics.   Arthritis   Presents for follow-up visit. She complains of pain and stiffness. She reports no joint swelling. The symptoms have been stable. Affected locations include the right knee and left knee. Her pain is at a severity of 7/10. Pertinent negatives include no diarrhea, dysuria, fatigue, fever or rash. Compliance with total regimen is %. Compliance with medications is %.      The following portions of the patient's history were reviewed and updated as appropriate: allergies, current medications, past social history and problem list.    Outpatient Medications Prior to Visit   Medication Sig Dispense Refill   • Alcohol Swabs (ALCOHOL WIPES) pads Use 4 x daily 120 each 11   • Blood Glucose Monitoring Suppl (BLOOD GLUCOSE MONITOR SYSTEM) W/DEVICE kit Test Blood Sugars Once Daily 1 each 0   • Dulaglutide 0.75 MG/0.5ML solution pen-injector Inject 0.75 mg under the skin 1 (One) Time Per Week. 2 pen 0   • DULoxetine (CYMBALTA) 60 MG capsule Take 1 capsule by  mouth Daily. 90 capsule 1   • Empagliflozin (JARDIANCE) 10 MG tablet Take 1 tablet by mouth Daily. Before bkfast 28 tablet 0   • glucose blood test strip Test Blood Sugars Once Daily 100 each 3   • hyoscyamine (OSCIMIN) 0.125 MG SL tablet Place 0.125-0.25 mg under the tongue every 4 (four) hours as needed. (max 12 per 24 hours)     • Insulin Pen Needle (B-D UF III MINI PEN NEEDLES) 31G X 5 MM misc Use 4 times daily 120 each 11   • LANCETS MICRO THIN 33G misc Test Blood Sugars Once Daily 100 each 3   • lisinopril (PRINIVIL,ZESTRIL) 20 MG tablet Take 1 tablet by mouth Daily. 30 tablet 11   • loratadine (CLARITIN) 10 MG tablet Take 10 mg by mouth daily as needed. Indication: Allergic rhinitis     • ondansetron ODT (ZOFRAN ODT) 4 MG disintegrating tablet Take 1 tablet by mouth Every 8 (Eight) Hours As Needed for Nausea or Vomiting. 60 tablet 11   • pantoprazole (PROTONIX) 40 MG EC tablet Take 1 tablet by mouth Daily As Needed (heartburn). 90 tablet 3   • promethazine (PHENERGAN) 25 MG tablet Take 0.5-1 tablets by mouth Every 6 (Six) Hours As Needed for Nausea. 60 tablet 1   • rizatriptan MLT (MAXALT-MLT) 10 MG disintegrating tablet Take 1 tablet by mouth Daily As Needed for migraine. Take 1 tab at onset of headache, can repeat x 1 in 2 hrs prn 27 tablet 3   • rosuvastatin (CRESTOR) 20 MG tablet Take 1 tablet by mouth Every Night. 30 tablet 11   • TRESIBA FLEXTOUCH 100 UNIT/ML solution pen-injector injection INJECT 50 UNITS UNDER THE SKIN EVERY NIGHT AT BEDTIME.  11   • HYDROcodone-acetaminophen (NORCO) 7.5-325 MG per tablet Take 1 tablet by mouth Every 6 (Six) Hours As Needed for Moderate Pain . 120 tablet 0   • Blood Glucose Monitoring Suppl w/Device kit USE AS INDICATED, ANY MONITOR 1 each 1   • cyclobenzaprine (FLEXERIL) 10 MG tablet Take 1 tablet by mouth Every Evening. AT BEDTIME AS NEEDED 30 tablet 1   • Glucose Blood (BLOOD GLUCOSE TEST) strip Use 4 x daily, use any brand covered by insurance or same brand as  "before 120 each 11   • hydrOXYzine (ATARAX) 25 MG tablet Take 1 tablet by mouth 3 (Three) Times a Day As Needed for Anxiety. 30 tablet 2   • Insulin Glargine (TOUJEO SOLOSTAR) 300 UNIT/ML solution pen-injector Inject 50 Units under the skin every night at bedtime. 1 pen 0   • Lancet Devices (LANCING DEVICE) misc USE AS INDICATED TO CORRELATE WITH STRIPS AND METER 1 each 1     No facility-administered medications prior to visit.      Review of Systems   Constitutional: Negative.  Negative for activity change, chills, fatigue, fever and unexpected weight change.   HENT: Negative.  Negative for congestion, ear discharge, ear pain, facial swelling, hearing loss, mouth sores, nosebleeds, postnasal drip, rhinorrhea, sneezing, sore throat and tinnitus.    Eyes: Negative.  Negative for pain, discharge, redness and visual disturbance.   Respiratory: Negative.  Negative for cough and wheezing.    Cardiovascular: Negative.  Negative for palpitations.   Gastrointestinal: Negative.  Negative for abdominal pain, constipation, diarrhea, nausea and vomiting.   Endocrine: Negative.    Genitourinary: Negative.  Negative for dysuria, frequency and urgency.   Musculoskeletal: Positive for arthralgias, arthritis, back pain and stiffness. Negative for joint swelling and neck pain.   Skin: Negative.  Negative for rash.   Allergic/Immunologic: Negative.    Neurological: Negative.  Negative for dizziness, weakness, numbness and headaches.   Hematological: Negative.  Negative for adenopathy.   Psychiatric/Behavioral: Negative.  Negative for dysphoric mood and sleep disturbance. The patient is not nervous/anxious.      Objective   Visit Vitals  /60   Pulse 102   Ht 157.5 cm (62\")   Wt 92.5 kg (204 lb)   SpO2 96%   BMI 37.31 kg/m²     Physical Exam   Constitutional: She is oriented to person, place, and time. She appears well-developed and well-nourished. No distress.   HENT:   Head: Normocephalic and atraumatic.   Nose: Nose normal. "   Mouth/Throat: Oropharynx is clear and moist.   Eyes: Conjunctivae and EOM are normal. Pupils are equal, round, and reactive to light. Right eye exhibits no discharge. Left eye exhibits no discharge.   Neck: No thyromegaly present.   Cardiovascular: Normal rate, regular rhythm, normal heart sounds and intact distal pulses.    Pulmonary/Chest: Effort normal and breath sounds normal.   Musculoskeletal:        Right knee: Tenderness found. Medial joint line and lateral joint line tenderness noted.        Left knee: Tenderness found. Medial joint line and lateral joint line tenderness noted.        Lumbar back: She exhibits tenderness.   Lymphadenopathy:     She has no cervical adenopathy.   Neurological: She is alert and oriented to person, place, and time.   Skin: Skin is warm and dry.   Psychiatric: She has a normal mood and affect.   Nursing note and vitals reviewed.    Assessment/Plan   Problem List Items Addressed This Visit        Cardiovascular and Mediastinum    Essential hypertension (Chronic)       Musculoskeletal and Integument    Degenerative joint disease involving multiple joints - Primary      Other Visit Diagnoses     Screen for colon cancer        Relevant Orders    Cologuard - Stool, Per Rectum    Degenerative disc disease, lumbar              Continue current treatment. Chris reviewed and appropriate. Not recommended to drive or operate heavy equipment while taking potentially sedating meds.  Patient understands the risks associated with this controlled medication, including tolerance and addiction. They also agree to obtain this medication only from me, and not from a another provider, unless that provider is covering for me in my absence. They also agree to be compliant in dosing, and not self adjust the dose of medication.  A signed controlled substance agreement is on file, and they have received a controlled substance education sheet at this or a previous visit. They have also signed a consent  for treatment with a controlled substance as per [facility] policy.      New Medications Ordered This Visit   Medications   • HYDROcodone-acetaminophen (NORCO) 7.5-325 MG per tablet     Sig: Take 1 tablet by mouth Every 6 (Six) Hours As Needed for Moderate Pain .     Dispense:  120 tablet     Refill:  0     Return in about 3 months (around 6/19/2018) for Annual physical.

## 2018-03-21 DIAGNOSIS — Z78.0 POST-MENOPAUSAL: ICD-10-CM

## 2018-03-21 DIAGNOSIS — Z12.31 ENCOUNTER FOR SCREENING MAMMOGRAM FOR MALIGNANT NEOPLASM OF BREAST: Primary | ICD-10-CM

## 2018-04-24 DIAGNOSIS — Z12.11 SCREEN FOR COLON CANCER: ICD-10-CM

## 2018-06-19 ENCOUNTER — TELEPHONE (OUTPATIENT)
Dept: ENDOCRINOLOGY | Facility: CLINIC | Age: 68
End: 2018-06-19

## 2018-06-19 NOTE — TELEPHONE ENCOUNTER
RENEE MOJICA (Key: [id number])   Rx #: [id number]   Jardiance 10MG tablets   Form  [employer] Part D Coverage Determination Form   Plan Contact  [phone] phone   [phone] fax   Created   1 day ago   Sent to Plan   now   Determination   Wait for Determination  Please wait for the plan to return a determination.

## 2018-06-20 ENCOUNTER — TELEPHONE (OUTPATIENT)
Dept: ENDOCRINOLOGY | Facility: CLINIC | Age: 68
End: 2018-06-20

## 2018-06-22 ENCOUNTER — OFFICE VISIT (OUTPATIENT)
Dept: FAMILY MEDICINE CLINIC | Facility: CLINIC | Age: 68
End: 2018-06-22

## 2018-06-22 VITALS
BODY MASS INDEX: 38.74 KG/M2 | HEIGHT: 62 IN | OXYGEN SATURATION: 99 % | WEIGHT: 210.5 LBS | DIASTOLIC BLOOD PRESSURE: 80 MMHG | HEART RATE: 84 BPM | SYSTOLIC BLOOD PRESSURE: 128 MMHG

## 2018-06-22 DIAGNOSIS — E11.65 TYPE 2 DIABETES MELLITUS WITH HYPERGLYCEMIA, WITHOUT LONG-TERM CURRENT USE OF INSULIN (HCC): Primary | ICD-10-CM

## 2018-06-22 DIAGNOSIS — E78.2 MIXED HYPERLIPIDEMIA: Chronic | ICD-10-CM

## 2018-06-22 DIAGNOSIS — I10 ESSENTIAL HYPERTENSION: Chronic | ICD-10-CM

## 2018-06-22 DIAGNOSIS — M15.9 OSTEOARTHRITIS OF MULTIPLE JOINTS, UNSPECIFIED OSTEOARTHRITIS TYPE: ICD-10-CM

## 2018-06-22 PROCEDURE — 99214 OFFICE O/P EST MOD 30 MIN: CPT | Performed by: GENERAL PRACTICE

## 2018-06-22 RX ORDER — PROMETHAZINE HYDROCHLORIDE 25 MG/1
12.5-25 TABLET ORAL EVERY 6 HOURS PRN
Qty: 60 TABLET | Refills: 1 | Status: SHIPPED | OUTPATIENT
Start: 2018-06-22 | End: 2018-09-05 | Stop reason: SDUPTHER

## 2018-06-22 RX ORDER — AMOXICILLIN 500 MG/1
1000 CAPSULE ORAL 3 TIMES DAILY
Qty: 21 CAPSULE | Refills: 0 | Status: SHIPPED | OUTPATIENT
Start: 2018-06-22 | End: 2018-09-14

## 2018-06-22 RX ORDER — HYDROCODONE BITARTRATE AND ACETAMINOPHEN 7.5; 325 MG/1; MG/1
1 TABLET ORAL EVERY 6 HOURS PRN
Qty: 120 TABLET | Refills: 0 | Status: SHIPPED | OUTPATIENT
Start: 2018-06-22 | End: 2018-09-21 | Stop reason: SDUPTHER

## 2018-06-22 NOTE — PROGRESS NOTES
Subjective   Bertha Hyde is a 67 y.o. female.     Chief Complaint   Patient presents with   • Annual Exam   • Hypertension   • Hyperlipidemia     For review and evaluation of management of chronic medical problems. Labs reviewed. Due for mammogram and bone density.   Hypertension   This is a chronic problem. The current episode started more than 1 year ago. The problem is unchanged. The problem is controlled. Associated symptoms include anxiety. Pertinent negatives include no chest pain, headaches, neck pain, palpitations or shortness of breath. There are no known risk factors for coronary artery disease. Current antihypertension treatment includes ACE inhibitors and diuretics. The current treatment provides significant improvement. There are no compliance problems.    Hyperlipidemia   This is a chronic problem. The current episode started more than 1 year ago. The problem is controlled. Recent lipid tests were reviewed and are variable. Exacerbating diseases include diabetes. Pertinent negatives include no chest pain, myalgias or shortness of breath. Current antihyperlipidemic treatment includes statins. The current treatment provides significant improvement of lipids. There are no compliance problems.    Diabetes   She presents for her follow-up diabetic visit. She has type 2 diabetes mellitus. Her disease course has been stable. Pertinent negatives for hypoglycemia include no dizziness, headaches or nervousness/anxiousness. There are no diabetic associated symptoms. Pertinent negatives for diabetes include no chest pain, no fatigue and no weakness. There are no hypoglycemic complications. There are no diabetic complications. Current diabetic treatment includes oral agent (dual therapy). She is compliant with treatment all of the time. Her weight is stable. She is following a generally healthy diet. Meal planning includes avoidance of concentrated sweets. She participates in exercise intermittently. There  is no change in her home blood glucose trend. An ACE inhibitor/angiotensin II receptor blocker is being taken.   Arthritis   She complains of pain and stiffness. She reports no joint swelling. The symptoms have been stable. Affected locations include the right knee and left knee. Her pain is at a severity of 8/10. Pertinent negatives include no diarrhea, dysuria, fatigue, fever or rash. Compliance with total regimen is %. Compliance with medications is %.        The following portions of the patient's history were reviewed and updated as appropriate: allergies, current medications, past family and social history and problem list.    Outpatient Medications Prior to Visit   Medication Sig Dispense Refill   • Alcohol Swabs (ALCOHOL WIPES) pads Use 4 x daily 120 each 11   • Blood Glucose Monitoring Suppl (BLOOD GLUCOSE MONITOR SYSTEM) W/DEVICE kit Test Blood Sugars Once Daily 1 each 0   • dapagliflozin (FARXIGA) 5 MG tablet tablet Take 1 tablet by mouth Daily. 30 tablet 5   • Dulaglutide 0.75 MG/0.5ML solution pen-injector Inject 0.75 mg under the skin 1 (One) Time Per Week. 2 pen 0   • DULoxetine (CYMBALTA) 60 MG capsule Take 1 capsule by mouth Daily. 90 capsule 1   • glucose blood test strip Test Blood Sugars Once Daily 100 each 3   • hyoscyamine (OSCIMIN) 0.125 MG SL tablet Place 0.125-0.25 mg under the tongue every 4 (four) hours as needed. (max 12 per 24 hours)     • Insulin Pen Needle (B-D UF III MINI PEN NEEDLES) 31G X 5 MM misc Use 4 times daily 120 each 11   • LANCETS MICRO THIN 33G misc Test Blood Sugars Once Daily 100 each 3   • lisinopril (PRINIVIL,ZESTRIL) 20 MG tablet Take 1 tablet by mouth Daily. 30 tablet 11   • loratadine (CLARITIN) 10 MG tablet Take 10 mg by mouth daily as needed. Indication: Allergic rhinitis     • ondansetron ODT (ZOFRAN ODT) 4 MG disintegrating tablet Take 1 tablet by mouth Every 8 (Eight) Hours As Needed for Nausea or Vomiting. 60 tablet 11   • pantoprazole (PROTONIX)  "40 MG EC tablet Take 1 tablet by mouth Daily As Needed (heartburn). 90 tablet 3   • rizatriptan MLT (MAXALT-MLT) 10 MG disintegrating tablet Take 1 tablet by mouth Daily As Needed for migraine. Take 1 tab at onset of headache, can repeat x 1 in 2 hrs prn 27 tablet 3   • rosuvastatin (CRESTOR) 20 MG tablet Take 1 tablet by mouth Every Night. 30 tablet 11   • TRESIBA FLEXTOUCH 100 UNIT/ML solution pen-injector injection INJECT 50 UNITS UNDER THE SKIN EVERY NIGHT AT BEDTIME.  11   • HYDROcodone-acetaminophen (NORCO) 7.5-325 MG per tablet Take 1 tablet by mouth Every 6 (Six) Hours As Needed for Moderate Pain . 120 tablet 0   • promethazine (PHENERGAN) 25 MG tablet Take 0.5-1 tablets by mouth Every 6 (Six) Hours As Needed for Nausea. 60 tablet 1     No facility-administered medications prior to visit.        Review of Systems   Constitutional: Negative for fatigue and fever.   Respiratory: Negative for shortness of breath.    Cardiovascular: Negative for chest pain and palpitations.   Gastrointestinal: Negative for diarrhea.   Genitourinary: Negative for dysuria.   Musculoskeletal: Positive for arthritis and stiffness. Negative for joint swelling, myalgias and neck pain.   Skin: Negative for rash.   Neurological: Negative for dizziness, weakness and headaches.   Psychiatric/Behavioral: The patient is not nervous/anxious.        Objective     Visit Vitals  /80 (BP Location: Left arm, Patient Position: Sitting, Cuff Size: Adult)   Pulse 84   Ht 157.5 cm (62\")   Wt 95.5 kg (210 lb 8 oz)   SpO2 99%   BMI 38.50 kg/m²     Physical Exam   Constitutional: She is oriented to person, place, and time. She appears well-developed and well-nourished. No distress.   HENT:   Head: Normocephalic and atraumatic.   Nose: Nose normal.   Mouth/Throat: Oropharynx is clear and moist.   Eyes: Conjunctivae and EOM are normal. Pupils are equal, round, and reactive to light. Right eye exhibits no discharge. Left eye exhibits no discharge. "   Neck: No tracheal deviation present. No thyromegaly present.   Cardiovascular: Normal rate, regular rhythm, normal heart sounds and intact distal pulses.    No murmur heard.  Pulmonary/Chest: Effort normal and breath sounds normal. No respiratory distress. She has no wheezes. She has no rales. She exhibits no tenderness. Right breast exhibits no inverted nipple, no mass, no nipple discharge, no skin change and no tenderness. Left breast exhibits no inverted nipple, no mass, no nipple discharge, no skin change and no tenderness.   Abdominal: Soft. Bowel sounds are normal. She exhibits no distension and no mass. There is no tenderness. No hernia.   Musculoskeletal: Normal range of motion. She exhibits no deformity.        Right knee: Tenderness found. Medial joint line and lateral joint line tenderness noted.        Left knee: Tenderness found. Medial joint line and lateral joint line tenderness noted.        Lumbar back: She exhibits tenderness.   Lymphadenopathy:     She has no cervical adenopathy.   Neurological: She is alert and oriented to person, place, and time. She has normal reflexes.   Skin: Skin is warm and dry.   Psychiatric: She has a normal mood and affect. Her behavior is normal. Judgment and thought content normal.   Nursing note and vitals reviewed.    Results for orders placed or performed in visit on 03/13/18   Hemoglobin A1c   Result Value Ref Range    Hemoglobin A1C 7.9 (H) 4 - 5.6 %   Comprehensive Metabolic Panel   Result Value Ref Range    Glucose 126 (H) 60 - 100 mg/dL    BUN 19 7 - 21 mg/dL    Creatinine 0.84 0.50 - 1.00 mg/dL    Sodium 143 137 - 145 mmol/L    Potassium 5.1 3.5 - 5.1 mmol/L    Chloride 99 95 - 110 mmol/L    CO2 27.0 22.0 - 31.0 mmol/L    Calcium 10.0 8.4 - 10.2 mg/dL    Total Protein 7.1 6.3 - 8.6 g/dL    Albumin 4.20 3.40 - 4.80 g/dL    ALT (SGPT) 30 9 - 52 U/L    AST (SGOT) 18 14 - 36 U/L    Alkaline Phosphatase 85 38 - 126 U/L    Total Bilirubin 0.2 0.2 - 1.3 mg/dL     eGFR Non  Amer 68 45 - 104 mL/min/1.73    Globulin 2.9 2.3 - 3.5 gm/dL    A/G Ratio 1.4 1.1 - 1.8 g/dL    BUN/Creatinine Ratio 22.6 7.0 - 25.0    Anion Gap 17.0 (H) 5.0 - 15.0 mmol/L      Assessment/Plan   Problem List Items Addressed This Visit        Cardiovascular and Mediastinum    Essential hypertension (Chronic)    Relevant Orders    CBC & Differential    Comprehensive Metabolic Panel    Hemoglobin A1c    Lipid Panel    Urinalysis With Microscopic - Urine, Clean Catch    Hyperlipidemia (Chronic)    Relevant Orders    CBC & Differential    Comprehensive Metabolic Panel    Hemoglobin A1c    Lipid Panel    Urinalysis With Microscopic - Urine, Clean Catch       Endocrine    Type 2 diabetes mellitus with hyperglycemia, without long-term current use of insulin - Primary (Chronic)    Relevant Orders    CBC & Differential    Comprehensive Metabolic Panel    Hemoglobin A1c    Lipid Panel    Urinalysis With Microscopic - Urine, Clean Catch    MicroAlbumin, Urine, Random - Urine, Clean Catch       Musculoskeletal and Integument    Degenerative joint disease involving multiple joints          Will notify regarding results. Continue current treatment. Check at pharmacy on Shingrix shingles vaccine Chris reviewed and appropriate. Not recommended to drive or operate heavy equipment while taking potentially sedating meds.  Patient understands the risks associated with this controlled medication, including tolerance and addiction. They also agree to obtain this medication only from me, and not from a another provider, unless that provider is covering for me in my absence. They also agree to be compliant in dosing, and not self adjust the dose of medication.  A signed controlled substance agreement is on file, and they have received a controlled substance education sheet at this or a previous visit. They have also signed a consent for treatment with a controlled substance as per [facility] policy.        New  Medications Ordered This Visit   Medications   • HYDROcodone-acetaminophen (NORCO) 7.5-325 MG per tablet     Sig: Take 1 tablet by mouth Every 6 (Six) Hours As Needed for Moderate Pain .     Dispense:  120 tablet     Refill:  0   • promethazine (PHENERGAN) 25 MG tablet     Sig: Take 0.5-1 tablets by mouth Every 6 (Six) Hours As Needed for Nausea.     Dispense:  60 tablet     Refill:  1   • amoxicillin (AMOXIL) 500 MG capsule     Sig: Take 2 capsules by mouth 3 (Three) Times a Day.     Dispense:  21 capsule     Refill:  0     Return in about 3 months (around 9/22/2018) for Recheck, medicare wellness visit.

## 2018-07-20 ENCOUNTER — TELEPHONE (OUTPATIENT)
Dept: FAMILY MEDICINE CLINIC | Facility: CLINIC | Age: 68
End: 2018-07-20

## 2018-09-05 ENCOUNTER — TELEPHONE (OUTPATIENT)
Dept: FAMILY MEDICINE CLINIC | Facility: CLINIC | Age: 68
End: 2018-09-05

## 2018-09-05 RX ORDER — PROMETHAZINE HYDROCHLORIDE 25 MG/1
12.5-25 TABLET ORAL EVERY 6 HOURS PRN
Qty: 60 TABLET | Refills: 0 | Status: SHIPPED | OUTPATIENT
Start: 2018-09-05 | End: 2018-10-22 | Stop reason: SDUPTHER

## 2018-09-05 NOTE — TELEPHONE ENCOUNTER
Requested Refills Sent    Script sent 6/22/18 #[id number] with 1 refill    Filled 6/22/18     Filled 7/24/18    No Script requested or filled in August.    Requested Refill sent today.      ----- Message from Madina Bethea sent at 9/5/2018 12:43 PM CDT -----  Contact: RENEE  Wants a refill for Phenergan to [facility] in [place] [phone]

## 2018-09-11 ENCOUNTER — TELEPHONE (OUTPATIENT)
Dept: FAMILY MEDICINE CLINIC | Facility: CLINIC | Age: 68
End: 2018-09-11

## 2018-09-14 ENCOUNTER — OFFICE VISIT (OUTPATIENT)
Dept: FAMILY MEDICINE CLINIC | Facility: CLINIC | Age: 68
End: 2018-09-14

## 2018-09-14 VITALS
HEIGHT: 62 IN | DIASTOLIC BLOOD PRESSURE: 80 MMHG | BODY MASS INDEX: 40.1 KG/M2 | OXYGEN SATURATION: 98 % | HEART RATE: 81 BPM | WEIGHT: 217.9 LBS | SYSTOLIC BLOOD PRESSURE: 168 MMHG

## 2018-09-14 DIAGNOSIS — I10 ESSENTIAL HYPERTENSION: Primary | Chronic | ICD-10-CM

## 2018-09-14 DIAGNOSIS — M51.36 DEGENERATIVE DISC DISEASE, LUMBAR: Chronic | ICD-10-CM

## 2018-09-14 PROCEDURE — 99214 OFFICE O/P EST MOD 30 MIN: CPT | Performed by: GENERAL PRACTICE

## 2018-09-14 RX ORDER — GABAPENTIN 300 MG/1
CAPSULE ORAL
Qty: 90 CAPSULE | Refills: 2 | Status: SHIPPED | OUTPATIENT
Start: 2018-09-14 | End: 2019-01-07 | Stop reason: SDUPTHER

## 2018-09-14 RX ORDER — AMLODIPINE BESYLATE 5 MG/1
5 TABLET ORAL EVERY EVENING
Qty: 90 TABLET | Refills: 3 | Status: SHIPPED | OUTPATIENT
Start: 2018-09-14 | End: 2019-01-07 | Stop reason: SDUPTHER

## 2018-09-14 RX ORDER — LISINOPRIL AND HYDROCHLOROTHIAZIDE 20; 12.5 MG/1; MG/1
1 TABLET ORAL DAILY
Qty: 90 TABLET | Refills: 3 | Status: SHIPPED | OUTPATIENT
Start: 2018-09-14 | End: 2019-01-07 | Stop reason: SDUPTHER

## 2018-09-14 NOTE — PROGRESS NOTES
Subjective   Bertha Hyde is a 67 y.o. female.   Chief Complaint   Patient presents with   • Hypertension     Blood pressure has been running high for last few days, was over 200 systolic and went to [facility] and then ER at [facility]. Was given Imitrex but she did not have a migraine, she told them she was better so they let her go home. Has still been running high but not as high. Only new med is a probiotic. Has been under more stress lately. Has seen an orthopedic doctor for her back, started her on gabapentin but not helping, only taking 1 at bedtime.  Hypertension   This is a chronic problem. The current episode started more than 1 year ago. The problem is unchanged. The problem is controlled. Associated symptoms include anxiety. Pertinent negatives include no chest pain, headaches, neck pain, palpitations or shortness of breath. There are no known risk factors for coronary artery disease. Current antihypertension treatment includes ACE inhibitors. The current treatment provides moderate improvement. There are no compliance problems.    Back Pain   This is a chronic problem. The current episode started more than 1 year ago. The problem occurs constantly. The problem has been gradually worsening since onset. The pain is present in the lumbar spine. The pain is at a severity of 8/10. The pain is severe. The pain is worse during the day. The symptoms are aggravated by bending, standing and twisting. Pertinent negatives include no abdominal pain, chest pain, headaches, numbness or weakness. She has tried analgesics for the symptoms. The treatment provided moderate relief.      The following portions of the patient's history were reviewed and updated as appropriate: allergies, current medications, past social history and problem list.    Outpatient Medications Prior to Visit   Medication Sig Dispense Refill   • Alcohol Swabs (ALCOHOL WIPES) pads Use 4 x daily 120 each 11   • Blood Glucose  Monitoring Suppl (BLOOD GLUCOSE MONITOR SYSTEM) W/DEVICE kit Test Blood Sugars Once Daily 1 each 0   • dapagliflozin (FARXIGA) 5 MG tablet tablet Take 1 tablet by mouth Daily. 30 tablet 5   • Dulaglutide 0.75 MG/0.5ML solution pen-injector Inject 0.75 mg under the skin 1 (One) Time Per Week. 2 pen 0   • DULoxetine (CYMBALTA) 60 MG capsule Take 1 capsule by mouth Daily. 90 capsule 1   • glucose blood test strip Test Blood Sugars Once Daily 100 each 3   • HYDROcodone-acetaminophen (NORCO) 7.5-325 MG per tablet Take 1 tablet by mouth Every 6 (Six) Hours As Needed for Moderate Pain . 120 tablet 0   • hyoscyamine (OSCIMIN) 0.125 MG SL tablet Place 0.125-0.25 mg under the tongue every 4 (four) hours as needed. (max 12 per 24 hours)     • Insulin Pen Needle (B-D UF III MINI PEN NEEDLES) 31G X 5 MM misc Use 4 times daily 120 each 11   • LANCETS MICRO THIN 33G misc Test Blood Sugars Once Daily 100 each 3   • loratadine (CLARITIN) 10 MG tablet Take 10 mg by mouth daily as needed. Indication: Allergic rhinitis     • ondansetron ODT (ZOFRAN ODT) 4 MG disintegrating tablet Take 1 tablet by mouth Every 8 (Eight) Hours As Needed for Nausea or Vomiting. 60 tablet 11   • pantoprazole (PROTONIX) 40 MG EC tablet Take 1 tablet by mouth Daily As Needed (heartburn). 90 tablet 3   • promethazine (PHENERGAN) 25 MG tablet Take 0.5-1 tablets by mouth Every 6 (Six) Hours As Needed for Nausea. 60 tablet 0   • rizatriptan MLT (MAXALT-MLT) 10 MG disintegrating tablet Take 1 tablet by mouth Daily As Needed for migraine. Take 1 tab at onset of headache, can repeat x 1 in 2 hrs prn 27 tablet 3   • rosuvastatin (CRESTOR) 20 MG tablet Take 1 tablet by mouth Every Night. 30 tablet 11   • TRESIBA FLEXTOUCH 100 UNIT/ML solution pen-injector injection INJECT 50 UNITS UNDER THE SKIN EVERY NIGHT AT BEDTIME.  11   • lisinopril (PRINIVIL,ZESTRIL) 20 MG tablet Take 1 tablet by mouth Daily. 30 tablet 11   • amoxicillin (AMOXIL) 500 MG capsule Take 2 capsules  "by mouth 3 (Three) Times a Day. 21 capsule 0     No facility-administered medications prior to visit.        Review of Systems   Constitutional: Negative.  Negative for chills and unexpected weight change.   HENT: Negative.  Negative for congestion, ear pain, hearing loss, nosebleeds, rhinorrhea, sneezing, sore throat and tinnitus.    Eyes: Negative.  Negative for discharge.   Respiratory: Negative.  Negative for cough, shortness of breath and wheezing.    Cardiovascular: Negative.  Negative for chest pain and palpitations.   Gastrointestinal: Negative.  Negative for abdominal pain, constipation, nausea and vomiting.   Endocrine: Negative.    Genitourinary: Negative.  Negative for frequency and urgency.   Musculoskeletal: Positive for arthralgias and back pain. Negative for myalgias and neck pain.   Skin: Negative.    Allergic/Immunologic: Negative.    Neurological: Negative.  Negative for dizziness, weakness, numbness and headaches.   Hematological: Negative.  Negative for adenopathy.   Psychiatric/Behavioral: Negative.  Negative for dysphoric mood and sleep disturbance. The patient is not nervous/anxious.        Objective   Visit Vitals  /80 (BP Location: Right arm, Patient Position: Lying, Cuff Size: Adult)   Pulse 81   Ht 157.5 cm (62\")   Wt 98.8 kg (217 lb 14.4 oz)   SpO2 98%   BMI 39.85 kg/m²     Physical Exam   Constitutional: She is oriented to person, place, and time. She appears well-developed and well-nourished. No distress.   HENT:   Head: Normocephalic and atraumatic.   Nose: Nose normal.   Mouth/Throat: Oropharynx is clear and moist.   Eyes: Pupils are equal, round, and reactive to light. Conjunctivae and EOM are normal. Right eye exhibits no discharge. Left eye exhibits no discharge.   Neck: No thyromegaly present.   Cardiovascular: Normal rate, regular rhythm, normal heart sounds and intact distal pulses.    Pulmonary/Chest: Effort normal and breath sounds normal.   Musculoskeletal:        " Lumbar back: She exhibits decreased range of motion and tenderness.   Lymphadenopathy:     She has no cervical adenopathy.   Neurological: She is alert and oriented to person, place, and time.   Skin: Skin is warm and dry.   Psychiatric: She has a normal mood and affect.   Nursing note and vitals reviewed.      Assessment/Plan   Problem List Items Addressed This Visit        Cardiovascular and Mediastinum    Essential hypertension - Primary (Chronic)    Relevant Medications    lisinopril-hydrochlorothiazide (PRINZIDE,ZESTORETIC) 20-12.5 MG per tablet    amLODIPine (NORVASC) 5 MG tablet       Musculoskeletal and Integument    Degenerative disc disease, lumbar (Chronic)         Add back hydrochlorothiazide and start amlodipine if syst over 170. Increase gabapentin. Chris reviewed and appropriate.     New Medications Ordered This Visit   Medications   • lisinopril-hydrochlorothiazide (PRINZIDE,ZESTORETIC) 20-12.5 MG per tablet     Sig: Take 1 tablet by mouth Daily. Indication: Essential (primary) hypertension     Dispense:  90 tablet     Refill:  3   • amLODIPine (NORVASC) 5 MG tablet     Sig: Take 1 tablet by mouth Every Evening.     Dispense:  90 tablet     Refill:  3   • gabapentin (NEURONTIN) 300 MG capsule     Si cap qam and 2 caps qhs     Dispense:  90 capsule     Refill:  2     Return for Next scheduled follow up.

## 2018-09-21 ENCOUNTER — LAB (OUTPATIENT)
Dept: LAB | Facility: HOSPITAL | Age: 68
End: 2018-09-21

## 2018-09-21 ENCOUNTER — OFFICE VISIT (OUTPATIENT)
Dept: FAMILY MEDICINE CLINIC | Facility: CLINIC | Age: 68
End: 2018-09-21

## 2018-09-21 VITALS
HEART RATE: 108 BPM | OXYGEN SATURATION: 98 % | DIASTOLIC BLOOD PRESSURE: 70 MMHG | SYSTOLIC BLOOD PRESSURE: 138 MMHG | HEIGHT: 62 IN | BODY MASS INDEX: 38.59 KG/M2 | WEIGHT: 209.7 LBS

## 2018-09-21 DIAGNOSIS — Z00.00 MEDICARE ANNUAL WELLNESS VISIT, SUBSEQUENT: Primary | ICD-10-CM

## 2018-09-21 DIAGNOSIS — Z23 NEED FOR INFLUENZA VACCINATION: ICD-10-CM

## 2018-09-21 DIAGNOSIS — I10 ESSENTIAL HYPERTENSION: ICD-10-CM

## 2018-09-21 DIAGNOSIS — M15.9 OSTEOARTHRITIS OF MULTIPLE JOINTS, UNSPECIFIED OSTEOARTHRITIS TYPE: ICD-10-CM

## 2018-09-21 DIAGNOSIS — I10 ESSENTIAL HYPERTENSION: Chronic | ICD-10-CM

## 2018-09-21 DIAGNOSIS — E11.65 TYPE 2 DIABETES MELLITUS WITH HYPERGLYCEMIA, WITHOUT LONG-TERM CURRENT USE OF INSULIN (HCC): ICD-10-CM

## 2018-09-21 DIAGNOSIS — E11.65 TYPE 2 DIABETES MELLITUS WITH HYPERGLYCEMIA, WITHOUT LONG-TERM CURRENT USE OF INSULIN (HCC): Chronic | ICD-10-CM

## 2018-09-21 DIAGNOSIS — E78.2 MIXED HYPERLIPIDEMIA: ICD-10-CM

## 2018-09-21 DIAGNOSIS — M51.36 DEGENERATIVE DISC DISEASE, LUMBAR: Chronic | ICD-10-CM

## 2018-09-21 DIAGNOSIS — E78.2 MIXED HYPERLIPIDEMIA: Chronic | ICD-10-CM

## 2018-09-21 LAB
ALBUMIN SERPL-MCNC: 4.7 G/DL (ref 3.4–4.8)
ALBUMIN UR-MCNC: 4.8 MG/L
ALBUMIN/GLOB SERPL: 1.2 G/DL (ref 1.1–1.8)
ALP SERPL-CCNC: 128 U/L (ref 38–126)
ALT SERPL W P-5'-P-CCNC: 41 U/L (ref 9–52)
ANION GAP SERPL CALCULATED.3IONS-SCNC: 14 MMOL/L (ref 5–15)
ARTICHOKE IGE QN: 76 MG/DL (ref 1–129)
AST SERPL-CCNC: 43 U/L (ref 14–36)
BACTERIA UR QL AUTO: ABNORMAL /HPF
BASOPHILS # BLD AUTO: 0.03 10*3/MM3 (ref 0–0.2)
BASOPHILS NFR BLD AUTO: 0.3 % (ref 0–2)
BILIRUB SERPL-MCNC: 0.5 MG/DL (ref 0.2–1.3)
BILIRUB UR QL STRIP: NEGATIVE
BUN BLD-MCNC: 24 MG/DL (ref 7–21)
BUN/CREAT SERPL: 28.9 (ref 7–25)
CALCIUM SPEC-SCNC: 10.2 MG/DL (ref 8.4–10.2)
CHLORIDE SERPL-SCNC: 91 MMOL/L (ref 95–110)
CLARITY UR: CLEAR
CO2 SERPL-SCNC: 27 MMOL/L (ref 22–31)
COLOR UR: YELLOW
CREAT BLD-MCNC: 0.83 MG/DL (ref 0.5–1)
DEPRECATED RDW RBC AUTO: 45.2 FL (ref 36.4–46.3)
EOSINOPHIL # BLD AUTO: 0.08 10*3/MM3 (ref 0–0.7)
EOSINOPHIL NFR BLD AUTO: 0.7 % (ref 0–7)
ERYTHROCYTE [DISTWIDTH] IN BLOOD BY AUTOMATED COUNT: 13.8 % (ref 11.5–14.5)
GFR SERPL CREATININE-BSD FRML MDRD: 69 ML/MIN/1.73 (ref 45–104)
GLOBULIN UR ELPH-MCNC: 4 GM/DL (ref 2.3–3.5)
GLUCOSE BLD-MCNC: 335 MG/DL (ref 60–100)
GLUCOSE UR STRIP-MCNC: ABNORMAL MG/DL
HBA1C MFR BLD: 9.6 % (ref 4–5.6)
HCT VFR BLD AUTO: 41.5 % (ref 35–45)
HGB BLD-MCNC: 14.2 G/DL (ref 12–15.5)
HGB UR QL STRIP.AUTO: ABNORMAL
HYALINE CASTS UR QL AUTO: ABNORMAL /LPF
IMM GRANULOCYTES # BLD: 0.03 10*3/MM3 (ref 0–0.02)
IMM GRANULOCYTES NFR BLD: 0.3 % (ref 0–0.5)
KETONES UR QL STRIP: NEGATIVE
LEUKOCYTE ESTERASE UR QL STRIP.AUTO: ABNORMAL
LYMPHOCYTES # BLD AUTO: 3.56 10*3/MM3 (ref 0.6–4.2)
LYMPHOCYTES NFR BLD AUTO: 30.5 % (ref 10–50)
MCH RBC QN AUTO: 30.9 PG (ref 26.5–34)
MCHC RBC AUTO-ENTMCNC: 34.2 G/DL (ref 31.4–36)
MCV RBC AUTO: 90.2 FL (ref 80–98)
MONOCYTES # BLD AUTO: 0.54 10*3/MM3 (ref 0–0.9)
MONOCYTES NFR BLD AUTO: 4.6 % (ref 0–12)
NEUTROPHILS # BLD AUTO: 7.42 10*3/MM3 (ref 2–8.6)
NEUTROPHILS NFR BLD AUTO: 63.6 % (ref 37–80)
NITRITE UR QL STRIP: NEGATIVE
PH UR STRIP.AUTO: <=5 [PH] (ref 5–9)
PLATELET # BLD AUTO: 371 10*3/MM3 (ref 150–450)
PMV BLD AUTO: 10.5 FL (ref 8–12)
POTASSIUM BLD-SCNC: 4.7 MMOL/L (ref 3.5–5.1)
PROT SERPL-MCNC: 8.7 G/DL (ref 6.3–8.6)
PROT UR QL STRIP: NEGATIVE
RBC # BLD AUTO: 4.6 10*6/MM3 (ref 3.77–5.16)
RBC # UR: ABNORMAL /HPF
REF LAB TEST METHOD: ABNORMAL
SODIUM BLD-SCNC: 132 MMOL/L (ref 137–145)
SP GR UR STRIP: 1.03 (ref 1–1.03)
SQUAMOUS #/AREA URNS HPF: ABNORMAL /HPF
UROBILINOGEN UR QL STRIP: ABNORMAL
WBC NRBC COR # BLD: 11.66 10*3/MM3 (ref 3.2–9.8)
WBC UR QL AUTO: ABNORMAL /HPF

## 2018-09-21 PROCEDURE — 90662 IIV NO PRSV INCREASED AG IM: CPT | Performed by: GENERAL PRACTICE

## 2018-09-21 PROCEDURE — 80053 COMPREHEN METABOLIC PANEL: CPT

## 2018-09-21 PROCEDURE — 83036 HEMOGLOBIN GLYCOSYLATED A1C: CPT

## 2018-09-21 PROCEDURE — 36415 COLL VENOUS BLD VENIPUNCTURE: CPT

## 2018-09-21 PROCEDURE — 99214 OFFICE O/P EST MOD 30 MIN: CPT | Performed by: GENERAL PRACTICE

## 2018-09-21 PROCEDURE — 82043 UR ALBUMIN QUANTITATIVE: CPT

## 2018-09-21 PROCEDURE — G0439 PPPS, SUBSEQ VISIT: HCPCS | Performed by: GENERAL PRACTICE

## 2018-09-21 PROCEDURE — 81001 URINALYSIS AUTO W/SCOPE: CPT

## 2018-09-21 PROCEDURE — 83721 ASSAY OF BLOOD LIPOPROTEIN: CPT | Performed by: GENERAL PRACTICE

## 2018-09-21 PROCEDURE — G0008 ADMIN INFLUENZA VIRUS VAC: HCPCS | Performed by: GENERAL PRACTICE

## 2018-09-21 PROCEDURE — 85025 COMPLETE CBC W/AUTO DIFF WBC: CPT

## 2018-09-21 RX ORDER — ASPIRIN 81 MG/1
81 TABLET ORAL DAILY
COMMUNITY

## 2018-09-21 RX ORDER — HYDROCODONE BITARTRATE AND ACETAMINOPHEN 7.5; 325 MG/1; MG/1
1 TABLET ORAL EVERY 6 HOURS PRN
Qty: 120 TABLET | Refills: 0 | Status: SHIPPED | OUTPATIENT
Start: 2018-09-21 | End: 2019-01-07 | Stop reason: SDUPTHER

## 2018-09-21 NOTE — PROGRESS NOTES
QUICK REFERENCE INFORMATION:  The ABCs of the Annual Wellness Visit    Subsequent Medicare Wellness Visit    HEALTH RISK ASSESSMENT    1950    Recent Hospitalizations:  No hospitalization(s) within the last year..        Current Medical Providers:  Patient Care Team:  Belinda Finn MD as PCP - General  Belinda Finn MD as PCP - Claims Attributed  Yonatan Green MD as Consulting Physician (Ophthalmology)  Marlene Young RN as Care Coordinator (Population Health)  Kathleen Porter MA as Medical Assistant        Smoking Status:  History   Smoking Status   • Never Smoker   Smokeless Tobacco   • Never Used       Alcohol Consumption:  History   Alcohol Use No       Depression Screen:   PHQ-2/PHQ-9 Depression Screening 9/21/2018   Little interest or pleasure in doing things 3   Feeling down, depressed, or hopeless 2   Trouble falling or staying asleep, or sleeping too much 3   Feeling tired or having little energy 2   Poor appetite or overeating 3   Feeling bad about yourself - or that you are a failure or have let yourself or your family down 1   Trouble concentrating on things, such as reading the newspaper or watching television 0   Moving or speaking so slowly that other people could have noticed. Or the opposite - being so fidgety or restless that you have been moving around a lot more than usual 0   Thoughts that you would be better off dead, or of hurting yourself in some way 0   Total Score 14   If you checked off any problems, how difficult have these problems made it for you to do your work, take care of things at home, or get along with other people? Somewhat difficult       Health Habits and Functional and Cognitive Screening:  Functional & Cognitive Status 9/21/2018   Do you have difficulty preparing food and eating? No   Do you have difficulty bathing yourself, getting dressed or grooming yourself? No   Do you have difficulty using the toilet? No   Do you have difficulty moving  around from place to place? No   Do you have trouble with steps or getting out of a bed or a chair? No   In the past year have you fallen or experienced a near fall? No   Current Diet Well Balanced Diet   Dental Exam Unknown   Eye Exam Up to date   Exercise (times per week) 3 times per week   Current Exercise Activities Include -   Do you need help using the phone?  No   Are you deaf or do you have serious difficulty hearing?  No   Do you need help with transportation? No   Do you need help shopping? No   Do you need help preparing meals?  No   Do you need help with housework?  No   Do you need help with laundry? No   Do you need help taking your medications? No   Do you need help managing money? No   Do you ever drive or ride in a car without wearing a seat belt? No   Have you felt unusual stress, anger or loneliness in the last month? Yes   Who do you live with? Alone   If you need help, do you have trouble finding someone available to you? No   Have you been bothered in the last four weeks by sexual problems? No   Do you have difficulty concentrating, remembering or making decisions? No           Does the patient have evidence of cognitive impairment? No    Aspirin use counseling: Taking ASA appropriately as indicated      Recent Lab Results:  CMP:  Lab Results   Component Value Date    BUN 24 (H) 09/21/2018    CREATININE 0.83 09/21/2018    EGFRIFNONA 69 09/21/2018    BCR 28.9 (H) 09/21/2018     (L) 09/21/2018    K 4.7 09/21/2018    CO2 27.0 09/21/2018    CALCIUM 10.2 09/21/2018    ALBUMIN 4.70 09/21/2018    BILITOT 0.5 09/21/2018    ALKPHOS 128 (H) 09/21/2018    AST 43 (H) 09/21/2018    ALT 41 09/21/2018     Lipid Panel:  Lab Results   Component Value Date    CHOL 136 03/13/2018    TRIG 139 03/13/2018    HDL 42 (L) 03/13/2018    LDLHDL 1.58 03/13/2018     HbA1c:  Lab Results   Component Value Date    HGBA1C 9.6 (H) 09/21/2018       Visual Acuity:  No exam data present    Age-appropriate Screening  Schedule:  Refer to the list below for future screening recommendations based on patient's age, sex and/or medical conditions. Orders for these recommended tests are listed in the plan section. The patient has been provided with a written plan.    Health Maintenance   Topic Date Due   • ZOSTER VACCINE (2 of 2) 08/11/2017   • DIABETIC EYE EXAM  07/14/2018   • INFLUENZA VACCINE  08/01/2018   • HEMOGLOBIN A1C  09/13/2018   • DIABETIC FOOT EXAM  12/18/2018   • LIPID PANEL  03/13/2019   • URINE MICROALBUMIN  03/13/2019   • PNEUMOCOCCAL VACCINES (65+ LOW/MEDIUM RISK) (2 of 2 - PPSV23) 05/06/2020   • MAMMOGRAM  06/22/2020   • DXA SCAN  06/22/2023   • TDAP/TD VACCINES (2 - Td) 06/16/2027   • COLONOSCOPY  06/22/2028        Subjective   History of Present Illness    Bertha Hyde is a 67 y.o. female who presents for an Subsequent Wellness Visit.    The following portions of the patient's history were reviewed and updated as appropriate: allergies, current medications, past family history, past medical history, past social history, past surgical history and problem list.    Outpatient Medications Prior to Visit   Medication Sig Dispense Refill   • Alcohol Swabs (ALCOHOL WIPES) pads Use 4 x daily 120 each 11   • amLODIPine (NORVASC) 5 MG tablet Take 1 tablet by mouth Every Evening. 90 tablet 3   • Blood Glucose Monitoring Suppl (BLOOD GLUCOSE MONITOR SYSTEM) W/DEVICE kit Test Blood Sugars Once Daily 1 each 0   • dapagliflozin (FARXIGA) 5 MG tablet tablet Take 1 tablet by mouth Daily. 30 tablet 5   • Dulaglutide 0.75 MG/0.5ML solution pen-injector Inject 0.75 mg under the skin 1 (One) Time Per Week. 2 pen 0   • DULoxetine (CYMBALTA) 60 MG capsule Take 1 capsule by mouth Daily. 90 capsule 1   • gabapentin (NEURONTIN) 300 MG capsule 1 cap qam and 2 caps qhs 90 capsule 2   • glucose blood test strip Test Blood Sugars Once Daily 100 each 3   • hyoscyamine (OSCIMIN) 0.125 MG SL tablet Place 0.125-0.25 mg under the tongue every  4 (four) hours as needed. (max 12 per 24 hours)     • Insulin Pen Needle (B-D UF III MINI PEN NEEDLES) 31G X 5 MM misc Use 4 times daily 120 each 11   • LANCETS MICRO THIN 33G misc Test Blood Sugars Once Daily 100 each 3   • lisinopril-hydrochlorothiazide (PRINZIDE,ZESTORETIC) 20-12.5 MG per tablet Take 1 tablet by mouth Daily. Indication: Essential (primary) hypertension 90 tablet 3   • loratadine (CLARITIN) 10 MG tablet Take 10 mg by mouth daily as needed. Indication: Allergic rhinitis     • ondansetron ODT (ZOFRAN ODT) 4 MG disintegrating tablet Take 1 tablet by mouth Every 8 (Eight) Hours As Needed for Nausea or Vomiting. 60 tablet 11   • pantoprazole (PROTONIX) 40 MG EC tablet Take 1 tablet by mouth Daily As Needed (heartburn). 90 tablet 3   • promethazine (PHENERGAN) 25 MG tablet Take 0.5-1 tablets by mouth Every 6 (Six) Hours As Needed for Nausea. 60 tablet 0   • rizatriptan MLT (MAXALT-MLT) 10 MG disintegrating tablet Take 1 tablet by mouth Daily As Needed for migraine. Take 1 tab at onset of headache, can repeat x 1 in 2 hrs prn 27 tablet 3   • rosuvastatin (CRESTOR) 20 MG tablet Take 1 tablet by mouth Every Night. 30 tablet 11   • TRESIBA FLEXTOUCH 100 UNIT/ML solution pen-injector injection INJECT 50 UNITS UNDER THE SKIN EVERY NIGHT AT BEDTIME.  11   • HYDROcodone-acetaminophen (NORCO) 7.5-325 MG per tablet Take 1 tablet by mouth Every 6 (Six) Hours As Needed for Moderate Pain . 120 tablet 0     No facility-administered medications prior to visit.        Patient Active Problem List   Diagnosis   • Degenerative joint disease involving multiple joints   • Depressive disorder   • Essential hypertension   • Hyperlipidemia   • Insomnia   • Migraine   • Type 2 diabetes mellitus with hyperglycemia, without long-term current use of insulin (CMS/[facility])   • Open-angle glaucoma   • Cataract   • Open-angle glaucoma of right eye   • Chronic right shoulder pain   • Colitis   • Degenerative disc disease, lumbar  "      Advance Care Planning:  has NO advance directive - information provided to the patient today    Identification of Risk Factors:  Risk factors include: weight , unhealthy diet, inactivity, increased fall risk and depression.    Review of Systems    Compared to one year ago, the patient feels her physical health is the same.  Compared to one year ago, the patient feels her mental health is the same.    Objective     Physical Exam   Constitutional: She is oriented to person, place, and time. She appears well-developed and well-nourished. No distress.   HENT:   Head: Normocephalic and atraumatic.   Nose: Nose normal.   Mouth/Throat: Oropharynx is clear and moist.   Eyes: Pupils are equal, round, and reactive to light. Conjunctivae and EOM are normal. Right eye exhibits no discharge. Left eye exhibits no discharge.   Neck: No thyromegaly present.   Cardiovascular: Normal rate, regular rhythm, normal heart sounds and intact distal pulses.    Pulmonary/Chest: Effort normal and breath sounds normal.   Lymphadenopathy:     She has no cervical adenopathy.   Neurological: She is alert and oriented to person, place, and time.   Skin: Skin is warm and dry.   Psychiatric: She has a normal mood and affect.   Nursing note and vitals reviewed.      Vitals:    09/21/18 1139   BP: 138/70   BP Location: Left arm   Patient Position: Sitting   Cuff Size: Adult   Pulse: 108   SpO2: 98%   Weight: 95.1 kg (209 lb 11.2 oz)   Height: 157.5 cm (62\")   PainSc:   4   PainLoc: Back       Patient's Body mass index is 38.35 kg/m². BMI is above normal parameters. Recommendations include: exercise counseling and nutrition counseling.      Assessment/Plan   Patient Self-Management and Personalized Health Advice  The patient has been provided with information about: diet, exercise, weight management and fall prevention and preventive services including:   · Advance directive, Exercise counseling provided, Fall Risk assessment done, Fall Risk plan " of care done, Influenza vaccine, Zostavax vaccine (Herpes Zoster).    Visit Diagnoses:    ICD-10-CM ICD-9-CM   1. Medicare annual wellness visit, subsequent Z00.00 V70.0   2. Need for influenza vaccination Z23 V04.81   3. Mixed hyperlipidemia E78.2 272.2   4. Essential hypertension I10 401.9   5. Type 2 diabetes mellitus with hyperglycemia, without long-term current use of insulin (CMS/[facility]) E11.65 250.00     790.29   6. Osteoarthritis of multiple joints, unspecified osteoarthritis type M15.9 715.89   7. Degenerative disc disease, lumbar M51.36 722.52       Orders Placed This Encounter   Procedures   • Flu Vaccine High Dose PF 65YR+ (2021-3075)   • LDL Cholesterol, Direct       Outpatient Encounter Prescriptions as of 9/21/2018   Medication Sig Dispense Refill   • aspirin 81 MG EC tablet Take 81 mg by mouth Daily.     • Alcohol Swabs (ALCOHOL WIPES) pads Use 4 x daily 120 each 11   • amLODIPine (NORVASC) 5 MG tablet Take 1 tablet by mouth Every Evening. 90 tablet 3   • Blood Glucose Monitoring Suppl (BLOOD GLUCOSE MONITOR SYSTEM) W/DEVICE kit Test Blood Sugars Once Daily 1 each 0   • dapagliflozin (FARXIGA) 5 MG tablet tablet Take 1 tablet by mouth Daily. 30 tablet 5   • Dulaglutide 0.75 MG/0.5ML solution pen-injector Inject 0.75 mg under the skin 1 (One) Time Per Week. 2 pen 0   • DULoxetine (CYMBALTA) 60 MG capsule Take 1 capsule by mouth Daily. 90 capsule 1   • gabapentin (NEURONTIN) 300 MG capsule 1 cap qam and 2 caps qhs 90 capsule 2   • glucose blood test strip Test Blood Sugars Once Daily 100 each 3   • HYDROcodone-acetaminophen (NORCO) 7.5-325 MG per tablet Take 1 tablet by mouth Every 6 (Six) Hours As Needed for Moderate Pain . 120 tablet 0   • hyoscyamine (OSCIMIN) 0.125 MG SL tablet Place 0.125-0.25 mg under the tongue every 4 (four) hours as needed. (max 12 per 24 hours)     • Insulin Pen Needle (B-D UF III MINI PEN NEEDLES) 31G X 5 MM misc Use 4 times daily 120 each 11   • LANCETS MICRO THIN 33G misc  Test Blood Sugars Once Daily 100 each 3   • lisinopril-hydrochlorothiazide (PRINZIDE,ZESTORETIC) 20-12.5 MG per tablet Take 1 tablet by mouth Daily. Indication: Essential (primary) hypertension 90 tablet 3   • loratadine (CLARITIN) 10 MG tablet Take 10 mg by mouth daily as needed. Indication: Allergic rhinitis     • ondansetron ODT (ZOFRAN ODT) 4 MG disintegrating tablet Take 1 tablet by mouth Every 8 (Eight) Hours As Needed for Nausea or Vomiting. 60 tablet 11   • pantoprazole (PROTONIX) 40 MG EC tablet Take 1 tablet by mouth Daily As Needed (heartburn). 90 tablet 3   • promethazine (PHENERGAN) 25 MG tablet Take 0.5-1 tablets by mouth Every 6 (Six) Hours As Needed for Nausea. 60 tablet 0   • rizatriptan MLT (MAXALT-MLT) 10 MG disintegrating tablet Take 1 tablet by mouth Daily As Needed for migraine. Take 1 tab at onset of headache, can repeat x 1 in 2 hrs prn 27 tablet 3   • rosuvastatin (CRESTOR) 20 MG tablet Take 1 tablet by mouth Every Night. 30 tablet 11   • TRESIBA FLEXTOUCH 100 UNIT/ML solution pen-injector injection INJECT 50 UNITS UNDER THE SKIN EVERY NIGHT AT BEDTIME.  11   • [DISCONTINUED] HYDROcodone-acetaminophen (NORCO) 7.5-325 MG per tablet Take 1 tablet by mouth Every 6 (Six) Hours As Needed for Moderate Pain . 120 tablet 0     No facility-administered encounter medications on file as of 9/21/2018.        Reviewed use of high risk medication in the elderly: yes  Reviewed for potential of harmful drug interactions in the elderly: yes    Follow Up:  Return in about 3 months (around 12/21/2018) for Recheck.     An After Visit Summary and PPPS with all of these plans were given to the patient.    Information has been scanned into chart.  Discussed importance of taking medications as prescribed. Encouraged healthy eating habits with low fat, low salt choices and working towards maintaining a healthy weight. Recommended regular exercise if able as well as care to prevent falls.

## 2018-09-21 NOTE — PROGRESS NOTES
Subjective   Bertha Hyde is a 67 y.o. female.   Chief Complaint   Patient presents with   • Follow-up     MWV   • Hypertension   • Diabetes   • Hyperlipidemia     For review and evaluation of management of chronic medical problems. Labs pending.   Hypertension   This is a chronic problem. The current episode started more than 1 year ago. The problem is unchanged. The problem is controlled. Associated symptoms include anxiety. Pertinent negatives include no chest pain, headaches, neck pain, palpitations or shortness of breath. There are no known risk factors for coronary artery disease. Current antihypertension treatment includes ACE inhibitors and diuretics. The current treatment provides significant improvement. There are no compliance problems.    Diabetes   She presents for her follow-up diabetic visit. She has type 2 diabetes mellitus. Her disease course has been stable. Pertinent negatives for hypoglycemia include no dizziness, headaches or nervousness/anxiousness. There are no diabetic associated symptoms. Pertinent negatives for diabetes include no chest pain, no fatigue and no weakness. There are no hypoglycemic complications. There are no diabetic complications. Current diabetic treatment includes oral agent (dual therapy). She is compliant with treatment all of the time. Her weight is stable. She is following a generally healthy diet. Meal planning includes avoidance of concentrated sweets. She participates in exercise intermittently. There is no change in her home blood glucose trend. An ACE inhibitor/angiotensin II receptor blocker is being taken.   Hyperlipidemia   This is a chronic problem. The current episode started more than 1 year ago. The problem is controlled. Recent lipid tests were reviewed and are normal. Exacerbating diseases include diabetes. Pertinent negatives include no chest pain, myalgias or shortness of breath. Current antihyperlipidemic treatment includes statins. The current  treatment provides significant improvement of lipids. There are no compliance problems.    Arthritis   Presents for follow-up visit. She complains of pain and stiffness. She reports no joint swelling. The symptoms have been stable. Affected locations include the right knee and left knee. Her pain is at a severity of 4/10. Pertinent negatives include no diarrhea, dysuria, fatigue, fever or rash. Compliance with total regimen is %. Compliance with medications is %.   Back Pain   This is a chronic problem. The current episode started more than 1 year ago. The problem occurs constantly. The problem has been gradually worsening since onset. The pain is present in the lumbar spine. The pain is at a severity of 4/10. The pain is severe. The pain is worse during the day. The symptoms are aggravated by bending, standing and twisting. Pertinent negatives include no abdominal pain, chest pain, dysuria, fever, headaches, numbness or weakness. She has tried analgesics for the symptoms. The treatment provided moderate relief.      The following portions of the patient's history were reviewed and updated as appropriate: allergies, current medications, past social history and problem list.    Outpatient Medications Prior to Visit   Medication Sig Dispense Refill   • Alcohol Swabs (ALCOHOL WIPES) pads Use 4 x daily 120 each 11   • amLODIPine (NORVASC) 5 MG tablet Take 1 tablet by mouth Every Evening. 90 tablet 3   • Blood Glucose Monitoring Suppl (BLOOD GLUCOSE MONITOR SYSTEM) W/DEVICE kit Test Blood Sugars Once Daily 1 each 0   • dapagliflozin (FARXIGA) 5 MG tablet tablet Take 1 tablet by mouth Daily. 30 tablet 5   • Dulaglutide 0.75 MG/0.5ML solution pen-injector Inject 0.75 mg under the skin 1 (One) Time Per Week. 2 pen 0   • DULoxetine (CYMBALTA) 60 MG capsule Take 1 capsule by mouth Daily. 90 capsule 1   • gabapentin (NEURONTIN) 300 MG capsule 1 cap qam and 2 caps qhs 90 capsule 2   • glucose blood test strip Test  Blood Sugars Once Daily 100 each 3   • hyoscyamine (OSCIMIN) 0.125 MG SL tablet Place 0.125-0.25 mg under the tongue every 4 (four) hours as needed. (max 12 per 24 hours)     • Insulin Pen Needle (B-D UF III MINI PEN NEEDLES) 31G X 5 MM misc Use 4 times daily 120 each 11   • LANCETS MICRO THIN 33G misc Test Blood Sugars Once Daily 100 each 3   • lisinopril-hydrochlorothiazide (PRINZIDE,ZESTORETIC) 20-12.5 MG per tablet Take 1 tablet by mouth Daily. Indication: Essential (primary) hypertension 90 tablet 3   • loratadine (CLARITIN) 10 MG tablet Take 10 mg by mouth daily as needed. Indication: Allergic rhinitis     • ondansetron ODT (ZOFRAN ODT) 4 MG disintegrating tablet Take 1 tablet by mouth Every 8 (Eight) Hours As Needed for Nausea or Vomiting. 60 tablet 11   • pantoprazole (PROTONIX) 40 MG EC tablet Take 1 tablet by mouth Daily As Needed (heartburn). 90 tablet 3   • promethazine (PHENERGAN) 25 MG tablet Take 0.5-1 tablets by mouth Every 6 (Six) Hours As Needed for Nausea. 60 tablet 0   • rizatriptan MLT (MAXALT-MLT) 10 MG disintegrating tablet Take 1 tablet by mouth Daily As Needed for migraine. Take 1 tab at onset of headache, can repeat x 1 in 2 hrs prn 27 tablet 3   • rosuvastatin (CRESTOR) 20 MG tablet Take 1 tablet by mouth Every Night. 30 tablet 11   • TRESIBA FLEXTOUCH 100 UNIT/ML solution pen-injector injection INJECT 50 UNITS UNDER THE SKIN EVERY NIGHT AT BEDTIME.  11   • HYDROcodone-acetaminophen (NORCO) 7.5-325 MG per tablet Take 1 tablet by mouth Every 6 (Six) Hours As Needed for Moderate Pain . 120 tablet 0     No facility-administered medications prior to visit.        Review of Systems   Constitutional: Negative for fatigue and fever.   Respiratory: Negative for shortness of breath.    Cardiovascular: Negative for chest pain and palpitations.   Gastrointestinal: Negative for abdominal pain and diarrhea.   Genitourinary: Negative for dysuria.   Musculoskeletal: Positive for arthritis, back pain and  "stiffness. Negative for joint swelling, myalgias and neck pain.   Skin: Negative for rash.   Neurological: Negative for dizziness, weakness, numbness and headaches.   Psychiatric/Behavioral: The patient is not nervous/anxious.        Objective   Visit Vitals  /70 (BP Location: Left arm, Patient Position: Sitting, Cuff Size: Adult)   Pulse 108   Ht 157.5 cm (62\")   Wt 95.1 kg (209 lb 11.2 oz)   SpO2 98%   BMI 38.35 kg/m²     Physical Exam   Constitutional: She is oriented to person, place, and time. She appears well-developed and well-nourished. No distress.   HENT:   Head: Normocephalic and atraumatic.   Nose: Nose normal.   Mouth/Throat: Oropharynx is clear and moist.   Eyes: Pupils are equal, round, and reactive to light. Conjunctivae and EOM are normal. Right eye exhibits no discharge. Left eye exhibits no discharge.   Neck: No thyromegaly present.   Cardiovascular: Normal rate, regular rhythm, normal heart sounds and intact distal pulses.    Pulmonary/Chest: Effort normal and breath sounds normal.   Musculoskeletal:        Right knee: Tenderness found. Medial joint line and lateral joint line tenderness noted.        Left knee: Tenderness found. Medial joint line and lateral joint line tenderness noted.        Lumbar back: She exhibits tenderness.   Lymphadenopathy:     She has no cervical adenopathy.   Neurological: She is alert and oriented to person, place, and time.   Skin: Skin is warm and dry.   Psychiatric: She has a normal mood and affect.   Nursing note and vitals reviewed.       Assessment/Plan   Problem List Items Addressed This Visit        Cardiovascular and Mediastinum    Essential hypertension (Chronic)    Hyperlipidemia (Chronic)    Relevant Orders    LDL Cholesterol, Direct (Completed)       Endocrine    Type 2 diabetes mellitus with hyperglycemia, without long-term current use of insulin (CMS/[facility]) (Chronic)       Musculoskeletal and Integument    Degenerative disc disease, lumbar " (Chronic)    Relevant Medications    HYDROcodone-acetaminophen (NORCO) 7.5-325 MG per tablet    Degenerative joint disease involving multiple joints    Relevant Medications    HYDROcodone-acetaminophen (NORCO) 7.5-325 MG per tablet      Other Visit Diagnoses     Medicare annual wellness visit, subsequent    -  Primary    Need for influenza vaccination        Relevant Orders    Flu Vaccine High Dose PF 65YR+ ([id number]) (Completed)          Will notify regarding results. Continue current treatment. Chris reviewed and appropriate. Not recommended to drive or operate heavy equipment while taking potentially sedating meds.  Patient understands the risks associated with this controlled medication, including tolerance and addiction. They also agree to obtain this medication only from me, and not from a another provider, unless that provider is covering for me in my absence. They also agree to be compliant in dosing, and not self adjust the dose of medication.  A signed controlled substance agreement is on file, and they have received a controlled substance education sheet at this or a previous visit. They have also signed a consent for treatment with a controlled substance as per [facility] policy.      New Medications Ordered This Visit   Medications   • HYDROcodone-acetaminophen (NORCO) 7.5-325 MG per tablet     Sig: Take 1 tablet by mouth Every 6 (Six) Hours As Needed for Moderate Pain .     Dispense:  120 tablet     Refill:  0     Return in about 3 months (around 12/21/2018) for Recheck.

## 2018-09-21 NOTE — PATIENT INSTRUCTIONS
Check at pharmacy on Shingrix shingles vaccine    Medicare Wellness  Personal Prevention Plan of Service     Date of Office Visit:  2018  Encounter Provider:  Belinda Finn MD  Place of Service:  [facility]  Patient Name: Bertha Hyde  :  1950    As part of the Medicare Wellness portion of your visit today, we are providing you with this personalized preventive plan of services (PPPS). This plan is based upon recommendations of the [place] Preventive Services Task Force (USPSTF) and the Advisory Committee on Immunization Practices (ACIP).    This lists the preventive care services that should be considered, and provides dates of when you are due. Items listed as completed are up-to-date and do not require any further intervention.    Health Maintenance   Topic Date Due   • ZOSTER VACCINE (2 of 2) 2017   • DIABETIC EYE EXAM  2018   • INFLUENZA VACCINE  2018   • HEMOGLOBIN A1C  2018   • MEDICARE ANNUAL WELLNESS  2018   • DIABETIC FOOT EXAM  2018   • LIPID PANEL  2019   • URINE MICROALBUMIN  2019   • PNEUMOCOCCAL VACCINES (65+ LOW/MEDIUM RISK) (2 of 2 - PPSV23) 2020   • MAMMOGRAM  2020   • COLOGUARD  2021   • DXA SCAN  2023   • TDAP/TD VACCINES (2 - Td) 2027   • COLONOSCOPY  2028   • HEPATITIS C SCREENING  Completed       Orders Placed This Encounter   Procedures   • Flu Vaccine High Dose PF 65YR+ ([id number])   • LDL Cholesterol, Direct       Return in about 3 months (around 2018) for Recheck.

## 2018-10-09 ENCOUNTER — EPISODE CHANGES (OUTPATIENT)
Dept: CASE MANAGEMENT | Facility: OTHER | Age: 68
End: 2018-10-09

## 2018-10-10 DIAGNOSIS — E11.65 TYPE 2 DIABETES MELLITUS WITH HYPERGLYCEMIA, WITHOUT LONG-TERM CURRENT USE OF INSULIN (HCC): ICD-10-CM

## 2018-10-10 DIAGNOSIS — D72.9 NEUTROPHILIA: ICD-10-CM

## 2018-10-10 DIAGNOSIS — E11.65 TYPE 2 DIABETES MELLITUS WITH HYPERGLYCEMIA, WITHOUT LONG-TERM CURRENT USE OF INSULIN (HCC): Primary | Chronic | ICD-10-CM

## 2018-10-22 ENCOUNTER — TELEPHONE (OUTPATIENT)
Dept: FAMILY MEDICINE CLINIC | Facility: CLINIC | Age: 68
End: 2018-10-22

## 2018-10-22 RX ORDER — DULOXETIN HYDROCHLORIDE 60 MG/1
60 CAPSULE, DELAYED RELEASE ORAL DAILY
Qty: 90 CAPSULE | Refills: 1 | Status: SHIPPED | OUTPATIENT
Start: 2018-10-22 | End: 2019-01-07 | Stop reason: SDUPTHER

## 2018-10-22 RX ORDER — PROMETHAZINE HYDROCHLORIDE 25 MG/1
12.5-25 TABLET ORAL EVERY 6 HOURS PRN
Qty: 60 TABLET | Refills: 0 | Status: SHIPPED | OUTPATIENT
Start: 2018-10-22 | End: 2019-11-01 | Stop reason: SDUPTHER

## 2018-10-29 RX ORDER — INSULIN DEGLUDEC INJECTION 100 U/ML
50 INJECTION, SOLUTION SUBCUTANEOUS
Qty: 15 ML | Refills: 11 | Status: SHIPPED | OUTPATIENT
Start: 2018-10-29 | End: 2019-01-07 | Stop reason: SDUPTHER

## 2018-11-02 RX ORDER — BLOOD PRESSURE TEST KIT
KIT MISCELLANEOUS
Qty: 150 EACH | Refills: 11 | Status: SHIPPED | OUTPATIENT
Start: 2018-11-02

## 2018-11-02 RX ORDER — ROSUVASTATIN CALCIUM 20 MG/1
20 TABLET, COATED ORAL NIGHTLY
Qty: 30 TABLET | Refills: 11 | Status: SHIPPED | OUTPATIENT
Start: 2018-11-02 | End: 2019-01-07 | Stop reason: SDUPTHER

## 2018-11-05 RX ORDER — PEN NEEDLE, DIABETIC 31 GX3/16"
NEEDLE, DISPOSABLE MISCELLANEOUS
Qty: 150 EACH | Refills: 11 | Status: SHIPPED | OUTPATIENT
Start: 2018-11-05 | End: 2021-05-06 | Stop reason: SDUPTHER

## 2018-11-07 ENCOUNTER — TELEPHONE (OUTPATIENT)
Dept: FAMILY MEDICINE CLINIC | Facility: CLINIC | Age: 68
End: 2018-11-07

## 2018-11-07 RX ORDER — PANTOPRAZOLE SODIUM 40 MG/1
40 TABLET, DELAYED RELEASE ORAL DAILY PRN
Qty: 90 TABLET | Refills: 3 | Status: SHIPPED | OUTPATIENT
Start: 2018-11-07 | End: 2019-01-07 | Stop reason: SDUPTHER

## 2018-12-13 ENCOUNTER — TELEPHONE (OUTPATIENT)
Dept: FAMILY MEDICINE CLINIC | Facility: CLINIC | Age: 68
End: 2018-12-13

## 2018-12-13 NOTE — TELEPHONE ENCOUNTER
Bertha called and wanted to know if Dr Finn would refill her Gabapentin to [facility] [place].  She has an appt 12-21-18.

## 2018-12-18 ENCOUNTER — TELEPHONE (OUTPATIENT)
Dept: ENDOCRINOLOGY | Facility: CLINIC | Age: 68
End: 2018-12-18

## 2018-12-18 NOTE — TELEPHONE ENCOUNTER
RENEE MOJICA ([place]: [id number])   Jardiance 10MG tablets   Form  [employer] Part D Coverage Determination Form   Plan Contact  [phone] phone   [phone] fax   Created   3 hours ago   Sent to Plan   1 minute ago   Determination   Wait for Determination  Please wait for the plan to return a determination

## 2019-01-03 ENCOUNTER — TELEPHONE (OUTPATIENT)
Dept: ENDOCRINOLOGY | Facility: CLINIC | Age: 69
End: 2019-01-03

## 2019-01-07 ENCOUNTER — LAB (OUTPATIENT)
Dept: LAB | Facility: HOSPITAL | Age: 69
End: 2019-01-07

## 2019-01-07 ENCOUNTER — TELEPHONE (OUTPATIENT)
Dept: FAMILY MEDICINE CLINIC | Facility: CLINIC | Age: 69
End: 2019-01-07

## 2019-01-07 ENCOUNTER — OFFICE VISIT (OUTPATIENT)
Dept: FAMILY MEDICINE CLINIC | Facility: CLINIC | Age: 69
End: 2019-01-07

## 2019-01-07 VITALS
SYSTOLIC BLOOD PRESSURE: 136 MMHG | BODY MASS INDEX: 38.09 KG/M2 | DIASTOLIC BLOOD PRESSURE: 72 MMHG | OXYGEN SATURATION: 98 % | WEIGHT: 207 LBS | HEART RATE: 100 BPM | HEIGHT: 62 IN

## 2019-01-07 DIAGNOSIS — E66.01 CLASS 2 SEVERE OBESITY DUE TO EXCESS CALORIES WITH SERIOUS COMORBIDITY AND BODY MASS INDEX (BMI) OF 37.0 TO 37.9 IN ADULT (HCC): ICD-10-CM

## 2019-01-07 DIAGNOSIS — E11.65 TYPE 2 DIABETES MELLITUS WITH HYPERGLYCEMIA, WITHOUT LONG-TERM CURRENT USE OF INSULIN (HCC): ICD-10-CM

## 2019-01-07 DIAGNOSIS — M15.9 OSTEOARTHRITIS OF MULTIPLE JOINTS, UNSPECIFIED OSTEOARTHRITIS TYPE: ICD-10-CM

## 2019-01-07 DIAGNOSIS — M51.36 DEGENERATIVE DISC DISEASE, LUMBAR: Chronic | ICD-10-CM

## 2019-01-07 DIAGNOSIS — E11.65 TYPE 2 DIABETES MELLITUS WITH HYPERGLYCEMIA, WITHOUT LONG-TERM CURRENT USE OF INSULIN (HCC): Primary | ICD-10-CM

## 2019-01-07 DIAGNOSIS — E11.65 TYPE 2 DIABETES MELLITUS WITH HYPERGLYCEMIA, WITHOUT LONG-TERM CURRENT USE OF INSULIN (HCC): Primary | Chronic | ICD-10-CM

## 2019-01-07 DIAGNOSIS — D72.9 NEUTROPHILIA: ICD-10-CM

## 2019-01-07 DIAGNOSIS — I10 ESSENTIAL HYPERTENSION: Chronic | ICD-10-CM

## 2019-01-07 LAB
ALBUMIN SERPL-MCNC: 4.5 G/DL (ref 3.4–4.8)
ALBUMIN/GLOB SERPL: 1.5 G/DL (ref 1.1–1.8)
ALP SERPL-CCNC: 126 U/L (ref 38–126)
ALT SERPL W P-5'-P-CCNC: 15 U/L (ref 9–52)
ANION GAP SERPL CALCULATED.3IONS-SCNC: 12 MMOL/L (ref 5–15)
ARTICHOKE IGE QN: 117 MG/DL (ref 1–129)
AST SERPL-CCNC: 28 U/L (ref 14–36)
BASOPHILS # BLD AUTO: 0.02 10*3/MM3 (ref 0–0.2)
BASOPHILS NFR BLD AUTO: 0.2 % (ref 0–2)
BILIRUB SERPL-MCNC: 0.3 MG/DL (ref 0.2–1.3)
BUN BLD-MCNC: 17 MG/DL (ref 7–21)
BUN/CREAT SERPL: 21.3 (ref 7–25)
CALCIUM SPEC-SCNC: 10 MG/DL (ref 8.4–10.2)
CHLORIDE SERPL-SCNC: 96 MMOL/L (ref 95–110)
CO2 SERPL-SCNC: 27 MMOL/L (ref 22–31)
CREAT BLD-MCNC: 0.8 MG/DL (ref 0.5–1)
DEPRECATED RDW RBC AUTO: 46.4 FL (ref 36.4–46.3)
EOSINOPHIL # BLD AUTO: 0.09 10*3/MM3 (ref 0–0.7)
EOSINOPHIL NFR BLD AUTO: 0.9 % (ref 0–7)
ERYTHROCYTE [DISTWIDTH] IN BLOOD BY AUTOMATED COUNT: 14.2 % (ref 11.5–14.5)
GFR SERPL CREATININE-BSD FRML MDRD: 71 ML/MIN/1.73 (ref 45–104)
GLOBULIN UR ELPH-MCNC: 3 GM/DL (ref 2.3–3.5)
GLUCOSE BLD-MCNC: 405 MG/DL (ref 60–100)
HBA1C MFR BLD: 10.3 % (ref 4–5.6)
HCT VFR BLD AUTO: 38.1 % (ref 35–45)
HGB BLD-MCNC: 13 G/DL (ref 12–15.5)
IMM GRANULOCYTES # BLD AUTO: 0.07 10*3/MM3 (ref 0–0.02)
IMM GRANULOCYTES NFR BLD AUTO: 0.7 % (ref 0–0.5)
LYMPHOCYTES # BLD AUTO: 2.91 10*3/MM3 (ref 0.6–4.2)
LYMPHOCYTES NFR BLD AUTO: 30 % (ref 10–50)
MCH RBC QN AUTO: 31 PG (ref 26.5–34)
MCHC RBC AUTO-ENTMCNC: 34.1 G/DL (ref 31.4–36)
MCV RBC AUTO: 90.7 FL (ref 80–98)
MONOCYTES # BLD AUTO: 0.51 10*3/MM3 (ref 0–0.9)
MONOCYTES NFR BLD AUTO: 5.3 % (ref 0–12)
NEUTROPHILS # BLD AUTO: 6.1 10*3/MM3 (ref 2–8.6)
NEUTROPHILS NFR BLD AUTO: 62.9 % (ref 37–80)
PLATELET # BLD AUTO: 341 10*3/MM3 (ref 150–450)
PMV BLD AUTO: 10.4 FL (ref 8–12)
POTASSIUM BLD-SCNC: 4.5 MMOL/L (ref 3.5–5.1)
PROT SERPL-MCNC: 7.5 G/DL (ref 6.3–8.6)
RBC # BLD AUTO: 4.2 10*6/MM3 (ref 3.77–5.16)
SODIUM BLD-SCNC: 135 MMOL/L (ref 137–145)
WBC NRBC COR # BLD: 9.7 10*3/MM3 (ref 3.2–9.8)

## 2019-01-07 PROCEDURE — 99214 OFFICE O/P EST MOD 30 MIN: CPT | Performed by: GENERAL PRACTICE

## 2019-01-07 PROCEDURE — 83721 ASSAY OF BLOOD LIPOPROTEIN: CPT

## 2019-01-07 PROCEDURE — 80053 COMPREHEN METABOLIC PANEL: CPT

## 2019-01-07 PROCEDURE — 36415 COLL VENOUS BLD VENIPUNCTURE: CPT

## 2019-01-07 PROCEDURE — 83036 HEMOGLOBIN GLYCOSYLATED A1C: CPT

## 2019-01-07 PROCEDURE — 85025 COMPLETE CBC W/AUTO DIFF WBC: CPT

## 2019-01-07 RX ORDER — RIZATRIPTAN BENZOATE 10 MG/1
10 TABLET, ORALLY DISINTEGRATING ORAL DAILY PRN
Qty: 27 TABLET | Refills: 3 | Status: SHIPPED | OUTPATIENT
Start: 2019-01-07 | End: 2019-07-23 | Stop reason: SDUPTHER

## 2019-01-07 RX ORDER — HYDROCODONE BITARTRATE AND ACETAMINOPHEN 7.5; 325 MG/1; MG/1
1 TABLET ORAL EVERY 6 HOURS PRN
Qty: 120 TABLET | Refills: 0 | Status: SHIPPED | OUTPATIENT
Start: 2019-01-07 | End: 2019-04-08 | Stop reason: SDUPTHER

## 2019-01-07 RX ORDER — AMLODIPINE BESYLATE 5 MG/1
5 TABLET ORAL EVERY EVENING
Qty: 90 TABLET | Refills: 3 | Status: SHIPPED | OUTPATIENT
Start: 2019-01-07 | End: 2019-07-23 | Stop reason: SDUPTHER

## 2019-01-07 RX ORDER — PANTOPRAZOLE SODIUM 40 MG/1
40 TABLET, DELAYED RELEASE ORAL DAILY PRN
Qty: 90 TABLET | Refills: 3 | Status: SHIPPED | OUTPATIENT
Start: 2019-01-07 | End: 2019-07-23 | Stop reason: SDUPTHER

## 2019-01-07 RX ORDER — DULOXETIN HYDROCHLORIDE 60 MG/1
60 CAPSULE, DELAYED RELEASE ORAL DAILY
Qty: 90 CAPSULE | Refills: 1 | Status: SHIPPED | OUTPATIENT
Start: 2019-01-07 | End: 2019-07-23 | Stop reason: SDUPTHER

## 2019-01-07 RX ORDER — LISINOPRIL AND HYDROCHLOROTHIAZIDE 20; 12.5 MG/1; MG/1
1 TABLET ORAL DAILY
Qty: 90 TABLET | Refills: 3 | Status: SHIPPED | OUTPATIENT
Start: 2019-01-07 | End: 2019-07-23 | Stop reason: SDUPTHER

## 2019-01-07 RX ORDER — ROSUVASTATIN CALCIUM 20 MG/1
20 TABLET, COATED ORAL NIGHTLY
Qty: 90 TABLET | Refills: 3 | Status: SHIPPED | OUTPATIENT
Start: 2019-01-07 | End: 2019-07-23 | Stop reason: SDUPTHER

## 2019-01-07 RX ORDER — GABAPENTIN 300 MG/1
600 CAPSULE ORAL 2 TIMES DAILY
Qty: 120 CAPSULE | Refills: 2 | Status: SHIPPED | OUTPATIENT
Start: 2019-01-07 | End: 2019-04-08 | Stop reason: SDUPTHER

## 2019-01-07 NOTE — TELEPHONE ENCOUNTER
Pr Dr. Finn, Ms. Hyde has been called with her recent lab results & recommendations.  Continue her current medications and follow-up as planned or sooner if any problems.      ----- Message from Belinda Finn MD sent at 1/7/2019 12:13 PM CST -----  Call and tell sugar was over 400, hemoglobin A1c 10.3, needs to increase Trulicity, have sent prescription. .decrease sweets in diet

## 2019-01-07 NOTE — PROGRESS NOTES
Subjective   Bertha Hyde is a 68 y.o. female.   Chief Complaint   Patient presents with   • Hypertension   • Med Refill   • Back Pain   • Knee Pain     For review and evaluation of management of chronic medical problems. Labs pending.   Hypertension   This is a chronic problem. The current episode started more than 1 year ago. The problem is unchanged. The problem is controlled. Associated symptoms include anxiety. Pertinent negatives include no chest pain, headaches, neck pain, palpitations or shortness of breath. There are no known risk factors for coronary artery disease. Current antihypertension treatment includes ACE inhibitors and diuretics. The current treatment provides significant improvement. There are no compliance problems.    Diabetes   She presents for her follow-up diabetic visit. She has type 2 diabetes mellitus. Her disease course has been stable. Pertinent negatives for hypoglycemia include no dizziness, headaches or nervousness/anxiousness. There are no diabetic associated symptoms. Pertinent negatives for diabetes include no chest pain, no fatigue and no weakness. There are no hypoglycemic complications. There are no diabetic complications. Current diabetic treatment includes oral agent (dual therapy). She is compliant with treatment all of the time. Her weight is stable. She is following a generally healthy diet. Meal planning includes avoidance of concentrated sweets. She participates in exercise intermittently. There is no change in her home blood glucose trend. An ACE inhibitor/angiotensin II receptor blocker is being taken.   Hyperlipidemia   This is a chronic problem. The current episode started more than 1 year ago. The problem is controlled. Recent lipid tests were reviewed and are normal. Exacerbating diseases include diabetes. Pertinent negatives include no chest pain, myalgias or shortness of breath. Current antihyperlipidemic treatment includes statins. The current treatment  provides significant improvement of lipids. There are no compliance problems.    Arthritis   Presents for follow-up visit. She complains of pain and stiffness. She reports no joint swelling. The symptoms have been stable. Affected locations include the right knee and left knee. Her pain is at a severity of 4/10. Pertinent negatives include no diarrhea, dysuria, fatigue, fever or rash. Compliance with total regimen is %. Compliance with medications is %.   Back Pain   This is a chronic problem. The current episode started more than 1 year ago. The problem occurs constantly. The problem has been gradually worsening since onset. The pain is present in the lumbar spine. The pain is at a severity of 4/10. The pain is severe. The pain is worse during the day. The symptoms are aggravated by bending, standing and twisting. Pertinent negatives include no abdominal pain, chest pain, dysuria, fever, headaches, numbness or weakness. She has tried analgesics for the symptoms. The treatment provided moderate relief.      The following portions of the patient's history were reviewed and updated as appropriate: allergies, current medications, past social history and problem list.    Outpatient Medications Prior to Visit   Medication Sig Dispense Refill   • Alcohol Swabs pads USE 4 TIMES DAILY 150 each 11   • aspirin 81 MG EC tablet Take 81 mg by mouth Daily.     • Blood Glucose Monitoring Suppl (BLOOD GLUCOSE MONITOR SYSTEM) W/DEVICE kit Test Blood Sugars Once Daily 1 each 0   • Dulaglutide 0.75 MG/0.5ML solution pen-injector Inject 0.75 mg under the skin into the appropriate area as directed 1 (One) Time Per Week. 4 pen 5   • GLOBAL EASE INJECT PEN NEEDLES 31G X 5 MM misc USE 4 TIMES DAILY 150 each 11   • glucose blood test strip Test Blood Sugars Once Daily 100 each 3   • hyoscyamine (OSCIMIN) 0.125 MG SL tablet Place 0.125-0.25 mg under the tongue every 4 (four) hours as needed. (max 12 per 24 hours)     • LANCETS  MICRO THIN 33G misc Test Blood Sugars Once Daily 100 each 3   • loratadine (CLARITIN) 10 MG tablet Take 10 mg by mouth daily as needed. Indication: Allergic rhinitis     • ondansetron ODT (ZOFRAN ODT) 4 MG disintegrating tablet Take 1 tablet by mouth Every 8 (Eight) Hours As Needed for Nausea or Vomiting. 60 tablet 11   • promethazine (PHENERGAN) 25 MG tablet Take 0.5-1 tablets by mouth Every 6 (Six) Hours As Needed for Nausea. 60 tablet 0   • amLODIPine (NORVASC) 5 MG tablet Take 1 tablet by mouth Every Evening. 90 tablet 3   • DULoxetine (CYMBALTA) 60 MG capsule Take 1 capsule by mouth Daily. 90 capsule 1   • gabapentin (NEURONTIN) 300 MG capsule 1 cap qam and 2 caps qhs 90 capsule 2   • HYDROcodone-acetaminophen (NORCO) 7.5-325 MG per tablet Take 1 tablet by mouth Every 6 (Six) Hours As Needed for Moderate Pain . 120 tablet 0   • lisinopril-hydrochlorothiazide (PRINZIDE,ZESTORETIC) 20-12.5 MG per tablet Take 1 tablet by mouth Daily. Indication: Essential (primary) hypertension 90 tablet 3   • pantoprazole (PROTONIX) 40 MG EC tablet Take 1 tablet by mouth Daily As Needed (heartburn). 90 tablet 3   • rizatriptan MLT (MAXALT-MLT) 10 MG disintegrating tablet Take 1 tablet by mouth Daily As Needed for migraine. Take 1 tab at onset of headache, can repeat x 1 in 2 hrs prn 27 tablet 3   • rosuvastatin (CRESTOR) 20 MG tablet TAKE 1 TABLET BY MOUTH EVERY NIGHT 30 tablet 11   • TRESIBA FLEXTOUCH 100 UNIT/ML solution pen-injector injection INJECT 50 UNITS UNDER THE SKIN EVERY NIGHT AT BEDTIME. 15 mL 11   • dapagliflozin (FARXIGA) 5 MG tablet tablet Take 1 tablet by mouth Daily. 30 tablet 5   • TRESIBA FLEXTOUCH 100 UNIT/ML solution pen-injector injection INJECT 50 UNITS UNDER THE SKIN EVERY NIGHT AT BEDTIME.  11     No facility-administered medications prior to visit.        Review of Systems   Constitutional: Negative.  Negative for chills, fatigue, fever and unexpected weight change.   HENT: Negative.  Negative for  "congestion, ear pain, hearing loss, nosebleeds, rhinorrhea, sneezing, sore throat and tinnitus.    Eyes: Negative.  Negative for discharge.   Respiratory: Negative.  Negative for cough, shortness of breath and wheezing.    Cardiovascular: Negative.  Negative for chest pain and palpitations.   Gastrointestinal: Negative.  Negative for abdominal pain, constipation, diarrhea, nausea and vomiting.   Endocrine: Negative.    Genitourinary: Negative.  Negative for dysuria, frequency and urgency.   Musculoskeletal: Positive for arthritis, back pain and stiffness. Negative for arthralgias, joint swelling, myalgias and neck pain.   Skin: Negative.  Negative for rash.   Allergic/Immunologic: Negative.    Neurological: Negative.  Negative for dizziness, weakness, numbness and headaches.   Hematological: Negative.  Negative for adenopathy.   Psychiatric/Behavioral: Negative.  Negative for dysphoric mood and sleep disturbance. The patient is not nervous/anxious.      Objective   Visit Vitals  /72   Pulse 100   Ht 157.5 cm (62\")   Wt 93.9 kg (207 lb)   SpO2 98%   BMI 37.86 kg/m²     Physical Exam   Constitutional: She is oriented to person, place, and time. She appears well-developed and well-nourished. No distress.   HENT:   Head: Normocephalic and atraumatic.   Nose: Nose normal.   Mouth/Throat: Oropharynx is clear and moist.   Eyes: Conjunctivae and EOM are normal. Pupils are equal, round, and reactive to light. Right eye exhibits no discharge. Left eye exhibits no discharge.   Neck: No thyromegaly present.   Cardiovascular: Normal rate, regular rhythm, normal heart sounds and intact distal pulses.   Pulmonary/Chest: Effort normal and breath sounds normal.   Musculoskeletal:        Right knee: Tenderness found. Medial joint line and lateral joint line tenderness noted.        Left knee: Tenderness found. Medial joint line and lateral joint line tenderness noted.        Lumbar back: She exhibits tenderness.    Bertha had " a diabetic foot exam performed today.   During the foot exam she had a monofilament test performed (normal).  Vascular Status -  Her right foot exhibits abnormal foot vasculature . Her right foot exhibits no edema. Her left foot exhibits abnormal foot vasculature . Her left foot exhibits no edema.  Skin Integrity  -  Her right foot skin is intact.Her left foot skin is intact..  Lymphadenopathy:     She has no cervical adenopathy.   Neurological: She is alert and oriented to person, place, and time.   Skin: Skin is warm and dry.   Psychiatric: She has a normal mood and affect.   Nursing note and vitals reviewed.      Assessment/Plan   Problem List Items Addressed This Visit        Cardiovascular and Mediastinum    Essential hypertension (Chronic)    Relevant Medications    amLODIPine (NORVASC) 5 MG tablet    lisinopril-hydrochlorothiazide (PRINZIDE,ZESTORETIC) 20-12.5 MG per tablet       Endocrine    Type 2 diabetes mellitus with hyperglycemia, without long-term current use of insulin (CMS/[facility]) - Primary (Chronic)    Relevant Medications    insulin degludec (TRESIBA FLEXTOUCH) 100 UNIT/ML solution pen-injector injection    Other Relevant Orders    Comprehensive Metabolic Panel    Hemoglobin A1c    LDL Cholesterol, Direct       Musculoskeletal and Integument    Degenerative disc disease, lumbar (Chronic)    Relevant Medications    HYDROcodone-acetaminophen (NORCO) 7.5-325 MG per tablet    Other Relevant Orders    Urine Drug Screen - Urine, Clean Catch    Degenerative joint disease involving multiple joints    Relevant Medications    HYDROcodone-acetaminophen (NORCO) 7.5-325 MG per tablet    Other Relevant Orders    Urine Drug Screen - Urine, Clean Catch      Other Visit Diagnoses     Class 2 severe obesity due to excess calories with serious comorbidity and body mass index (BMI) of 37.0 to 37.9 in adult (CMS/[facility])              Will notify regarding results. Patient's Body mass index is 37.86 kg/m². BMI is above normal  parameters. Recommendations include: exercise counseling and nutrition counseling.  Chris reviewed and appropriate. Not recommended to drive or operate heavy equipment while taking potentially sedating meds.  Patient understands the risks associated with this controlled medication, including tolerance and addiction. They also agree to obtain this medication only from me, and not from a another provider, unless that provider is covering for me in my absence. They also agree to be compliant in dosing, and not self adjust the dose of medication.  A signed controlled substance agreement is on file, and they have received a controlled substance education sheet at this or a previous visit. They have also signed a consent for treatment with a controlled substance as per [facility] policy.      New Medications Ordered This Visit   Medications   • amLODIPine (NORVASC) 5 MG tablet     Sig: Take 1 tablet by mouth Every Evening.     Dispense:  90 tablet     Refill:  3   • DULoxetine (CYMBALTA) 60 MG capsule     Sig: Take 1 capsule by mouth Daily.     Dispense:  90 capsule     Refill:  1   • gabapentin (NEURONTIN) 300 MG capsule     Sig: Take 2 capsules by mouth 2 (Two) Times a Day. 1 cap qam and 2 caps qhs     Dispense:  120 capsule     Refill:  2   • HYDROcodone-acetaminophen (NORCO) 7.5-325 MG per tablet     Sig: Take 1 tablet by mouth Every 6 (Six) Hours As Needed for Moderate Pain .     Dispense:  120 tablet     Refill:  0   • lisinopril-hydrochlorothiazide (PRINZIDE,ZESTORETIC) 20-12.5 MG per tablet     Sig: Take 1 tablet by mouth Daily. Indication: Essential (primary) hypertension     Dispense:  90 tablet     Refill:  3   • pantoprazole (PROTONIX) 40 MG EC tablet     Sig: Take 1 tablet by mouth Daily As Needed (heartburn).     Dispense:  90 tablet     Refill:  3   • rizatriptan MLT (MAXALT-MLT) 10 MG disintegrating tablet     Sig: Take 1 tablet by mouth Daily As Needed for Migraine. Take 1 tab at onset of headache,  can repeat x 1 in 2 hrs prn     Dispense:  27 tablet     Refill:  3   • rosuvastatin (CRESTOR) 20 MG tablet     Sig: Take 1 tablet by mouth Every Night.     Dispense:  90 tablet     Refill:  3     Generic For:CRESTOR 20MG  11/02/2018 3:13:03 PM   • insulin degludec (TRESIBA FLEXTOUCH) 100 UNIT/ML solution pen-injector injection     Sig: Inject 50 Units under the skin into the appropriate area as directed every night at bedtime.     Dispense:  45 mL     Refill:  3     10/29/2018 7:37:01 AM     Return in about 3 months (around 4/7/2019) for Recheck.

## 2019-01-14 ENCOUNTER — TELEPHONE (OUTPATIENT)
Dept: FAMILY MEDICINE CLINIC | Facility: CLINIC | Age: 69
End: 2019-01-14

## 2019-04-08 ENCOUNTER — OFFICE VISIT (OUTPATIENT)
Dept: FAMILY MEDICINE CLINIC | Facility: CLINIC | Age: 69
End: 2019-04-08

## 2019-04-08 ENCOUNTER — LAB (OUTPATIENT)
Dept: LAB | Facility: HOSPITAL | Age: 69
End: 2019-04-08

## 2019-04-08 VITALS
BODY MASS INDEX: 37.6 KG/M2 | WEIGHT: 204.3 LBS | HEIGHT: 62 IN | OXYGEN SATURATION: 96 % | DIASTOLIC BLOOD PRESSURE: 60 MMHG | HEART RATE: 104 BPM | SYSTOLIC BLOOD PRESSURE: 104 MMHG

## 2019-04-08 DIAGNOSIS — M51.36 DEGENERATIVE DISC DISEASE, LUMBAR: Chronic | ICD-10-CM

## 2019-04-08 DIAGNOSIS — M15.9 OSTEOARTHRITIS OF MULTIPLE JOINTS, UNSPECIFIED OSTEOARTHRITIS TYPE: ICD-10-CM

## 2019-04-08 DIAGNOSIS — E11.65 TYPE 2 DIABETES MELLITUS WITH HYPERGLYCEMIA, WITH LONG-TERM CURRENT USE OF INSULIN (HCC): Primary | ICD-10-CM

## 2019-04-08 DIAGNOSIS — E11.65 TYPE 2 DIABETES MELLITUS WITH HYPERGLYCEMIA, WITHOUT LONG-TERM CURRENT USE OF INSULIN (HCC): Chronic | ICD-10-CM

## 2019-04-08 DIAGNOSIS — Z79.4 TYPE 2 DIABETES MELLITUS WITH HYPERGLYCEMIA, WITH LONG-TERM CURRENT USE OF INSULIN (HCC): Primary | ICD-10-CM

## 2019-04-08 LAB
ALBUMIN SERPL-MCNC: 4.6 G/DL (ref 3.5–5.2)
ALBUMIN/GLOB SERPL: 1.3 G/DL
ALP SERPL-CCNC: 78 U/L (ref 39–117)
ALT SERPL W P-5'-P-CCNC: 18 U/L (ref 1–33)
ANION GAP SERPL CALCULATED.3IONS-SCNC: 17.1 MMOL/L
ARTICHOKE IGE QN: 131 MG/DL (ref 0–100)
AST SERPL-CCNC: 11 U/L (ref 1–32)
BILIRUB SERPL-MCNC: 0.3 MG/DL (ref 0.2–1.2)
BUN BLD-MCNC: 30 MG/DL (ref 8–23)
BUN/CREAT SERPL: 27 (ref 7–25)
CALCIUM SPEC-SCNC: 10.3 MG/DL (ref 8.6–10.5)
CHLORIDE SERPL-SCNC: 94 MMOL/L (ref 98–107)
CO2 SERPL-SCNC: 25.9 MMOL/L (ref 22–29)
CREAT BLD-MCNC: 1.11 MG/DL (ref 0.57–1)
GFR SERPL CREATININE-BSD FRML MDRD: 49 ML/MIN/1.73
GLOBULIN UR ELPH-MCNC: 3.5 GM/DL
GLUCOSE BLD-MCNC: 202 MG/DL (ref 65–99)
HBA1C MFR BLD: 8.65 % (ref 4.8–5.6)
POTASSIUM BLD-SCNC: 4.5 MMOL/L (ref 3.5–5.2)
PROT SERPL-MCNC: 8.1 G/DL (ref 6–8.5)
SODIUM BLD-SCNC: 137 MMOL/L (ref 136–145)

## 2019-04-08 PROCEDURE — 80053 COMPREHEN METABOLIC PANEL: CPT

## 2019-04-08 PROCEDURE — 36415 COLL VENOUS BLD VENIPUNCTURE: CPT

## 2019-04-08 PROCEDURE — 83721 ASSAY OF BLOOD LIPOPROTEIN: CPT

## 2019-04-08 PROCEDURE — 99214 OFFICE O/P EST MOD 30 MIN: CPT | Performed by: GENERAL PRACTICE

## 2019-04-08 PROCEDURE — 83036 HEMOGLOBIN GLYCOSYLATED A1C: CPT

## 2019-04-08 RX ORDER — HYDROCODONE BITARTRATE AND ACETAMINOPHEN 7.5; 325 MG/1; MG/1
1 TABLET ORAL EVERY 6 HOURS PRN
Qty: 120 TABLET | Refills: 0 | Status: SHIPPED | OUTPATIENT
Start: 2019-04-08 | End: 2019-07-23 | Stop reason: SDUPTHER

## 2019-04-08 RX ORDER — GABAPENTIN 300 MG/1
600 CAPSULE ORAL 2 TIMES DAILY
Qty: 120 CAPSULE | Refills: 2 | Status: SHIPPED | OUTPATIENT
Start: 2019-04-08 | End: 2019-04-08 | Stop reason: SDUPTHER

## 2019-04-08 RX ORDER — GABAPENTIN 300 MG/1
600 CAPSULE ORAL 2 TIMES DAILY
Qty: 360 CAPSULE | Refills: 0 | Status: SHIPPED | OUTPATIENT
Start: 2019-04-08 | End: 2019-07-23 | Stop reason: SDUPTHER

## 2019-04-08 RX ORDER — ONDANSETRON 4 MG/1
4 TABLET, ORALLY DISINTEGRATING ORAL EVERY 8 HOURS PRN
Qty: 60 TABLET | Refills: 2 | Status: SHIPPED | OUTPATIENT
Start: 2019-04-08 | End: 2021-07-29 | Stop reason: SDUPTHER

## 2019-04-08 NOTE — PROGRESS NOTES
Subjective   Bertha Hyde is a 68 y.o. female.   Chief Complaint   Patient presents with   • Med Refill   • Shoulder Pain   • Diabetes     For review and evaluation of management of chronic medical problems. Labs pending.   Diabetes   She presents for her follow-up diabetic visit. She has type 2 diabetes mellitus. Her disease course has been stable. Pertinent negatives for hypoglycemia include no dizziness or nervousness/anxiousness. There are no diabetic associated symptoms. Pertinent negatives for diabetes include no fatigue and no weakness. There are no hypoglycemic complications. There are no diabetic complications. Current diabetic treatment includes insulin injections (Trulicity). She is compliant with treatment all of the time. Her weight is stable. She is following a generally healthy diet. Meal planning includes avoidance of concentrated sweets. She participates in exercise intermittently. There is no change in her home blood glucose trend. An ACE inhibitor/angiotensin II receptor blocker is being taken.   Arthritis   Presents for follow-up visit. She complains of pain and stiffness. She reports no joint swelling. The symptoms have been stable. Affected locations include the right knee and left knee. Her pain is at a severity of 4/10. Pertinent negatives include no diarrhea, dysuria, fatigue, fever or rash. Compliance with total regimen is %. Compliance with medications is %.   Back Pain   This is a chronic problem. The current episode started more than 1 year ago. The problem occurs constantly. The problem has been gradually worsening since onset. The pain is present in the lumbar spine. The pain is at a severity of 4/10. The pain is severe. The pain is worse during the day. The symptoms are aggravated by bending, standing and twisting. Pertinent negatives include no abdominal pain, dysuria, fever, numbness or weakness. She has tried analgesics for the symptoms. The treatment provided  moderate relief.      The following portions of the patient's history were reviewed and updated as appropriate: allergies, current medications, past social history and problem list.    Outpatient Medications Prior to Visit   Medication Sig Dispense Refill   • Alcohol Swabs pads USE 4 TIMES DAILY 150 each 11   • amLODIPine (NORVASC) 5 MG tablet Take 1 tablet by mouth Every Evening. 90 tablet 3   • aspirin 81 MG EC tablet Take 81 mg by mouth Daily.     • Blood Glucose Monitoring Suppl (BLOOD GLUCOSE MONITOR SYSTEM) W/DEVICE kit Test Blood Sugars Once Daily 1 each 0   • Dulaglutide (TRULICITY) 1.5 MG/0.5ML solution pen-injector Inject 1.5 mg under the skin into the appropriate area as directed 1 (One) Time Per Week. 12 pen 3   • DULoxetine (CYMBALTA) 60 MG capsule Take 1 capsule by mouth Daily. 90 capsule 1   • GLOBAL EASE INJECT PEN NEEDLES 31G X 5 MM misc USE 4 TIMES DAILY 150 each 11   • glucose blood test strip Test Blood Sugars Once Daily 100 each 3   • hyoscyamine (OSCIMIN) 0.125 MG SL tablet Place 0.125-0.25 mg under the tongue every 4 (four) hours as needed. (max 12 per 24 hours)     • insulin degludec (TRESIBA FLEXTOUCH) 100 UNIT/ML solution pen-injector injection Inject 50 Units under the skin into the appropriate area as directed every night at bedtime. 45 mL 3   • LANCETS MICRO THIN 33G misc Test Blood Sugars Once Daily 100 each 3   • lisinopril-hydrochlorothiazide (PRINZIDE,ZESTORETIC) 20-12.5 MG per tablet Take 1 tablet by mouth Daily. Indication: Essential (primary) hypertension 90 tablet 3   • loratadine (CLARITIN) 10 MG tablet Take 10 mg by mouth daily as needed. Indication: Allergic rhinitis     • pantoprazole (PROTONIX) 40 MG EC tablet Take 1 tablet by mouth Daily As Needed (heartburn). 90 tablet 3   • promethazine (PHENERGAN) 25 MG tablet Take 0.5-1 tablets by mouth Every 6 (Six) Hours As Needed for Nausea. 60 tablet 0   • rizatriptan MLT (MAXALT-MLT) 10 MG disintegrating tablet Take 1 tablet by  "mouth Daily As Needed for Migraine. Take 1 tab at onset of headache, can repeat x 1 in 2 hrs prn 27 tablet 3   • rosuvastatin (CRESTOR) 20 MG tablet Take 1 tablet by mouth Every Night. 90 tablet 3   • gabapentin (NEURONTIN) 300 MG capsule Take 2 capsules by mouth 2 (Two) Times a Day. 1 cap qam and 2 caps qhs 120 capsule 2   • HYDROcodone-acetaminophen (NORCO) 7.5-325 MG per tablet Take 1 tablet by mouth Every 6 (Six) Hours As Needed for Moderate Pain . 120 tablet 0   • ondansetron ODT (ZOFRAN ODT) 4 MG disintegrating tablet Take 1 tablet by mouth Every 8 (Eight) Hours As Needed for Nausea or Vomiting. 60 tablet 11     No facility-administered medications prior to visit.        Review of Systems   Constitutional: Negative.  Negative for chills, fatigue, fever and unexpected weight change.   HENT: Negative.  Negative for congestion, ear pain, hearing loss, nosebleeds, rhinorrhea, sneezing, sore throat and tinnitus.    Eyes: Negative.  Negative for discharge.   Respiratory: Negative.  Negative for cough and wheezing.    Cardiovascular: Negative.    Gastrointestinal: Negative.  Negative for abdominal pain, constipation, diarrhea, nausea and vomiting.   Endocrine: Negative.    Genitourinary: Negative.  Negative for dysuria, frequency and urgency.   Musculoskeletal: Positive for arthritis, back pain and stiffness. Negative for arthralgias and joint swelling.   Skin: Negative.  Negative for rash.   Allergic/Immunologic: Negative.    Neurological: Negative.  Negative for dizziness, weakness and numbness.   Hematological: Negative.  Negative for adenopathy.   Psychiatric/Behavioral: Negative.  Negative for dysphoric mood and sleep disturbance. The patient is not nervous/anxious.      Objective   Visit Vitals  /60   Pulse 104   Ht 157.5 cm (62\")   Wt 92.7 kg (204 lb 4.8 oz)   SpO2 96%   BMI 37.37 kg/m²     Physical Exam   Constitutional: She is oriented to person, place, and time. She appears well-developed and " well-nourished. No distress.   HENT:   Head: Normocephalic and atraumatic.   Nose: Nose normal.   Mouth/Throat: Oropharynx is clear and moist.   Eyes: Conjunctivae and EOM are normal. Pupils are equal, round, and reactive to light. Right eye exhibits no discharge. Left eye exhibits no discharge.   Neck: No thyromegaly present.   Cardiovascular: Normal rate, regular rhythm, normal heart sounds and intact distal pulses.   Pulmonary/Chest: Effort normal and breath sounds normal.   Musculoskeletal:        Right knee: Tenderness found. Medial joint line and lateral joint line tenderness noted.        Left knee: Tenderness found. Medial joint line and lateral joint line tenderness noted.        Lumbar back: She exhibits tenderness.   Lymphadenopathy:     She has no cervical adenopathy.   Neurological: She is alert and oriented to person, place, and time.   Skin: Skin is warm and dry.   Psychiatric: She has a normal mood and affect.   Nursing note and vitals reviewed.      Assessment/Plan   Problem List Items Addressed This Visit        Endocrine    Type 2 diabetes mellitus with hyperglycemia, without long-term current use of insulin (CMS/[facility]) - Primary (Chronic)       Musculoskeletal and Integument    Degenerative disc disease, lumbar (Chronic)    Relevant Medications    HYDROcodone-acetaminophen (NORCO) 7.5-325 MG per tablet    gabapentin (NEURONTIN) 300 MG capsule    Other Relevant Orders    Urine Drug Screen - Urine, Clean Catch    Degenerative joint disease involving multiple joints    Relevant Medications    HYDROcodone-acetaminophen (NORCO) 7.5-325 MG per tablet    Other Relevant Orders    Urine Drug Screen - Urine, Clean Catch          Will notify regarding results. Patient's Body mass index is 37.37 kg/m². BMI is above normal parameters. Recommendations include: exercise counseling and nutrition counseling.  Chris reviewed and appropriate. Not recommended to drive or operate heavy equipment while taking  potentially sedating meds.  Patient understands the risks associated with this controlled medication, including tolerance and addiction. They also agree to obtain this medication only from me, and not from a another provider, unless that provider is covering for me in my absence. They also agree to be compliant in dosing, and not self adjust the dose of medication.  A signed controlled substance agreement is on file, and they have received a controlled substance education sheet at this or a previous visit. They have also signed a consent for treatment with a controlled substance as per [facility] policy.      New Medications Ordered This Visit   Medications   • HYDROcodone-acetaminophen (NORCO) 7.5-325 MG per tablet     Sig: Take 1 tablet by mouth Every 6 (Six) Hours As Needed for Moderate Pain .     Dispense:  120 tablet     Refill:  0   • ondansetron ODT (ZOFRAN ODT) 4 MG disintegrating tablet     Sig: Take 1 tablet by mouth Every 8 (Eight) Hours As Needed for Nausea or Vomiting.     Dispense:  60 tablet     Refill:  2   • gabapentin (NEURONTIN) 300 MG capsule     Sig: Take 2 capsules by mouth 2 (Two) Times a Day. 1 cap qam and 2 caps qhs     Dispense:  360 capsule     Refill:  0     Return in about 3 months (around 7/8/2019) for Annual physical.

## 2019-04-10 DIAGNOSIS — Z12.31 ENCOUNTER FOR SCREENING MAMMOGRAM FOR MALIGNANT NEOPLASM OF BREAST: Primary | ICD-10-CM

## 2019-04-17 ENCOUNTER — TELEPHONE (OUTPATIENT)
Dept: FAMILY MEDICINE CLINIC | Facility: CLINIC | Age: 69
End: 2019-04-17

## 2019-04-17 NOTE — PROGRESS NOTES
Per Dr. Finn, Ms. Hyde has been called with recent lab results & recommendations.  Continue current medications and follow-up as planned or sooner if any problems.

## 2019-04-17 NOTE — TELEPHONE ENCOUNTER
Per Dr. Finn, Ms. Hyde has been called with recent lab results & recommendations.  Continue current medications and follow-up as planned or sooner if any problems.        ----- Message from Belinda Finn MD sent at 4/16/2019  6:18 PM CDT -----  Call and tell sugar and A1c is a little better, continue to try to improve her diet and lose some weight.  Cholesterol is also a little bit higher than usual.  Make sure she is taking her cholesterol medication on a daily basis.  Recheck as scheduled.

## 2019-07-22 ENCOUNTER — LAB (OUTPATIENT)
Dept: LAB | Facility: HOSPITAL | Age: 69
End: 2019-07-22

## 2019-07-22 DIAGNOSIS — M51.36 DEGENERATIVE DISC DISEASE, LUMBAR: Chronic | ICD-10-CM

## 2019-07-22 DIAGNOSIS — Z79.4 TYPE 2 DIABETES MELLITUS WITH HYPERGLYCEMIA, WITH LONG-TERM CURRENT USE OF INSULIN (HCC): ICD-10-CM

## 2019-07-22 DIAGNOSIS — E11.65 TYPE 2 DIABETES MELLITUS WITH HYPERGLYCEMIA, WITH LONG-TERM CURRENT USE OF INSULIN (HCC): ICD-10-CM

## 2019-07-22 DIAGNOSIS — M15.9 OSTEOARTHRITIS OF MULTIPLE JOINTS, UNSPECIFIED OSTEOARTHRITIS TYPE: ICD-10-CM

## 2019-07-22 LAB
ALBUMIN SERPL-MCNC: 4.4 G/DL (ref 3.5–5.2)
ALBUMIN UR-MCNC: 4.4 MG/L
ALBUMIN/GLOB SERPL: 1.4 G/DL
ALP SERPL-CCNC: 95 U/L (ref 39–117)
ALT SERPL W P-5'-P-CCNC: 17 U/L (ref 1–33)
AMPHET+METHAMPHET UR QL: NEGATIVE
ANION GAP SERPL CALCULATED.3IONS-SCNC: 18.1 MMOL/L (ref 5–15)
AST SERPL-CCNC: 16 U/L (ref 1–32)
BACTERIA UR QL AUTO: ABNORMAL /HPF
BARBITURATES UR QL SCN: NEGATIVE
BENZODIAZ UR QL SCN: NEGATIVE
BILIRUB SERPL-MCNC: 0.4 MG/DL (ref 0.2–1.2)
BILIRUB UR QL STRIP: ABNORMAL
BUN BLD-MCNC: 21 MG/DL (ref 8–23)
BUN/CREAT SERPL: 20.6 (ref 7–25)
CALCIUM SPEC-SCNC: 10.1 MG/DL (ref 8.6–10.5)
CANNABINOIDS SERPL QL: NEGATIVE
CHLORIDE SERPL-SCNC: 94 MMOL/L (ref 98–107)
CHOLEST SERPL-MCNC: 190 MG/DL (ref 0–200)
CLARITY UR: CLEAR
CO2 SERPL-SCNC: 24.9 MMOL/L (ref 22–29)
COCAINE UR QL: NEGATIVE
COLOR UR: ABNORMAL
CREAT BLD-MCNC: 1.02 MG/DL (ref 0.57–1)
GFR SERPL CREATININE-BSD FRML MDRD: 54 ML/MIN/1.73
GLOBULIN UR ELPH-MCNC: 3.2 GM/DL
GLUCOSE BLD-MCNC: 358 MG/DL (ref 65–99)
GLUCOSE UR STRIP-MCNC: ABNORMAL MG/DL
HBA1C MFR BLD: 12.37 % (ref 4.8–5.6)
HDLC SERPL-MCNC: 43 MG/DL (ref 40–60)
HGB UR QL STRIP.AUTO: ABNORMAL
HYALINE CASTS UR QL AUTO: ABNORMAL /LPF
KETONES UR QL STRIP: ABNORMAL
LDLC SERPL CALC-MCNC: 75 MG/DL (ref 0–100)
LDLC/HDLC SERPL: 1.74 {RATIO}
LEUKOCYTE ESTERASE UR QL STRIP.AUTO: NEGATIVE
METHADONE UR QL SCN: NEGATIVE
NITRITE UR QL STRIP: NEGATIVE
OPIATES UR QL: NEGATIVE
OXYCODONE UR QL SCN: NEGATIVE
PH UR STRIP.AUTO: 5 [PH] (ref 5–8)
POTASSIUM BLD-SCNC: 4.8 MMOL/L (ref 3.5–5.2)
PROT SERPL-MCNC: 7.6 G/DL (ref 6–8.5)
PROT UR QL STRIP: ABNORMAL
RBC # UR: ABNORMAL /HPF
REF LAB TEST METHOD: ABNORMAL
SODIUM BLD-SCNC: 137 MMOL/L (ref 136–145)
SP GR UR STRIP: 1.03 (ref 1–1.03)
SQUAMOUS #/AREA URNS HPF: ABNORMAL /HPF
TRIGL SERPL-MCNC: 361 MG/DL (ref 0–150)
UROBILINOGEN UR QL STRIP: ABNORMAL
VLDLC SERPL-MCNC: 72.2 MG/DL (ref 5–40)
WBC UR QL AUTO: ABNORMAL /HPF

## 2019-07-22 PROCEDURE — 80061 LIPID PANEL: CPT

## 2019-07-22 PROCEDURE — 80307 DRUG TEST PRSMV CHEM ANLYZR: CPT

## 2019-07-22 PROCEDURE — 80053 COMPREHEN METABOLIC PANEL: CPT

## 2019-07-22 PROCEDURE — 82043 UR ALBUMIN QUANTITATIVE: CPT

## 2019-07-22 PROCEDURE — 81015 MICROSCOPIC EXAM OF URINE: CPT

## 2019-07-22 PROCEDURE — 81003 URINALYSIS AUTO W/O SCOPE: CPT

## 2019-07-22 PROCEDURE — 83036 HEMOGLOBIN GLYCOSYLATED A1C: CPT

## 2019-07-23 ENCOUNTER — OFFICE VISIT (OUTPATIENT)
Dept: FAMILY MEDICINE CLINIC | Facility: CLINIC | Age: 69
End: 2019-07-23

## 2019-07-23 VITALS
HEART RATE: 110 BPM | BODY MASS INDEX: 37.73 KG/M2 | OXYGEN SATURATION: 94 % | HEIGHT: 62 IN | WEIGHT: 205 LBS | SYSTOLIC BLOOD PRESSURE: 110 MMHG | DIASTOLIC BLOOD PRESSURE: 72 MMHG

## 2019-07-23 DIAGNOSIS — I10 ESSENTIAL HYPERTENSION: Chronic | ICD-10-CM

## 2019-07-23 DIAGNOSIS — E78.2 MIXED HYPERLIPIDEMIA: Chronic | ICD-10-CM

## 2019-07-23 DIAGNOSIS — E11.65 TYPE 2 DIABETES MELLITUS WITH HYPERGLYCEMIA, WITHOUT LONG-TERM CURRENT USE OF INSULIN (HCC): Primary | Chronic | ICD-10-CM

## 2019-07-23 DIAGNOSIS — M51.36 DEGENERATIVE DISC DISEASE, LUMBAR: Chronic | ICD-10-CM

## 2019-07-23 DIAGNOSIS — M15.9 OSTEOARTHRITIS OF MULTIPLE JOINTS, UNSPECIFIED OSTEOARTHRITIS TYPE: ICD-10-CM

## 2019-07-23 DIAGNOSIS — Z12.31 ENCOUNTER FOR SCREENING MAMMOGRAM FOR MALIGNANT NEOPLASM OF BREAST: ICD-10-CM

## 2019-07-23 PROCEDURE — 99214 OFFICE O/P EST MOD 30 MIN: CPT | Performed by: GENERAL PRACTICE

## 2019-07-23 RX ORDER — PANTOPRAZOLE SODIUM 40 MG/1
40 TABLET, DELAYED RELEASE ORAL DAILY PRN
Qty: 90 TABLET | Refills: 3 | Status: SHIPPED | OUTPATIENT
Start: 2019-07-23 | End: 2020-04-07 | Stop reason: SDUPTHER

## 2019-07-23 RX ORDER — ROSUVASTATIN CALCIUM 20 MG/1
20 TABLET, COATED ORAL NIGHTLY
Qty: 90 TABLET | Refills: 3 | Status: SHIPPED | OUTPATIENT
Start: 2019-07-23 | End: 2021-07-29

## 2019-07-23 RX ORDER — GABAPENTIN 300 MG/1
600 CAPSULE ORAL 2 TIMES DAILY
Qty: 360 CAPSULE | Refills: 0 | Status: SHIPPED | OUTPATIENT
Start: 2019-07-23 | End: 2019-11-06 | Stop reason: SDUPTHER

## 2019-07-23 RX ORDER — AMLODIPINE BESYLATE 5 MG/1
5 TABLET ORAL EVERY EVENING
Qty: 90 TABLET | Refills: 3 | Status: SHIPPED | OUTPATIENT
Start: 2019-07-23 | End: 2021-01-07

## 2019-07-23 RX ORDER — RIZATRIPTAN BENZOATE 10 MG/1
10 TABLET, ORALLY DISINTEGRATING ORAL DAILY PRN
Qty: 27 TABLET | Refills: 3 | Status: SHIPPED | OUTPATIENT
Start: 2019-07-23 | End: 2019-11-06 | Stop reason: SDUPTHER

## 2019-07-23 RX ORDER — LISINOPRIL AND HYDROCHLOROTHIAZIDE 20; 12.5 MG/1; MG/1
1 TABLET ORAL DAILY
Qty: 90 TABLET | Refills: 3 | Status: SHIPPED | OUTPATIENT
Start: 2019-07-23 | End: 2020-04-07 | Stop reason: SDUPTHER

## 2019-07-23 RX ORDER — HYDROCODONE BITARTRATE AND ACETAMINOPHEN 7.5; 325 MG/1; MG/1
1 TABLET ORAL EVERY 6 HOURS PRN
Qty: 120 TABLET | Refills: 0 | Status: SHIPPED | OUTPATIENT
Start: 2019-07-23 | End: 2019-11-06 | Stop reason: SDUPTHER

## 2019-07-23 RX ORDER — DULOXETIN HYDROCHLORIDE 60 MG/1
60 CAPSULE, DELAYED RELEASE ORAL DAILY
Qty: 90 CAPSULE | Refills: 3 | Status: SHIPPED | OUTPATIENT
Start: 2019-07-23 | End: 2020-08-12 | Stop reason: SDUPTHER

## 2019-07-23 NOTE — PROGRESS NOTES
Subjective   Bertha Hyde is a 68 y.o. female.     Chief Complaint   Patient presents with   • Annual Exam   • Diabetes   • Hyperlipidemia   • Hypertension     For review and evaluation of management of chronic medical problems. Labs reviewed. Due for mammogram.  Admits to being out of her medications for the last month or 2 as she could not afford them.  Diabetes   She presents for her follow-up diabetic visit. She has type 2 diabetes mellitus. Her disease course has been stable. Pertinent negatives for hypoglycemia include no dizziness, headaches or nervousness/anxiousness. There are no diabetic associated symptoms. Pertinent negatives for diabetes include no chest pain, no fatigue and no weakness. There are no hypoglycemic complications. There are no diabetic complications. Current diabetic treatment includes oral agent (dual therapy). She is compliant with treatment all of the time. Her weight is stable. She is following a generally healthy diet. Meal planning includes avoidance of concentrated sweets. She participates in exercise intermittently. There is no change in her home blood glucose trend. An ACE inhibitor/angiotensin II receptor blocker is being taken.   Hyperlipidemia   This is a chronic problem. The current episode started more than 1 year ago. The problem is controlled. Recent lipid tests were reviewed and are variable. Exacerbating diseases include diabetes. Pertinent negatives include no chest pain, myalgias or shortness of breath. Current antihyperlipidemic treatment includes statins. The current treatment provides significant improvement of lipids. There are no compliance problems.    Hypertension   This is a chronic problem. The current episode started more than 1 year ago. The problem is unchanged. The problem is controlled. Associated symptoms include anxiety. Pertinent negatives include no chest pain, headaches, neck pain, palpitations or shortness of breath. There are no known risk  factors for coronary artery disease. Current antihypertension treatment includes ACE inhibitors and diuretics. The current treatment provides significant improvement. There are no compliance problems.    Arthritis   Presents for follow-up visit. She complains of pain and stiffness. She reports no joint swelling. The symptoms have been stable. Affected locations include the right knee and left knee. Her pain is at a severity of 8/10. Pertinent negatives include no diarrhea, dysuria, fatigue, fever or rash. Compliance with total regimen is %. Compliance with medications is %.   Back Pain   This is a chronic problem. The current episode started more than 1 year ago. The problem occurs constantly. The problem has been gradually worsening since onset. The pain is present in the lumbar spine. The pain is at a severity of 4/10. The pain is severe. The pain is worse during the day. The symptoms are aggravated by bending, standing and twisting. Pertinent negatives include no abdominal pain, chest pain, dysuria, fever, headaches, numbness or weakness. She has tried analgesics for the symptoms. The treatment provided moderate relief.      The following portions of the patient's history were reviewed and updated as appropriate: allergies, current medications, past family and social history and problem list.    Outpatient Medications Prior to Visit   Medication Sig Dispense Refill   • Alcohol Swabs pads USE 4 TIMES DAILY 150 each 11   • aspirin 81 MG EC tablet Take 81 mg by mouth Daily.     • Blood Glucose Monitoring Suppl (BLOOD GLUCOSE MONITOR SYSTEM) W/DEVICE kit Test Blood Sugars Once Daily 1 each 0   • GLOBAL EASE INJECT PEN NEEDLES 31G X 5 MM misc USE 4 TIMES DAILY 150 each 11   • glucose blood test strip Test Blood Sugars Once Daily 100 each 3   • hyoscyamine (OSCIMIN) 0.125 MG SL tablet Place 0.125-0.25 mg under the tongue every 4 (four) hours as needed. (max 12 per 24 hours)     • LANCETS MICRO THIN 33G misc  Test Blood Sugars Once Daily 100 each 3   • loratadine (CLARITIN) 10 MG tablet Take 10 mg by mouth daily as needed. Indication: Allergic rhinitis     • ondansetron ODT (ZOFRAN ODT) 4 MG disintegrating tablet Take 1 tablet by mouth Every 8 (Eight) Hours As Needed for Nausea or Vomiting. 60 tablet 2   • promethazine (PHENERGAN) 25 MG tablet Take 0.5-1 tablets by mouth Every 6 (Six) Hours As Needed for Nausea. 60 tablet 0   • amLODIPine (NORVASC) 5 MG tablet Take 1 tablet by mouth Every Evening. 90 tablet 3   • Dulaglutide (TRULICITY) 1.5 MG/0.5ML solution pen-injector Inject 1.5 mg under the skin into the appropriate area as directed 1 (One) Time Per Week. 12 pen 3   • DULoxetine (CYMBALTA) 60 MG capsule Take 1 capsule by mouth Daily. 90 capsule 1   • gabapentin (NEURONTIN) 300 MG capsule Take 2 capsules by mouth 2 (Two) Times a Day. 1 cap qam and 2 caps qhs 360 capsule 0   • HYDROcodone-acetaminophen (NORCO) 7.5-325 MG per tablet Take 1 tablet by mouth Every 6 (Six) Hours As Needed for Moderate Pain . 120 tablet 0   • insulin degludec (TRESIBA FLEXTOUCH) 100 UNIT/ML solution pen-injector injection Inject 50 Units under the skin into the appropriate area as directed every night at bedtime. 45 mL 3   • lisinopril-hydrochlorothiazide (PRINZIDE,ZESTORETIC) 20-12.5 MG per tablet Take 1 tablet by mouth Daily. Indication: Essential (primary) hypertension 90 tablet 3   • pantoprazole (PROTONIX) 40 MG EC tablet Take 1 tablet by mouth Daily As Needed (heartburn). 90 tablet 3   • rizatriptan MLT (MAXALT-MLT) 10 MG disintegrating tablet Take 1 tablet by mouth Daily As Needed for Migraine. Take 1 tab at onset of headache, can repeat x 1 in 2 hrs prn 27 tablet 3   • rosuvastatin (CRESTOR) 20 MG tablet Take 1 tablet by mouth Every Night. 90 tablet 3     No facility-administered medications prior to visit.        Review of Systems   Constitutional: Negative.  Negative for chills, fatigue, fever and unexpected weight change.   HENT:  "Negative.  Negative for congestion, ear pain, hearing loss, nosebleeds, rhinorrhea, sneezing, sore throat and tinnitus.    Eyes: Negative.  Negative for discharge.   Respiratory: Negative.  Negative for cough, shortness of breath and wheezing.    Cardiovascular: Negative.  Negative for chest pain and palpitations.   Gastrointestinal: Negative.  Negative for abdominal pain, constipation, diarrhea, nausea and vomiting.   Endocrine: Negative.    Genitourinary: Negative.  Negative for dysuria, frequency and urgency.   Musculoskeletal: Positive for arthritis, back pain and stiffness. Negative for arthralgias, joint swelling, myalgias and neck pain.   Skin: Negative.  Negative for rash.   Allergic/Immunologic: Negative.    Neurological: Negative.  Negative for dizziness, weakness, numbness and headaches.   Hematological: Negative.  Negative for adenopathy.   Psychiatric/Behavioral: Negative.  Negative for dysphoric mood and sleep disturbance. The patient is not nervous/anxious.        Objective     Visit Vitals  /72 (BP Location: Left arm)   Pulse 110   Ht 157.5 cm (62\")   Wt 93 kg (205 lb)   SpO2 94%   BMI 37.49 kg/m²     Physical Exam   Constitutional: She is oriented to person, place, and time. She appears well-developed and well-nourished. No distress.   HENT:   Head: Normocephalic and atraumatic.   Nose: Nose normal.   Mouth/Throat: Oropharynx is clear and moist.   Eyes: Conjunctivae and EOM are normal. Pupils are equal, round, and reactive to light. Right eye exhibits no discharge. Left eye exhibits no discharge.   Neck: No tracheal deviation present. No thyromegaly present.   Cardiovascular: Normal rate, regular rhythm, normal heart sounds and intact distal pulses.   No murmur heard.  Pulmonary/Chest: Effort normal and breath sounds normal. No respiratory distress. She has no wheezes. She has no rales. She exhibits no tenderness. Right breast exhibits no inverted nipple, no mass, no nipple discharge, no skin " change and no tenderness. Left breast exhibits no inverted nipple, no mass, no nipple discharge, no skin change and no tenderness.   Abdominal: Soft. Bowel sounds are normal. She exhibits no distension and no mass. There is no tenderness. No hernia.   Musculoskeletal: Normal range of motion. She exhibits no deformity.   Lymphadenopathy:     She has no cervical adenopathy.   Neurological: She is alert and oriented to person, place, and time. She has normal reflexes.   Skin: Skin is warm and dry.   Psychiatric: She has a normal mood and affect. Her behavior is normal. Judgment and thought content normal.   Nursing note and vitals reviewed.    Results for orders placed or performed in visit on 07/22/19   Comprehensive Metabolic Panel   Result Value Ref Range    Glucose 358 (H) 65 - 99 mg/dL    BUN 21 8 - 23 mg/dL    Creatinine 1.02 (H) 0.57 - 1.00 mg/dL    Sodium 137 136 - 145 mmol/L    Potassium 4.8 3.5 - 5.2 mmol/L    Chloride 94 (L) 98 - 107 mmol/L    CO2 24.9 22.0 - 29.0 mmol/L    Calcium 10.1 8.6 - 10.5 mg/dL    Total Protein 7.6 6.0 - 8.5 g/dL    Albumin 4.40 3.50 - 5.20 g/dL    ALT (SGPT) 17 1 - 33 U/L    AST (SGOT) 16 1 - 32 U/L    Alkaline Phosphatase 95 39 - 117 U/L    Total Bilirubin 0.4 0.2 - 1.2 mg/dL    eGFR Non African Amer 54 (L) >60 mL/min/1.73    Globulin 3.2 gm/dL    A/G Ratio 1.4 g/dL    BUN/Creatinine Ratio 20.6 7.0 - 25.0    Anion Gap 18.1 (H) 5.0 - 15.0 mmol/L   Hemoglobin A1c   Result Value Ref Range    Hemoglobin A1C 12.37 (H) 4.80 - 5.60 %   Lipid Panel   Result Value Ref Range    Total Cholesterol 190 0 - 200 mg/dL    Triglycerides 361 (H) 0 - 150 mg/dL    HDL Cholesterol 43 40 - 60 mg/dL    LDL Cholesterol  75 0 - 100 mg/dL    VLDL Cholesterol 72.2 (H) 5 - 40 mg/dL    LDL/HDL Ratio 1.74    MicroAlbumin, Urine, Random - Urine, Clean Catch   Result Value Ref Range    Microalbumin, Urine 4.4 mg/L   Urine Drug Screen - Urine, Clean Catch   Result Value Ref Range    Amphet/Methamphet, Screen  Negative Negative    Barbiturates Screen, Urine Negative Negative    Benzodiazepine Screen, Urine Negative Negative    Cocaine Screen, Urine Negative Negative    Opiate Screen Negative Negative    THC, Screen, Urine Negative Negative    Methadone Screen, Urine Negative Negative    Oxycodone Screen, Urine Negative Negative   Urinalysis without microscopic (no culture) - Urine, Clean Catch   Result Value Ref Range    Color, UA Dark Yellow (A) Yellow, Straw    Appearance, UA Clear Clear    pH, UA 5.0 5.0 - 8.0    Specific Gravity, UA 1.030 1.005 - 1.030    Glucose, UA >=1000 mg/dL (3+) (A) Negative    Ketones, UA 80 mg/dL (3+) (A) Negative    Bilirubin, UA Large (3+) (A) Negative    Blood, UA Trace (A) Negative    Protein, UA >=300 mg/dL (3+) (A) Negative    Leuk Esterase, UA Negative Negative    Nitrite, UA Negative Negative    Urobilinogen, UA 0.2 E.U./dL 0.2 - 1.0 E.U./dL   Urinalysis, Microscopic Only - Urine, Clean Catch   Result Value Ref Range    RBC, UA 0-2 (A) None Seen /HPF    WBC, UA 3-5 None Seen, 0-2, 3-5 /HPF    Bacteria, UA Trace (A) None Seen /HPF    Squamous Epithelial Cells, UA 0-2 None Seen, 0-2 /HPF    Hyaline Casts, UA 3-6 None Seen /LPF    Methodology Automated Microscopy       Assessment/Plan   Problem List Items Addressed This Visit        Cardiovascular and Mediastinum    Essential hypertension (Chronic)    Relevant Medications    amLODIPine (NORVASC) 5 MG tablet    lisinopril-hydrochlorothiazide (PRINZIDE,ZESTORETIC) 20-12.5 MG per tablet    Hyperlipidemia (Chronic)    Relevant Medications    rosuvastatin (CRESTOR) 20 MG tablet       Endocrine    Type 2 diabetes mellitus with hyperglycemia, without long-term current use of insulin (CMS/[facility]) - Primary (Chronic)    Relevant Medications    Dulaglutide (TRULICITY) 1.5 MG/0.5ML solution pen-injector    insulin degludec (TRESIBA FLEXTOUCH) 100 UNIT/ML solution pen-injector injection    Other Relevant Orders    Hemoglobin A1c    Comprehensive  Metabolic Panel       Musculoskeletal and Integument    Degenerative disc disease, lumbar (Chronic)    Relevant Medications    HYDROcodone-acetaminophen (NORCO) 7.5-325 MG per tablet    gabapentin (NEURONTIN) 300 MG capsule    Degenerative joint disease involving multiple joints    Relevant Medications    HYDROcodone-acetaminophen (NORCO) 7.5-325 MG per tablet      Other Visit Diagnoses     Encounter for screening mammogram for malignant neoplasm of breast             Will notify regarding results.  Start back on medications as prescribed.  Hopefully her sugars will improve when she is back on her meds. Chris reviewed and appropriate. Not recommended to drive or operate heavy equipment while taking potentially sedating meds.  Patient understands the risks associated with this controlled medication, including tolerance and addiction. They also agree to obtain this medication only from me, and not from a another provider, unless that provider is covering for me in my absence. They also agree to be compliant in dosing, and not self adjust the dose of medication.  A signed controlled substance agreement is on file, and they have received a controlled substance education sheet at this or a previous visit. They have also signed a consent for treatment with a controlled substance as per [facility] policy.        New Medications Ordered This Visit   Medications   • amLODIPine (NORVASC) 5 MG tablet     Sig: Take 1 tablet by mouth Every Evening.     Dispense:  90 tablet     Refill:  3   • Dulaglutide (TRULICITY) 1.5 MG/0.5ML solution pen-injector     Sig: Inject 1.5 mg under the skin into the appropriate area as directed 1 (One) Time Per Week.     Dispense:  12 pen     Refill:  3   • DULoxetine (CYMBALTA) 60 MG capsule     Sig: Take 1 capsule by mouth Daily.     Dispense:  90 capsule     Refill:  3   • rosuvastatin (CRESTOR) 20 MG tablet     Sig: Take 1 tablet by mouth Every Night.     Dispense:  90 tablet     Refill:  3      Generic For:CRESTOR 20MG  11/02/2018 3:13:03 PM   • rizatriptan MLT (MAXALT-MLT) 10 MG disintegrating tablet     Sig: Take 1 tablet by mouth Daily As Needed for Migraine. Take 1 tab at onset of headache, can repeat x 1 in 2 hrs prn     Dispense:  27 tablet     Refill:  3   • pantoprazole (PROTONIX) 40 MG EC tablet     Sig: Take 1 tablet by mouth Daily As Needed (heartburn).     Dispense:  90 tablet     Refill:  3   • lisinopril-hydrochlorothiazide (PRINZIDE,ZESTORETIC) 20-12.5 MG per tablet     Sig: Take 1 tablet by mouth Daily. Indication: Essential (primary) hypertension     Dispense:  90 tablet     Refill:  3   • insulin degludec (TRESIBA FLEXTOUCH) 100 UNIT/ML solution pen-injector injection     Sig: Inject 50 Units under the skin into the appropriate area as directed every night at bedtime.     Dispense:  45 mL     Refill:  3     10/29/2018 7:37:01 AM   • HYDROcodone-acetaminophen (NORCO) 7.5-325 MG per tablet     Sig: Take 1 tablet by mouth Every 6 (Six) Hours As Needed for Moderate Pain .     Dispense:  120 tablet     Refill:  0   • gabapentin (NEURONTIN) 300 MG capsule     Sig: Take 2 capsules by mouth 2 (Two) Times a Day.     Dispense:  360 capsule     Refill:  0     Return in about 3 months (around 10/23/2019) for Recheck, medicare wellness visit.

## 2019-08-22 ENCOUNTER — OFFICE VISIT (OUTPATIENT)
Dept: FAMILY MEDICINE CLINIC | Facility: CLINIC | Age: 69
End: 2019-08-22

## 2019-08-22 VITALS
TEMPERATURE: 97.5 F | BODY MASS INDEX: 37.93 KG/M2 | DIASTOLIC BLOOD PRESSURE: 68 MMHG | SYSTOLIC BLOOD PRESSURE: 105 MMHG | HEIGHT: 62 IN | WEIGHT: 206.1 LBS | OXYGEN SATURATION: 98 % | HEART RATE: 81 BPM

## 2019-08-22 DIAGNOSIS — H60.501 ACUTE OTITIS EXTERNA OF RIGHT EAR, UNSPECIFIED TYPE: Primary | ICD-10-CM

## 2019-08-22 PROCEDURE — 99213 OFFICE O/P EST LOW 20 MIN: CPT | Performed by: GENERAL PRACTICE

## 2019-08-22 NOTE — PROGRESS NOTES
Subjective   Bertha Hyde is a 68 y.o. female.   Chief Complaint   Patient presents with   • Earache     right     Earache    There is pain in the right ear. This is a new problem. The current episode started yesterday. The problem occurs constantly. The problem has been gradually worsening. There has been no fever. The pain is at a severity of 8/10. The pain is severe. Associated symptoms include headaches and rhinorrhea. Pertinent negatives include no abdominal pain, coughing, diarrhea, hearing loss, neck pain, rash, sore throat or vomiting. She has tried nothing for the symptoms.      The following portions of the patient's history were reviewed and updated as appropriate: allergies, current medications, past social history and problem list.    Outpatient Medications Prior to Visit   Medication Sig Dispense Refill   • Alcohol Swabs pads USE 4 TIMES DAILY 150 each 11   • amLODIPine (NORVASC) 5 MG tablet Take 1 tablet by mouth Every Evening. 90 tablet 3   • aspirin 81 MG EC tablet Take 81 mg by mouth Daily.     • Blood Glucose Monitoring Suppl (BLOOD GLUCOSE MONITOR SYSTEM) W/DEVICE kit Test Blood Sugars Once Daily 1 each 0   • Dulaglutide (TRULICITY) 1.5 MG/0.5ML solution pen-injector Inject 1.5 mg under the skin into the appropriate area as directed 1 (One) Time Per Week. 12 pen 3   • DULoxetine (CYMBALTA) 60 MG capsule Take 1 capsule by mouth Daily. 90 capsule 3   • gabapentin (NEURONTIN) 300 MG capsule Take 2 capsules by mouth 2 (Two) Times a Day. 360 capsule 0   • GLOBAL EASE INJECT PEN NEEDLES 31G X 5 MM misc USE 4 TIMES DAILY 150 each 11   • glucose blood test strip Test Blood Sugars Once Daily 100 each 3   • HYDROcodone-acetaminophen (NORCO) 7.5-325 MG per tablet Take 1 tablet by mouth Every 6 (Six) Hours As Needed for Moderate Pain . 120 tablet 0   • hyoscyamine (OSCIMIN) 0.125 MG SL tablet Place 0.125-0.25 mg under the tongue every 4 (four) hours as needed. (max 12 per 24 hours)     • insulin  degludec (TRESIBA FLEXTOUCH) 100 UNIT/ML solution pen-injector injection Inject 50 Units under the skin into the appropriate area as directed every night at bedtime. 45 mL 3   • LANCETS MICRO THIN 33G misc Test Blood Sugars Once Daily 100 each 3   • lisinopril-hydrochlorothiazide (PRINZIDE,ZESTORETIC) 20-12.5 MG per tablet Take 1 tablet by mouth Daily. Indication: Essential (primary) hypertension 90 tablet 3   • loratadine (CLARITIN) 10 MG tablet Take 10 mg by mouth daily as needed. Indication: Allergic rhinitis     • ondansetron ODT (ZOFRAN ODT) 4 MG disintegrating tablet Take 1 tablet by mouth Every 8 (Eight) Hours As Needed for Nausea or Vomiting. 60 tablet 2   • pantoprazole (PROTONIX) 40 MG EC tablet Take 1 tablet by mouth Daily As Needed (heartburn). 90 tablet 3   • promethazine (PHENERGAN) 25 MG tablet Take 0.5-1 tablets by mouth Every 6 (Six) Hours As Needed for Nausea. 60 tablet 0   • rizatriptan MLT (MAXALT-MLT) 10 MG disintegrating tablet Take 1 tablet by mouth Daily As Needed for Migraine. Take 1 tab at onset of headache, can repeat x 1 in 2 hrs prn 27 tablet 3   • rosuvastatin (CRESTOR) 20 MG tablet Take 1 tablet by mouth Every Night. 90 tablet 3     No facility-administered medications prior to visit.        Review of Systems   Constitutional: Negative.  Negative for chills, fatigue, fever and unexpected weight change.   HENT: Positive for ear pain and rhinorrhea. Negative for congestion, hearing loss, nosebleeds, sneezing, sore throat and tinnitus.    Eyes: Negative.  Negative for discharge.   Respiratory: Negative.  Negative for cough, shortness of breath and wheezing.    Cardiovascular: Negative.  Negative for chest pain and palpitations.   Gastrointestinal: Negative.  Negative for abdominal pain, constipation, diarrhea, nausea and vomiting.   Endocrine: Negative.    Genitourinary: Negative.  Negative for dysuria, frequency and urgency.   Musculoskeletal: Negative.  Negative for arthralgias, back  "pain, joint swelling, myalgias and neck pain.   Skin: Negative.  Negative for rash.   Allergic/Immunologic: Negative.    Neurological: Positive for headaches. Negative for dizziness, weakness and numbness.   Hematological: Negative.  Negative for adenopathy.   Psychiatric/Behavioral: Negative.  Negative for dysphoric mood and sleep disturbance. The patient is not nervous/anxious.        Objective   Visit Vitals  /68 (BP Location: Left arm)   Pulse 81   Temp 97.5 °F (36.4 °C)   Ht 157.5 cm (62\")   Wt 93.5 kg (206 lb 1.6 oz)   SpO2 98%   BMI 37.70 kg/m²     Physical Exam   Constitutional: She is oriented to person, place, and time. She appears well-developed and well-nourished. No distress.   HENT:   Head: Normocephalic and atraumatic.   Right Ear: Tympanic membrane is not injected.   Left Ear: Tympanic membrane, external ear and ear canal normal.   Ears:    Nose: Nose normal.   Mouth/Throat: Oropharynx is clear and moist.   Eyes: Conjunctivae and EOM are normal. Pupils are equal, round, and reactive to light. Right eye exhibits no discharge. Left eye exhibits no discharge.   Neck: No thyromegaly present.   Cardiovascular: Normal rate, regular rhythm, normal heart sounds and intact distal pulses.   Pulmonary/Chest: Effort normal and breath sounds normal.   Lymphadenopathy:     She has no cervical adenopathy.   Neurological: She is alert and oriented to person, place, and time.   Skin: Skin is warm and dry.   Psychiatric: She has a normal mood and affect.   Nursing note and vitals reviewed.      Assessment/Plan   Problem List Items Addressed This Visit     None      Visit Diagnoses     Acute otitis externa of right ear, unspecified type    -  Primary         Advised not to put anything in her ear. Recheck if not improving.      New Medications Ordered This Visit   Medications   • neomycin-polymyxin-hydrocortisone (CORTISPORIN) 3.5-59130-0 otic solution     Sig: Administer 3 drops to the right ear 4 (Four) Times a " Day.     Dispense:  10 mL     Refill:  0     Return if symptoms worsen or fail to improve, for Next scheduled follow up.

## 2019-09-30 ENCOUNTER — OFFICE VISIT (OUTPATIENT)
Dept: FAMILY MEDICINE CLINIC | Facility: CLINIC | Age: 69
End: 2019-09-30

## 2019-09-30 VITALS
WEIGHT: 193.5 LBS | SYSTOLIC BLOOD PRESSURE: 110 MMHG | OXYGEN SATURATION: 96 % | DIASTOLIC BLOOD PRESSURE: 60 MMHG | BODY MASS INDEX: 35.61 KG/M2 | HEART RATE: 116 BPM | HEIGHT: 62 IN

## 2019-09-30 DIAGNOSIS — Z23 NEED FOR IMMUNIZATION AGAINST INFLUENZA: ICD-10-CM

## 2019-09-30 DIAGNOSIS — N90.7 LABIAL CYST: Primary | ICD-10-CM

## 2019-09-30 PROCEDURE — 99213 OFFICE O/P EST LOW 20 MIN: CPT | Performed by: GENERAL PRACTICE

## 2019-09-30 PROCEDURE — G0008 ADMIN INFLUENZA VIRUS VAC: HCPCS | Performed by: GENERAL PRACTICE

## 2019-09-30 PROCEDURE — 90674 CCIIV4 VAC NO PRSV 0.5 ML IM: CPT | Performed by: GENERAL PRACTICE

## 2019-09-30 RX ORDER — CEPHALEXIN 500 MG/1
500 CAPSULE ORAL 3 TIMES DAILY
Qty: 21 CAPSULE | Refills: 0 | Status: SHIPPED | OUTPATIENT
Start: 2019-09-30 | End: 2019-11-01

## 2019-10-31 ENCOUNTER — TELEPHONE (OUTPATIENT)
Dept: FAMILY MEDICINE CLINIC | Facility: CLINIC | Age: 69
End: 2019-10-31

## 2019-11-06 ENCOUNTER — OFFICE VISIT (OUTPATIENT)
Dept: FAMILY MEDICINE CLINIC | Facility: CLINIC | Age: 69
End: 2019-11-06

## 2019-11-06 ENCOUNTER — LAB (OUTPATIENT)
Dept: LAB | Facility: HOSPITAL | Age: 69
End: 2019-11-06

## 2019-11-06 VITALS
SYSTOLIC BLOOD PRESSURE: 120 MMHG | WEIGHT: 199.5 LBS | OXYGEN SATURATION: 98 % | HEIGHT: 62 IN | DIASTOLIC BLOOD PRESSURE: 70 MMHG | BODY MASS INDEX: 36.71 KG/M2 | HEART RATE: 89 BPM

## 2019-11-06 DIAGNOSIS — I10 ESSENTIAL HYPERTENSION: Chronic | ICD-10-CM

## 2019-11-06 DIAGNOSIS — Z00.00 MEDICARE ANNUAL WELLNESS VISIT, SUBSEQUENT: Primary | ICD-10-CM

## 2019-11-06 DIAGNOSIS — E11.65 TYPE 2 DIABETES MELLITUS WITH HYPERGLYCEMIA, WITHOUT LONG-TERM CURRENT USE OF INSULIN (HCC): Chronic | ICD-10-CM

## 2019-11-06 DIAGNOSIS — G43.009 MIGRAINE WITHOUT AURA AND WITHOUT STATUS MIGRAINOSUS, NOT INTRACTABLE: Chronic | ICD-10-CM

## 2019-11-06 DIAGNOSIS — M51.36 DEGENERATIVE DISC DISEASE, LUMBAR: Chronic | ICD-10-CM

## 2019-11-06 DIAGNOSIS — M15.9 OSTEOARTHRITIS OF MULTIPLE JOINTS, UNSPECIFIED OSTEOARTHRITIS TYPE: ICD-10-CM

## 2019-11-06 LAB
ALBUMIN SERPL-MCNC: 3.9 G/DL (ref 3.5–5.2)
ALBUMIN/GLOB SERPL: 1.1 G/DL
ALP SERPL-CCNC: 98 U/L (ref 39–117)
ALT SERPL W P-5'-P-CCNC: 15 U/L (ref 1–33)
ANION GAP SERPL CALCULATED.3IONS-SCNC: 14.5 MMOL/L (ref 5–15)
AST SERPL-CCNC: 12 U/L (ref 1–32)
BILIRUB SERPL-MCNC: 0.2 MG/DL (ref 0.2–1.2)
BUN BLD-MCNC: 25 MG/DL (ref 8–23)
BUN/CREAT SERPL: 29.1 (ref 7–25)
CALCIUM SPEC-SCNC: 9.5 MG/DL (ref 8.6–10.5)
CHLORIDE SERPL-SCNC: 97 MMOL/L (ref 98–107)
CO2 SERPL-SCNC: 24.5 MMOL/L (ref 22–29)
CREAT BLD-MCNC: 0.86 MG/DL (ref 0.57–1)
GFR SERPL CREATININE-BSD FRML MDRD: 66 ML/MIN/1.73
GLOBULIN UR ELPH-MCNC: 3.4 GM/DL
GLUCOSE BLD-MCNC: 300 MG/DL (ref 65–99)
HBA1C MFR BLD: 11.6 % (ref 4.8–5.6)
POTASSIUM BLD-SCNC: 4.6 MMOL/L (ref 3.5–5.2)
PROT SERPL-MCNC: 7.3 G/DL (ref 6–8.5)
SODIUM BLD-SCNC: 136 MMOL/L (ref 136–145)

## 2019-11-06 PROCEDURE — 80053 COMPREHEN METABOLIC PANEL: CPT

## 2019-11-06 PROCEDURE — G0439 PPPS, SUBSEQ VISIT: HCPCS | Performed by: GENERAL PRACTICE

## 2019-11-06 PROCEDURE — 99214 OFFICE O/P EST MOD 30 MIN: CPT | Performed by: GENERAL PRACTICE

## 2019-11-06 PROCEDURE — 83036 HEMOGLOBIN GLYCOSYLATED A1C: CPT

## 2019-11-06 RX ORDER — GABAPENTIN 300 MG/1
600 CAPSULE ORAL 2 TIMES DAILY
Qty: 360 CAPSULE | Refills: 0 | Status: SHIPPED | OUTPATIENT
Start: 2019-11-06 | End: 2020-01-10 | Stop reason: SDUPTHER

## 2019-11-06 RX ORDER — RIZATRIPTAN BENZOATE 10 MG/1
10 TABLET, ORALLY DISINTEGRATING ORAL DAILY PRN
Qty: 27 TABLET | Refills: 3 | Status: SHIPPED | OUTPATIENT
Start: 2019-11-06 | End: 2020-04-16 | Stop reason: SDUPTHER

## 2019-11-06 RX ORDER — HYDROCODONE BITARTRATE AND ACETAMINOPHEN 7.5; 325 MG/1; MG/1
1 TABLET ORAL EVERY 6 HOURS PRN
Qty: 120 TABLET | Refills: 0 | Status: SHIPPED | OUTPATIENT
Start: 2019-11-06 | End: 2020-02-07 | Stop reason: SDUPTHER

## 2019-11-06 NOTE — PROGRESS NOTES
The ABCs of the Annual Wellness Visit  Subsequent Medicare Wellness Visit    Chief Complaint   Patient presents with   • Medicare Wellness-subsequent   • Diabetes   • Hyperlipidemia   • Hypertension       Subjective   History of Present Illness:  Bertha Hyde is a 68 y.o. female who presents for a Subsequent Medicare Wellness Visit.    HEALTH RISK ASSESSMENT    Recent Hospitalizations:  No hospitalization(s) within the last year.    Current Medical Providers:  Patient Care Team:  Belinda Finn MD as PCP - General  Belinda Finn MD as PCP - Claims Attributed  Yonatan Green MD as Consulting Physician (Ophthalmology)  Kathleen Cook MA (Inactive) as Medical Assistant    Smoking Status:  Social History     Tobacco Use   Smoking Status Never Smoker   Smokeless Tobacco Never Used       Alcohol Consumption:  Social History     Substance and Sexual Activity   Alcohol Use No       Depression Screen:   PHQ-2/PHQ-9 Depression Screening 11/6/2019   Little interest or pleasure in doing things 3   Feeling down, depressed, or hopeless 3   Trouble falling or staying asleep, or sleeping too much 3   Feeling tired or having little energy 3   Poor appetite or overeating 1   Feeling bad about yourself - or that you are a failure or have let yourself or your family down 0   Trouble concentrating on things, such as reading the newspaper or watching television 0   Moving or speaking so slowly that other people could have noticed. Or the opposite - being so fidgety or restless that you have been moving around a lot more than usual 0   Thoughts that you would be better off dead, or of hurting yourself in some way 0   Total Score 13   If you checked off any problems, how difficult have these problems made it for you to do your work, take care of things at home, or get along with other people? Somewhat difficult       Fall Risk Screen:  STEADI Fall Risk Assessment was completed, and patient is at LOW  risk for falls.Assessment completed on:9/30/2019    Health Habits and Functional and Cognitive Screening:  Functional & Cognitive Status 11/6/2019   Do you have difficulty preparing food and eating? No   Do you have difficulty bathing yourself, getting dressed or grooming yourself? No   Do you have difficulty using the toilet? No   Do you have difficulty moving around from place to place? No   Do you have trouble with steps or getting out of a bed or a chair? No   Current Diet Well Balanced Diet   Dental Exam Not up to date   Eye Exam Up to date   Exercise (times per week) 5 times per week   Current Exercise Activities Include -   Do you need help using the phone?  No   Are you deaf or do you have serious difficulty hearing?  No   Do you need help with transportation? No   Do you need help shopping? No   Do you need help preparing meals?  No   Do you need help with housework?  No   Do you need help with laundry? No   Do you need help taking your medications? No   Do you need help managing money? No   Do you ever drive or ride in a car without wearing a seat belt? No   Have you felt unusual stress, anger or loneliness in the last month? Yes   Who do you live with? Alone   If you need help, do you have trouble finding someone available to you? No   Have you been bothered in the last four weeks by sexual problems? No   Do you have difficulty concentrating, remembering or making decisions? No         Does the patient have evidence of cognitive impairment? No    Asprin use counseling:Taking ASA appropriately as indicated    Age-appropriate Screening Schedule:  Refer to the list below for future screening recommendations based on patient's age, sex and/or medical conditions. Orders for these recommended tests are listed in the plan section. The patient has been provided with a written plan.    Health Maintenance   Topic Date Due   • ZOSTER VACCINE (2 of 2) 08/11/2017   • HEMOGLOBIN A1C  01/22/2020   • DIABETIC FOOT EXAM   04/08/2020   • PNEUMOCOCCAL VACCINES (65+ LOW/MEDIUM RISK) (2 of 2 - PPSV23) 05/06/2020   • LIPID PANEL  07/22/2020   • URINE MICROALBUMIN  07/22/2020   • DIABETIC EYE EXAM  09/09/2020   • MAMMOGRAM  07/23/2021   • DXA SCAN  06/22/2023   • TDAP/TD VACCINES (2 - Td) 06/16/2027   • INFLUENZA VACCINE  Completed          The following portions of the patient's history were reviewed and updated as appropriate: allergies, current medications, past family history, past medical history, past social history, past surgical history and problem list.    Outpatient Medications Prior to Visit   Medication Sig Dispense Refill   • Alcohol Swabs pads USE 4 TIMES DAILY 150 each 11   • amLODIPine (NORVASC) 5 MG tablet Take 1 tablet by mouth Every Evening. 90 tablet 3   • aspirin 81 MG EC tablet Take 81 mg by mouth Daily.     • Blood Glucose Monitoring Suppl (BLOOD GLUCOSE MONITOR SYSTEM) W/DEVICE kit Test Blood Sugars Once Daily 1 each 0   • Dulaglutide (TRULICITY) 1.5 MG/0.5ML solution pen-injector Inject 1.5 mg under the skin into the appropriate area as directed 1 (One) Time Per Week. 12 pen 3   • DULoxetine (CYMBALTA) 60 MG capsule Take 1 capsule by mouth Daily. 90 capsule 3   • GLOBAL EASE INJECT PEN NEEDLES 31G X 5 MM misc USE 4 TIMES DAILY 150 each 11   • glucose blood test strip Test Blood Sugars Once Daily 100 each 3   • hyoscyamine (OSCIMIN) 0.125 MG SL tablet Place 0.125-0.25 mg under the tongue every 4 (four) hours as needed. (max 12 per 24 hours)     • insulin degludec (TRESIBA FLEXTOUCH) 100 UNIT/ML solution pen-injector injection Inject 50 Units under the skin into the appropriate area as directed every night at bedtime. 45 mL 3   • LANCETS MICRO THIN 33G misc Test Blood Sugars Once Daily 100 each 3   • lisinopril-hydrochlorothiazide (PRINZIDE,ZESTORETIC) 20-12.5 MG per tablet Take 1 tablet by mouth Daily. Indication: Essential (primary) hypertension 90 tablet 3   • loratadine (CLARITIN) 10 MG tablet Take 10 mg by mouth  daily as needed. Indication: Allergic rhinitis     • neomycin-polymyxin-hydrocortisone (CORTISPORIN) 3.5-60193-9 otic solution Administer 3 drops to the right ear 4 (Four) Times a Day. 10 mL 0   • ondansetron ODT (ZOFRAN ODT) 4 MG disintegrating tablet Take 1 tablet by mouth Every 8 (Eight) Hours As Needed for Nausea or Vomiting. 60 tablet 2   • pantoprazole (PROTONIX) 40 MG EC tablet Take 1 tablet by mouth Daily As Needed (heartburn). 90 tablet 3   • promethazine (PHENERGAN) 25 MG tablet Take 1 tablet by mouth Every 6 (Six) Hours As Needed for Nausea. 30 tablet 0   • rosuvastatin (CRESTOR) 20 MG tablet Take 1 tablet by mouth Every Night. 90 tablet 3   • gabapentin (NEURONTIN) 300 MG capsule Take 2 capsules by mouth 2 (Two) Times a Day. 360 capsule 0   • HYDROcodone-acetaminophen (NORCO) 7.5-325 MG per tablet Take 1 tablet by mouth Every 6 (Six) Hours As Needed for Moderate Pain . 120 tablet 0   • rizatriptan MLT (MAXALT-MLT) 10 MG disintegrating tablet Take 1 tablet by mouth Daily As Needed for Migraine. Take 1 tab at onset of headache, can repeat x 1 in 2 hrs prn 27 tablet 3     No facility-administered medications prior to visit.        Patient Active Problem List   Diagnosis   • Degenerative joint disease involving multiple joints   • Depressive disorder   • Essential hypertension   • Hyperlipidemia   • Insomnia   • Migraine   • Type 2 diabetes mellitus with hyperglycemia, without long-term current use of insulin (CMS/[facility])   • Open-angle glaucoma   • Cataract   • Open-angle glaucoma of right eye   • Chronic right shoulder pain   • Colitis   • Degenerative disc disease, lumbar       Advanced Care Planning:  Patient does not have an advance directive - information provided to the patient today    Review of Systems   Constitutional: Negative.  Negative for chills, fatigue, fever and unexpected weight change.   HENT: Negative.  Negative for congestion, ear pain, hearing loss, nosebleeds, rhinorrhea, sneezing, sore  "throat and tinnitus.    Eyes: Negative.  Negative for discharge.   Respiratory: Negative.  Negative for cough, shortness of breath and wheezing.    Cardiovascular: Negative.  Negative for chest pain and palpitations.   Gastrointestinal: Negative.  Negative for abdominal pain, constipation, diarrhea, nausea and vomiting.   Endocrine: Negative.    Genitourinary: Negative.  Negative for dysuria, frequency and urgency.   Musculoskeletal: Positive for arthralgias and back pain. Negative for joint swelling, myalgias and neck pain.   Skin: Negative.  Negative for rash.   Allergic/Immunologic: Negative.    Neurological: Negative.  Negative for dizziness, weakness, numbness and headaches.   Hematological: Negative.  Negative for adenopathy.   Psychiatric/Behavioral: Negative.  Negative for dysphoric mood and sleep disturbance. The patient is not nervous/anxious.        Compared to one year ago, the patient feels her physical health is better.  Compared to one year ago, the patient feels her mental health is the same.    Reviewed chart for potential of high risk medication in the elderly: yes  Reviewed chart for potential of harmful drug interactions in the elderly:not applicable    Objective         Vitals:    11/06/19 1116   BP: 120/70   BP Location: Left arm   Pulse: 89   SpO2: 98%   Weight: 90.5 kg (199 lb 8 oz)   Height: 157.5 cm (62\")   PainSc:   2   PainLoc: Back       Body mass index is 36.49 kg/m².  Discussed the patient's BMI with her. The BMI is above average; BMI management plan is completed.    Physical Exam   Constitutional: She is oriented to person, place, and time. She appears well-developed and well-nourished. No distress.   HENT:   Head: Normocephalic and atraumatic.   Nose: Nose normal.   Mouth/Throat: Oropharynx is clear and moist.   Eyes: Conjunctivae and EOM are normal. Pupils are equal, round, and reactive to light. Right eye exhibits no discharge. Left eye exhibits no discharge.   Neck: No thyromegaly " present.   Cardiovascular: Normal rate, regular rhythm, normal heart sounds and intact distal pulses.   Pulmonary/Chest: Effort normal and breath sounds normal.   Lymphadenopathy:     She has no cervical adenopathy.   Neurological: She is alert and oriented to person, place, and time.   Skin: Skin is warm and dry.   Psychiatric: She has a normal mood and affect.   Nursing note and vitals reviewed.    Assessment/Plan   Medicare Risks and Personalized Health Plan  CMS Preventative Services Quick Reference  Advance Directive Discussion  Chronic Pain   Depression/Dysphoria  Fall Risk  Immunizations Discussed/Encouraged (specific immunizations; Shingrix )  Obesity/Overweight     The above risks/problems have been discussed with the patient.  Pertinent information has been shared with the patient in the After Visit Summary.  Follow up plans and orders are seen below in the Assessment/Plan Section.    Diagnoses and all orders for this visit:    1. Medicare annual wellness visit, subsequent (Primary)    2. Osteoarthritis of multiple joints, unspecified osteoarthritis type  -     HYDROcodone-acetaminophen (NORCO) 7.5-325 MG per tablet; Take 1 tablet by mouth Every 6 (Six) Hours As Needed for Moderate Pain .  Dispense: 120 tablet; Refill: 0    3. Degenerative disc disease, lumbar  -     HYDROcodone-acetaminophen (NORCO) 7.5-325 MG per tablet; Take 1 tablet by mouth Every 6 (Six) Hours As Needed for Moderate Pain .  Dispense: 120 tablet; Refill: 0  -     gabapentin (NEURONTIN) 300 MG capsule; Take 2 capsules by mouth 2 (Two) Times a Day.  Dispense: 360 capsule; Refill: 0    4. Type 2 diabetes mellitus with hyperglycemia, without long-term current use of insulin (CMS/[facility])  -     Comprehensive Metabolic Panel; Future  -     Hemoglobin A1c; Future  -     LDL Cholesterol, Direct; Future    5. Essential hypertension    6. Migraine without aura and without status migrainosus, not intractable  -     rizatriptan MLT (MAXALT-MLT) 10 MG  disintegrating tablet; Take 1 tablet by mouth Daily As Needed for Migraine. Take 1 tab at onset of headache, can repeat x 1 in 2 hrs prn  Dispense: 27 tablet; Refill: 3    Other orders  -     Discontinue: Dulaglutide (TRULICITY) 0.75 MG/0.5ML solution pen-injector; Inject 1.5 mg under the skin into the appropriate area as directed 1 (One) Time Per Week.  Dispense: 8 pen; Refill: 0      Follow Up:  Return in about 3 months (around 2/6/2020) for Recheck.     An After Visit Summary and PPPS were given to the patient.    Information has been scanned into chart.  Discussed importance of taking medications as prescribed. Encouraged healthy eating habits with low fat, low salt choices and working towards maintaining a healthy weight. Recommended regular exercise if able as well as care to prevent falls including avoiding anything on the floor that they could slip or trip on such as throw rugs, making sure they have a bathmat to step onto when their feet are wet and having grab bars and railings where needed.

## 2019-11-06 NOTE — PATIENT INSTRUCTIONS
Check at pharmacy on Shingrix shingles vaccine    Medicare Wellness  Personal Prevention Plan of Service     Date of Office Visit:  2019  Encounter Provider:  Belinda Finn MD  Place of Service:  [facility]  Patient Name: Bertha Hyde  :  1950    As part of the Medicare Wellness portion of your visit today, we are providing you with this personalized preventive plan of services (PPPS). This plan is based upon recommendations of the [place] Preventive Services Task Force (USPSTF) and the Advisory Committee on Immunization Practices (ACIP).    This lists the preventive care services that should be considered, and provides dates of when you are due. Items listed as completed are up-to-date and do not require any further intervention.    Health Maintenance   Topic Date Due   • ZOSTER VACCINE (2 of 2) 2017   • MEDICARE ANNUAL WELLNESS  2019   • HEMOGLOBIN A1C  2020   • DIABETIC FOOT EXAM  2020   • PNEUMOCOCCAL VACCINES (65+ LOW/MEDIUM RISK) (2 of 2 - PPSV23) 2020   • LIPID PANEL  2020   • URINE MICROALBUMIN  2020   • DIABETIC EYE EXAM  2020   • COLOGUARD  2021   • MAMMOGRAM  2021   • DXA SCAN  2023   • TDAP/TD VACCINES (2 - Td) 2027   • HEPATITIS C SCREENING  Completed   • INFLUENZA VACCINE  Completed       No orders of the defined types were placed in this encounter.      Return in about 3 months (around 2020) for Recheck.

## 2019-11-06 NOTE — PROGRESS NOTES
Subjective   Bertha Hyde is a 68 y.o. female.   Chief Complaint   Patient presents with   • Medicare Wellness-subsequent   • Diabetes   • Hyperlipidemia   • Hypertension     For review and evaluation of management of chronic medical problems. Labs pending.   Diabetes   She presents for her follow-up diabetic visit. She has type 2 diabetes mellitus. Her disease course has been stable. Pertinent negatives for hypoglycemia include no dizziness, headaches or nervousness/anxiousness. There are no diabetic associated symptoms. Pertinent negatives for diabetes include no fatigue and no weakness. There are no hypoglycemic complications. There are no diabetic complications. Current diabetic treatment includes oral agent (dual therapy). She is compliant with treatment all of the time. Her weight is stable. She is following a generally healthy diet. Meal planning includes avoidance of concentrated sweets. She participates in exercise intermittently. There is no change in her home blood glucose trend. An ACE inhibitor/angiotensin II receptor blocker is being taken.   Hypertension   This is a chronic problem. The current episode started more than 1 year ago. The problem is unchanged. The problem is controlled. Associated symptoms include anxiety. Pertinent negatives include no headaches, neck pain or palpitations. There are no known risk factors for coronary artery disease. Current antihypertension treatment includes ACE inhibitors and diuretics. The current treatment provides significant improvement. There are no compliance problems.    Arthritis   Presents for follow-up visit. She complains of pain and stiffness. She reports no joint swelling. The symptoms have been stable. Affected locations include the right knee and left knee. Her pain is at a severity of 8/10. Pertinent negatives include no diarrhea, dysuria, fatigue, fever or rash. Compliance with total regimen is %. Compliance with medications is %.    Back Pain   This is a chronic problem. The current episode started more than 1 year ago. The problem occurs constantly. The problem has been gradually worsening since onset. The pain is present in the lumbar spine. The pain is at a severity of 4/10. The pain is severe. The pain is worse during the day. The symptoms are aggravated by bending, standing and twisting. Pertinent negatives include no abdominal pain, dysuria, fever, headaches, numbness or weakness. She has tried analgesics for the symptoms. The treatment provided moderate relief.      The following portions of the patient's history were reviewed and updated as appropriate: allergies, current medications, past social history and problem list.    Outpatient Medications Prior to Visit   Medication Sig Dispense Refill   • Alcohol Swabs pads USE 4 TIMES DAILY 150 each 11   • amLODIPine (NORVASC) 5 MG tablet Take 1 tablet by mouth Every Evening. 90 tablet 3   • aspirin 81 MG EC tablet Take 81 mg by mouth Daily.     • Blood Glucose Monitoring Suppl (BLOOD GLUCOSE MONITOR SYSTEM) W/DEVICE kit Test Blood Sugars Once Daily 1 each 0   • Dulaglutide (TRULICITY) 1.5 MG/0.5ML solution pen-injector Inject 1.5 mg under the skin into the appropriate area as directed 1 (One) Time Per Week. 12 pen 3   • DULoxetine (CYMBALTA) 60 MG capsule Take 1 capsule by mouth Daily. 90 capsule 3   • GLOBAL EASE INJECT PEN NEEDLES 31G X 5 MM misc USE 4 TIMES DAILY 150 each 11   • glucose blood test strip Test Blood Sugars Once Daily 100 each 3   • hyoscyamine (OSCIMIN) 0.125 MG SL tablet Place 0.125-0.25 mg under the tongue every 4 (four) hours as needed. (max 12 per 24 hours)     • insulin degludec (TRESIBA FLEXTOUCH) 100 UNIT/ML solution pen-injector injection Inject 50 Units under the skin into the appropriate area as directed every night at bedtime. 45 mL 3   • LANCETS MICRO THIN 33G misc Test Blood Sugars Once Daily 100 each 3   • lisinopril-hydrochlorothiazide (PRINZIDE,ZESTORETIC)  "20-12.5 MG per tablet Take 1 tablet by mouth Daily. Indication: Essential (primary) hypertension 90 tablet 3   • loratadine (CLARITIN) 10 MG tablet Take 10 mg by mouth daily as needed. Indication: Allergic rhinitis     • neomycin-polymyxin-hydrocortisone (CORTISPORIN) 3.5-65397-9 otic solution Administer 3 drops to the right ear 4 (Four) Times a Day. 10 mL 0   • ondansetron ODT (ZOFRAN ODT) 4 MG disintegrating tablet Take 1 tablet by mouth Every 8 (Eight) Hours As Needed for Nausea or Vomiting. 60 tablet 2   • pantoprazole (PROTONIX) 40 MG EC tablet Take 1 tablet by mouth Daily As Needed (heartburn). 90 tablet 3   • promethazine (PHENERGAN) 25 MG tablet Take 1 tablet by mouth Every 6 (Six) Hours As Needed for Nausea. 30 tablet 0   • rosuvastatin (CRESTOR) 20 MG tablet Take 1 tablet by mouth Every Night. 90 tablet 3   • gabapentin (NEURONTIN) 300 MG capsule Take 2 capsules by mouth 2 (Two) Times a Day. 360 capsule 0   • HYDROcodone-acetaminophen (NORCO) 7.5-325 MG per tablet Take 1 tablet by mouth Every 6 (Six) Hours As Needed for Moderate Pain . 120 tablet 0   • rizatriptan MLT (MAXALT-MLT) 10 MG disintegrating tablet Take 1 tablet by mouth Daily As Needed for Migraine. Take 1 tab at onset of headache, can repeat x 1 in 2 hrs prn 27 tablet 3     No facility-administered medications prior to visit.      Review of Systems   Constitutional: Negative for fatigue and fever.   Cardiovascular: Negative for palpitations.   Gastrointestinal: Negative for abdominal pain and diarrhea.   Genitourinary: Negative for dysuria.   Musculoskeletal: Positive for arthritis, back pain and stiffness. Negative for joint swelling and neck pain.   Skin: Negative for rash.   Neurological: Negative for dizziness, weakness, numbness and headaches.   Psychiatric/Behavioral: The patient is not nervous/anxious.      Objective   Visit Vitals  /70 (BP Location: Left arm)   Pulse 89   Ht 157.5 cm (62\")   Wt 90.5 kg (199 lb 8 oz)   SpO2 98% "   BMI 36.49 kg/m²     Physical Exam   Constitutional: She is oriented to person, place, and time. She appears well-developed and well-nourished. No distress.   HENT:   Head: Normocephalic and atraumatic.   Nose: Nose normal.   Mouth/Throat: Oropharynx is clear and moist.   Eyes: Conjunctivae and EOM are normal. Pupils are equal, round, and reactive to light. Right eye exhibits no discharge. Left eye exhibits no discharge.   Neck: No thyromegaly present.   Cardiovascular: Normal rate, regular rhythm, normal heart sounds and intact distal pulses.   Pulmonary/Chest: Effort normal and breath sounds normal.   Lymphadenopathy:     She has no cervical adenopathy.   Neurological: She is alert and oriented to person, place, and time.   Skin: Skin is warm and dry.   Psychiatric: She has a normal mood and affect.   Nursing note and vitals reviewed.    Assessment/Plan   Problem List Items Addressed This Visit        Cardiovascular and Mediastinum    Essential hypertension (Chronic)    Migraine    Relevant Medications    rizatriptan MLT (MAXALT-MLT) 10 MG disintegrating tablet    HYDROcodone-acetaminophen (NORCO) 7.5-325 MG per tablet    gabapentin (NEURONTIN) 300 MG capsule       Endocrine    Type 2 diabetes mellitus with hyperglycemia, without long-term current use of insulin (CMS/[facility]) (Chronic)    Relevant Orders    Comprehensive Metabolic Panel    Hemoglobin A1c    LDL Cholesterol, Direct       Musculoskeletal and Integument    Degenerative disc disease, lumbar (Chronic)    Relevant Medications    HYDROcodone-acetaminophen (NORCO) 7.5-325 MG per tablet    gabapentin (NEURONTIN) 300 MG capsule    Degenerative joint disease involving multiple joints    Relevant Medications    HYDROcodone-acetaminophen (NORCO) 7.5-325 MG per tablet      Other Visit Diagnoses     Medicare annual wellness visit, subsequent    -  Primary         Continue current treatment. Will notify regarding results. Chris reviewed and appropriate. Not  recommended to drive or operate heavy equipment while taking potentially sedating meds.  Patient understands the risks associated with this controlled medication, including tolerance and addiction. They also agree to obtain this medication only from me, and not from a another provider, unless that provider is covering for me in my absence. They also agree to be compliant in dosing, and not self adjust the dose of medication.  A signed controlled substance agreement is on file, and they have received a controlled substance education sheet at this or a previous visit. They have also signed a consent for treatment with a controlled substance as per [facility] policy.      New Medications Ordered This Visit   Medications   • rizatriptan MLT (MAXALT-MLT) 10 MG disintegrating tablet     Sig: Take 1 tablet by mouth Daily As Needed for Migraine. Take 1 tab at onset of headache, can repeat x 1 in 2 hrs prn     Dispense:  27 tablet     Refill:  3   • HYDROcodone-acetaminophen (NORCO) 7.5-325 MG per tablet     Sig: Take 1 tablet by mouth Every 6 (Six) Hours As Needed for Moderate Pain .     Dispense:  120 tablet     Refill:  0   • gabapentin (NEURONTIN) 300 MG capsule     Sig: Take 2 capsules by mouth 2 (Two) Times a Day.     Dispense:  360 capsule     Refill:  0     Return in about 3 months (around 2/6/2020) for Recheck.

## 2020-01-10 ENCOUNTER — TELEPHONE (OUTPATIENT)
Dept: FAMILY MEDICINE CLINIC | Facility: CLINIC | Age: 70
End: 2020-01-10

## 2020-01-10 DIAGNOSIS — M51.36 DEGENERATIVE DISC DISEASE, LUMBAR: Chronic | ICD-10-CM

## 2020-01-10 RX ORDER — GABAPENTIN 300 MG/1
600 CAPSULE ORAL 2 TIMES DAILY
Qty: 360 CAPSULE | Refills: 0 | Status: SHIPPED | OUTPATIENT
Start: 2020-01-10 | End: 2020-04-16 | Stop reason: SDUPTHER

## 2020-01-10 NOTE — TELEPHONE ENCOUNTER
Bertha doesn't have an apt until 2-6 with you and needs her Gabepentin sent to "[facility]r drug.

## 2020-02-03 ENCOUNTER — TELEPHONE (OUTPATIENT)
Dept: FAMILY MEDICINE CLINIC | Facility: CLINIC | Age: 70
End: 2020-02-03

## 2020-02-03 NOTE — TELEPHONE ENCOUNTER
Message on machine from Nancy Pinto who is a PT.  Said, she needs a verbal saying it's ok for her to go twice a week for 2 weeks and then once a week for 2 weeks to see Aylin French's phone number is [phone]

## 2020-02-04 ENCOUNTER — TELEPHONE (OUTPATIENT)
Dept: FAMILY MEDICINE CLINIC | Facility: CLINIC | Age: 70
End: 2020-02-04

## 2020-02-04 RX ORDER — FLUCONAZOLE 150 MG/1
150 TABLET ORAL ONCE
Qty: 1 TABLET | Refills: 0 | Status: SHIPPED | OUTPATIENT
Start: 2020-02-04 | End: 2021-06-07 | Stop reason: SDUPTHER

## 2020-02-04 NOTE — TELEPHONE ENCOUNTER
Pt called and said that she has a yeast infection and wanted to see if you would call in Diflucan to [facility] in [place]. Her phone is [phone].

## 2020-02-05 ENCOUNTER — OUTSIDE FACILITY SERVICE (OUTPATIENT)
Dept: FAMILY MEDICINE CLINIC | Facility: CLINIC | Age: 70
End: 2020-02-05

## 2020-02-05 PROCEDURE — G0180 MD CERTIFICATION HHA PATIENT: HCPCS | Performed by: GENERAL PRACTICE

## 2020-02-07 ENCOUNTER — OFFICE VISIT (OUTPATIENT)
Dept: FAMILY MEDICINE CLINIC | Facility: CLINIC | Age: 70
End: 2020-02-07

## 2020-02-07 VITALS
SYSTOLIC BLOOD PRESSURE: 110 MMHG | OXYGEN SATURATION: 96 % | BODY MASS INDEX: 36.58 KG/M2 | HEART RATE: 94 BPM | DIASTOLIC BLOOD PRESSURE: 70 MMHG | WEIGHT: 198.8 LBS | HEIGHT: 62 IN

## 2020-02-07 DIAGNOSIS — E11.65 TYPE 2 DIABETES MELLITUS WITH HYPERGLYCEMIA, WITHOUT LONG-TERM CURRENT USE OF INSULIN (HCC): Primary | Chronic | ICD-10-CM

## 2020-02-07 DIAGNOSIS — E78.2 MIXED HYPERLIPIDEMIA: Chronic | ICD-10-CM

## 2020-02-07 DIAGNOSIS — I10 ESSENTIAL HYPERTENSION: Chronic | ICD-10-CM

## 2020-02-07 DIAGNOSIS — M15.9 OSTEOARTHRITIS OF MULTIPLE JOINTS, UNSPECIFIED OSTEOARTHRITIS TYPE: ICD-10-CM

## 2020-02-07 DIAGNOSIS — M51.36 DEGENERATIVE DISC DISEASE, LUMBAR: Chronic | ICD-10-CM

## 2020-02-07 PROCEDURE — 99214 OFFICE O/P EST MOD 30 MIN: CPT | Performed by: GENERAL PRACTICE

## 2020-02-07 RX ORDER — HYDROCODONE BITARTRATE AND ACETAMINOPHEN 7.5; 325 MG/1; MG/1
1 TABLET ORAL EVERY 6 HOURS PRN
Qty: 120 TABLET | Refills: 0 | Status: SHIPPED | OUTPATIENT
Start: 2020-02-07 | End: 2020-05-08 | Stop reason: SDUPTHER

## 2020-02-07 NOTE — PROGRESS NOTES
Subjective   Bertha Hyde is a 69 y.o. female.   Chief Complaint   Patient presents with   • Diabetes     labs     For review and evaluation of management of chronic medical problems.  Labs were done by home health and are reviewed.  A1c is elevated 11.5 with her glucose being 162.  Cholesterol is also high.  She states she has not been able to get her medications and has therefore not been taking them.  She has just started back on her medications 3 weeks ago.    Diabetes   She presents for her follow-up diabetic visit. She has type 2 diabetes mellitus. Her disease course has been stable. Pertinent negatives for hypoglycemia include no dizziness, headaches or nervousness/anxiousness. There are no diabetic associated symptoms. Pertinent negatives for diabetes include no chest pain, no fatigue and no weakness. There are no hypoglycemic complications. There are no diabetic complications. Current diabetic treatment includes insulin injections. She is compliant with treatment some of the time. Her weight is stable. She is following a generally healthy diet. Meal planning includes avoidance of concentrated sweets. She participates in exercise intermittently. Her home blood glucose trend is increasing rapidly (Due to being out of her medications). An ACE inhibitor/angiotensin II receptor blocker is being taken.   Hypertension   This is a chronic problem. The current episode started more than 1 year ago. The problem is unchanged. The problem is controlled. Associated symptoms include anxiety. Pertinent negatives include no chest pain, headaches, neck pain, palpitations or shortness of breath. There are no known risk factors for coronary artery disease. Current antihypertension treatment includes ACE inhibitors, diuretics and calcium channel blockers. The current treatment provides significant improvement. There are no compliance problems.    Arthritis   Presents for follow-up visit. She complains of pain and  stiffness. She reports no joint swelling. The symptoms have been stable. Affected locations include the right knee, left knee and left shoulder. Her pain is at a severity of 9/10. Pertinent negatives include no diarrhea, dysuria, fatigue, fever or rash. Compliance with total regimen is %. Compliance with medications is %.   Back Pain   This is a chronic problem. The current episode started more than 1 year ago. The problem occurs constantly. The problem has been gradually worsening since onset. The pain is present in the lumbar spine. The pain is at a severity of 4/10. The pain is severe. The pain is worse during the day. The symptoms are aggravated by bending, standing and twisting. Pertinent negatives include no abdominal pain, chest pain, dysuria, fever, headaches, numbness or weakness. She has tried analgesics for the symptoms. The treatment provided moderate relief.   Hyperlipidemia   This is a chronic problem. The current episode started more than 1 year ago. The problem is uncontrolled. Recent lipid tests were reviewed and are high. Exacerbating diseases include diabetes and obesity. There are no known factors aggravating her hyperlipidemia. Pertinent negatives include no chest pain, myalgias or shortness of breath. Current antihyperlipidemic treatment includes statins. The current treatment provides moderate improvement of lipids. Compliance problems include medication cost.       The following portions of the patient's history were reviewed and updated as appropriate: allergies, current medications, past social history and problem list.    Outpatient Medications Prior to Visit   Medication Sig Dispense Refill   • Alcohol Swabs pads USE 4 TIMES DAILY 150 each 11   • amLODIPine (NORVASC) 5 MG tablet Take 1 tablet by mouth Every Evening. 90 tablet 3   • aspirin 81 MG EC tablet Take 81 mg by mouth Daily.     • Blood Glucose Monitoring Suppl (BLOOD GLUCOSE MONITOR SYSTEM) W/DEVICE kit Test Blood  Sugars Once Daily 1 each 0   • DULoxetine (CYMBALTA) 60 MG capsule Take 1 capsule by mouth Daily. 90 capsule 3   • gabapentin (NEURONTIN) 300 MG capsule Take 2 capsules by mouth 2 (Two) Times a Day. 360 capsule 0   • GLOBAL EASE INJECT PEN NEEDLES 31G X 5 MM misc USE 4 TIMES DAILY 150 each 11   • glucose blood test strip Test Blood Sugars Once Daily 100 each 3   • hyoscyamine (OSCIMIN) 0.125 MG SL tablet Place 0.125-0.25 mg under the tongue every 4 (four) hours as needed. (max 12 per 24 hours)     • Insulin Glargine, 1 Unit Dial, (TOUJEO) 300 UNIT/ML solution pen-injector injection Inject 50 Units under the skin into the appropriate area as directed every night at bedtime.     • LANCETS MICRO THIN 33G misc Test Blood Sugars Once Daily 100 each 3   • lisinopril-hydrochlorothiazide (PRINZIDE,ZESTORETIC) 20-12.5 MG per tablet Take 1 tablet by mouth Daily. Indication: Essential (primary) hypertension 90 tablet 3   • loratadine (CLARITIN) 10 MG tablet Take 10 mg by mouth daily as needed. Indication: Allergic rhinitis     • ondansetron ODT (ZOFRAN ODT) 4 MG disintegrating tablet Take 1 tablet by mouth Every 8 (Eight) Hours As Needed for Nausea or Vomiting. 60 tablet 2   • pantoprazole (PROTONIX) 40 MG EC tablet Take 1 tablet by mouth Daily As Needed (heartburn). 90 tablet 3   • promethazine (PHENERGAN) 25 MG tablet Take 1 tablet by mouth Every 6 (Six) Hours As Needed for Nausea. 30 tablet 0   • rizatriptan MLT (MAXALT-MLT) 10 MG disintegrating tablet Take 1 tablet by mouth Daily As Needed for Migraine. Take 1 tab at onset of headache, can repeat x 1 in 2 hrs prn 27 tablet 3   • rosuvastatin (CRESTOR) 20 MG tablet Take 1 tablet by mouth Every Night. 90 tablet 3   • Dulaglutide (TRULICITY) 1.5 MG/0.5ML solution pen-injector Inject 1.5 mg under the skin into the appropriate area as directed 1 (One) Time Per Week. 12 pen 3   • HYDROcodone-acetaminophen (NORCO) 7.5-325 MG per tablet Take 1 tablet by mouth Every 6 (Six) Hours  "As Needed for Moderate Pain . 120 tablet 0   • insulin degludec (TRESIBA FLEXTOUCH) 100 UNIT/ML solution pen-injector injection Inject 50 Units under the skin into the appropriate area as directed every night at bedtime. 45 mL 3   • neomycin-polymyxin-hydrocortisone (CORTISPORIN) 3.5-01925-0 otic solution Administer 3 drops to the right ear 4 (Four) Times a Day. 10 mL 0     No facility-administered medications prior to visit.        Review of Systems   Constitutional: Negative.  Negative for chills, fatigue, fever and unexpected weight change.   HENT: Negative.  Negative for congestion, ear pain, hearing loss, nosebleeds, rhinorrhea, sneezing, sore throat and tinnitus.    Eyes: Negative.  Negative for discharge.   Respiratory: Negative.  Negative for cough, shortness of breath and wheezing.    Cardiovascular: Negative.  Negative for chest pain and palpitations.   Gastrointestinal: Negative.  Negative for abdominal pain, constipation, diarrhea, nausea and vomiting.   Endocrine: Negative.    Genitourinary: Negative.  Negative for dysuria, frequency and urgency.   Musculoskeletal: Positive for arthritis, back pain and stiffness. Negative for arthralgias, joint swelling, myalgias and neck pain.   Skin: Negative.  Negative for rash.   Allergic/Immunologic: Negative.    Neurological: Negative.  Negative for dizziness, weakness, numbness and headaches.   Hematological: Negative.  Negative for adenopathy.   Psychiatric/Behavioral: Negative.  Negative for dysphoric mood and sleep disturbance. The patient is not nervous/anxious.      Objective   Visit Vitals  /70   Pulse 94   Ht 157.5 cm (62\")   Wt 90.2 kg (198 lb 12.8 oz)   SpO2 96%   BMI 36.36 kg/m²     Physical Exam   Constitutional: She is oriented to person, place, and time. She appears well-developed and well-nourished. No distress.   HENT:   Head: Normocephalic and atraumatic.   Nose: Nose normal.   Mouth/Throat: Oropharynx is clear and moist.   Eyes: Pupils are " equal, round, and reactive to light. Conjunctivae and EOM are normal. Right eye exhibits no discharge. Left eye exhibits no discharge.   Neck: No thyromegaly present.   Cardiovascular: Normal rate, regular rhythm, normal heart sounds and intact distal pulses.   Pulmonary/Chest: Effort normal and breath sounds normal.   Musculoskeletal:        Left shoulder: She exhibits decreased range of motion and tenderness.        Left knee: Tenderness found. Medial joint line and lateral joint line tenderness noted.        Lumbar back: She exhibits decreased range of motion and tenderness.   Lymphadenopathy:     She has no cervical adenopathy.   Neurological: She is alert and oriented to person, place, and time.   Skin: Skin is warm and dry.   Psychiatric: She has a normal mood and affect.   Nursing note and vitals reviewed.    Assessment/Plan   Problem List Items Addressed This Visit        Cardiovascular and Mediastinum    Essential hypertension (Chronic)    Hyperlipidemia (Chronic)       Endocrine    Type 2 diabetes mellitus with hyperglycemia, without long-term current use of insulin (CMS/[facility]) - Primary (Chronic)    Relevant Medications    Insulin Glargine, 1 Unit Dial, (TOUJEO) 300 UNIT/ML solution pen-injector injection    Empagliflozin (JARDIANCE) 10 MG tablet    Other Relevant Orders    Comprehensive Metabolic Panel    Hemoglobin A1c    LDL Cholesterol, Direct       Musculoskeletal and Integument    Degenerative disc disease, lumbar (Chronic)    Relevant Medications    HYDROcodone-acetaminophen (NORCO) 7.5-325 MG per tablet    Degenerative joint disease involving multiple joints    Relevant Medications    HYDROcodone-acetaminophen (NORCO) 7.5-325 MG per tablet         Start Jardiance 10 mg daily.  Continue on the Toujeo.  Continue with diet and weight loss. Chris reviewed and appropriate. Not recommended to drive or operate heavy equipment while taking potentially sedating meds.  Patient understands the risks  associated with this controlled medication, including tolerance and addiction. They also agree to obtain this medication only from me, and not from a another provider, unless that provider is covering for me in my absence. They also agree to be compliant in dosing, and not self adjust the dose of medication.  A signed controlled substance agreement is on file, and they have received a controlled substance education sheet at this or a previous visit. They have also signed a consent for treatment with a controlled substance as per [facility] policy.        New Medications Ordered This Visit   Medications   • Empagliflozin (JARDIANCE) 10 MG tablet     Sig: Take 10 mg by mouth Daily. Lot [id number]  Apr 2021     Dispense:  84 tablet     Refill:  0   • HYDROcodone-acetaminophen (NORCO) 7.5-325 MG per tablet     Sig: Take 1 tablet by mouth Every 6 (Six) Hours As Needed for Moderate Pain .     Dispense:  120 tablet     Refill:  0     Return in about 3 months (around 5/7/2020) for Recheck.

## 2020-02-10 ENCOUNTER — TELEPHONE (OUTPATIENT)
Dept: FAMILY MEDICINE CLINIC | Facility: CLINIC | Age: 70
End: 2020-02-10

## 2020-02-10 NOTE — TELEPHONE ENCOUNTER
Dr. Finn,   I talked to Ms. Hyde and she said Yes, she is still taking her Crestor 40 mg daily.      Please Advise if any further    Thank you----- Message from Belinda Finn MD sent at 2/7/2020  5:35 PM CST -----  Call and see if she is taking her Crestor/rosuvastatin.  If she is not she should restart this.  If she is not because she cannot afford it send a prescription for lovastatin 40 mg daily

## 2020-02-12 DIAGNOSIS — E11.65 TYPE 2 DIABETES MELLITUS WITH HYPERGLYCEMIA, WITHOUT LONG-TERM CURRENT USE OF INSULIN (HCC): Chronic | ICD-10-CM

## 2020-02-20 ENCOUNTER — TELEPHONE (OUTPATIENT)
Dept: FAMILY MEDICINE CLINIC | Facility: CLINIC | Age: 70
End: 2020-02-20

## 2020-03-16 ENCOUNTER — OFFICE VISIT (OUTPATIENT)
Dept: FAMILY MEDICINE CLINIC | Facility: CLINIC | Age: 70
End: 2020-03-16

## 2020-03-16 ENCOUNTER — LAB (OUTPATIENT)
Dept: LAB | Facility: HOSPITAL | Age: 70
End: 2020-03-16

## 2020-03-16 VITALS
HEART RATE: 94 BPM | WEIGHT: 198 LBS | SYSTOLIC BLOOD PRESSURE: 130 MMHG | DIASTOLIC BLOOD PRESSURE: 70 MMHG | OXYGEN SATURATION: 98 % | BODY MASS INDEX: 36.44 KG/M2 | HEIGHT: 62 IN

## 2020-03-16 DIAGNOSIS — E78.2 MIXED HYPERLIPIDEMIA: Chronic | ICD-10-CM

## 2020-03-16 DIAGNOSIS — E11.65 TYPE 2 DIABETES MELLITUS WITH HYPERGLYCEMIA, WITHOUT LONG-TERM CURRENT USE OF INSULIN (HCC): Primary | Chronic | ICD-10-CM

## 2020-03-16 DIAGNOSIS — E11.65 TYPE 2 DIABETES MELLITUS WITH HYPERGLYCEMIA, WITHOUT LONG-TERM CURRENT USE OF INSULIN (HCC): ICD-10-CM

## 2020-03-16 DIAGNOSIS — I10 ESSENTIAL HYPERTENSION: Chronic | ICD-10-CM

## 2020-03-16 LAB
ALBUMIN SERPL-MCNC: 3.6 G/DL (ref 3.5–5.2)
ALBUMIN/GLOB SERPL: 1 G/DL
ALP SERPL-CCNC: 72 U/L (ref 39–117)
ALT SERPL W P-5'-P-CCNC: 21 U/L (ref 1–33)
ANION GAP SERPL CALCULATED.3IONS-SCNC: 14.4 MMOL/L (ref 5–15)
ARTICHOKE IGE QN: 89 MG/DL (ref 0–100)
AST SERPL-CCNC: 28 U/L (ref 1–32)
BACTERIA UR QL AUTO: ABNORMAL /HPF
BILIRUB SERPL-MCNC: 0.3 MG/DL (ref 0.2–1.2)
BILIRUB UR QL STRIP: NEGATIVE
BUN BLD-MCNC: 9 MG/DL (ref 8–23)
BUN/CREAT SERPL: 11.8 (ref 7–25)
CALCIUM SPEC-SCNC: 9.5 MG/DL (ref 8.6–10.5)
CHLORIDE SERPL-SCNC: 97 MMOL/L (ref 98–107)
CLARITY UR: ABNORMAL
CO2 SERPL-SCNC: 25.6 MMOL/L (ref 22–29)
COLOR UR: YELLOW
CREAT BLD-MCNC: 0.76 MG/DL (ref 0.57–1)
GFR SERPL CREATININE-BSD FRML MDRD: 75 ML/MIN/1.73
GLOBULIN UR ELPH-MCNC: 3.6 GM/DL
GLUCOSE BLD-MCNC: 201 MG/DL (ref 65–99)
GLUCOSE UR STRIP-MCNC: ABNORMAL MG/DL
HBA1C MFR BLD: 8.4 % (ref 4.8–5.6)
HGB UR QL STRIP.AUTO: NEGATIVE
HYALINE CASTS UR QL AUTO: ABNORMAL /LPF
KETONES UR QL STRIP: NEGATIVE
LEUKOCYTE ESTERASE UR QL STRIP.AUTO: ABNORMAL
NITRITE UR QL STRIP: NEGATIVE
PH UR STRIP.AUTO: 5.5 [PH] (ref 5–8)
POTASSIUM BLD-SCNC: 3.5 MMOL/L (ref 3.5–5.2)
PROT SERPL-MCNC: 7.2 G/DL (ref 6–8.5)
PROT UR QL STRIP: ABNORMAL
RBC # UR: ABNORMAL /HPF
REF LAB TEST METHOD: ABNORMAL
SODIUM BLD-SCNC: 137 MMOL/L (ref 136–145)
SP GR UR STRIP: 1.03 (ref 1–1.03)
SQUAMOUS #/AREA URNS HPF: ABNORMAL /HPF
UROBILINOGEN UR QL STRIP: ABNORMAL
WBC UR QL AUTO: ABNORMAL /HPF
YEAST URNS QL MICRO: ABNORMAL /HPF

## 2020-03-16 PROCEDURE — 80053 COMPREHEN METABOLIC PANEL: CPT

## 2020-03-16 PROCEDURE — 83036 HEMOGLOBIN GLYCOSYLATED A1C: CPT

## 2020-03-16 PROCEDURE — 87086 URINE CULTURE/COLONY COUNT: CPT

## 2020-03-16 PROCEDURE — 99214 OFFICE O/P EST MOD 30 MIN: CPT | Performed by: GENERAL PRACTICE

## 2020-03-16 PROCEDURE — 36415 COLL VENOUS BLD VENIPUNCTURE: CPT

## 2020-03-16 PROCEDURE — 83721 ASSAY OF BLOOD LIPOPROTEIN: CPT

## 2020-03-16 PROCEDURE — 81001 URINALYSIS AUTO W/SCOPE: CPT

## 2020-03-16 NOTE — PROGRESS NOTES
Subjective   Bertha Hyde is a 69 y.o. female.   Chief Complaint   Patient presents with   • Follow-up     hospital  shandra uti   • Diabetes     For review and evaluation of management of chronic medical problems. Labs pending.  Recent hospitalization, labs, xrays reviewed and medications reconciled. Had a uti and was treated with IV antibiotics. Unfortunately records are not available.   Diabetes   She presents for her follow-up diabetic visit. She has type 2 diabetes mellitus. Her disease course has been stable. Pertinent negatives for hypoglycemia include no dizziness or nervousness/anxiousness. There are no diabetic associated symptoms. There are no hypoglycemic complications. There are no diabetic complications. Current diabetic treatment includes insulin injections and oral agent (monotherapy). She is compliant with treatment some of the time. Her weight is stable. She is following a generally healthy diet. Meal planning includes avoidance of concentrated sweets. She participates in exercise intermittently. Her home blood glucose trend is increasing rapidly (Due to being out of her medications). An ACE inhibitor/angiotensin II receptor blocker is being taken.   Hypertension   This is a chronic problem. The current episode started more than 1 year ago. The problem is unchanged. The problem is controlled. Associated symptoms include anxiety. Pertinent negatives include no neck pain, palpitations or shortness of breath. There are no known risk factors for coronary artery disease. Current antihypertension treatment includes ACE inhibitors, diuretics and calcium channel blockers. The current treatment provides significant improvement. There are no compliance problems.    Hyperlipidemia   This is a chronic problem. The current episode started more than 1 year ago. The problem is uncontrolled. Recent lipid tests were reviewed and are high. Exacerbating diseases include diabetes and obesity. There are no known  factors aggravating her hyperlipidemia. Pertinent negatives include no myalgias or shortness of breath. Current antihyperlipidemic treatment includes statins. The current treatment provides moderate improvement of lipids. Compliance problems include medication cost.       The following portions of the patient's history were reviewed and updated as appropriate: allergies, current medications, past social history and problem list.    Outpatient Medications Prior to Visit   Medication Sig Dispense Refill   • Alcohol Swabs pads USE 4 TIMES DAILY 150 each 11   • amLODIPine (NORVASC) 5 MG tablet Take 1 tablet by mouth Every Evening. 90 tablet 3   • aspirin 81 MG EC tablet Take 81 mg by mouth Daily.     • Blood Glucose Monitoring Suppl (BLOOD GLUCOSE MONITOR SYSTEM) W/DEVICE kit Test Blood Sugars Once Daily 1 each 0   • DULoxetine (CYMBALTA) 60 MG capsule Take 1 capsule by mouth Daily. 90 capsule 3   • Empagliflozin (JARDIANCE) 10 MG tablet Take 10 mg by mouth Daily. Lot [id number]  Apr 2021 84 tablet 0   • gabapentin (NEURONTIN) 300 MG capsule Take 2 capsules by mouth 2 (Two) Times a Day. 360 capsule 0   • GLOBAL EASE INJECT PEN NEEDLES 31G X 5 MM misc USE 4 TIMES DAILY 150 each 11   • glucose blood test strip Test Blood Sugars Once Daily 100 each 3   • HYDROcodone-acetaminophen (NORCO) 7.5-325 MG per tablet Take 1 tablet by mouth Every 6 (Six) Hours As Needed for Moderate Pain . 120 tablet 0   • hyoscyamine (OSCIMIN) 0.125 MG SL tablet Place 0.125-0.25 mg under the tongue every 4 (four) hours as needed. (max 12 per 24 hours)     • Insulin Glargine, 1 Unit Dial, (TOUJEO) 300 UNIT/ML solution pen-injector injection Inject 50 Units under the skin into the appropriate area as directed every night at bedtime.     • LANCETS MICRO THIN 33G misc Test Blood Sugars Once Daily 100 each 3   • loratadine (CLARITIN) 10 MG tablet Take 10 mg by mouth daily as needed. Indication: Allergic rhinitis     • ondansetron ODT (ZOFRAN ODT) 4 MG  "disintegrating tablet Take 1 tablet by mouth Every 8 (Eight) Hours As Needed for Nausea or Vomiting. 60 tablet 2   • promethazine (PHENERGAN) 25 MG tablet Take 1 tablet by mouth Every 6 (Six) Hours As Needed for Nausea. 30 tablet 0   • rizatriptan MLT (MAXALT-MLT) 10 MG disintegrating tablet Take 1 tablet by mouth Daily As Needed for Migraine. Take 1 tab at onset of headache, can repeat x 1 in 2 hrs prn 27 tablet 3   • rosuvastatin (CRESTOR) 20 MG tablet Take 1 tablet by mouth Every Night. 90 tablet 3   • lisinopril-hydrochlorothiazide (PRINZIDE,ZESTORETIC) 20-12.5 MG per tablet Take 1 tablet by mouth Daily. Indication: Essential (primary) hypertension 90 tablet 3   • pantoprazole (PROTONIX) 40 MG EC tablet Take 1 tablet by mouth Daily As Needed (heartburn). 90 tablet 3     No facility-administered medications prior to visit.        Review of Systems   Constitutional: Negative.  Negative for chills and unexpected weight change.   HENT: Negative.  Negative for congestion, ear pain, hearing loss, nosebleeds, rhinorrhea, sneezing, sore throat and tinnitus.    Eyes: Negative.  Negative for discharge.   Respiratory: Positive for cough. Negative for shortness of breath and wheezing.    Cardiovascular: Negative.  Negative for palpitations.   Gastrointestinal: Negative.  Negative for constipation, nausea and vomiting.   Endocrine: Negative.    Genitourinary: Negative.  Negative for frequency and urgency.   Musculoskeletal: Negative for arthralgias, myalgias and neck pain.   Skin: Negative.    Allergic/Immunologic: Negative.    Neurological: Negative.  Negative for dizziness.   Hematological: Negative.  Negative for adenopathy.   Psychiatric/Behavioral: Negative.  Negative for dysphoric mood and sleep disturbance. The patient is not nervous/anxious.      Objective   Visit Vitals  /70   Pulse 94   Ht 157.5 cm (62\")   Wt 89.8 kg (198 lb)   SpO2 98%   BMI 36.21 kg/m²     Physical Exam   Constitutional: She is oriented to " person, place, and time. She appears well-developed and well-nourished. No distress.   HENT:   Head: Normocephalic and atraumatic.   Nose: Nose normal.   Mouth/Throat: Oropharynx is clear and moist.   Eyes: Pupils are equal, round, and reactive to light. Conjunctivae and EOM are normal. Right eye exhibits no discharge. Left eye exhibits no discharge.   Neck: No thyromegaly present.   Cardiovascular: Normal rate, regular rhythm, normal heart sounds and intact distal pulses.   Pulmonary/Chest: Effort normal and breath sounds normal.   Lymphadenopathy:     She has no cervical adenopathy.   Neurological: She is alert and oriented to person, place, and time.   Skin: Skin is warm and dry.   Psychiatric: She has a normal mood and affect.   Nursing note and vitals reviewed.    Notes brought forward are reviewed and updated if indicated.     Assessment/Plan   Problem List Items Addressed This Visit        Cardiovascular and Mediastinum    Essential hypertension (Chronic)    Hyperlipidemia (Chronic)       Endocrine    Type 2 diabetes mellitus with hyperglycemia, without long-term current use of insulin (CMS/[facility]) - Primary (Chronic)    Relevant Orders    Urinalysis With Culture If Indicated - (Completed)          Will notify regarding results. Continue current treatment.     No orders of the defined types were placed in this encounter.    Return if symptoms worsen or fail to improve, for Next scheduled follow up.

## 2020-03-17 LAB — BACTERIA SPEC AEROBE CULT: NO GROWTH

## 2020-03-18 ENCOUNTER — TELEPHONE (OUTPATIENT)
Dept: FAMILY MEDICINE CLINIC | Facility: CLINIC | Age: 70
End: 2020-03-18

## 2020-03-18 NOTE — TELEPHONE ENCOUNTER
Per Dr. Finn, Ms. Hyde has been called with recent lab results & recommendations.  Continue current medications and follow-up as planned or sooner if any problems.      ----- Message from Belinda Finn MD sent at 3/17/2020  9:19 AM CDT -----  Call and tell labs are better,  A1c is down to 8.4, continue treatment and recheck as scheduled

## 2020-04-07 ENCOUNTER — TELEPHONE (OUTPATIENT)
Dept: FAMILY MEDICINE CLINIC | Facility: CLINIC | Age: 70
End: 2020-04-07

## 2020-04-07 DIAGNOSIS — E11.9 DIABETES MELLITUS WITHOUT COMPLICATION (HCC): ICD-10-CM

## 2020-04-07 RX ORDER — CALCIUM CITRATE/VITAMIN D3 200MG-6.25
TABLET ORAL
Qty: 100 EACH | Refills: 10 | OUTPATIENT
Start: 2020-04-07

## 2020-04-07 RX ORDER — PANTOPRAZOLE SODIUM 40 MG/1
40 TABLET, DELAYED RELEASE ORAL DAILY PRN
Qty: 90 TABLET | Refills: 3 | Status: SHIPPED | OUTPATIENT
Start: 2020-04-07 | End: 2021-05-06 | Stop reason: SDUPTHER

## 2020-04-07 RX ORDER — LISINOPRIL AND HYDROCHLOROTHIAZIDE 20; 12.5 MG/1; MG/1
1 TABLET ORAL DAILY
Qty: 90 TABLET | Refills: 3 | Status: SHIPPED | OUTPATIENT
Start: 2020-04-07 | End: 2021-04-26 | Stop reason: SDUPTHER

## 2020-04-16 ENCOUNTER — TELEMEDICINE (OUTPATIENT)
Dept: FAMILY MEDICINE CLINIC | Facility: CLINIC | Age: 70
End: 2020-04-16

## 2020-04-16 VITALS
WEIGHT: 198 LBS | SYSTOLIC BLOOD PRESSURE: 128 MMHG | HEIGHT: 62 IN | BODY MASS INDEX: 36.44 KG/M2 | DIASTOLIC BLOOD PRESSURE: 86 MMHG

## 2020-04-16 DIAGNOSIS — E11.9 DIABETES MELLITUS WITHOUT COMPLICATION (HCC): ICD-10-CM

## 2020-04-16 DIAGNOSIS — M51.36 DEGENERATIVE DISC DISEASE, LUMBAR: Chronic | ICD-10-CM

## 2020-04-16 DIAGNOSIS — G43.009 MIGRAINE WITHOUT AURA AND WITHOUT STATUS MIGRAINOSUS, NOT INTRACTABLE: Primary | Chronic | ICD-10-CM

## 2020-04-16 DIAGNOSIS — E11.65 TYPE 2 DIABETES MELLITUS WITH HYPERGLYCEMIA, WITH LONG-TERM CURRENT USE OF INSULIN (HCC): ICD-10-CM

## 2020-04-16 DIAGNOSIS — Z79.4 TYPE 2 DIABETES MELLITUS WITH HYPERGLYCEMIA, WITH LONG-TERM CURRENT USE OF INSULIN (HCC): ICD-10-CM

## 2020-04-16 PROCEDURE — 99213 OFFICE O/P EST LOW 20 MIN: CPT | Performed by: GENERAL PRACTICE

## 2020-04-16 RX ORDER — PROMETHAZINE HYDROCHLORIDE 25 MG/1
25 TABLET ORAL EVERY 6 HOURS PRN
Qty: 30 TABLET | Refills: 0 | Status: SHIPPED | OUTPATIENT
Start: 2020-04-16 | End: 2021-04-26 | Stop reason: SDUPTHER

## 2020-04-16 RX ORDER — GABAPENTIN 300 MG/1
CAPSULE ORAL
Qty: 150 CAPSULE | Refills: 0 | Status: SHIPPED | OUTPATIENT
Start: 2020-04-16 | End: 2020-05-08 | Stop reason: SDUPTHER

## 2020-04-16 RX ORDER — RIZATRIPTAN BENZOATE 10 MG/1
10 TABLET, ORALLY DISINTEGRATING ORAL DAILY PRN
Qty: 27 TABLET | Refills: 3 | Status: SHIPPED | OUTPATIENT
Start: 2020-04-16 | End: 2020-10-20 | Stop reason: SDUPTHER

## 2020-04-16 NOTE — PROGRESS NOTES
Subjective   Bertha Hyde is a 69 y.o. female.   Chief Complaint   Patient presents with   • Migraine     You have chosen to receive care through a telehealth visit.  Do you consent to use a video/audio connection for your medical care today? Yes    Headache    This is a recurrent problem. The current episode started in the past 7 days. The problem occurs intermittently. The problem has been gradually worsening. The pain is located in the frontal region. The pain quality is similar to prior headaches. The quality of the pain is described as stabbing and aching. The pain is at a severity of 8/10. The pain is severe. Associated symptoms include nausea and photophobia. Pertinent negatives include no abdominal pain, back pain, coughing, dizziness, ear pain, fever, hearing loss, neck pain, numbness, rhinorrhea, sore throat, tinnitus, vomiting or weakness. The symptoms are aggravated by emotional stress. She has tried oral narcotics (maxalt) for the symptoms. The treatment provided significant relief. Her past medical history is significant for migraine headaches.   Headaches have been worse in the last week or so, thinks it may be stress related due to the coronavirus pandemic.    The following portions of the patient's history were reviewed and updated as appropriate: allergies, current medications, past social history and problem list.    Outpatient Medications Prior to Visit   Medication Sig Dispense Refill   • Alcohol Swabs pads USE 4 TIMES DAILY 150 each 11   • amLODIPine (NORVASC) 5 MG tablet Take 1 tablet by mouth Every Evening. 90 tablet 3   • aspirin 81 MG EC tablet Take 81 mg by mouth Daily.     • Blood Glucose Monitoring Suppl (BLOOD GLUCOSE MONITOR SYSTEM) W/DEVICE kit Test Blood Sugars Once Daily 1 each 0   • DULoxetine (CYMBALTA) 60 MG capsule Take 1 capsule by mouth Daily. 90 capsule 3   • Empagliflozin (JARDIANCE) 10 MG tablet Take 10 mg by mouth Daily. Lot [id number]  Apr 2021 84 tablet 0   • GLOBAL  EASE INJECT PEN NEEDLES 31G X 5 MM misc USE 4 TIMES DAILY 150 each 11   • HYDROcodone-acetaminophen (NORCO) 7.5-325 MG per tablet Take 1 tablet by mouth Every 6 (Six) Hours As Needed for Moderate Pain . 120 tablet 0   • hyoscyamine (OSCIMIN) 0.125 MG SL tablet Place 0.125-0.25 mg under the tongue every 4 (four) hours as needed. (max 12 per 24 hours)     • Insulin Glargine, 1 Unit Dial, (TOUJEO) 300 UNIT/ML solution pen-injector injection Inject 50 Units under the skin into the appropriate area as directed every night at bedtime.     • LANCETS MICRO THIN 33G misc Test Blood Sugars Once Daily 100 each 3   • lisinopril-hydrochlorothiazide (PRINZIDE,ZESTORETIC) 20-12.5 MG per tablet Take 1 tablet by mouth Daily. Indication: Essential (primary) hypertension 90 tablet 3   • loratadine (CLARITIN) 10 MG tablet Take 10 mg by mouth daily as needed. Indication: Allergic rhinitis     • ondansetron ODT (ZOFRAN ODT) 4 MG disintegrating tablet Take 1 tablet by mouth Every 8 (Eight) Hours As Needed for Nausea or Vomiting. 60 tablet 2   • pantoprazole (Protonix) 40 MG EC tablet Take 1 tablet by mouth Daily As Needed (heartburn). 90 tablet 3   • rosuvastatin (CRESTOR) 20 MG tablet Take 1 tablet by mouth Every Night. 90 tablet 3   • gabapentin (NEURONTIN) 300 MG capsule Take 2 capsules by mouth 2 (Two) Times a Day. 360 capsule 0   • glucose blood test strip Test Blood Sugars Once Daily 100 each 3   • promethazine (PHENERGAN) 25 MG tablet Take 1 tablet by mouth Every 6 (Six) Hours As Needed for Nausea. 30 tablet 0   • rizatriptan MLT (MAXALT-MLT) 10 MG disintegrating tablet Take 1 tablet by mouth Daily As Needed for Migraine. Take 1 tab at onset of headache, can repeat x 1 in 2 hrs prn 27 tablet 3     No facility-administered medications prior to visit.        Review of Systems   Constitutional: Negative.  Negative for chills, fatigue, fever and unexpected weight change.   HENT: Negative for congestion, ear pain, hearing loss,  "nosebleeds, rhinorrhea, sneezing, sore throat and tinnitus.    Eyes: Positive for photophobia. Negative for discharge.   Respiratory: Negative.  Negative for cough, shortness of breath and wheezing.    Cardiovascular: Negative.  Negative for chest pain and palpitations.   Gastrointestinal: Positive for nausea. Negative for abdominal pain, constipation, diarrhea and vomiting.   Endocrine: Negative.    Genitourinary: Negative.  Negative for dysuria, frequency and urgency.   Musculoskeletal: Negative.  Negative for arthralgias, back pain, joint swelling, myalgias and neck pain.   Skin: Negative.  Negative for rash.   Allergic/Immunologic: Negative.    Neurological: Positive for headaches. Negative for dizziness, weakness and numbness.   Hematological: Negative.  Negative for adenopathy.   Psychiatric/Behavioral: Negative.  Negative for dysphoric mood and sleep disturbance. The patient is not nervous/anxious.        Objective   Visit Vitals  /86   Ht 157.5 cm (62\")   Wt 89.8 kg (198 lb)   BMI 36.21 kg/m²     Physical Exam   Constitutional: She is oriented to person, place, and time. She appears well-developed and well-nourished.   HENT:   Head: Normocephalic and atraumatic.   Eyes: Pupils are equal, round, and reactive to light. Conjunctivae and EOM are normal.   Pulmonary/Chest: Effort normal.   Neurological: She is alert and oriented to person, place, and time.   Psychiatric: She has a normal mood and affect.   Vitals reviewed.      Assessment/Plan   Problem List Items Addressed This Visit        Cardiovascular and Mediastinum    Migraine - Primary    Relevant Medications    promethazine (PHENERGAN) 25 MG tablet    rizatriptan MLT (MAXALT-MLT) 10 MG disintegrating tablet    gabapentin (NEURONTIN) 300 MG capsule       Endocrine    Type 2 diabetes mellitus with hyperglycemia, with long-term current use of insulin (CMS/[facility])       Musculoskeletal and Integument    Degenerative disc disease, lumbar (Chronic)    " Relevant Medications    gabapentin (NEURONTIN) 300 MG capsule      Other Visit Diagnoses     Diabetes mellitus without complication (CMS/[facility])        Relevant Medications    glucose blood test strip            Patient Instructions   Continue current treatment.  Increase gabapentin by adding 1 dose midday.Recheck if not improving.  Follow up as scheduled.      This was an audio and video enabled telemedicine encounter. The time that was spent in reviewing the patient's chart and addressing their symptoms, diagnosis and treatment was 18 mins.        New Medications Ordered This Visit   Medications   • glucose blood test strip     Sig: Test Blood Sugars tid prn     Dispense:  100 each     Refill:  5     Insurance Formulary, patient does not know what her Insurance will pay for   • promethazine (PHENERGAN) 25 MG tablet     Sig: Take 1 tablet by mouth Every 6 (Six) Hours As Needed for Nausea.     Dispense:  30 tablet     Refill:  0   • rizatriptan MLT (MAXALT-MLT) 10 MG disintegrating tablet     Sig: Place 1 tablet on the tongue Daily As Needed for Migraine. Take 1 tab at onset of headache, can repeat x 1 in 2 hrs prn     Dispense:  27 tablet     Refill:  3   • gabapentin (NEURONTIN) 300 MG capsule     Si caps bid and 1 cap midday     Dispense:  150 capsule     Refill:  0     Return for Next scheduled follow up.

## 2020-04-16 NOTE — PATIENT INSTRUCTIONS
Continue current treatment.  Increase gabapentin by adding 1 dose midday.Recheck if not improving.  Follow up as scheduled.

## 2020-04-23 ENCOUNTER — OUTSIDE FACILITY SERVICE (OUTPATIENT)
Dept: FAMILY MEDICINE CLINIC | Facility: CLINIC | Age: 70
End: 2020-04-23

## 2020-04-23 PROCEDURE — OUTSIDEPOS PR OUTSIDE POS PLACEHOLDER: Performed by: GENERAL PRACTICE

## 2020-04-23 PROCEDURE — G0179 MD RECERTIFICATION HHA PT: HCPCS | Performed by: GENERAL PRACTICE

## 2020-05-08 ENCOUNTER — TELEMEDICINE (OUTPATIENT)
Dept: FAMILY MEDICINE CLINIC | Facility: CLINIC | Age: 70
End: 2020-05-08

## 2020-05-08 VITALS
HEIGHT: 62 IN | HEART RATE: 70 BPM | DIASTOLIC BLOOD PRESSURE: 77 MMHG | SYSTOLIC BLOOD PRESSURE: 125 MMHG | BODY MASS INDEX: 36.44 KG/M2 | WEIGHT: 198 LBS

## 2020-05-08 DIAGNOSIS — Z79.4 TYPE 2 DIABETES MELLITUS WITH HYPERGLYCEMIA, WITH LONG-TERM CURRENT USE OF INSULIN (HCC): Primary | ICD-10-CM

## 2020-05-08 DIAGNOSIS — E11.65 TYPE 2 DIABETES MELLITUS WITH HYPERGLYCEMIA, WITH LONG-TERM CURRENT USE OF INSULIN (HCC): Primary | ICD-10-CM

## 2020-05-08 DIAGNOSIS — M15.9 OSTEOARTHRITIS OF MULTIPLE JOINTS, UNSPECIFIED OSTEOARTHRITIS TYPE: ICD-10-CM

## 2020-05-08 DIAGNOSIS — M51.36 DEGENERATIVE DISC DISEASE, LUMBAR: Chronic | ICD-10-CM

## 2020-05-08 DIAGNOSIS — E11.42 DIABETIC POLYNEUROPATHY ASSOCIATED WITH TYPE 2 DIABETES MELLITUS (HCC): ICD-10-CM

## 2020-05-08 DIAGNOSIS — I10 ESSENTIAL HYPERTENSION: Chronic | ICD-10-CM

## 2020-05-08 PROCEDURE — 99213 OFFICE O/P EST LOW 20 MIN: CPT | Performed by: GENERAL PRACTICE

## 2020-05-08 RX ORDER — HYDROCODONE BITARTRATE AND ACETAMINOPHEN 7.5; 325 MG/1; MG/1
1 TABLET ORAL EVERY 6 HOURS PRN
Qty: 120 TABLET | Refills: 0 | Status: SHIPPED | OUTPATIENT
Start: 2020-05-08 | End: 2020-10-20 | Stop reason: SDUPTHER

## 2020-05-08 RX ORDER — GABAPENTIN 300 MG/1
CAPSULE ORAL
Qty: 150 CAPSULE | Refills: 0 | Status: SHIPPED | OUTPATIENT
Start: 2020-05-08 | End: 2021-05-06

## 2020-05-08 NOTE — PROGRESS NOTES
You have chosen to receive care through a telehealth visit.  Do you consent to use a video/audio connection for your medical care today? Yes    Subjective   Bertha Hyde is a 69 y.o. female.   Chief Complaint   Patient presents with   • Diabetes     For review and evaluation of management of chronic medical problems. Sugars are better. Is watching her diet better. Depression is a little worse as she has not been able to see her family.  Also weather has not been on that nice.  She thinks it will improve as things get back to normal. Pain is controlled with medications. No side effects.   Diabetes   She presents for her follow-up diabetic visit. She has type 2 diabetes mellitus. Her disease course has been stable. Pertinent negatives for hypoglycemia include no dizziness, headaches or nervousness/anxiousness. There are no diabetic associated symptoms. Pertinent negatives for diabetes include no fatigue and no weakness. There are no hypoglycemic complications. There are no diabetic complications. Current diabetic treatment includes insulin injections and oral agent (monotherapy). She is compliant with treatment some of the time. Her weight is stable. She is following a generally healthy diet. Meal planning includes avoidance of concentrated sweets. She participates in exercise intermittently. Her home blood glucose trend is decreasing steadily. An ACE inhibitor/angiotensin II receptor blocker is being taken.   Hypertension   This is a chronic problem. The current episode started more than 1 year ago. The problem is unchanged. The problem is controlled. Associated symptoms include anxiety. Pertinent negatives include no headaches, neck pain or palpitations. There are no known risk factors for coronary artery disease. Current antihypertension treatment includes ACE inhibitors, diuretics and calcium channel blockers. The current treatment provides significant improvement. There are no compliance problems.     Arthritis   Presents for follow-up visit. She complains of pain and stiffness. She reports no joint swelling. The symptoms have been stable. Affected locations include the right knee, left knee and left shoulder. Her pain is at a severity of 9/10. Pertinent negatives include no diarrhea, dysuria, fatigue, fever or rash. Compliance with total regimen is %. Compliance with medications is %.   Back Pain   This is a chronic problem. The current episode started more than 1 year ago. The problem occurs constantly. The problem has been gradually worsening since onset. The pain is present in the lumbar spine. The pain is at a severity of 4/10. The pain is severe. The pain is worse during the day. The symptoms are aggravated by bending, standing and twisting. Pertinent negatives include no abdominal pain, dysuria, fever, headaches, numbness or weakness. She has tried analgesics for the symptoms. The treatment provided moderate relief.      The following portions of the patient's history were reviewed and updated as appropriate: allergies, current medications, past social history and problem list.    Outpatient Medications Prior to Visit   Medication Sig Dispense Refill   • Alcohol Swabs pads USE 4 TIMES DAILY 150 each 11   • amLODIPine (NORVASC) 5 MG tablet Take 1 tablet by mouth Every Evening. 90 tablet 3   • aspirin 81 MG EC tablet Take 81 mg by mouth Daily.     • Blood Glucose Monitoring Suppl (BLOOD GLUCOSE MONITOR SYSTEM) W/DEVICE kit Test Blood Sugars Once Daily 1 each 0   • DULoxetine (CYMBALTA) 60 MG capsule Take 1 capsule by mouth Daily. 90 capsule 3   • Empagliflozin (JARDIANCE) 10 MG tablet Take 10 mg by mouth Daily. Lot [id number]  Apr 2021 84 tablet 0   • GLOBAL EASE INJECT PEN NEEDLES 31G X 5 MM misc USE 4 TIMES DAILY 150 each 11   • glucose blood test strip Test Blood Sugars tid prn 100 each 5   • hyoscyamine (OSCIMIN) 0.125 MG SL tablet Place 0.125-0.25 mg under the tongue every 4 (four) hours as  needed. (max 12 per 24 hours)     • Insulin Glargine, 1 Unit Dial, (TOUJEO) 300 UNIT/ML solution pen-injector injection Inject 50 Units under the skin into the appropriate area as directed every night at bedtime.     • LANCETS MICRO THIN 33G misc Test Blood Sugars Once Daily 100 each 3   • lisinopril-hydrochlorothiazide (PRINZIDE,ZESTORETIC) 20-12.5 MG per tablet Take 1 tablet by mouth Daily. Indication: Essential (primary) hypertension 90 tablet 3   • loratadine (CLARITIN) 10 MG tablet Take 10 mg by mouth daily as needed. Indication: Allergic rhinitis     • ondansetron ODT (ZOFRAN ODT) 4 MG disintegrating tablet Take 1 tablet by mouth Every 8 (Eight) Hours As Needed for Nausea or Vomiting. 60 tablet 2   • pantoprazole (Protonix) 40 MG EC tablet Take 1 tablet by mouth Daily As Needed (heartburn). 90 tablet 3   • promethazine (PHENERGAN) 25 MG tablet Take 1 tablet by mouth Every 6 (Six) Hours As Needed for Nausea. 30 tablet 0   • rizatriptan MLT (MAXALT-MLT) 10 MG disintegrating tablet Place 1 tablet on the tongue Daily As Needed for Migraine. Take 1 tab at onset of headache, can repeat x 1 in 2 hrs prn 27 tablet 3   • rosuvastatin (CRESTOR) 20 MG tablet Take 1 tablet by mouth Every Night. 90 tablet 3   • gabapentin (NEURONTIN) 300 MG capsule 2 caps bid and 1 cap midday 150 capsule 0   • HYDROcodone-acetaminophen (NORCO) 7.5-325 MG per tablet Take 1 tablet by mouth Every 6 (Six) Hours As Needed for Moderate Pain . 120 tablet 0     No facility-administered medications prior to visit.        Review of Systems   Constitutional: Negative for fatigue and fever.   Cardiovascular: Negative for palpitations.   Gastrointestinal: Negative for abdominal pain and diarrhea.   Genitourinary: Negative for dysuria.   Musculoskeletal: Positive for arthritis, back pain and stiffness. Negative for joint swelling and neck pain.   Skin: Negative for rash.   Neurological: Negative for dizziness, weakness, numbness and headaches.  "  Psychiatric/Behavioral: The patient is not nervous/anxious.        Objective   Visit Vitals  /77   Pulse 70   Ht 157.5 cm (62\")   Wt 89.8 kg (198 lb)   BMI 36.21 kg/m²     Physical Exam   Constitutional: She is oriented to person, place, and time. She appears well-developed and well-nourished.   HENT:   Head: Normocephalic and atraumatic.   Eyes: Pupils are equal, round, and reactive to light. Conjunctivae and EOM are normal.   Pulmonary/Chest: Effort normal.   Neurological: She is alert and oriented to person, place, and time.   Psychiatric: She has a normal mood and affect.   Vitals reviewed.    Notes brought forward are reviewed and updated if indicated.    Assessment/Plan   Problem List Items Addressed This Visit        Cardiovascular and Mediastinum    Essential hypertension (Chronic)    Relevant Orders    CBC & Differential    Comprehensive Metabolic Panel    Hemoglobin A1c    Lipid Panel    MicroAlbumin, Urine, Random - Urine, Clean Catch    Urinalysis With Culture If Indicated -       Endocrine    Type 2 diabetes mellitus with hyperglycemia, with long-term current use of insulin (CMS/[facility]) - Primary    Relevant Orders    CBC & Differential    Comprehensive Metabolic Panel    Hemoglobin A1c    Lipid Panel    MicroAlbumin, Urine, Random - Urine, Clean Catch    Urinalysis With Culture If Indicated -       Nervous and Auditory    Diabetic polyneuropathy associated with type 2 diabetes mellitus (CMS/HCC)    Relevant Medications    gabapentin (NEURONTIN) 300 MG capsule       Musculoskeletal and Integument    Degenerative disc disease, lumbar (Chronic)    Relevant Medications    gabapentin (NEURONTIN) 300 MG capsule    HYDROcodone-acetaminophen (NORCO) 7.5-325 MG per tablet    Degenerative joint disease involving multiple joints    Relevant Medications    HYDROcodone-acetaminophen (NORCO) 7.5-325 MG per tablet      Chris reviewed and appropriate. Not recommended to drive or operate heavy equipment while " taking potentially sedating meds.  Patient understands the risks associated with this controlled medication, including tolerance and addiction. They also agree to obtain this medication only from me, and not from a another provider, unless that provider is covering for me in my absence. They also agree to be compliant in dosing, and not self adjust the dose of medication.  A signed controlled substance agreement is on file, and they have received a controlled substance education sheet at this or a previous visit. They have also signed a consent for treatment with a controlled substance as per [facility] policy.         Patient Instructions   Continue current treatment.  Recheck if depression is worsening.  Continue to manage diet.       This was an audio and video enabled telemedicine encounter. The time that was spent in reviewing the patient's chart and addressing their symptoms, diagnosis and treatment was 16 mins.      New Medications Ordered This Visit   Medications   • gabapentin (NEURONTIN) 300 MG capsule     Si caps bid and 1 cap midday     Dispense:  150 capsule     Refill:  0   • HYDROcodone-acetaminophen (NORCO) 7.5-325 MG per tablet     Sig: Take 1 tablet by mouth Every 6 (Six) Hours As Needed for Moderate Pain .     Dispense:  120 tablet     Refill:  0     Return in about 11 weeks (around 2020) for Annual physical.

## 2020-06-05 ENCOUNTER — OUTSIDE FACILITY SERVICE (OUTPATIENT)
Dept: FAMILY MEDICINE CLINIC | Facility: CLINIC | Age: 70
End: 2020-06-05

## 2020-06-05 PROCEDURE — G0179 MD RECERTIFICATION HHA PT: HCPCS | Performed by: GENERAL PRACTICE

## 2020-08-12 RX ORDER — DULOXETIN HYDROCHLORIDE 60 MG/1
60 CAPSULE, DELAYED RELEASE ORAL DAILY
Qty: 90 CAPSULE | Refills: 3 | Status: SHIPPED | OUTPATIENT
Start: 2020-08-12 | End: 2021-10-25 | Stop reason: SDUPTHER

## 2020-10-20 ENCOUNTER — TELEPHONE (OUTPATIENT)
Dept: FAMILY MEDICINE CLINIC | Facility: CLINIC | Age: 70
End: 2020-10-20

## 2020-10-20 ENCOUNTER — OFFICE VISIT (OUTPATIENT)
Dept: FAMILY MEDICINE CLINIC | Facility: CLINIC | Age: 70
End: 2020-10-20

## 2020-10-20 VITALS
WEIGHT: 206 LBS | HEART RATE: 76 BPM | DIASTOLIC BLOOD PRESSURE: 80 MMHG | SYSTOLIC BLOOD PRESSURE: 142 MMHG | OXYGEN SATURATION: 99 % | HEIGHT: 62 IN | BODY MASS INDEX: 37.91 KG/M2

## 2020-10-20 DIAGNOSIS — Z79.4 TYPE 2 DIABETES MELLITUS WITH HYPERGLYCEMIA, WITH LONG-TERM CURRENT USE OF INSULIN (HCC): ICD-10-CM

## 2020-10-20 DIAGNOSIS — F33.41 MAJOR DEPRESSIVE DISORDER, RECURRENT EPISODE, IN PARTIAL REMISSION (HCC): Primary | ICD-10-CM

## 2020-10-20 DIAGNOSIS — M79.671 RIGHT FOOT PAIN: ICD-10-CM

## 2020-10-20 DIAGNOSIS — E11.65 TYPE 2 DIABETES MELLITUS WITH HYPERGLYCEMIA, WITH LONG-TERM CURRENT USE OF INSULIN (HCC): ICD-10-CM

## 2020-10-20 DIAGNOSIS — G43.009 MIGRAINE WITHOUT AURA AND WITHOUT STATUS MIGRAINOSUS, NOT INTRACTABLE: Chronic | ICD-10-CM

## 2020-10-20 DIAGNOSIS — M15.9 OSTEOARTHRITIS OF MULTIPLE JOINTS, UNSPECIFIED OSTEOARTHRITIS TYPE: ICD-10-CM

## 2020-10-20 DIAGNOSIS — M51.36 DEGENERATIVE DISC DISEASE, LUMBAR: Chronic | ICD-10-CM

## 2020-10-20 PROCEDURE — 99214 OFFICE O/P EST MOD 30 MIN: CPT | Performed by: GENERAL PRACTICE

## 2020-10-20 RX ORDER — RIZATRIPTAN BENZOATE 10 MG/1
10 TABLET, ORALLY DISINTEGRATING ORAL DAILY PRN
Qty: 27 TABLET | Refills: 3 | Status: SHIPPED | OUTPATIENT
Start: 2020-10-20 | End: 2022-04-12 | Stop reason: SDUPTHER

## 2020-10-20 RX ORDER — HYDROCODONE BITARTRATE AND ACETAMINOPHEN 7.5; 325 MG/1; MG/1
1 TABLET ORAL EVERY 6 HOURS PRN
Qty: 120 TABLET | Refills: 0 | Status: SHIPPED | OUTPATIENT
Start: 2020-10-20 | End: 2020-12-07 | Stop reason: SDUPTHER

## 2020-10-20 RX ORDER — BUPROPION HYDROCHLORIDE 100 MG/1
100 TABLET, EXTENDED RELEASE ORAL EVERY MORNING
Qty: 30 TABLET | Refills: 1 | Status: SHIPPED | OUTPATIENT
Start: 2020-10-20 | End: 2021-01-07

## 2020-10-20 NOTE — PROGRESS NOTES
Subjective   Bertha Hyde is a 69 y.o. female.   Chief Complaint   Patient presents with   • Depression     moods are changing   • Back Pain     Is having more depression. Not wanting to do anything. Is crying a lot. No new stressors. Has access to counselors. Denies any suicidality.  Fall  The accident occurred 12 to 24 hours ago. The fall occurred while walking (thinks she caught her right toe). She landed on carpet. The point of impact was the left knee, right knee and right foot. The pain is present in the right foot. The pain is at a severity of 6/10. The pain is moderate. The symptoms are aggravated by ambulation, flexion and movement. Pertinent negatives include no abdominal pain, fever, headaches, nausea, numbness or vomiting. She has tried rest, ice and acetaminophen for the symptoms. The treatment provided moderate relief.   Back Pain  This is a chronic problem. The current episode started more than 1 year ago. The problem occurs constantly. The problem has been gradually worsening since onset. The pain is present in the lumbar spine. The pain does not radiate. The pain is at a severity of 8/10. The pain is worse during the day. The symptoms are aggravated by bending and position. Stiffness is present in the morning. Pertinent negatives include no abdominal pain, chest pain, dysuria, fever, headaches, numbness or weakness. Risk factors include recent trauma. She has tried analgesics for the symptoms. The treatment provided moderate relief.      The following portions of the patient's history were reviewed and updated as appropriate: allergies, current medications, past social history and problem list.    Outpatient Medications Prior to Visit   Medication Sig Dispense Refill   • Alcohol Swabs pads USE 4 TIMES DAILY 150 each 11   • amLODIPine (NORVASC) 5 MG tablet Take 1 tablet by mouth Every Evening. 90 tablet 3   • aspirin 81 MG EC tablet Take 81 mg by mouth Daily.     • Blood Glucose Monitoring  Suppl (BLOOD GLUCOSE MONITOR SYSTEM) W/DEVICE kit Test Blood Sugars Once Daily 1 each 0   • DULoxetine (CYMBALTA) 60 MG capsule Take 1 capsule by mouth Daily. 90 capsule 3   • Empagliflozin (JARDIANCE) 10 MG tablet Take 10 mg by mouth Daily. Lot [id number]  Apr 2021 84 tablet 0   • gabapentin (NEURONTIN) 300 MG capsule 2 caps bid and 1 cap midday 150 capsule 0   • GLOBAL EASE INJECT PEN NEEDLES 31G X 5 MM misc USE 4 TIMES DAILY 150 each 11   • glucose blood test strip Test Blood Sugars tid prn 100 each 5   • hyoscyamine (OSCIMIN) 0.125 MG SL tablet Place 0.125-0.25 mg under the tongue every 4 (four) hours as needed. (max 12 per 24 hours)     • Insulin Glargine, 1 Unit Dial, (TOUJEO) 300 UNIT/ML solution pen-injector injection Inject 50 Units under the skin into the appropriate area as directed every night at bedtime.     • LANCETS MICRO THIN 33G misc Test Blood Sugars Once Daily 100 each 3   • lisinopril-hydrochlorothiazide (PRINZIDE,ZESTORETIC) 20-12.5 MG per tablet Take 1 tablet by mouth Daily. Indication: Essential (primary) hypertension 90 tablet 3   • loratadine (CLARITIN) 10 MG tablet Take 10 mg by mouth daily as needed. Indication: Allergic rhinitis     • ondansetron ODT (ZOFRAN ODT) 4 MG disintegrating tablet Take 1 tablet by mouth Every 8 (Eight) Hours As Needed for Nausea or Vomiting. 60 tablet 2   • pantoprazole (Protonix) 40 MG EC tablet Take 1 tablet by mouth Daily As Needed (heartburn). 90 tablet 3   • promethazine (PHENERGAN) 25 MG tablet Take 1 tablet by mouth Every 6 (Six) Hours As Needed for Nausea. 30 tablet 0   • HYDROcodone-acetaminophen (NORCO) 7.5-325 MG per tablet Take 1 tablet by mouth Every 6 (Six) Hours As Needed for Moderate Pain . 120 tablet 0   • rizatriptan MLT (MAXALT-MLT) 10 MG disintegrating tablet Place 1 tablet on the tongue Daily As Needed for Migraine. Take 1 tab at onset of headache, can repeat x 1 in 2 hrs prn 27 tablet 3     No facility-administered medications prior to visit.   "      Review of Systems   Constitutional: Negative.  Negative for chills, fatigue, fever and unexpected weight change.   HENT: Negative.  Negative for congestion, ear pain, hearing loss, nosebleeds, rhinorrhea, sneezing, sore throat and tinnitus.    Eyes: Negative.  Negative for discharge.   Respiratory: Negative.  Negative for cough, shortness of breath and wheezing.    Cardiovascular: Negative.  Negative for chest pain and palpitations.   Gastrointestinal: Negative.  Negative for abdominal pain, constipation, diarrhea, nausea and vomiting.   Endocrine: Negative.    Genitourinary: Negative.  Negative for dysuria, frequency and urgency.   Musculoskeletal: Positive for arthralgias and back pain. Negative for joint swelling, myalgias and neck pain.   Skin: Negative.  Negative for rash.   Allergic/Immunologic: Negative.    Neurological: Negative.  Negative for dizziness, weakness, numbness and headaches.   Hematological: Negative.  Negative for adenopathy.   Psychiatric/Behavioral: Positive for dysphoric mood. Negative for sleep disturbance. The patient is not nervous/anxious.        Objective   Visit Vitals  /80   Pulse 76   Ht 157.5 cm (62\")   Wt 93.4 kg (206 lb)   SpO2 99%   BMI 37.68 kg/m²     Physical Exam  Vitals signs and nursing note reviewed.   Constitutional:       General: She is not in acute distress.     Appearance: She is well-developed.   HENT:      Head: Normocephalic and atraumatic.      Nose: Nose normal.   Eyes:      General:         Right eye: No discharge.         Left eye: No discharge.      Conjunctiva/sclera: Conjunctivae normal.      Pupils: Pupils are equal, round, and reactive to light.   Neck:      Thyroid: No thyromegaly.   Cardiovascular:      Rate and Rhythm: Normal rate and regular rhythm.      Heart sounds: Normal heart sounds.   Pulmonary:      Effort: Pulmonary effort is normal.      Breath sounds: Normal breath sounds.   Musculoskeletal:      Lumbar back: She exhibits decreased " range of motion and tenderness.        Feet:    Lymphadenopathy:      Cervical: No cervical adenopathy.   Skin:     General: Skin is warm and dry.   Neurological:      Mental Status: She is alert and oriented to person, place, and time.         Notes brought forward are reviewed and updated if indicated.     Assessment/Plan   Problems Addressed this Visit        Cardiovascular and Mediastinum    Migraine    Relevant Medications    HYDROcodone-acetaminophen (NORCO) 7.5-325 MG per tablet    rizatriptan MLT (MAXALT-MLT) 10 MG disintegrating tablet    buPROPion SR (Wellbutrin SR) 100 MG 12 hr tablet       Endocrine    Type 2 diabetes mellitus with hyperglycemia, with long-term current use of insulin (CMS/[facility])    Relevant Orders    CBC & Differential    Comprehensive Metabolic Panel    Hemoglobin A1c    Lipid Panel    Urinalysis With Culture If Indicated -    MicroAlbumin, Urine, Random - Urine, Clean Catch       Musculoskeletal and Integument    Degenerative disc disease, lumbar (Chronic)    Relevant Medications    HYDROcodone-acetaminophen (NORCO) 7.5-325 MG per tablet    Other Relevant Orders    ToxASSURE Select 13 (MW) - Urine, Clean Catch    Degenerative joint disease involving multiple joints    Relevant Medications    HYDROcodone-acetaminophen (NORCO) 7.5-325 MG per tablet    Other Relevant Orders    ToxASSURE Select 13 (MW) - Urine, Clean Catch      Other Visit Diagnoses     Major depressive disorder, recurrent episode, in partial remission (CMS/[facility])    -  Primary    Relevant Medications    buPROPion SR (Wellbutrin SR) 100 MG 12 hr tablet    Right foot pain        Relevant Orders    XR Foot 3+ View Right (Completed)      Diagnoses       Codes Comments    Major depressive disorder, recurrent episode, in partial remission (CMS/[facility])    -  Primary ICD-10-CM: F33.41  ICD-9-CM: 296.35     Right foot pain     ICD-10-CM: M79.671  ICD-9-CM: 729.5     Osteoarthritis of multiple joints, unspecified osteoarthritis type      ICD-10-CM: M15.9  ICD-9-CM: 715.89     Degenerative disc disease, lumbar     ICD-10-CM: M51.36  ICD-9-CM: 722.52     Migraine without aura and without status migrainosus, not intractable     ICD-10-CM: G43.009  ICD-9-CM: 346.10     Type 2 diabetes mellitus with hyperglycemia, with long-term current use of insulin (CMS/[facility])     ICD-10-CM: E11.65, Z79.4  ICD-9-CM: 250.00, 790.29, V58.67          Will notify regarding results. Start bupropion. Chris reviewed and appropriate. Not recommended to drive or operate heavy equipment while taking potentially sedating meds.  Patient understands the risks associated with this controlled medication, including tolerance and addiction. They also agree to obtain this medication only from me, and not from a another provider, unless that provider is covering for me in my absence. They also agree to be compliant in dosing, and not self adjust the dose of medication.  A signed controlled substance agreement is on file, and they have received a controlled substance education sheet at this or a previous visit. They have also signed a consent for treatment with a controlled substance as per [facility] policy.      New Medications Ordered This Visit   Medications   • HYDROcodone-acetaminophen (NORCO) 7.5-325 MG per tablet     Sig: Take 1 tablet by mouth Every 6 (Six) Hours As Needed for Moderate Pain .     Dispense:  120 tablet     Refill:  0   • rizatriptan MLT (MAXALT-MLT) 10 MG disintegrating tablet     Sig: Place 1 tablet on the tongue Daily As Needed for Migraine. Take 1 tab at onset of headache, can repeat x 1 in 2 hrs prn     Dispense:  27 tablet     Refill:  3   • buPROPion SR (Wellbutrin SR) 100 MG 12 hr tablet     Sig: Take 1 tablet by mouth Every Morning.     Dispense:  30 tablet     Refill:  1     Return in about 1 month (around 11/20/2020), or if symptoms worsen or fail to improve, for Next scheduled follow up, medicare wellness visit, Annual physical.

## 2020-10-20 NOTE — TELEPHONE ENCOUNTER
Per Dr. Finn, Ms. Hyde has been called with recent Right Foot x-ray results & recommendations.  Continue current medications and follow-up as planned or sooner if any problems.  .      ----- Message from Belinda Finn MD sent at 10/20/2020  2:37 PM CDT -----  Call and tell did break her little toe but no treatment needed. If painful then can theodora tape to next toe. Ice prn. Elevate fir swelling

## 2020-12-07 DIAGNOSIS — M51.36 DEGENERATIVE DISC DISEASE, LUMBAR: Chronic | ICD-10-CM

## 2020-12-07 DIAGNOSIS — M15.9 OSTEOARTHRITIS OF MULTIPLE JOINTS, UNSPECIFIED OSTEOARTHRITIS TYPE: ICD-10-CM

## 2020-12-07 RX ORDER — HYDROCODONE BITARTRATE AND ACETAMINOPHEN 7.5; 325 MG/1; MG/1
1 TABLET ORAL EVERY 6 HOURS PRN
Qty: 120 TABLET | Refills: 0 | Status: SHIPPED | OUTPATIENT
Start: 2020-12-07 | End: 2021-01-07 | Stop reason: SDUPTHER

## 2020-12-07 NOTE — TELEPHONE ENCOUNTER
DELETE AFTER REVIEWING: Telephone encounter to be sent to the clinical pool.  If patient has less than a 3 day supply left, send the encounter HIGH Priority.    Caller: Bertha Hyde    Relationship: Self    Best call back number: [phone]  Medication needed:   Requested Prescriptions     Pending Prescriptions Disp Refills   • HYDROcodone-acetaminophen (NORCO) 7.5-325 MG per tablet 120 tablet 0     Sig: Take 1 tablet by mouth Every 6 (Six) Hours As Needed for Moderate Pain .       When do you need the refill by: ASAP      Does the patient have less than a 3 day supply:  [x] Yes  [] No    What is the patient's preferred pharmacy: [facility] - [place], [place] - [address] [facility] [phone] [facility] [phone] FX

## 2021-01-07 ENCOUNTER — OFFICE VISIT (OUTPATIENT)
Dept: FAMILY MEDICINE CLINIC | Facility: CLINIC | Age: 71
End: 2021-01-07

## 2021-01-07 ENCOUNTER — LAB (OUTPATIENT)
Dept: LAB | Facility: HOSPITAL | Age: 71
End: 2021-01-07

## 2021-01-07 VITALS
HEART RATE: 79 BPM | WEIGHT: 214.6 LBS | SYSTOLIC BLOOD PRESSURE: 120 MMHG | DIASTOLIC BLOOD PRESSURE: 70 MMHG | BODY MASS INDEX: 39.49 KG/M2 | OXYGEN SATURATION: 98 % | HEIGHT: 62 IN

## 2021-01-07 DIAGNOSIS — M51.36 DEGENERATIVE DISC DISEASE, LUMBAR: Chronic | ICD-10-CM

## 2021-01-07 DIAGNOSIS — Z12.31 ENCOUNTER FOR SCREENING MAMMOGRAM FOR MALIGNANT NEOPLASM OF BREAST: ICD-10-CM

## 2021-01-07 DIAGNOSIS — Z79.4 TYPE 2 DIABETES MELLITUS WITH HYPERGLYCEMIA, WITH LONG-TERM CURRENT USE OF INSULIN (HCC): ICD-10-CM

## 2021-01-07 DIAGNOSIS — Z23 NEED FOR PROPHYLACTIC VACCINATION AGAINST STREPTOCOCCUS PNEUMONIAE (PNEUMOCOCCUS): ICD-10-CM

## 2021-01-07 DIAGNOSIS — E66.01 CLASS 2 SEVERE OBESITY DUE TO EXCESS CALORIES WITH SERIOUS COMORBIDITY AND BODY MASS INDEX (BMI) OF 37.0 TO 37.9 IN ADULT (HCC): ICD-10-CM

## 2021-01-07 DIAGNOSIS — I10 ESSENTIAL HYPERTENSION: ICD-10-CM

## 2021-01-07 DIAGNOSIS — F33.41 MAJOR DEPRESSIVE DISORDER, RECURRENT EPISODE, IN PARTIAL REMISSION (HCC): Chronic | ICD-10-CM

## 2021-01-07 DIAGNOSIS — M15.9 OSTEOARTHRITIS OF MULTIPLE JOINTS, UNSPECIFIED OSTEOARTHRITIS TYPE: ICD-10-CM

## 2021-01-07 DIAGNOSIS — I10 ESSENTIAL HYPERTENSION: Chronic | ICD-10-CM

## 2021-01-07 DIAGNOSIS — E78.2 MIXED HYPERLIPIDEMIA: Chronic | ICD-10-CM

## 2021-01-07 DIAGNOSIS — E11.65 TYPE 2 DIABETES MELLITUS WITH HYPERGLYCEMIA, WITH LONG-TERM CURRENT USE OF INSULIN (HCC): ICD-10-CM

## 2021-01-07 DIAGNOSIS — M15.9 OSTEOARTHRITIS OF MULTIPLE JOINTS, UNSPECIFIED OSTEOARTHRITIS TYPE: Chronic | ICD-10-CM

## 2021-01-07 DIAGNOSIS — E11.42 DIABETIC POLYNEUROPATHY ASSOCIATED WITH TYPE 2 DIABETES MELLITUS (HCC): ICD-10-CM

## 2021-01-07 DIAGNOSIS — Z00.00 MEDICARE ANNUAL WELLNESS VISIT, SUBSEQUENT: Primary | ICD-10-CM

## 2021-01-07 LAB
ALBUMIN SERPL-MCNC: 4.7 G/DL (ref 3.5–5.2)
ALBUMIN UR-MCNC: <1.2 MG/DL
ALBUMIN/GLOB SERPL: 1.5 G/DL
ALP SERPL-CCNC: 126 U/L (ref 39–117)
ALT SERPL W P-5'-P-CCNC: 17 U/L (ref 1–33)
ANION GAP SERPL CALCULATED.3IONS-SCNC: 11.5 MMOL/L (ref 5–15)
AST SERPL-CCNC: 15 U/L (ref 1–32)
BACTERIA UR QL AUTO: ABNORMAL /HPF
BASOPHILS # BLD AUTO: 0.04 10*3/MM3 (ref 0–0.2)
BASOPHILS NFR BLD AUTO: 0.5 % (ref 0–1.5)
BILIRUB SERPL-MCNC: 0.2 MG/DL (ref 0–1.2)
BILIRUB UR QL STRIP: NEGATIVE
BUN SERPL-MCNC: 22 MG/DL (ref 8–23)
BUN/CREAT SERPL: 24.4 (ref 7–25)
CALCIUM SPEC-SCNC: 9.5 MG/DL (ref 8.6–10.5)
CHLORIDE SERPL-SCNC: 98 MMOL/L (ref 98–107)
CHOLEST SERPL-MCNC: 264 MG/DL (ref 0–200)
CLARITY UR: CLEAR
CO2 SERPL-SCNC: 28.5 MMOL/L (ref 22–29)
COLOR UR: ABNORMAL
CREAT SERPL-MCNC: 0.9 MG/DL (ref 0.57–1)
DEPRECATED RDW RBC AUTO: 43 FL (ref 37–54)
EOSINOPHIL # BLD AUTO: 0.16 10*3/MM3 (ref 0–0.4)
EOSINOPHIL NFR BLD AUTO: 1.9 % (ref 0.3–6.2)
ERYTHROCYTE [DISTWIDTH] IN BLOOD BY AUTOMATED COUNT: 13.5 % (ref 12.3–15.4)
GFR SERPL CREATININE-BSD FRML MDRD: 62 ML/MIN/1.73
GLOBULIN UR ELPH-MCNC: 3.1 GM/DL
GLUCOSE SERPL-MCNC: 139 MG/DL (ref 65–99)
GLUCOSE UR STRIP-MCNC: ABNORMAL MG/DL
HBA1C MFR BLD: 9.4 % (ref 4.8–5.6)
HCT VFR BLD AUTO: 37.6 % (ref 34–46.6)
HDLC SERPL-MCNC: 55 MG/DL (ref 40–60)
HGB BLD-MCNC: 12.9 G/DL (ref 12–15.9)
HGB UR QL STRIP.AUTO: NEGATIVE
HYALINE CASTS UR QL AUTO: ABNORMAL /LPF
IMM GRANULOCYTES # BLD AUTO: 0.07 10*3/MM3 (ref 0–0.05)
IMM GRANULOCYTES NFR BLD AUTO: 0.8 % (ref 0–0.5)
KETONES UR QL STRIP: NEGATIVE
LDLC SERPL CALC-MCNC: 167 MG/DL (ref 0–100)
LDLC/HDLC SERPL: 2.98 {RATIO}
LEUKOCYTE ESTERASE UR QL STRIP.AUTO: ABNORMAL
LYMPHOCYTES # BLD AUTO: 3.78 10*3/MM3 (ref 0.7–3.1)
LYMPHOCYTES NFR BLD AUTO: 44.5 % (ref 19.6–45.3)
MCH RBC QN AUTO: 30.5 PG (ref 26.6–33)
MCHC RBC AUTO-ENTMCNC: 34.3 G/DL (ref 31.5–35.7)
MCV RBC AUTO: 88.9 FL (ref 79–97)
MONOCYTES # BLD AUTO: 0.58 10*3/MM3 (ref 0.1–0.9)
MONOCYTES NFR BLD AUTO: 6.8 % (ref 5–12)
NEUTROPHILS NFR BLD AUTO: 3.87 10*3/MM3 (ref 1.7–7)
NEUTROPHILS NFR BLD AUTO: 45.5 % (ref 42.7–76)
NITRITE UR QL STRIP: NEGATIVE
NRBC BLD AUTO-RTO: 0 /100 WBC (ref 0–0.2)
PH UR STRIP.AUTO: 5.5 [PH] (ref 5–8)
PLATELET # BLD AUTO: 369 10*3/MM3 (ref 140–450)
PMV BLD AUTO: 10.3 FL (ref 6–12)
POTASSIUM SERPL-SCNC: 3.9 MMOL/L (ref 3.5–5.2)
PROT SERPL-MCNC: 7.8 G/DL (ref 6–8.5)
PROT UR QL STRIP: NEGATIVE
RBC # BLD AUTO: 4.23 10*6/MM3 (ref 3.77–5.28)
RBC # UR: ABNORMAL /HPF
REF LAB TEST METHOD: ABNORMAL
SODIUM SERPL-SCNC: 138 MMOL/L (ref 136–145)
SP GR UR STRIP: 1.02 (ref 1–1.03)
SQUAMOUS #/AREA URNS HPF: ABNORMAL /HPF
TRIGL SERPL-MCNC: 225 MG/DL (ref 0–150)
UROBILINOGEN UR QL STRIP: ABNORMAL
VLDLC SERPL-MCNC: 42 MG/DL (ref 5–40)
WBC # BLD AUTO: 8.5 10*3/MM3 (ref 3.4–10.8)
WBC UR QL AUTO: ABNORMAL /HPF

## 2021-01-07 PROCEDURE — G0481 DRUG TEST DEF 8-14 CLASSES: HCPCS

## 2021-01-07 PROCEDURE — 36415 COLL VENOUS BLD VENIPUNCTURE: CPT

## 2021-01-07 PROCEDURE — 83036 HEMOGLOBIN GLYCOSYLATED A1C: CPT

## 2021-01-07 PROCEDURE — 90732 PPSV23 VACC 2 YRS+ SUBQ/IM: CPT | Performed by: GENERAL PRACTICE

## 2021-01-07 PROCEDURE — 80307 DRUG TEST PRSMV CHEM ANLYZR: CPT

## 2021-01-07 PROCEDURE — 82043 UR ALBUMIN QUANTITATIVE: CPT

## 2021-01-07 PROCEDURE — 80061 LIPID PANEL: CPT

## 2021-01-07 PROCEDURE — 85025 COMPLETE CBC W/AUTO DIFF WBC: CPT

## 2021-01-07 PROCEDURE — 99214 OFFICE O/P EST MOD 30 MIN: CPT | Performed by: GENERAL PRACTICE

## 2021-01-07 PROCEDURE — 80053 COMPREHEN METABOLIC PANEL: CPT

## 2021-01-07 PROCEDURE — 81001 URINALYSIS AUTO W/SCOPE: CPT

## 2021-01-07 PROCEDURE — G0439 PPPS, SUBSEQ VISIT: HCPCS | Performed by: GENERAL PRACTICE

## 2021-01-07 PROCEDURE — G0009 ADMIN PNEUMOCOCCAL VACCINE: HCPCS | Performed by: GENERAL PRACTICE

## 2021-01-07 RX ORDER — HYDROCODONE BITARTRATE AND ACETAMINOPHEN 7.5; 325 MG/1; MG/1
1 TABLET ORAL EVERY 6 HOURS PRN
Qty: 120 TABLET | Refills: 0 | Status: SHIPPED | OUTPATIENT
Start: 2021-01-07 | End: 2021-04-26 | Stop reason: SDUPTHER

## 2021-01-07 RX ORDER — EMPAGLIFLOZIN 10 MG/1
10 TABLET, FILM COATED ORAL DAILY
Qty: 90 TABLET | Refills: 1 | Status: SHIPPED | OUTPATIENT
Start: 2021-01-07 | End: 2021-05-06 | Stop reason: SDUPTHER

## 2021-01-07 RX ORDER — BUPROPION HYDROCHLORIDE 150 MG/1
150 TABLET, EXTENDED RELEASE ORAL DAILY
Qty: 90 TABLET | Refills: 1 | Status: SHIPPED | OUTPATIENT
Start: 2021-01-07 | End: 2021-05-06 | Stop reason: SDUPTHER
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
## 2021-01-07 NOTE — PATIENT INSTRUCTIONS
Medicare Wellness  Personal Prevention Plan of Service     Date of Office Visit:  2021  Encounter Provider:  Belinda Finn MD  Place of Service:  [facility]  Patient Name: Bertha Hyde  :  1950    As part of the Medicare Wellness portion of your visit today, we are providing you with this personalized preventive plan of services (PPPS). This plan is based upon recommendations of the [place] Preventive Services Task Force (USPSTF) and the Advisory Committee on Immunization Practices (ACIP).    This lists the preventive care services that should be considered, and provides dates of when you are due. Items listed as completed are up-to-date and do not require any further intervention.    Health Maintenance   Topic Date Due   • ZOSTER VACCINE (2 of 2) 2017   • DIABETIC FOOT EXAM  2020   • Pneumococcal Vaccine 65+ (2 of 2 - PPSV23) 2020   • URINE MICROALBUMIN  2020   • DIABETIC EYE EXAM  2020   • HEMOGLOBIN A1C  2020   • ANNUAL WELLNESS VISIT  2020   • LIPID PANEL  2021   • COLOGUARD  2021   • MAMMOGRAM  2021   • DXA SCAN  2023   • TDAP/TD VACCINES (2 - Td) 2027   • HEPATITIS C SCREENING  Completed   • INFLUENZA VACCINE  Completed   • MENINGOCOCCAL VACCINE  Aged Out   • PAP SMEAR  Discontinued       Orders Placed This Encounter   Procedures   • Mammo Screening Digital Tomosynthesis Bilateral With CAD     Standing Status:   Future     Standing Expiration Date:   2022     Order Specific Question:   Reason for Exam:     Answer:   screen   • Pneumococcal Polysaccharide Vaccine 23-Valent Greater Than or Equal To 3yo Subcutaneous / IM       Return in about 3 months (around 2021) for Recheck.

## 2021-01-07 NOTE — PROGRESS NOTES
The ABCs of the Annual Wellness Visit  Subsequent Medicare Wellness Visit    Chief Complaint   Patient presents with   • Diabetes     medicare wellness   • Depression       Subjective   History of Present Illness:  Bertha Hyde is a 70 y.o. female who presents for a Subsequent Medicare Wellness Visit.    HEALTH RISK ASSESSMENT    Recent Hospitalizations:  Recently treated at the following:  Other: [facility]    Current Medical Providers:  Patient Care Team:  Belinda Finn MD as PCP - General    Smoking Status:  Social History     Tobacco Use   Smoking Status Never Smoker   Smokeless Tobacco Never Used       Alcohol Consumption:  Social History     Substance and Sexual Activity   Alcohol Use No       Depression Screen:   PHQ-2/PHQ-9 Depression Screening 1/7/2021   Little interest or pleasure in doing things 3   Feeling down, depressed, or hopeless 3   Trouble falling or staying asleep, or sleeping too much 3   Feeling tired or having little energy 3   Poor appetite or overeating 3   Feeling bad about yourself - or that you are a failure or have let yourself or your family down 3   Trouble concentrating on things, such as reading the newspaper or watching television 2   Moving or speaking so slowly that other people could have noticed. Or the opposite - being so fidgety or restless that you have been moving around a lot more than usual 0   Thoughts that you would be better off dead, or of hurting yourself in some way 0   Total Score 20   If you checked off any problems, how difficult have these problems made it for you to do your work, take care of things at home, or get along with other people? Somewhat difficult       Fall Risk Screen:  STEADI Fall Risk Assessment was completed, and patient is at MODERATE risk for falls. Assessment completed on:1/7/2021    Health Habits and Functional and Cognitive Screening:  Functional & Cognitive Status 1/7/2021   Do you have difficulty preparing food and eating?  No   Do you have difficulty bathing yourself, getting dressed or grooming yourself? No   Do you have difficulty using the toilet? No   Do you have difficulty moving around from place to place? No   Do you have trouble with steps or getting out of a bed or a chair? No   Current Diet Well Balanced Diet   Dental Exam Up to date   Eye Exam Up to date   Exercise (times per week) 3 times per week   Current Exercises Include Walking   Current Exercise Activities Include -   Do you need help using the phone?  No   Are you deaf or do you have serious difficulty hearing?  No   Do you need help with transportation? No   Do you need help shopping? No   Do you need help preparing meals?  No   Do you need help with housework?  Yes   Do you need help with laundry? No   Do you need help taking your medications? No   Do you need help managing money? No   Do you ever drive or ride in a car without wearing a seat belt? No   Have you felt unusual stress, anger or loneliness in the last month? Yes   Who do you live with? Alone   If you need help, do you have trouble finding someone available to you? No   Have you been bothered in the last four weeks by sexual problems? No   Do you have difficulty concentrating, remembering or making decisions? No         Does the patient have evidence of cognitive impairment? No    Asprin use counseling:Taking ASA appropriately as indicated    Age-appropriate Screening Schedule:  Refer to the list below for future screening recommendations based on patient's age, sex and/or medical conditions. Orders for these recommended tests are listed in the plan section. The patient has been provided with a written plan.    Health Maintenance   Topic Date Due   • ZOSTER VACCINE (2 of 2) 08/11/2017   • DIABETIC FOOT EXAM  04/08/2020   • DIABETIC EYE EXAM  09/09/2020   • HEMOGLOBIN A1C  07/07/2021   • LIPID PANEL  01/07/2022   • URINE MICROALBUMIN  01/07/2022   • MAMMOGRAM  01/07/2023   • DXA SCAN  06/22/2023   •  TDAP/TD VACCINES (2 - Td) 06/16/2027   • INFLUENZA VACCINE  Completed   • PAP SMEAR  Discontinued          The following portions of the patient's history were reviewed and updated as appropriate: allergies, current medications, past family history, past medical history, past social history, past surgical history and problem list.    Outpatient Medications Prior to Visit   Medication Sig Dispense Refill   • Alcohol Swabs pads USE 4 TIMES DAILY 150 each 11   • aspirin 81 MG EC tablet Take 81 mg by mouth Daily.     • b complex-C-folic acid 1 MG capsule Take 1 capsule by mouth Daily.     • Blood Glucose Monitoring Suppl (BLOOD GLUCOSE MONITOR SYSTEM) W/DEVICE kit Test Blood Sugars Once Daily 1 each 0   • DULoxetine (CYMBALTA) 60 MG capsule Take 1 capsule by mouth Daily. 90 capsule 3   • gabapentin (NEURONTIN) 300 MG capsule 2 caps bid and 1 cap midday 150 capsule 0   • GLOBAL EASE INJECT PEN NEEDLES 31G X 5 MM misc USE 4 TIMES DAILY 150 each 11   • glucose blood test strip Test Blood Sugars tid prn 100 each 5   • hyoscyamine (OSCIMIN) 0.125 MG SL tablet Place 0.125-0.25 mg under the tongue every 4 (four) hours as needed. (max 12 per 24 hours)     • insulin degludec (TRESIBA FLEXTOUCH) 100 UNIT/ML solution pen-injector injection Inject 50 Units under the skin into the appropriate area as directed Every Night. 4 pen 3   • LANCETS MICRO THIN 33G misc Test Blood Sugars Once Daily 100 each 3   • lisinopril-hydrochlorothiazide (PRINZIDE,ZESTORETIC) 20-12.5 MG per tablet Take 1 tablet by mouth Daily. Indication: Essential (primary) hypertension 90 tablet 3   • loratadine (CLARITIN) 10 MG tablet Take 10 mg by mouth daily as needed. Indication: Allergic rhinitis     • ondansetron ODT (ZOFRAN ODT) 4 MG disintegrating tablet Take 1 tablet by mouth Every 8 (Eight) Hours As Needed for Nausea or Vomiting. 60 tablet 2   • pantoprazole (Protonix) 40 MG EC tablet Take 1 tablet by mouth Daily As Needed (heartburn). 90 tablet 3   •  promethazine (PHENERGAN) 25 MG tablet Take 1 tablet by mouth Every 6 (Six) Hours As Needed for Nausea. 30 tablet 0   • rizatriptan MLT (MAXALT-MLT) 10 MG disintegrating tablet Place 1 tablet on the tongue Daily As Needed for Migraine. Take 1 tab at onset of headache, can repeat x 1 in 2 hrs prn 27 tablet 3   • buPROPion SR (Wellbutrin SR) 100 MG 12 hr tablet Take 1 tablet by mouth Every Morning. 30 tablet 1   • Empagliflozin (JARDIANCE) 10 MG tablet Take 10 mg by mouth Daily. Lot [id number]  Apr 2021 84 tablet 0   • HYDROcodone-acetaminophen (NORCO) 7.5-325 MG per tablet Take 1 tablet by mouth Every 6 (Six) Hours As Needed for Moderate Pain . 120 tablet 0   • amLODIPine (NORVASC) 5 MG tablet Take 1 tablet by mouth Every Evening. 90 tablet 3     No facility-administered medications prior to visit.        Patient Active Problem List   Diagnosis   • Degenerative joint disease involving multiple joints   • Depressive disorder   • Essential hypertension   • Hyperlipidemia   • Insomnia   • Migraine   • Type 2 diabetes mellitus with hyperglycemia, with long-term current use of insulin (CMS/[facility])   • Open-angle glaucoma   • Cataract   • Open-angle glaucoma of right eye   • Chronic right shoulder pain   • Colitis   • Degenerative disc disease, lumbar   • Diabetic polyneuropathy associated with type 2 diabetes mellitus (CMS/[facility])   • Class 2 severe obesity due to excess calories with serious comorbidity and body mass index (BMI) of 37.0 to 37.9 in adult (CMS/[facility])       Advanced Care Planning:  ACP discussion was held with the patient during this visit. Patient does not have an advance directive, information provided.    Review of Systems   Constitutional: Negative.  Negative for chills, fatigue, fever and unexpected weight change.   HENT: Negative.  Negative for congestion, ear pain, hearing loss, nosebleeds, rhinorrhea, sneezing, sore throat and tinnitus.    Eyes: Negative.  Negative for discharge.   Respiratory: Negative.   "Negative for cough, shortness of breath and wheezing.    Cardiovascular: Negative.  Negative for chest pain and palpitations.   Gastrointestinal: Negative.  Negative for abdominal pain, constipation, diarrhea, nausea and vomiting.   Endocrine: Negative.    Genitourinary: Negative.  Negative for dysuria, frequency and urgency.   Musculoskeletal: Negative.  Negative for arthralgias, back pain, joint swelling, myalgias and neck pain.   Skin: Negative.  Negative for rash.   Allergic/Immunologic: Negative.    Neurological: Negative.  Negative for dizziness, weakness, numbness and headaches.   Hematological: Negative.  Negative for adenopathy.   Psychiatric/Behavioral: Negative.  Negative for dysphoric mood and sleep disturbance. The patient is not nervous/anxious.        Compared to one year ago, the patient feels her physical health is the same.  Compared to one year ago, the patient feels her mental health is worse.    Reviewed chart for potential of high risk medication in the elderly: yes  Reviewed chart for potential of harmful drug interactions in the elderly:yes    Objective         Vitals:    01/07/21 0959   BP: 120/70   Pulse: 79   SpO2: 98%   Weight: 97.3 kg (214 lb 9.6 oz)   Height: 157.5 cm (62\")   PainSc:   6   PainLoc: Comment: knees       Body mass index is 39.25 kg/m².  Discussed the patient's BMI with her. The BMI is above average; BMI management plan is completed.    Physical Exam  Vitals signs and nursing note reviewed.   Constitutional:       General: She is not in acute distress.     Appearance: She is well-developed.   HENT:      Head: Normocephalic and atraumatic.      Nose: Nose normal.   Eyes:      General:         Right eye: No discharge.         Left eye: No discharge.      Conjunctiva/sclera: Conjunctivae normal.      Pupils: Pupils are equal, round, and reactive to light.   Neck:      Thyroid: No thyromegaly.   Cardiovascular:      Rate and Rhythm: Normal rate and regular rhythm.      Heart " sounds: Normal heart sounds.   Pulmonary:      Effort: Pulmonary effort is normal.      Breath sounds: Normal breath sounds.   Lymphadenopathy:      Cervical: No cervical adenopathy.   Skin:     General: Skin is warm and dry.   Neurological:      Mental Status: She is alert and oriented to person, place, and time.       Assessment/Plan   Medicare Risks and Personalized Health Plan  CMS Preventative Services Quick Reference  Advance Directive Discussion  Depression/Dysphoria  Fall Risk  Immunizations Discussed/Encouraged (specific immunizations; Pneumococcal 23, Shingrix and covid 19 )  Obesity/Overweight     The above risks/problems have been discussed with the patient.  Pertinent information has been shared with the patient in the After Visit Summary.  Follow up plans and orders are seen below in the Assessment/Plan Section.    Diagnoses and all orders for this visit:    1. Medicare annual wellness visit, subsequent (Primary)    2. Need for prophylactic vaccination against Streptococcus pneumoniae (pneumococcus)  -     Pneumococcal Polysaccharide Vaccine 23-Valent Greater Than or Equal To 3yo Subcutaneous / IM    3. Degenerative disc disease, lumbar  -     HYDROcodone-acetaminophen (NORCO) 7.5-325 MG per tablet; Take 1 tablet by mouth Every 6 (Six) Hours As Needed for Moderate Pain .  Dispense: 120 tablet; Refill: 0    4. Diabetic polyneuropathy associated with type 2 diabetes mellitus (CMS/[facility])    5. Encounter for screening mammogram for malignant neoplasm of breast  -     Mammo Screening Digital Tomosynthesis Bilateral With CAD; Future    6. Major depressive disorder, recurrent episode, in partial remission (CMS/[facility])  -     buPROPion SR (Wellbutrin SR) 150 MG 12 hr tablet; Take 1 tablet by mouth Daily.  Dispense: 90 tablet; Refill: 1    7. Osteoarthritis of multiple joints, unspecified osteoarthritis type  -     HYDROcodone-acetaminophen (NORCO) 7.5-325 MG per tablet; Take 1 tablet by mouth Every 6 (Six) Hours As  Needed for Moderate Pain .  Dispense: 120 tablet; Refill: 0    8. Type 2 diabetes mellitus with hyperglycemia, with long-term current use of insulin (CMS/H[facility])  -     Empagliflozin (Jardiance) 10 MG tablet; Take 10 mg by mouth Daily. Lot [id number]  Apr 2021  Dispense: 90 tablet; Refill: 1    9. Essential hypertension    10. Mixed hyperlipidemia    11. Class 2 severe obesity due to excess calories with serious comorbidity and body mass index (BMI) of 37.0 to 37.9 in adult (CMS/[facility])      Follow Up:  Return in about 3 months (around 4/7/2021) for Recheck.     An After Visit Summary and PPPS were given to the patient.    Information has been scanned into chart. Discussed importance of taking medications as prescribed. Encouraged healthy eating habits with low fat, low salt choices and working towards maintaining a healthy weight. Recommended regular exercise if able as well as care to prevent falls including avoiding anything on the floor that they could slip or trip on such as throw rugs, making sure they have a bathmat to step onto when their feet are wet and having grab bars and railings where needed.

## 2021-01-13 LAB — DRUGS UR: NORMAL

## 2021-01-20 ENCOUNTER — TELEPHONE (OUTPATIENT)
Dept: FAMILY MEDICINE CLINIC | Facility: CLINIC | Age: 71
End: 2021-01-20

## 2021-02-03 DIAGNOSIS — M51.36 DEGENERATIVE DISC DISEASE, LUMBAR: Chronic | ICD-10-CM

## 2021-02-03 DIAGNOSIS — M15.9 OSTEOARTHRITIS OF MULTIPLE JOINTS, UNSPECIFIED OSTEOARTHRITIS TYPE: Chronic | ICD-10-CM

## 2021-02-03 RX ORDER — HYDROCODONE BITARTRATE AND ACETAMINOPHEN 7.5; 325 MG/1; MG/1
1 TABLET ORAL EVERY 6 HOURS PRN
Qty: 120 TABLET | Refills: 0 | OUTPATIENT
Start: 2021-02-03

## 2021-02-08 ENCOUNTER — TELEPHONE (OUTPATIENT)
Dept: FAMILY MEDICINE CLINIC | Facility: CLINIC | Age: 71
End: 2021-02-08

## 2021-02-08 NOTE — TELEPHONE ENCOUNTER
Caller: Bertha Hyde    Relationship: Self    Best call back number: [phone]    What medication are you requesting: BREATHING TREATMENT AND PAIN MEDICATION     What are your current symptoms: COVID 19/ PNEUMONIA     How long have you been experiencing symptoms: 12 DAYS     Have you had these symptoms before:    [] Yes  [x] No    Have you been treated for these symptoms before:   [] Yes  [x] No    If a prescription is needed, what is your preferred pharmacy and phone number:  [facility]   [address] [facility].     Additional notes: PLEASE CALL AND ADVISE. PATIENT STATED SHE IS IN  A LOT OF PAIN.

## 2021-02-19 ENCOUNTER — TELEPHONE (OUTPATIENT)
Dept: FAMILY MEDICINE CLINIC | Facility: CLINIC | Age: 71
End: 2021-02-19

## 2021-02-19 NOTE — TELEPHONE ENCOUNTER
Caller: Bertha Hyde    Relationship: Self    Best call back number: [phone]    What medication are you requesting: MEDICATION FOR DIVERTICULITIS    What are your current symptoms: POCKET ON LEFT SIDE, HURTING BAD     Have you had these symptoms before:    [x] Yes  [] No    Have you been treated for these symptoms before:   [x] Yes  [] No    If a prescription is needed, what is your preferred pharmacy and phone number: [facility] - [facility] [address] [facility] [phone] [facility] [phone] FX     Additional notes:  PATIENT REQUESTING ANTIBIOTIC FOR DIVERTICULITIS

## 2021-02-26 ENCOUNTER — TELEPHONE (OUTPATIENT)
Dept: FAMILY MEDICINE CLINIC | Facility: CLINIC | Age: 71
End: 2021-02-26

## 2021-03-08 ENCOUNTER — OUTSIDE FACILITY SERVICE (OUTPATIENT)
Dept: FAMILY MEDICINE CLINIC | Facility: CLINIC | Age: 71
End: 2021-03-08

## 2021-03-08 PROCEDURE — G0180 MD CERTIFICATION HHA PATIENT: HCPCS | Performed by: GENERAL PRACTICE

## 2021-03-16 ENCOUNTER — TELEPHONE (OUTPATIENT)
Dept: FAMILY MEDICINE CLINIC | Facility: CLINIC | Age: 71
End: 2021-03-16

## 2021-03-16 NOTE — TELEPHONE ENCOUNTER
Left VM for pt not sure who dx her with ear infection, she will need to go to urgent care if she has not been seen.

## 2021-03-16 NOTE — TELEPHONE ENCOUNTER
Pt would like to know if she could get something for an ear infection and she also had a yeast infection and needs something for that as well

## 2021-04-26 DIAGNOSIS — M51.36 DEGENERATIVE DISC DISEASE, LUMBAR: Chronic | ICD-10-CM

## 2021-04-26 DIAGNOSIS — G43.009 MIGRAINE WITHOUT AURA AND WITHOUT STATUS MIGRAINOSUS, NOT INTRACTABLE: Chronic | ICD-10-CM

## 2021-04-26 DIAGNOSIS — M15.9 OSTEOARTHRITIS OF MULTIPLE JOINTS, UNSPECIFIED OSTEOARTHRITIS TYPE: Chronic | ICD-10-CM

## 2021-04-26 RX ORDER — PROMETHAZINE HYDROCHLORIDE 25 MG/1
25 TABLET ORAL EVERY 6 HOURS PRN
Qty: 30 TABLET | Refills: 0 | Status: SHIPPED | OUTPATIENT
Start: 2021-04-26

## 2021-04-26 RX ORDER — HYDROCODONE BITARTRATE AND ACETAMINOPHEN 7.5; 325 MG/1; MG/1
1 TABLET ORAL EVERY 6 HOURS PRN
Qty: 120 TABLET | Refills: 0 | Status: SHIPPED | OUTPATIENT
Start: 2021-04-26 | End: 2021-07-29 | Stop reason: SDUPTHER

## 2021-04-26 RX ORDER — LISINOPRIL AND HYDROCHLOROTHIAZIDE 20; 12.5 MG/1; MG/1
1 TABLET ORAL DAILY
Qty: 90 TABLET | Refills: 3 | Status: SHIPPED | OUTPATIENT
Start: 2021-04-26 | End: 2022-04-12 | Stop reason: SDUPTHER

## 2021-04-26 NOTE — TELEPHONE ENCOUNTER
Bertha missed her appt and has resched for 05-06-21 and said she is needing refills sent to [facility] in [place]    Lisinopril  Lortabs   Phenergan

## 2021-05-06 ENCOUNTER — OFFICE VISIT (OUTPATIENT)
Dept: FAMILY MEDICINE CLINIC | Facility: CLINIC | Age: 71
End: 2021-05-06

## 2021-05-06 ENCOUNTER — LAB (OUTPATIENT)
Dept: LAB | Facility: HOSPITAL | Age: 71
End: 2021-05-06

## 2021-05-06 VITALS
HEART RATE: 117 BPM | BODY MASS INDEX: 37.36 KG/M2 | WEIGHT: 203 LBS | DIASTOLIC BLOOD PRESSURE: 80 MMHG | SYSTOLIC BLOOD PRESSURE: 130 MMHG | OXYGEN SATURATION: 97 % | HEIGHT: 62 IN

## 2021-05-06 DIAGNOSIS — K21.9 GERD WITHOUT ESOPHAGITIS: ICD-10-CM

## 2021-05-06 DIAGNOSIS — M51.36 DEGENERATIVE DISC DISEASE, LUMBAR: Chronic | ICD-10-CM

## 2021-05-06 DIAGNOSIS — E78.2 MIXED HYPERLIPIDEMIA: Chronic | ICD-10-CM

## 2021-05-06 DIAGNOSIS — Z79.4 TYPE 2 DIABETES MELLITUS WITH HYPERGLYCEMIA, WITH LONG-TERM CURRENT USE OF INSULIN (HCC): ICD-10-CM

## 2021-05-06 DIAGNOSIS — R00.0 TACHYCARDIA: Primary | ICD-10-CM

## 2021-05-06 DIAGNOSIS — E11.42 DIABETIC POLYNEUROPATHY ASSOCIATED WITH TYPE 2 DIABETES MELLITUS (HCC): ICD-10-CM

## 2021-05-06 DIAGNOSIS — E11.65 TYPE 2 DIABETES MELLITUS WITH HYPERGLYCEMIA, WITH LONG-TERM CURRENT USE OF INSULIN (HCC): ICD-10-CM

## 2021-05-06 DIAGNOSIS — F33.41 MAJOR DEPRESSIVE DISORDER, RECURRENT EPISODE, IN PARTIAL REMISSION (HCC): Chronic | ICD-10-CM

## 2021-05-06 PROCEDURE — 93010 ELECTROCARDIOGRAM REPORT: CPT | Performed by: INTERNAL MEDICINE

## 2021-05-06 PROCEDURE — 93005 ELECTROCARDIOGRAM TRACING: CPT | Performed by: GENERAL PRACTICE

## 2021-05-06 PROCEDURE — 99214 OFFICE O/P EST MOD 30 MIN: CPT | Performed by: GENERAL PRACTICE

## 2021-05-06 PROCEDURE — 80053 COMPREHEN METABOLIC PANEL: CPT

## 2021-05-06 PROCEDURE — 36415 COLL VENOUS BLD VENIPUNCTURE: CPT

## 2021-05-06 PROCEDURE — 83721 ASSAY OF BLOOD LIPOPROTEIN: CPT

## 2021-05-06 PROCEDURE — 83036 HEMOGLOBIN GLYCOSYLATED A1C: CPT

## 2021-05-06 RX ORDER — EMPAGLIFLOZIN 10 MG/1
10 TABLET, FILM COATED ORAL DAILY
Qty: 90 TABLET | Refills: 1 | Status: SHIPPED | OUTPATIENT
Start: 2021-05-06 | End: 2021-12-09 | Stop reason: SDUPTHER

## 2021-05-06 RX ORDER — PANTOPRAZOLE SODIUM 40 MG/1
40 TABLET, DELAYED RELEASE ORAL DAILY PRN
Qty: 90 TABLET | Refills: 3 | Status: SHIPPED | OUTPATIENT
Start: 2021-05-06 | End: 2022-05-24 | Stop reason: SDUPTHER

## 2021-05-06 RX ORDER — GABAPENTIN 300 MG/1
600 CAPSULE ORAL 3 TIMES DAILY
COMMUNITY

## 2021-05-06 RX ORDER — BUPROPION HYDROCHLORIDE 150 MG/1
150 TABLET, EXTENDED RELEASE ORAL DAILY
Qty: 90 TABLET | Refills: 1 | Status: SHIPPED | OUTPATIENT
Start: 2021-05-06 | End: 2021-07-29 | Stop reason: SDUPTHER

## 2021-05-06 RX ORDER — PEN NEEDLE, DIABETIC 31 GX3/16"
NEEDLE, DISPOSABLE MISCELLANEOUS
Qty: 90 EACH | Refills: 3 | Status: SHIPPED | OUTPATIENT
Start: 2021-05-06

## 2021-05-07 LAB
ALBUMIN SERPL-MCNC: 4.3 G/DL (ref 3.5–5.2)
ALBUMIN/GLOB SERPL: 1.4 G/DL
ALP SERPL-CCNC: 84 U/L (ref 39–117)
ALT SERPL W P-5'-P-CCNC: 14 U/L (ref 1–33)
ANION GAP SERPL CALCULATED.3IONS-SCNC: 18.4 MMOL/L (ref 5–15)
ARTICHOKE IGE QN: 127 MG/DL (ref 0–100)
AST SERPL-CCNC: 16 U/L (ref 1–32)
BILIRUB SERPL-MCNC: 0.3 MG/DL (ref 0–1.2)
BUN SERPL-MCNC: 13 MG/DL (ref 8–23)
BUN/CREAT SERPL: 11.6 (ref 7–25)
CALCIUM SPEC-SCNC: 9.5 MG/DL (ref 8.6–10.5)
CHLORIDE SERPL-SCNC: 92 MMOL/L (ref 98–107)
CO2 SERPL-SCNC: 21.6 MMOL/L (ref 22–29)
CREAT SERPL-MCNC: 1.12 MG/DL (ref 0.57–1)
GFR SERPL CREATININE-BSD FRML MDRD: 48 ML/MIN/1.73
GLOBULIN UR ELPH-MCNC: 3 GM/DL
GLUCOSE SERPL-MCNC: 361 MG/DL (ref 65–99)
HBA1C MFR BLD: 10.2 % (ref 4.8–5.6)
POTASSIUM SERPL-SCNC: 4 MMOL/L (ref 3.5–5.2)
PROT SERPL-MCNC: 7.3 G/DL (ref 6–8.5)
SODIUM SERPL-SCNC: 132 MMOL/L (ref 136–145)

## 2021-05-17 LAB
QT INTERVAL: 330 MS
QTC INTERVAL: 444 MS

## 2021-05-23 ENCOUNTER — HOSPITAL ENCOUNTER (EMERGENCY)
Facility: HOSPITAL | Age: 71
Discharge: HOME | End: 2021-05-23
Attending: EMERGENCY MEDICINE | Admitting: EMERGENCY MEDICINE

## 2021-05-23 VITALS
BODY MASS INDEX: 35.97 KG/M2 | DIASTOLIC BLOOD PRESSURE: 71 MMHG | TEMPERATURE: 97.8 F | OXYGEN SATURATION: 96 % | RESPIRATION RATE: 18 BRPM | SYSTOLIC BLOOD PRESSURE: 142 MMHG | WEIGHT: 203 LBS | HEART RATE: 86 BPM | HEIGHT: 63 IN

## 2021-05-23 DIAGNOSIS — E11.9 TYPE 2 DIABETES MELLITUS WITHOUT COMPLICATION, WITH LONG-TERM CURRENT USE OF INSULIN (HCC): Primary | ICD-10-CM

## 2021-05-23 DIAGNOSIS — Z79.4 TYPE 2 DIABETES MELLITUS WITHOUT COMPLICATION, WITH LONG-TERM CURRENT USE OF INSULIN (HCC): Primary | ICD-10-CM

## 2021-05-23 DIAGNOSIS — I10 ESSENTIAL HYPERTENSION: ICD-10-CM

## 2021-05-23 DIAGNOSIS — G43.909 MIGRAINE WITHOUT STATUS MIGRAINOSUS, NOT INTRACTABLE, UNSPECIFIED MIGRAINE TYPE: ICD-10-CM

## 2021-05-23 LAB
ALBUMIN SERPL-MCNC: 4.5 G/DL (ref 3.5–5.2)
ALBUMIN/GLOB SERPL: 1.6 G/DL
ALP SERPL-CCNC: 96 U/L (ref 39–117)
ALT SERPL W P-5'-P-CCNC: 11 U/L (ref 1–33)
ANION GAP SERPL CALCULATED.3IONS-SCNC: 9 MMOL/L (ref 5–15)
AST SERPL-CCNC: 17 U/L (ref 1–32)
BASOPHILS # BLD AUTO: 0.04 10*3/MM3 (ref 0–0.2)
BASOPHILS NFR BLD AUTO: 0.5 % (ref 0–1.5)
BILIRUB SERPL-MCNC: 0.2 MG/DL (ref 0–1.2)
BILIRUB UR QL STRIP: NEGATIVE
BUN SERPL-MCNC: 15 MG/DL (ref 8–23)
BUN/CREAT SERPL: 18.8 (ref 7–25)
CALCIUM SPEC-SCNC: 9.8 MG/DL (ref 8.6–10.5)
CHLORIDE SERPL-SCNC: 100 MMOL/L (ref 98–107)
CLARITY UR: CLEAR
CO2 SERPL-SCNC: 26 MMOL/L (ref 22–29)
COLOR UR: YELLOW
CREAT SERPL-MCNC: 0.8 MG/DL (ref 0.57–1)
DEPRECATED RDW RBC AUTO: 45.4 FL (ref 37–54)
EOSINOPHIL # BLD AUTO: 0.08 10*3/MM3 (ref 0–0.4)
EOSINOPHIL NFR BLD AUTO: 0.9 % (ref 0.3–6.2)
ERYTHROCYTE [DISTWIDTH] IN BLOOD BY AUTOMATED COUNT: 14 % (ref 12.3–15.4)
GFR SERPL CREATININE-BSD FRML MDRD: 71 ML/MIN/1.73
GLOBULIN UR ELPH-MCNC: 2.8 GM/DL
GLUCOSE BLDC GLUCOMTR-MCNC: 102 MG/DL (ref 70–130)
GLUCOSE BLDC GLUCOMTR-MCNC: 156 MG/DL (ref 70–130)
GLUCOSE SERPL-MCNC: 148 MG/DL (ref 65–99)
GLUCOSE UR STRIP-MCNC: ABNORMAL MG/DL
HCT VFR BLD AUTO: 39.2 % (ref 34–46.6)
HGB BLD-MCNC: 12.9 G/DL (ref 12–15.9)
HGB UR QL STRIP.AUTO: NEGATIVE
HOLD SPECIMEN: NORMAL
IMM GRANULOCYTES # BLD AUTO: 0.02 10*3/MM3 (ref 0–0.05)
IMM GRANULOCYTES NFR BLD AUTO: 0.2 % (ref 0–0.5)
KETONES UR QL STRIP: NEGATIVE
LEUKOCYTE ESTERASE UR QL STRIP.AUTO: NEGATIVE
LYMPHOCYTES # BLD AUTO: 3.01 10*3/MM3 (ref 0.7–3.1)
LYMPHOCYTES NFR BLD AUTO: 35 % (ref 19.6–45.3)
MCH RBC QN AUTO: 29.3 PG (ref 26.6–33)
MCHC RBC AUTO-ENTMCNC: 32.9 G/DL (ref 31.5–35.7)
MCV RBC AUTO: 89.1 FL (ref 79–97)
MONOCYTES # BLD AUTO: 0.42 10*3/MM3 (ref 0.1–0.9)
MONOCYTES NFR BLD AUTO: 4.9 % (ref 5–12)
NEUTROPHILS NFR BLD AUTO: 5.04 10*3/MM3 (ref 1.7–7)
NEUTROPHILS NFR BLD AUTO: 58.5 % (ref 42.7–76)
NITRITE UR QL STRIP: NEGATIVE
NRBC BLD AUTO-RTO: 0 /100 WBC (ref 0–0.2)
PH UR STRIP.AUTO: 5.5 [PH] (ref 5–9)
PLATELET # BLD AUTO: 347 10*3/MM3 (ref 140–450)
PMV BLD AUTO: 9.7 FL (ref 6–12)
POTASSIUM SERPL-SCNC: 3.9 MMOL/L (ref 3.5–5.2)
PROT SERPL-MCNC: 7.3 G/DL (ref 6–8.5)
PROT UR QL STRIP: NEGATIVE
RBC # BLD AUTO: 4.4 10*6/MM3 (ref 3.77–5.28)
SODIUM SERPL-SCNC: 135 MMOL/L (ref 136–145)
SP GR UR STRIP: 1.02 (ref 1–1.03)
UROBILINOGEN UR QL STRIP: ABNORMAL
WBC # BLD AUTO: 8.61 10*3/MM3 (ref 3.4–10.8)

## 2021-05-23 PROCEDURE — 25010000002 METOCLOPRAMIDE PER 10 MG: Performed by: EMERGENCY MEDICINE

## 2021-05-23 PROCEDURE — 80053 COMPREHEN METABOLIC PANEL: CPT | Performed by: EMERGENCY MEDICINE

## 2021-05-23 PROCEDURE — 82962 GLUCOSE BLOOD TEST: CPT

## 2021-05-23 PROCEDURE — 96374 THER/PROPH/DIAG INJ IV PUSH: CPT

## 2021-05-23 PROCEDURE — 81003 URINALYSIS AUTO W/O SCOPE: CPT | Performed by: EMERGENCY MEDICINE

## 2021-05-23 PROCEDURE — 99283 EMERGENCY DEPT VISIT LOW MDM: CPT

## 2021-05-23 PROCEDURE — 85025 COMPLETE CBC W/AUTO DIFF WBC: CPT | Performed by: EMERGENCY MEDICINE

## 2021-05-23 PROCEDURE — 96361 HYDRATE IV INFUSION ADD-ON: CPT

## 2021-05-23 RX ORDER — NAPROXEN 500 MG/1
500 TABLET ORAL 2 TIMES DAILY PRN
Qty: 20 TABLET | Refills: 0 | Status: SHIPPED | OUTPATIENT
Start: 2021-05-23 | End: 2021-10-14 | Stop reason: ALTCHOICE

## 2021-05-23 RX ORDER — METOCLOPRAMIDE HYDROCHLORIDE 5 MG/ML
10 INJECTION INTRAMUSCULAR; INTRAVENOUS ONCE
Status: COMPLETED | OUTPATIENT
Start: 2021-05-23 | End: 2021-05-23

## 2021-05-23 RX ORDER — SODIUM CHLORIDE 0.9 % (FLUSH) 0.9 %
10 SYRINGE (ML) INJECTION AS NEEDED
Status: DISCONTINUED | OUTPATIENT
Start: 2021-05-23 | End: 2021-05-23 | Stop reason: HOSPADM

## 2021-05-23 RX ORDER — ONDANSETRON 4 MG/1
4 TABLET, ORALLY DISINTEGRATING ORAL EVERY 8 HOURS PRN
Qty: 10 TABLET | Refills: 0 | Status: SHIPPED | OUTPATIENT
Start: 2021-05-23 | End: 2021-08-12 | Stop reason: ALTCHOICE

## 2021-05-23 RX ADMIN — METOCLOPRAMIDE 10 MG: 5 INJECTION, SOLUTION INTRAMUSCULAR; INTRAVENOUS at 17:30

## 2021-05-23 RX ADMIN — SODIUM CHLORIDE 500 ML: 9 INJECTION, SOLUTION INTRAVENOUS at 17:30

## 2021-05-23 NOTE — ED PROVIDER NOTES
Subjective   69yo female pmh significant htn/dm2/hyperlipidemia/migraine presents ED c/o hyperglycemia at home with fsbs>300mg/dl; pt self administered Tresiba 50 units SQ prior to arrival.  Pt additionally c/o nausea associated with usual migraine headache.  ROS (+) elevated blood pressure.  Denies fever/chills/chest pain/soa/abd pain/dysuria.      History provided by:  Patient  Illness  Severity:  Moderate  Onset quality:  Sudden  Duration:  1 day  Chronicity:  New  Associated symptoms: headaches and nausea    Associated symptoms: no abdominal pain and no vomiting        Review of Systems   Constitutional: Negative.    Respiratory: Negative.    Cardiovascular: Negative.    Gastrointestinal: Positive for nausea. Negative for abdominal pain and vomiting.   Genitourinary: Negative.    Allergic/Immunologic: Negative for immunocompromised state.   Neurological: Positive for headaches.   All other systems reviewed and are negative.      Past Medical History:   Diagnosis Date   • Acute allergic reaction    • Acute gastroenteritis    • Allergic ileitis    • Allergic rhinitis    • Amaurosis fugax     both ? 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   • Breast injury    • Colitis    • Common cold    • Cough    • Degenerative joint disease involving multiple joints    • Depressive disorder    • Diabetes mellitus (CMS/HCC)    • Diverticulitis of colon    • Encounter for immunization    • Essential hypertension    • Fatigue    • Headache    • Hemorrhoids     internal & external      • History of fracture of vertebral column     Fracture of vertebral column - T11 compression      • History of mammogram 12/13/2011    Mammogram screen (1) - Benign , negative   • History of screening mammography     Screening mammography      • History of screening mammography  2016    SCREENING MAMMOGRAPHY DIGITAL  (Medicare) (2) - Ordered By: MICA ROWLAND ()    • History of transfusion    • Hyperglycemia    • Hyperlipidemia    • Insomnia    • Itch of skin     Itching of skin   • Left lower quadrant pain    • Menopause    • Migraine    • Nausea and vomiting    • Nausea, vomiting and diarrhea    • Need for immunization against influenza     Needs influenza immunization   • Need for prophylactic vaccination against Streptococcus pneumoniae (pneumococcus)    • Nuclear senile cataract of left eye     Nuclear senile cataract - left   • Osteoarthritis    • Osteopenia    • Pain of left arm     Pain in left arm - STRAIN SECONDARY TO FALL      • Posterior subcapsular polar senile cataract of right eye     Posterior subcapsular polar senile cataract - right   • Rhinitis    • Scalp laceration    • Type 2 diabetes mellitus (CMS/HCC)     no BDR   • Upper respiratory infection        No Known Allergies    Past Surgical History:   Procedure Laterality Date   • BREAST BIOPSY     • CARPAL TUNNEL RELEASE  1984    Carpal tunnel surgery (1) - Release of transverse carpal ligament, (2) Microneurolysis, right wrist; (3) Synovectomy, flexor tendons, right wrist   • CATARACT EXTRACTION Right 2015    Remove cataract, insert lens (1) - Right eye.   •  SECTION  1976     Section (2) - Low cervical section & Goldy tubal ligation   • CHOLECYSTECTOMY  10/19/2001    Cholecystectomy, laparoscopic (1) - Acute cholecystitis, (2) right ovarian cyst   • DILATATION AND CURETTAGE  1981    D&C (2) - Dysfunctional uterine bleeding   • ENDOSCOPY  10/11/2007    Colon endoscopy 00969 (1) - Colitis of colon. Multiple random biopsies obtained. A few medium scattered diverticula in sigmoid, descending, & transverse colon. Small internal & external hemorrhoids were present.   • EXCISION LESION Right 2003    Excision, breast lesion (1) - Excision of breast mass, right   •  INJECTION OF MEDICATION  12/06/2012    B12 (1) - Ordered By: MICA ROWLAND ()    • INJECTION OF MEDICATION  01/23/2015    Phenergan (7) - Ordered By: JAYA POWER ([facility])    • INJECTION OF MEDICATION  01/23/2015    Toradol (7) - Ordered By: JAYA POWER ([facility])   • INJECTION OF MEDICATION  05/31/2014    Zofran (1) - Ordered By: GEENA NUNEZ ([facility])   • OOPHORECTOMY  11/11/1981    Oophorectomy (1) - one   • OTHER SURGICAL HISTORY      Tubal ligation (1)   • PAP SMEAR  10/30/2007    PAP SMEAR (1) - negative   • TOTAL KNEE ARTHROPLASTY Right 06/04/2012    Total knee replacement (1) - right    • VAGINAL HYSTERECTOMY  11/11/1981    Vaginal hysterectomy (1) - recurrent dysfunctional uterine bleeding       Family History   Problem Relation Age of Onset   • Coronary artery disease Other    • Hypertension Other    • Heart disease Mother    • Hypertension Mother    • Osteoporosis Mother    • Stroke Mother    • Arthritis Mother    • Arthritis Father    • Migraines Maternal Grandmother    • Obesity Maternal Grandmother    • Cancer Maternal Aunt    • Mental illness Maternal Aunt    • Cancer Paternal Aunt    • Mental illness Paternal Aunt        Social History     Socioeconomic History   • Marital status:      Spouse name: Not on file   • Number of children: Not on file   • Years of education: Not on file   • Highest education level: Not on file   Tobacco Use   • Smoking status: Never Smoker   • Smokeless tobacco: Never Used   Substance and Sexual Activity   • Alcohol use: No   • Drug use: No   • Sexual activity: Defer     Comment: Marital status:            Objective   Physical Exam  Vitals and nursing note reviewed.   Constitutional:       Appearance: Normal appearance.   HENT:      Head: Normocephalic and atraumatic.      Right Ear: Tympanic membrane, ear canal and external ear normal.      Left Ear: Ear canal and external ear normal.      Mouth/Throat:      Mouth: Mucous membranes are moist.   Eyes:       Pupils: Pupils are equal, round, and reactive to light.   Cardiovascular:      Rate and Rhythm: Normal rate and regular rhythm.      Pulses: Normal pulses.      Heart sounds: Normal heart sounds. No murmur heard.   No friction rub. No gallop.    Pulmonary:      Effort: Pulmonary effort is normal. No respiratory distress.      Breath sounds: Normal breath sounds. No wheezing, rhonchi or rales.   Abdominal:      General: Bowel sounds are normal. There is no distension.      Palpations: Abdomen is soft.      Tenderness: There is no abdominal tenderness. There is no guarding or rebound.   Musculoskeletal:         General: No swelling or deformity.      Cervical back: Normal range of motion and neck supple. No rigidity.   Lymphadenopathy:      Cervical: No cervical adenopathy.   Neurological:      General: No focal deficit present.      Mental Status: She is alert and oriented to person, place, and time.      GCS: GCS eye subscore is 4. GCS verbal subscore is 5. GCS motor subscore is 6.         Procedures           ED Course      Labs Reviewed   COMPREHENSIVE METABOLIC PANEL - Abnormal; Notable for the following components:       Result Value    Glucose 148 (*)     Sodium 135 (*)     All other components within normal limits    Narrative:     GFR Normal >60  Chronic Kidney Disease <60  Kidney Failure <15     URINALYSIS W/ MICROSCOPIC IF INDICATED (NO CULTURE) - Abnormal; Notable for the following components:    Glucose,  mg/dL (1+) (*)     All other components within normal limits    Narrative:     Urine microscopic not indicated.   CBC WITH AUTO DIFFERENTIAL - Abnormal; Notable for the following components:    Monocyte % 4.9 (*)     All other components within normal limits   POCT GLUCOSE FINGERSTICK - Abnormal; Notable for the following components:    Glucose 156 (*)     All other components within normal limits   CBC AND DIFFERENTIAL    Narrative:     The following orders were created for panel order CBC &  Differential.  Procedure                               Abnormality         Status                     ---------                               -----------         ------                     CBC Auto Differential[[id number]]        Abnormal            Final result                 Please view results for these tests on the individual orders.   EXTRA TUBES    Narrative:     The following orders were created for panel order Extra Tubes.  Procedure                               Abnormality         Status                     ---------                               -----------         ------                     Gold Top - SST[[id number]]                                   Final result                 Please view results for these tests on the individual orders.   GOLD TOP - SST     No results found.                                       MDM  Number of Diagnoses or Management Options  Essential hypertension  Migraine without status migrainosus, not intractable, unspecified migraine type  Type 2 diabetes mellitus without complication, with long-term current use of insulin (CMS/[facility])  Diagnosis management comments: Labs noted. Glucose stable. No hypoglycemia/hyperglycemia noted. Normal neuro exam. Pt resting comfortably. bp improved 142/71. Stable discharge.       Amount and/or Complexity of Data Reviewed  Clinical lab tests: reviewed        Final diagnoses:   Type 2 diabetes mellitus without complication, with long-term current use of insulin (CMS/[facility])   Essential hypertension   Migraine without status migrainosus, not intractable, unspecified migraine type       ED Disposition  ED Disposition     ED Disposition Condition Comment    Discharge Belinda Laura MD  [address] [place] [address] DR  [facility] [address] [place] [address]  [phone]    In 2 days           Medication List      New Prescriptions    naproxen 500 MG EC tablet  Commonly known as: EC NAPROSYN  Take 1 tablet by mouth 2 (Two) Times a Day As Needed for  Headache.        Changed    * ondansetron ODT 4 MG disintegrating tablet  Commonly known as: Zofran ODT  Take 1 tablet by mouth Every 8 (Eight) Hours As Needed for Nausea or Vomiting.  What changed: Another medication with the same name was added. Make sure you understand how and when to take each.     * ondansetron ODT 4 MG disintegrating tablet  Commonly known as: ZOFRAN-ODT  Place 1 tablet on the tongue Every 8 (Eight) Hours As Needed for Nausea or Vomiting.  What changed: You were already taking a medication with the same name, and this prescription was added. Make sure you understand how and when to take each.         * This list has 2 medication(s) that are the same as other medications prescribed for you. Read the directions carefully, and ask your doctor or other care provider to review them with you.               Where to Get Your Medications      These medications were sent to [facility] - [place], [place] - [address] [facility] - [phone]  - [phone] FX [address] [facility] [address]    Phone: [phone]   · naproxen 500 MG EC tablet  · ondansetron ODT 4 MG disintegrating tablet          Sae Sherwood MD  05/23/21 [id number]

## 2021-05-23 NOTE — DISCHARGE INSTRUCTIONS
Return ED fever, headache, vomiting, motor weakness, sensory loss, hyperglycemia, hypoglycemia, worse condition, any other concerns  Accucheck every 6hrs x24hrs

## 2021-06-07 ENCOUNTER — TELEPHONE (OUTPATIENT)
Dept: FAMILY MEDICINE CLINIC | Facility: CLINIC | Age: 71
End: 2021-06-07

## 2021-06-07 RX ORDER — FLUCONAZOLE 150 MG/1
150 TABLET ORAL ONCE
Qty: 1 TABLET | Refills: 0 | Status: SHIPPED | OUTPATIENT
Start: 2021-06-07 | End: 2021-06-07

## 2021-07-01 ENCOUNTER — OUTSIDE FACILITY SERVICE (OUTPATIENT)
Dept: FAMILY MEDICINE CLINIC | Facility: CLINIC | Age: 71
End: 2021-07-01

## 2021-07-01 PROCEDURE — OUTSIDEPOS PR OUTSIDE POS PLACEHOLDER: Performed by: GENERAL PRACTICE

## 2021-07-29 ENCOUNTER — OFFICE VISIT (OUTPATIENT)
Dept: FAMILY MEDICINE CLINIC | Facility: CLINIC | Age: 71
End: 2021-07-29

## 2021-07-29 ENCOUNTER — LAB (OUTPATIENT)
Dept: LAB | Facility: HOSPITAL | Age: 71
End: 2021-07-29

## 2021-07-29 VITALS
WEIGHT: 208 LBS | HEART RATE: 96 BPM | OXYGEN SATURATION: 96 % | BODY MASS INDEX: 36.86 KG/M2 | DIASTOLIC BLOOD PRESSURE: 62 MMHG | SYSTOLIC BLOOD PRESSURE: 116 MMHG | HEIGHT: 63 IN

## 2021-07-29 DIAGNOSIS — R07.2 PRECORDIAL PAIN: ICD-10-CM

## 2021-07-29 DIAGNOSIS — R94.31 ABNORMAL EKG: ICD-10-CM

## 2021-07-29 DIAGNOSIS — F33.41 MAJOR DEPRESSIVE DISORDER, RECURRENT EPISODE, IN PARTIAL REMISSION (HCC): Chronic | ICD-10-CM

## 2021-07-29 DIAGNOSIS — Z82.49 FH: HEART DISEASE: ICD-10-CM

## 2021-07-29 DIAGNOSIS — M51.36 DEGENERATIVE DISC DISEASE, LUMBAR: Chronic | ICD-10-CM

## 2021-07-29 DIAGNOSIS — M15.9 OSTEOARTHRITIS OF MULTIPLE JOINTS, UNSPECIFIED OSTEOARTHRITIS TYPE: Chronic | ICD-10-CM

## 2021-07-29 DIAGNOSIS — E11.65 TYPE 2 DIABETES MELLITUS WITH HYPERGLYCEMIA, WITH LONG-TERM CURRENT USE OF INSULIN (HCC): Primary | Chronic | ICD-10-CM

## 2021-07-29 DIAGNOSIS — Z79.4 TYPE 2 DIABETES MELLITUS WITH HYPERGLYCEMIA, WITH LONG-TERM CURRENT USE OF INSULIN (HCC): Primary | ICD-10-CM

## 2021-07-29 DIAGNOSIS — E78.2 MIXED HYPERLIPIDEMIA: Chronic | ICD-10-CM

## 2021-07-29 DIAGNOSIS — E11.65 TYPE 2 DIABETES MELLITUS WITH HYPERGLYCEMIA, WITH LONG-TERM CURRENT USE OF INSULIN (HCC): Primary | ICD-10-CM

## 2021-07-29 DIAGNOSIS — Z79.4 TYPE 2 DIABETES MELLITUS WITH HYPERGLYCEMIA, WITH LONG-TERM CURRENT USE OF INSULIN (HCC): Primary | Chronic | ICD-10-CM

## 2021-07-29 PROCEDURE — 93010 ELECTROCARDIOGRAM REPORT: CPT | Performed by: INTERNAL MEDICINE

## 2021-07-29 PROCEDURE — 80053 COMPREHEN METABOLIC PANEL: CPT | Performed by: GENERAL PRACTICE

## 2021-07-29 PROCEDURE — 83036 HEMOGLOBIN GLYCOSYLATED A1C: CPT | Performed by: GENERAL PRACTICE

## 2021-07-29 PROCEDURE — 93005 ELECTROCARDIOGRAM TRACING: CPT | Performed by: GENERAL PRACTICE

## 2021-07-29 PROCEDURE — 36415 COLL VENOUS BLD VENIPUNCTURE: CPT | Performed by: GENERAL PRACTICE

## 2021-07-29 PROCEDURE — 99214 OFFICE O/P EST MOD 30 MIN: CPT | Performed by: GENERAL PRACTICE

## 2021-07-29 PROCEDURE — 83721 ASSAY OF BLOOD LIPOPROTEIN: CPT | Performed by: GENERAL PRACTICE

## 2021-07-29 RX ORDER — HYDROCODONE BITARTRATE AND ACETAMINOPHEN 7.5; 325 MG/1; MG/1
1 TABLET ORAL EVERY 6 HOURS PRN
Qty: 120 TABLET | Refills: 0 | Status: SHIPPED | OUTPATIENT
Start: 2021-07-29 | End: 2021-12-09 | Stop reason: SDUPTHER

## 2021-07-29 RX ORDER — ROSUVASTATIN CALCIUM 10 MG/1
10 TABLET, COATED ORAL DAILY
Qty: 90 TABLET | Refills: 0 | Status: SHIPPED | OUTPATIENT
Start: 2021-07-29 | End: 2021-12-09 | Stop reason: SDUPTHER

## 2021-07-29 RX ORDER — BUPROPION HYDROCHLORIDE 150 MG/1
150 TABLET, EXTENDED RELEASE ORAL 2 TIMES DAILY
Qty: 180 TABLET | Refills: 0 | Status: SHIPPED | OUTPATIENT
Start: 2021-07-29 | End: 2021-12-09 | Stop reason: SDUPTHER

## 2021-07-29 RX ORDER — NITROGLYCERIN 0.4 MG/1
0.4 TABLET SUBLINGUAL
Qty: 25 TABLET | Refills: 0 | Status: SHIPPED | OUTPATIENT
Start: 2021-07-29

## 2021-07-29 NOTE — PROGRESS NOTES
Subjective   Bertha Hyde is a 70 y.o. female.   Chief Complaint   Patient presents with   • Diabetes     For review and evaluation of management of chronic medical problems. Records reviewed. Recent labs, xrays reviewed and medications reconciled. Labs pending. Depression is not controlled.  She has not felt well since she had Covid.  Is having some chest pain.  Diabetes  She presents for her follow-up diabetic visit. She has type 2 diabetes mellitus. Her disease course has been stable. Pertinent negatives for hypoglycemia include no dizziness or nervousness/anxiousness. There are no diabetic associated symptoms. Pertinent negatives for diabetes include no chest pain, no fatigue and no weakness. There are no hypoglycemic complications. There are no diabetic complications. Current diabetic treatment includes insulin injections and oral agent (monotherapy). She is compliant with treatment some of the time. Her weight is stable. She is following a generally healthy diet. Meal planning includes avoidance of concentrated sweets. She participates in exercise intermittently. Her home blood glucose trend is increasing rapidly (Due to being out of her medications). An ACE inhibitor/angiotensin II receptor blocker is being taken.   Hypertension  This is a chronic problem. The current episode started more than 1 year ago. The problem is unchanged. The problem is controlled. Associated symptoms include anxiety. Pertinent negatives include no chest pain, neck pain, palpitations or shortness of breath. There are no known risk factors for coronary artery disease. Current antihypertension treatment includes ACE inhibitors, diuretics and calcium channel blockers. The current treatment provides significant improvement. There are no compliance problems.    Hyperlipidemia  This is a chronic problem. The current episode started more than 1 year ago. The problem is uncontrolled. Recent lipid tests were reviewed and are high.  Exacerbating diseases include diabetes and obesity. There are no known factors aggravating her hyperlipidemia. Pertinent negatives include no chest pain, myalgias or shortness of breath. Current antihyperlipidemic treatment includes statins. The current treatment provides moderate improvement of lipids. Compliance problems include medication cost.    Back Pain  This is a chronic problem. The current episode started more than 1 year ago. The problem occurs constantly. The problem has been gradually worsening since onset. The pain is present in the lumbar spine. The pain does not radiate. The pain is at a severity of 8/10. The pain is severe. The pain is worse during the day. The symptoms are aggravated by bending and position. Stiffness is present in the morning. Pertinent negatives include no chest pain, dysuria or weakness. Risk factors include recent trauma. She has tried analgesics for the symptoms. The treatment provided moderate relief.   Arthritis  Presents for follow-up visit. She complains of pain. She reports no joint swelling. The symptoms have been stable. Affected locations include the right knee and left knee. Her pain is at a severity of 8/10. Pertinent negatives include no diarrhea, dysuria, fatigue or rash. Compliance with total regimen is %. Compliance with medications is %.   Chest Pain   This is a new problem. The current episode started more than 1 month ago. The onset quality is undetermined. The problem occurs intermittently. The problem has been unchanged. The pain is present in the substernal region. The pain is mild. The quality of the pain is described as pressure and heavy (just lasts seconde to a minute). The pain does not radiate. Pertinent negatives include no back pain, dizziness, palpitations, shortness of breath or weakness. She has tried nothing for the symptoms. Risk factors include lack of exercise, obesity, post-menopausal and sedentary lifestyle.   Her past medical  history is significant for diabetes and hyperlipidemia.   Her family medical history is significant for CAD.      The following portions of the patient's history were reviewed and updated as appropriate: allergies, current medications, past social history and problem list.    Outpatient Medications Prior to Visit   Medication Sig Dispense Refill   • Alcohol Swabs pads USE 4 TIMES DAILY 150 each 11   • aspirin 81 MG EC tablet Take 81 mg by mouth Daily.     • b complex-C-folic acid 1 MG capsule Take 1 capsule by mouth Daily.     • Blood Glucose Monitoring Suppl (BLOOD GLUCOSE MONITOR SYSTEM) W/DEVICE kit Test Blood Sugars Once Daily 1 each 0   • DULoxetine (CYMBALTA) 60 MG capsule Take 1 capsule by mouth Daily. 90 capsule 3   • Empagliflozin (Jardiance) 10 MG tablet Take 10 mg by mouth Daily. Lot [id number]  Apr 2021 90 tablet 1   • gabapentin (NEURONTIN) 300 MG capsule Take 600 mg by mouth 3 (Three) Times a Day.     • glucose blood test strip Test Blood Sugars tid prn 100 each 5   • hyoscyamine (OSCIMIN) 0.125 MG SL tablet Place 0.125-0.25 mg under the tongue every 4 (four) hours as needed. (max 12 per 24 hours)     • insulin degludec (TRESIBA FLEXTOUCH) 100 UNIT/ML solution pen-injector injection Inject 50 Units under the skin into the appropriate area as directed Every Night. 4 pen 3   • Insulin Pen Needle (Global Ease Inject Pen Needles) 31G X 5 MM misc Use one daily with Tresiba 90 each 3   • LANCETS MICRO THIN 33G misc Test Blood Sugars Once Daily 100 each 3   • lisinopril-hydrochlorothiazide (PRINZIDE,ZESTORETIC) 20-12.5 MG per tablet Take 1 tablet by mouth Daily. Indication: Essential (primary) hypertension 90 tablet 3   • loratadine (CLARITIN) 10 MG tablet Take 10 mg by mouth daily as needed. Indication: Allergic rhinitis     • naproxen (EC NAPROSYN) 500 MG EC tablet Take 1 tablet by mouth 2 (Two) Times a Day As Needed for Headache. 20 tablet 0   • ondansetron ODT (ZOFRAN-ODT) 4 MG disintegrating tablet Place 1  "tablet on the tongue Every 8 (Eight) Hours As Needed for Nausea or Vomiting. 10 tablet 0   • pantoprazole (Protonix) 40 MG EC tablet Take 1 tablet by mouth Daily As Needed (heartburn). 90 tablet 3   • promethazine (PHENERGAN) 25 MG tablet Take 1 tablet by mouth Every 6 (Six) Hours As Needed for Nausea. 30 tablet 0   • rizatriptan MLT (MAXALT-MLT) 10 MG disintegrating tablet Place 1 tablet on the tongue Daily As Needed for Migraine. Take 1 tab at onset of headache, can repeat x 1 in 2 hrs prn 27 tablet 3   • buPROPion SR (Wellbutrin SR) 150 MG 12 hr tablet Take 1 tablet by mouth Daily. 90 tablet 1   • HYDROcodone-acetaminophen (NORCO) 7.5-325 MG per tablet Take 1 tablet by mouth Every 6 (Six) Hours As Needed for Moderate Pain . 120 tablet 0   • ondansetron ODT (ZOFRAN ODT) 4 MG disintegrating tablet Take 1 tablet by mouth Every 8 (Eight) Hours As Needed for Nausea or Vomiting. 60 tablet 2     No facility-administered medications prior to visit.     I have reviewed 12 systems with patient. Findings were negative except what is noted below and/or in history of present illness.   Review of Systems   Constitutional: Negative for fatigue.   Respiratory: Negative for shortness of breath.    Cardiovascular: Negative for chest pain and palpitations.   Gastrointestinal: Negative for diarrhea.   Genitourinary: Negative for dysuria.   Musculoskeletal: Positive for arthritis. Negative for back pain, joint swelling, myalgias and neck pain.   Skin: Negative for rash.   Neurological: Negative for dizziness and weakness.   Psychiatric/Behavioral: The patient is not nervous/anxious.        Objective   Visit Vitals  /62   Pulse 96   Ht 160 cm (63\")   Wt 94.3 kg (208 lb)   SpO2 96%   BMI 36.85 kg/m²     Physical Exam  Vitals and nursing note reviewed.   Constitutional:       General: She is not in acute distress.     Appearance: She is well-developed.   HENT:      Head: Normocephalic and atraumatic.      Nose: Nose normal. "   Eyes:      General:         Right eye: No discharge.         Left eye: No discharge.      Conjunctiva/sclera: Conjunctivae normal.      Pupils: Pupils are equal, round, and reactive to light.   Neck:      Thyroid: No thyromegaly.   Cardiovascular:      Rate and Rhythm: Normal rate and regular rhythm.      Heart sounds: Normal heart sounds.      Comments: EKG show lateral ischemia  Pulmonary:      Effort: Pulmonary effort is normal.      Breath sounds: Normal breath sounds.   Lymphadenopathy:      Cervical: No cervical adenopathy.   Skin:     General: Skin is warm and dry.   Neurological:      Mental Status: She is alert and oriented to person, place, and time.       Notes brought forward are reviewed and updated if indicated.     Assessment/Plan   Problems Addressed this Visit        Cardiac and Vasculature    Hyperlipidemia (Chronic)    Relevant Medications    rosuvastatin (Crestor) 10 MG tablet    Other Relevant Orders    Ambulatory Referral to Cardiology    Hemoglobin A1c    LDL Cholesterol, Direct    Comprehensive Metabolic Panel       Endocrine and Metabolic    Type 2 diabetes mellitus with hyperglycemia, with long-term current use of insulin (CMS/[facility]) - Primary    Relevant Orders    Ambulatory Referral to Cardiology    Hemoglobin A1c    LDL Cholesterol, Direct    Comprehensive Metabolic Panel       Musculoskeletal and Injuries    Degenerative joint disease involving multiple joints    Relevant Medications    HYDROcodone-acetaminophen (NORCO) 7.5-325 MG per tablet       Neuro    Degenerative disc disease, lumbar (Chronic)    Relevant Medications    HYDROcodone-acetaminophen (NORCO) 7.5-325 MG per tablet      Other Visit Diagnoses     Major depressive disorder, recurrent episode, in partial remission (CMS/[facility])  (Chronic)       Relevant Medications    buPROPion SR (Wellbutrin SR) 150 MG 12 hr tablet    Precordial pain        Relevant Medications    nitroglycerin (Nitrostat) 0.4 MG SL tablet    Other Relevant  Orders    ECG 12 Lead (Completed)    Ambulatory Referral to Cardiology    FH: heart disease        Relevant Orders    Ambulatory Referral to Cardiology    Abnormal EKG        Relevant Orders    Ambulatory Referral to Cardiology      Diagnoses       Codes Comments    Type 2 diabetes mellitus with hyperglycemia, with long-term current use of insulin (CMS/[facility])    -  Primary ICD-10-CM: E11.65, Z79.4  ICD-9-CM: 250.00, 790.29, V58.67     Major depressive disorder, recurrent episode, in partial remission (CMS/[facility])     ICD-10-CM: F33.41  ICD-9-CM: 296.35     Mixed hyperlipidemia     ICD-10-CM: E78.2  ICD-9-CM: 272.2     Precordial pain     ICD-10-CM: R07.2  ICD-9-CM: 786.51     Osteoarthritis of multiple joints, unspecified osteoarthritis type     ICD-10-CM: M15.9  ICD-9-CM: 715.89     Degenerative disc disease, lumbar     ICD-10-CM: M51.36  ICD-9-CM: 722.52     FH: heart disease     ICD-10-CM: Z82.49  ICD-9-CM: V17.49     Abnormal EKG     ICD-10-CM: R94.31  ICD-9-CM: 794.31          Increase bupropion.  Encouraged to consider seeking counseling.  Restart rosuvastatin.  Referral to cardiology.  Given a prescription for nitroglycerin and advised how to use this.  Also advised that if she has chest pain that lasts longer than 10 minutes and is not resolved with nitroglycerin that she should present to the emergency room.    New Medications Ordered This Visit   Medications   • buPROPion SR (Wellbutrin SR) 150 MG 12 hr tablet     Sig: Take 1 tablet by mouth 2 (Two) Times a Day.     Dispense:  180 tablet     Refill:  0   • rosuvastatin (Crestor) 10 MG tablet     Sig: Take 1 tablet by mouth Daily. For cholesterol     Dispense:  90 tablet     Refill:  0   • HYDROcodone-acetaminophen (NORCO) 7.5-325 MG per tablet     Sig: Take 1 tablet by mouth Every 6 (Six) Hours As Needed for Moderate Pain .     Dispense:  120 tablet     Refill:  0   • nitroglycerin (Nitrostat) 0.4 MG SL tablet     Sig: Place 1 tablet under the tongue Every 5  (Five) Minutes As Needed for Chest Pain. Take no more than 3 doses in 15 minutes.     Dispense:  25 tablet     Refill:  0     Return in about 3 months (around 10/29/2021) for Recheck.        This document has been electronically signed by Belinda Finn MD on July 29, 2021 11:43 CDT

## 2021-07-29 NOTE — PATIENT INSTRUCTIONS
Check at pharmacy on Shingrix shingles vaccine    Calorie Counting for Weight Loss  Calories are units of energy. Your body needs a certain number of calories from food to keep going throughout the day. When you eat or drink more calories than your body needs, your body stores the extra calories mostly as fat. When you eat or drink fewer calories than your body needs, your body burns fat to get the energy it needs.  Calorie counting means keeping track of how many calories you eat and drink each day. Calorie counting can be helpful if you need to lose weight. If you eat fewer calories than your body needs, you should lose weight. Ask your health care provider what a healthy weight is for you.  For calorie counting to work, you will need to eat the right number of calories each day to lose a healthy amount of weight per week. A dietitian can help you figure out how many calories you need in a day and will suggest ways to reach your calorie goal.  · A healthy amount of weight to lose each week is usually 1-2 lb (0.5-0.9 kg). This usually means that your daily calorie intake should be reduced by 500-750 calories.  · Eating 1,200-1,500 calories a day can help most women lose weight.  · Eating 1,500-1,800 calories a day can help most men lose weight.  What do I need to know about calorie counting?  Work with your health care provider or dietitian to determine how many calories you should get each day. To meet your daily calorie goal, you will need to:  · Find out how many calories are in each food that you would like to eat. Try to do this before you eat.  · Decide how much of the food you plan to eat.  · Keep a food log. Do this by writing down what you ate and how many calories it had.  To successfully lose weight, it is important to balance calorie counting with a healthy lifestyle that includes regular activity.  Where do I find calorie information?    The number of calories in a food can be found on a Nutrition  Facts label. If a food does not have a Nutrition Facts label, try to look up the calories online or ask your dietitian for help.  Remember that calories are listed per serving. If you choose to have more than one serving of a food, you will have to multiply the calories per serving by the number of servings you plan to eat. For example, the label on a package of bread might say that a serving size is 1 slice and that there are 90 calories in a serving. If you eat 1 slice, you will have eaten 90 calories. If you eat 2 slices, you will have eaten 180 calories.  How do I keep a food log?  After each time that you eat, record the following in your food log as soon as possible:  · What you ate. Be sure to include toppings, sauces, and other extras on the food.  · How much you ate. This can be measured in cups, ounces, or number of items.  · How many calories were in each food and drink.  · The total number of calories in the food you ate.  Keep your food log near you, such as in a pocket-sized notebook or on an jazmin or website on your mobile phone. Some programs will calculate calories for you and show you how many calories you have left to meet your daily goal.  What are some portion-control tips?  · Know how many calories are in a serving. This will help you know how many servings you can have of a certain food.  · Use a measuring cup to measure serving sizes. You could also try weighing out portions on a kitchen scale. With time, you will be able to estimate serving sizes for some foods.  · Take time to put servings of different foods on your favorite plates or in your favorite bowls and cups so you know what a serving looks like.  · Try not to eat straight from a food's packaging, such as from a bag or box. Eating straight from the package makes it hard to see how much you are eating and can lead to overeating. Put the amount you would like to eat in a cup or on a plate to make sure you are eating the right  portion.  · Use smaller plates, glasses, and bowls for smaller portions and to prevent overeating.  · Try not to multitask. For example, avoid watching TV or using your computer while eating. If it is time to eat, sit down at a table and enjoy your food. This will help you recognize when you are full. It will also help you be more mindful of what and how much you are eating.  What are tips for following this plan?  Reading food labels  · Check the calorie count compared with the serving size. The serving size may be smaller than what you are used to eating.  · Check the source of the calories. Try to choose foods that are high in protein, fiber, and vitamins, and low in saturated fat, trans fat, and sodium.  Shopping  · Read nutrition labels while you shop. This will help you make healthy decisions about which foods to buy.  · Pay attention to nutrition labels for low-fat or fat-free foods. These foods sometimes have the same number of calories or more calories than the full-fat versions. They also often have added sugar, starch, or salt to make up for flavor that was removed with the fat.  · Make a grocery list of lower-calorie foods and stick to it.  Cooking  · Try to cook your favorite foods in a healthier way. For example, try baking instead of frying.  · Use low-fat dairy products.  Meal planning  · Use more fruits and vegetables. One-half of your plate should be fruits and vegetables.  · Include lean proteins, such as chicken, turkey, and fish.  Lifestyle  Each week, aim to do one of the following:  · 150 minutes of moderate exercise, such as walking.  · 75 minutes of vigorous exercise, such as running.  General information  · Know how many calories are in the foods you eat most often. This will help you calculate calorie counts faster.  · Find a way of tracking calories that works for you. Get creative. Try different apps or programs if writing down calories does not work for you.  What foods should I  eat?    · Eat nutritious foods. It is better to have a nutritious, high-calorie food, such as an avocado, than a food with few nutrients, such as a bag of potato chips.  · Use your calories on foods and drinks that will fill you up and will not leave you hungry soon after eating.  ? Examples of foods that fill you up are nuts and nut butters, vegetables, lean proteins, and high-fiber foods such as whole grains. High-fiber foods are foods with more than 5 g of fiber per serving.  · Pay attention to calories in drinks. Low-calorie drinks include water and unsweetened drinks.  The items listed above may not be a complete list of foods and beverages you can eat. Contact a dietitian for more information.  What foods should I limit?  Limit foods or drinks that are not good sources of vitamins, minerals, or protein or that are high in unhealthy fats. These include:  · Candy.  · Other sweets.  · Sodas, specialty coffee drinks, alcohol, and juice.  The items listed above may not be a complete list of foods and beverages you should avoid. Contact a dietitian for more information.  How do I count calories when eating out?  · Pay attention to portions. Often, portions are much larger when eating out. Try these tips to keep portions smaller:  ? Consider sharing a meal instead of getting your own.  ? If you get your own meal, eat only half of it. Before you start eating, ask for a container and put half of your meal into it.  ? When available, consider ordering smaller portions from the menu instead of full portions.  · Pay attention to your food and drink choices. Knowing the way food is cooked and what is included with the meal can help you eat fewer calories.  ? If calories are listed on the menu, choose the lower-calorie options.  ? Choose dishes that include vegetables, fruits, whole grains, low-fat dairy products, and lean proteins.  ? Choose items that are boiled, broiled, grilled, or steamed. Avoid items that are  buttered, battered, fried, or served with cream sauce. Items labeled as crispy are usually fried, unless stated otherwise.  ? Choose water, low-fat milk, unsweetened iced tea, or other drinks without added sugar. If you want an alcoholic beverage, choose a lower-calorie option, such as a glass of wine or light beer.  ? Ask for dressings, sauces, and syrups on the side. These are usually high in calories, so you should limit the amount you eat.  ? If you want a salad, choose a garden salad and ask for grilled meats. Avoid extra toppings such as wei, cheese, or fried items. Ask for the dressing on the side, or ask for olive oil and vinegar or lemon to use as dressing.  · Estimate how many servings of a food you are given. Knowing serving sizes will help you be aware of how much food you are eating at restaurants.  Where to find more information  · Centers for Disease Control and Prevention: www.cdc.gov  · [place] Department of Agriculture: myplate.gov  Summary  · Calorie counting means keeping track of how many calories you eat and drink each day. If you eat fewer calories than your body needs, you should lose weight.  · A healthy amount of weight to lose per week is usually 1-2 lb (0.5-0.9 kg). This usually means reducing your daily calorie intake by 500-750 calories.  · The number of calories in a food can be found on a Nutrition Facts label. If a food does not have a Nutrition Facts label, try to look up the calories online or ask your dietitian for help.  · Use smaller plates, glasses, and bowls for smaller portions and to prevent overeating.  · Use your calories on foods and drinks that will fill you up and not leave you hungry shortly after a meal.  This information is not intended to replace advice given to you by your health care provider. Make sure you discuss any questions you have with your health care provider.  Document Revised: 01/28/2021 Document Reviewed: 01/28/2021  [employer] Patient Education © 2021  [employer]

## 2021-07-30 ENCOUNTER — TELEPHONE (OUTPATIENT)
Dept: FAMILY MEDICINE CLINIC | Facility: CLINIC | Age: 71
End: 2021-07-30

## 2021-07-30 LAB
ALBUMIN SERPL-MCNC: 4.2 G/DL (ref 3.5–5.2)
ALBUMIN/GLOB SERPL: 1.2 G/DL
ALP SERPL-CCNC: 107 U/L (ref 39–117)
ALT SERPL W P-5'-P-CCNC: 11 U/L (ref 1–33)
ANION GAP SERPL CALCULATED.3IONS-SCNC: 13.8 MMOL/L (ref 5–15)
ARTICHOKE IGE QN: 161 MG/DL (ref 0–100)
AST SERPL-CCNC: 10 U/L (ref 1–32)
BILIRUB SERPL-MCNC: 0.3 MG/DL (ref 0–1.2)
BUN SERPL-MCNC: 17 MG/DL (ref 8–23)
BUN/CREAT SERPL: 14.5 (ref 7–25)
CALCIUM SPEC-SCNC: 9.8 MG/DL (ref 8.6–10.5)
CHLORIDE SERPL-SCNC: 101 MMOL/L (ref 98–107)
CO2 SERPL-SCNC: 24.2 MMOL/L (ref 22–29)
CREAT SERPL-MCNC: 1.17 MG/DL (ref 0.57–1)
GFR SERPL CREATININE-BSD FRML MDRD: 46 ML/MIN/1.73
GLOBULIN UR ELPH-MCNC: 3.5 GM/DL
GLUCOSE SERPL-MCNC: 151 MG/DL (ref 65–99)
HBA1C MFR BLD: 9.05 % (ref 4.8–5.6)
POTASSIUM SERPL-SCNC: 4 MMOL/L (ref 3.5–5.2)
PROT SERPL-MCNC: 7.7 G/DL (ref 6–8.5)
QT INTERVAL: 358 MS
QTC INTERVAL: 435 MS
SODIUM SERPL-SCNC: 139 MMOL/L (ref 136–145)

## 2021-07-30 NOTE — TELEPHONE ENCOUNTER
Per Dr. Finn, Ms Hyde has been called with recent lab results & recommendations.  Continue current medications and follow-up as planned or sooner if any problems.       ----- Message from Belinda Finn MD sent at 7/30/2021  1:09 PM CDT -----  Call and tell sugar and A1c are better, continue treatment and recheck as scheduled.

## 2021-07-30 NOTE — PROGRESS NOTES
Per Dr. Finn, Ms Hyde has been called with recent lab results & recommendations.  Continue current medications and follow-up as planned or sooner if any problems.

## 2021-08-12 ENCOUNTER — OFFICE VISIT (OUTPATIENT)
Dept: CARDIOLOGY | Facility: CLINIC | Age: 71
End: 2021-08-12

## 2021-08-12 VITALS
HEIGHT: 63 IN | OXYGEN SATURATION: 97 % | WEIGHT: 210 LBS | DIASTOLIC BLOOD PRESSURE: 70 MMHG | SYSTOLIC BLOOD PRESSURE: 124 MMHG | HEART RATE: 110 BPM | BODY MASS INDEX: 37.21 KG/M2

## 2021-08-12 DIAGNOSIS — I10 ESSENTIAL HYPERTENSION: ICD-10-CM

## 2021-08-12 DIAGNOSIS — E78.49 OTHER HYPERLIPIDEMIA: ICD-10-CM

## 2021-08-12 DIAGNOSIS — R07.9 CHEST PAIN, UNSPECIFIED TYPE: Primary | ICD-10-CM

## 2021-08-12 PROCEDURE — 99204 OFFICE O/P NEW MOD 45 MIN: CPT | Performed by: INTERNAL MEDICINE

## 2021-08-12 NOTE — PROGRESS NOTES
"  [facility] Cardiology  OFFICE NOTE    Cardiovascular Medicine  Aryan Hoang M.D., [facility]         Belinda Finn MD  [address]  [address]  [place] [phone]    Thank you for asking me to see Bertha Hyde for chest pain    History of Present Illness    Bertha Hyde is a 70 y.o. female who presents for consultation today. She has a known h/o diabetes mellitus, hypertension, dyslipidemia, anxiety and had COVID infection some time ago.    She has been having episodes of substernal chest discomfort for the past few months. These episodes are not provoked by exertion, are described as \"squeezing\", non-radiating and not associated with dyspnea or diaphoresis. They occur 3-4 times/week. She was prescribed SL nitro by her PCP, she has not tried that yet for these episodes.     She denies having any PND, orthopnea, leg swelling or palpitations.     Review of Systems - ROS  Constitution: Negative for weight gain and weight loss. Positive for weakness.   HENT: Negative for congestion.    Eyes: Negative for blurred vision.   Cardiovascular: As mentioned above  Respiratory: Negative for cough and hemoptysis.    Endocrine: Negative for polydipsia and polyuria.   Hematologic/Lymphatic: Negative for bleeding problem. Does not bruise/bleed easily.   Skin: Negative for flushing.   Musculoskeletal: Negative for neck pain and stiffness.   Gastrointestinal: Negative for abdominal pain, diarrhea, jaundice, melena, nausea and vomiting.   Genitourinary: Negative for dysuria and hematuria.   Neurological: Negative for dizziness, focal weakness and numbness.   Psychiatric/Behavioral: Negative for altered mental status and depression.          All other systems were reviewed and were negative.    family history includes Arthritis in her father and mother; Cancer in her maternal aunt and paternal aunt; Coronary artery disease in an other family member; Heart disease in her mother; " Hypertension in her mother and another family member; Mental illness in her maternal aunt and paternal aunt; Migraines in her maternal grandmother; Obesity in her maternal grandmother; Osteoporosis in her mother; Stroke in her mother.     reports that she has never smoked. She has never used smokeless tobacco. She reports that she does not drink alcohol and does not use drugs.    No Known Allergies      Current Outpatient Medications:   •  Alcohol Swabs pads, USE 4 TIMES DAILY, Disp: 150 each, Rfl: 11  •  aspirin 81 MG EC tablet, Take 81 mg by mouth Daily., Disp: , Rfl:   •  b complex-C-folic acid 1 MG capsule, Take 1 capsule by mouth Daily., Disp: , Rfl:   •  Blood Glucose Monitoring Suppl (BLOOD GLUCOSE MONITOR SYSTEM) W/DEVICE kit, Test Blood Sugars Once Daily, Disp: 1 each, Rfl: 0  •  buPROPion SR (Wellbutrin SR) 150 MG 12 hr tablet, Take 1 tablet by mouth 2 (Two) Times a Day., Disp: 180 tablet, Rfl: 0  •  DULoxetine (CYMBALTA) 60 MG capsule, Take 1 capsule by mouth Daily., Disp: 90 capsule, Rfl: 3  •  Empagliflozin (Jardiance) 10 MG tablet, Take 10 mg by mouth Daily. Lot [id number]  Apr 2021, Disp: 90 tablet, Rfl: 1  •  gabapentin (NEURONTIN) 300 MG capsule, Take 600 mg by mouth 3 (Three) Times a Day., Disp: , Rfl:   •  glucose blood test strip, Test Blood Sugars tid prn, Disp: 100 each, Rfl: 5  •  HYDROcodone-acetaminophen (NORCO) 7.5-325 MG per tablet, Take 1 tablet by mouth Every 6 (Six) Hours As Needed for Moderate Pain ., Disp: 120 tablet, Rfl: 0  •  hyoscyamine (OSCIMIN) 0.125 MG SL tablet, Place 0.125-0.25 mg under the tongue every 4 (four) hours as needed. (max 12 per 24 hours), Disp: , Rfl:   •  insulin degludec (TRESIBA FLEXTOUCH) 100 UNIT/ML solution pen-injector injection, Inject 50 Units under the skin into the appropriate area as directed Every Night., Disp: 4 pen, Rfl: 3  •  Insulin Pen Needle (Global Ease Inject Pen Needles) 31G X 5 MM misc, Use one daily with Tresiba, Disp: 90 each, Rfl: 3  •   "LANCETS MICRO THIN 33G misc, Test Blood Sugars Once Daily, Disp: 100 each, Rfl: 3  •  lisinopril-hydrochlorothiazide (PRINZIDE,ZESTORETIC) 20-12.5 MG per tablet, Take 1 tablet by mouth Daily. Indication: Essential (primary) hypertension, Disp: 90 tablet, Rfl: 3  •  loratadine (CLARITIN) 10 MG tablet, Take 10 mg by mouth daily as needed. Indication: Allergic rhinitis, Disp: , Rfl:   •  nitroglycerin (Nitrostat) 0.4 MG SL tablet, Place 1 tablet under the tongue Every 5 (Five) Minutes As Needed for Chest Pain. Take no more than 3 doses in 15 minutes., Disp: 25 tablet, Rfl: 0  •  pantoprazole (Protonix) 40 MG EC tablet, Take 1 tablet by mouth Daily As Needed (heartburn)., Disp: 90 tablet, Rfl: 3  •  promethazine (PHENERGAN) 25 MG tablet, Take 1 tablet by mouth Every 6 (Six) Hours As Needed for Nausea., Disp: 30 tablet, Rfl: 0  •  rizatriptan MLT (MAXALT-MLT) 10 MG disintegrating tablet, Place 1 tablet on the tongue Daily As Needed for Migraine. Take 1 tab at onset of headache, can repeat x 1 in 2 hrs prn, Disp: 27 tablet, Rfl: 3  •  rosuvastatin (Crestor) 10 MG tablet, Take 1 tablet by mouth Daily. For cholesterol, Disp: 90 tablet, Rfl: 0  •  naproxen (EC NAPROSYN) 500 MG EC tablet, Take 1 tablet by mouth 2 (Two) Times a Day As Needed for Headache., Disp: 20 tablet, Rfl: 0  •  ondansetron ODT (ZOFRAN-ODT) 4 MG disintegrating tablet, Place 1 tablet on the tongue Every 8 (Eight) Hours As Needed for Nausea or Vomiting., Disp: 10 tablet, Rfl: 0    Physical Exam:  Vitals:    08/12/21 1353   BP: 124/70   BP Location: Left arm   Patient Position: Sitting   Cuff Size: Large Adult   Pulse: 110   SpO2: 97%   Weight: 95.3 kg (210 lb)   Height: 160 cm (63\")   PainSc: 0-No pain     Current Pain Level: none  Pulse Ox: Normal  on room air  General: alert, appears stated age and cooperative     Body Habitus: well-nourished    HEENT: Head: Normocephalic, no lesions, without obvious abnormality.   Neuro: alert, oriented x3  Pulses: 2+ " and symmetric  JVP: Volume/Pulsation: Normal.  Normal waveforms.   Appropriate inspiratory decrease.  No Kussmaul's. No Astrid's.   Carotid Exam: no bruit normal pulsation bilaterally   Carotid Volume: normal.     Respirations: no increased work of breathing   Chest:  Normal    Pulmonary:Normal   Precordium: Normal impulses. P2 is not palpable.  [place]:  normal size and placement  Palpable S4: absent.  Heart rate: mildly tachycardic    Heart Rhythm: regular     Heart Sounds: S1: normal  S2: normal  Opening Snap: absent    Pericardial Rub:  Absent: .    Abdomen:   Appearance: normal .  Palpation: Soft, non-tender to palpation, bowel sounds positive in all four quadrants; no guarding or rebound tenderness  Extremity: no edema.   LE Skin: no rashes  LE Hair:  normal  LE Pulses: well perfused with normal pulses in the distal extremities  Pallor on elevation: Absent. Rubor on dependency: None      DATA REVIEWED:     EKG. I personally reviewed and interpreted the EKG.  Normal sinus rhythm, non-specific ST-T wave changes.    ECG/EMG Results (all)     None        ---------------------------------------------------  -----------------------------------------------------  CXR/Imaging:   Imaging Results (Most Recent)     None        ----------------------------------------------------      --------------------------------------------------------------------------------------------------  LABS:     The 10-year CVD risk score (Aminta et al., 2008) is: 29%    Values used to calculate the score:      Age: 70 years      Sex: Female      Diabetic: Yes      Tobacco smoker: No      Systolic Blood Pressure: 124 mmHg      Is BP treated: Yes      HDL Cholesterol: 55 mg/dL      Total Cholesterol: 264 mg/dL         Lab Results   Component Value Date    GLUCOSE 151 (H) 07/29/2021    BUN 17 07/29/2021    CREATININE 1.17 (H) 07/29/2021    EGFRIFNONA 46 (L) 07/29/2021    BCR 14.5 07/29/2021    K 4.0 07/29/2021    CO2 24.2 07/29/2021     CALCIUM 9.8 07/29/2021    ALBUMIN 4.20 07/29/2021    AST 10 07/29/2021    ALT 11 07/29/2021     Lab Results   Component Value Date    WBC 8.61 05/23/2021    HGB 12.9 05/23/2021    HCT 39.2 05/23/2021    MCV 89.1 05/23/2021     05/23/2021     Lab Results   Component Value Date    CHOL 264 (H) 01/07/2021    CHLPL 238 (H) 08/18/2016    TRIG 225 (H) 01/07/2021    HDL 55 01/07/2021     (H) 07/29/2021     Lab Results   Component Value Date    TSH 2.720 03/13/2018     Lab Results   Component Value Date    CKTOTAL 35 02/25/2016    CKMB 0.4 02/25/2016    TROPONINI <0.012 02/25/2016     Lab Results   Component Value Date    HGBA1C 9.05 (H) 07/29/2021     No results found for: DDIMER  Lab Results   Component Value Date    ALT 11 07/29/2021     Lab Results   Component Value Date    HGBA1C 9.05 (H) 07/29/2021    HGBA1C 10.20 (H) 05/06/2021    HGBA1C 9.40 (H) 01/07/2021     Lab Results   Component Value Date    MICROALBUR <1.2 01/07/2021    CREATININE 1.17 (H) 07/29/2021     Lab Results   Component Value Date    IRON <10 (L) 10/02/2017    TIBC 173 (L) 10/02/2017     No results found for: INR, PROTIME    Assessment/Plan     1. Chest pain, unspecified type  Her chest pain has some typical features of cardiac etiology (substernal location) but is not provoked by exertion or relieved by rest. She does have uncontrolled DM (latest HbA1C of 9.05%) and other coronary risk factors. We will proceed with pharmacological nuclear stress test for further evaluation.   I have asked her to try using SL nitro when she had episodes of discomfort, if it works, we may consider long acting nitrates.   - Stress Test With Myocardial Perfusion; Future    2. Essential hypertension  Clinic BP is at goal. Continue current therapy.    3. Other hyperlipidemia  Last LDL was 161 mg/dl. She was recently started on Crestor. Will evaluate response at next visit.      Prevention:  Patient's Body mass index is 37.2 kg/m². indicating that she is obese  (BMI >30). Obesity-related health conditions include the following: hypertension, diabetes mellitus and dyslipidemias. We discussed portion control and increasing exercise..    Return in about 2 months (around 10/12/2021).          This document has been electronically signed by Aryan Hoang MD on August 12, 2021 18:58 CDT

## 2021-08-23 ENCOUNTER — HOSPITAL ENCOUNTER (OUTPATIENT)
Dept: NUCLEAR MEDICINE | Facility: HOSPITAL | Age: 71
Discharge: HOME | End: 2021-08-23

## 2021-08-23 ENCOUNTER — HOSPITAL ENCOUNTER (OUTPATIENT)
Dept: CARDIOLOGY | Facility: HOSPITAL | Age: 71
Discharge: HOME | End: 2021-08-23

## 2021-08-23 DIAGNOSIS — R07.9 CHEST PAIN, UNSPECIFIED TYPE: ICD-10-CM

## 2021-08-23 PROCEDURE — 0 TECHNETIUM SESTAMIBI: Performed by: INTERNAL MEDICINE

## 2021-08-23 PROCEDURE — 78452 HT MUSCLE IMAGE SPECT MULT: CPT

## 2021-08-23 PROCEDURE — 93018 CV STRESS TEST I&R ONLY: CPT | Performed by: INTERNAL MEDICINE

## 2021-08-23 PROCEDURE — 25010000002 REGADENOSON 0.4 MG/5ML SOLUTION: Performed by: INTERNAL MEDICINE

## 2021-08-23 PROCEDURE — A9500 TC99M SESTAMIBI: HCPCS | Performed by: INTERNAL MEDICINE

## 2021-08-23 PROCEDURE — 78452 HT MUSCLE IMAGE SPECT MULT: CPT | Performed by: INTERNAL MEDICINE

## 2021-08-23 PROCEDURE — 93017 CV STRESS TEST TRACING ONLY: CPT

## 2021-08-23 PROCEDURE — 93016 CV STRESS TEST SUPVJ ONLY: CPT | Performed by: INTERNAL MEDICINE

## 2021-08-23 RX ORDER — SODIUM CHLORIDE 0.9 % (FLUSH) 0.9 %
10 SYRINGE (ML) INJECTION ONCE
Status: COMPLETED | OUTPATIENT
Start: 2021-08-23 | End: 2021-08-23

## 2021-08-23 RX ADMIN — TECHNETIUM TC 99M SESTAMIBI 1 DOSE: 1 INJECTION INTRAVENOUS at 08:45

## 2021-08-23 RX ADMIN — TECHNETIUM TC 99M SESTAMIBI 1 DOSE: 1 INJECTION INTRAVENOUS at 09:57

## 2021-08-23 RX ADMIN — SODIUM CHLORIDE, PRESERVATIVE FREE 10 ML: 5 INJECTION INTRAVENOUS at 10:13

## 2021-08-23 RX ADMIN — REGADENOSON 0.4 MG: 0.08 INJECTION, SOLUTION INTRAVENOUS at 10:13

## 2021-08-27 ENCOUNTER — TELEPHONE (OUTPATIENT)
Dept: CARDIOLOGY | Facility: CLINIC | Age: 71
End: 2021-08-27

## 2021-09-09 ENCOUNTER — TELEPHONE (OUTPATIENT)
Dept: FAMILY MEDICINE CLINIC | Facility: CLINIC | Age: 71
End: 2021-09-09

## 2021-09-28 LAB
BH CV REST NUCLEAR ISOTOPE DOSE: 10.4 MCI
BH CV STRESS BP STAGE 1: NORMAL
BH CV STRESS COMMENTS STAGE 1: NORMAL
BH CV STRESS DOSE REGADENOSON STAGE 1: 0.4
BH CV STRESS DURATION MIN STAGE 1: 0
BH CV STRESS DURATION SEC STAGE 1: 10
BH CV STRESS HR STAGE 1: 90
BH CV STRESS NUCLEAR ISOTOPE DOSE: 35.7 MCI
BH CV STRESS PROTOCOL 1: NORMAL
BH CV STRESS RECOVERY BP: NORMAL MMHG
BH CV STRESS RECOVERY HR: 85 BPM
BH CV STRESS STAGE 1: 1
LV EF NUC BP: 65 %
MAXIMAL PREDICTED HEART RATE: 150 BPM
PERCENT MAX PREDICTED HR: 78.67 %
STRESS BASELINE BP: NORMAL MMHG
STRESS BASELINE HR: 86 BPM
STRESS PERCENT HR: 93 %
STRESS POST ESTIMATED WORKLOAD: 1 METS
STRESS POST PEAK BP: NORMAL MMHG
STRESS POST PEAK HR: 118 BPM
STRESS TARGET HR: 128 BPM

## 2021-10-14 ENCOUNTER — OFFICE VISIT (OUTPATIENT)
Dept: CARDIOLOGY | Facility: CLINIC | Age: 71
End: 2021-10-14

## 2021-10-14 VITALS
WEIGHT: 212.2 LBS | HEART RATE: 73 BPM | DIASTOLIC BLOOD PRESSURE: 66 MMHG | HEIGHT: 63 IN | OXYGEN SATURATION: 95 % | SYSTOLIC BLOOD PRESSURE: 118 MMHG | BODY MASS INDEX: 37.6 KG/M2

## 2021-10-14 DIAGNOSIS — R07.9 CHEST PAIN, UNSPECIFIED TYPE: Primary | ICD-10-CM

## 2021-10-14 DIAGNOSIS — I10 HYPERTENSION, UNSPECIFIED TYPE: ICD-10-CM

## 2021-10-14 PROCEDURE — 99214 OFFICE O/P EST MOD 30 MIN: CPT | Performed by: INTERNAL MEDICINE

## 2021-10-14 NOTE — PROGRESS NOTES
"  [facility] Cardiology  OFFICE NOTE    Cardiovascular Medicine  Aryan Hoang M.D., [facility]         No referring provider defined for this encounter.    Past Cardiac History:  1.  Chest pain  2.  Hypertension  3.  Dyslipidemia  4.  Diabetes mellitus    History of Present Illness    Bertha Hyde is a 70 y.o. female who presents for consultation today. She has a known h/o diabetes mellitus, hypertension, dyslipidemia, anxiety and had COVID infection some time ago.    She has been having episodes of substernal chest discomfort for the past few months. These episodes are not provoked by exertion, are described as \"squeezing\", non-radiating and not associated with dyspnea or diaphoresis. They occur 3-4 times/week. She was prescribed SL nitro by her PCP, she has not tried that yet for these episodes.     She denies having any PND, orthopnea, leg swelling or palpitations.     10/14/2021:  We ordered a pharmacological nuclear stress test at her last visit for evaluation of chest discomfort.  This study showed normal LV function, no definite evidence of fixed or reversible myocardial perfusion defects and borderline ECG criteria for ischemia in inferior leads on Lexiscan stress.  It appeared low risk overall.  We decided to proceed with medical therapy with Lopressor 25 mg orally daily.    She states today that her chest pain has completely resolved after initiation of metoprolol tartrate.  He denies having any exertional dyspnea, PND, orthopnea, leg swelling or palpitations.  Review of systems is positive for fatigue, she requires to take a few naps during the day when she feels tired.    Review of Systems -   Constitution: Negative for weight gain and weight loss. Positive for fatigue.   HENT: Negative for congestion.    Eyes: Negative for blurred vision.   Cardiovascular: As mentioned above  Respiratory: Negative for cough and hemoptysis.    Endocrine: Negative for polydipsia and polyuria. "   Hematologic/Lymphatic: Negative for bleeding problem. Does not bruise/bleed easily.   Skin: Negative for flushing.   Musculoskeletal: Negative for neck pain and stiffness.   Gastrointestinal: Negative for abdominal pain, diarrhea, jaundice, melena, nausea and vomiting.   Genitourinary: Negative for dysuria and hematuria.   Neurological: Negative for dizziness, focal weakness and numbness.   Psychiatric/Behavioral: Negative for altered mental status and depression.      All other systems were reviewed and were negative.    family history includes Arthritis in her father and mother; Cancer in her maternal aunt and paternal aunt; Coronary artery disease in an other family member; Heart disease in her mother; Hypertension in her mother and another family member; Mental illness in her maternal aunt and paternal aunt; Migraines in her maternal grandmother; Obesity in her maternal grandmother; Osteoporosis in her mother; Stroke in her mother.     reports that she has never smoked. She has never used smokeless tobacco. She reports that she does not drink alcohol and does not use drugs.    No Known Allergies      Current Outpatient Medications:   •  Alcohol Swabs pads, USE 4 TIMES DAILY, Disp: 150 each, Rfl: 11  •  aspirin 81 MG EC tablet, Take 81 mg by mouth Daily., Disp: , Rfl:   •  b complex-C-folic acid 1 MG capsule, Take 1 capsule by mouth Daily., Disp: , Rfl:   •  Blood Glucose Monitoring Suppl (BLOOD GLUCOSE MONITOR SYSTEM) W/DEVICE kit, Test Blood Sugars Once Daily, Disp: 1 each, Rfl: 0  •  buPROPion SR (Wellbutrin SR) 150 MG 12 hr tablet, Take 1 tablet by mouth 2 (Two) Times a Day., Disp: 180 tablet, Rfl: 0  •  DULoxetine (CYMBALTA) 60 MG capsule, Take 1 capsule by mouth Daily., Disp: 90 capsule, Rfl: 3  •  Empagliflozin (Jardiance) 10 MG tablet, Take 10 mg by mouth Daily. Lot [id number]  Apr 2021, Disp: 90 tablet, Rfl: 1  •  gabapentin (NEURONTIN) 300 MG capsule, Take 600 mg by mouth 3 (Three) Times a Day., Disp: ,  Rfl:   •  glucose blood test strip, Test Blood Sugars tid prn, Disp: 100 each, Rfl: 5  •  HYDROcodone-acetaminophen (NORCO) 7.5-325 MG per tablet, Take 1 tablet by mouth Every 6 (Six) Hours As Needed for Moderate Pain ., Disp: 120 tablet, Rfl: 0  •  hyoscyamine (OSCIMIN) 0.125 MG SL tablet, Place 0.125-0.25 mg under the tongue every 4 (four) hours as needed. (max 12 per 24 hours), Disp: , Rfl:   •  insulin degludec (TRESIBA FLEXTOUCH) 100 UNIT/ML solution pen-injector injection, Inject 50 Units under the skin into the appropriate area as directed Every Night., Disp: 4 pen, Rfl: 3  •  Insulin Pen Needle (Global Ease Inject Pen Needles) 31G X 5 MM misc, Use one daily with Tresiba, Disp: 90 each, Rfl: 3  •  LANCETS MICRO THIN 33G misc, Test Blood Sugars Once Daily, Disp: 100 each, Rfl: 3  •  lisinopril-hydrochlorothiazide (PRINZIDE,ZESTORETIC) 20-12.5 MG per tablet, Take 1 tablet by mouth Daily. Indication: Essential (primary) hypertension, Disp: 90 tablet, Rfl: 3  •  loratadine (CLARITIN) 10 MG tablet, Take 10 mg by mouth daily as needed. Indication: Allergic rhinitis, Disp: , Rfl:   •  metoprolol tartrate (LOPRESSOR) 25 MG tablet, Take 2 tablets by mouth 2 (Two) Times a Day., Disp: 60 tablet, Rfl: 2  •  nitroglycerin (Nitrostat) 0.4 MG SL tablet, Place 1 tablet under the tongue Every 5 (Five) Minutes As Needed for Chest Pain. Take no more than 3 doses in 15 minutes., Disp: 25 tablet, Rfl: 0  •  pantoprazole (Protonix) 40 MG EC tablet, Take 1 tablet by mouth Daily As Needed (heartburn)., Disp: 90 tablet, Rfl: 3  •  promethazine (PHENERGAN) 25 MG tablet, Take 1 tablet by mouth Every 6 (Six) Hours As Needed for Nausea., Disp: 30 tablet, Rfl: 0  •  rizatriptan MLT (MAXALT-MLT) 10 MG disintegrating tablet, Place 1 tablet on the tongue Daily As Needed for Migraine. Take 1 tab at onset of headache, can repeat x 1 in 2 hrs prn, Disp: 27 tablet, Rfl: 3  •  rosuvastatin (Crestor) 10 MG tablet, Take 1 tablet by mouth Daily. For  "cholesterol, Disp: 90 tablet, Rfl: 0    Physical Exam:  Vitals:    10/14/21 1037   BP: 118/66   BP Location: Left arm   Patient Position: Sitting   Cuff Size: Adult   Pulse: 73   SpO2: 95%   Weight: 96.3 kg (212 lb 3.2 oz)   Height: 160 cm (63\")   PainSc: 0-No pain     Current Pain Level: none  Pulse Ox: Normal  on room air  General: alert, appears stated age and cooperative     Body Habitus: Obese  HEENT: Head: Normocephalic, no lesions, without obvious abnormality.   Neuro: alert, oriented x3  Pulses: 2+ and symmetric  JVP: Volume/Pulsation: Normal.  Normal waveforms.   Appropriate inspiratory decrease.    Carotid Exam: no bruit normal pulsation bilaterally   Carotid Volume: normal.     Respirations: no increased work of breathing   Chest:  Normal    Pulmonary:Normal   Precordium: Normal impulses.   [place]:  normal size and placement  Heart rate: mildly tachycardic    Heart Rhythm: regular     Heart Sounds: S1: normal  S2: normal    Abdomen:   Appearance: normal .  Palpation: Soft, non-tender to palpation, bowel sounds positive in all four quadrants; no guarding or rebound tenderness  Extremity: no edema.   LE Skin: no rashes  LE Hair:  normal  LE Pulses: well perfused with normal pulses in the distal extremities  Pallor on elevation: Absent. Rubor on dependency: None      DATA REVIEWED:     EKG. I personally reviewed and interpreted the EKG.  Normal sinus rhythm, non-specific ST-T wave changes.    ECG/EMG Results (all)     None        ---------------------------------------------------  -----------------------------------------------------  CXR/Imaging:   Imaging Results (Most Recent)     None        ----------------------------------------------------      --------------------------------------------------------------------------------------------------  LABS:     The 10-year CVD risk score (Aminta et al., 2008) is: 29%    Values used to calculate the score:      Age: 70 years      Sex: Female      Diabetic: " Yes      Tobacco smoker: No      Systolic Blood Pressure: 124 mmHg      Is BP treated: Yes      HDL Cholesterol: 55 mg/dL      Total Cholesterol: 264 mg/dL         Lab Results   Component Value Date    GLUCOSE 151 (H) 07/29/2021    BUN 17 07/29/2021    CREATININE 1.17 (H) 07/29/2021    EGFRIFNONA 46 (L) 07/29/2021    BCR 14.5 07/29/2021    K 4.0 07/29/2021    CO2 24.2 07/29/2021    CALCIUM 9.8 07/29/2021    ALBUMIN 4.20 07/29/2021    AST 10 07/29/2021    ALT 11 07/29/2021     Lab Results   Component Value Date    WBC 8.61 05/23/2021    HGB 12.9 05/23/2021    HCT 39.2 05/23/2021    MCV 89.1 05/23/2021     05/23/2021     Lab Results   Component Value Date    CHOL 264 (H) 01/07/2021    CHLPL 238 (H) 08/18/2016    TRIG 225 (H) 01/07/2021    HDL 55 01/07/2021     (H) 07/29/2021     Lab Results   Component Value Date    TSH 2.720 03/13/2018     Lab Results   Component Value Date    CKTOTAL 35 02/25/2016    CKMB 0.4 02/25/2016    TROPONINI <0.012 02/25/2016     Lab Results   Component Value Date    HGBA1C 9.05 (H) 07/29/2021     No results found for: DDIMER  Lab Results   Component Value Date    ALT 11 07/29/2021     Lab Results   Component Value Date    HGBA1C 9.05 (H) 07/29/2021    HGBA1C 10.20 (H) 05/06/2021    HGBA1C 9.40 (H) 01/07/2021     Lab Results   Component Value Date    MICROALBUR <1.2 01/07/2021    CREATININE 1.17 (H) 07/29/2021     Lab Results   Component Value Date    IRON <10 (L) 10/02/2017    TIBC 173 (L) 10/02/2017     No results found for: INR, PROTIME    Assessment/Plan     1. Chest pain, unspecified type  We ordered a pharmacological nuclear stress test at her last visit for evaluation of chest discomfort.  This study showed normal LV function, no definite evidence of fixed or reversible myocardial perfusion defects and borderline ECG criteria for ischemia in inferior leads on Lexiscan stress.  It appeared low risk overall.  We decided to proceed with medical therapy with Lopressor 25 mg  orally daily.  She reported today that her chest discomfort has resolved after starting Lopressor therapy.   Will increase metoprolol dosage from 25 mg orally twice daily to 50 mg orally daily.  Continue baby aspirin and rosuvastatin.  She was informed that although her stress test appeared low risk, in some cases the stress test may be falsely negative.  She was informed of further options including coronary angiography for definitive evaluation.  We mutually decided to continue with medical therapy for now and consider coronary angiography if her symptoms recur.  Optimal control of coronary risk factors is recommended.    2. Essential hypertension  Clinic BP is at goal. Continue current therapy.      Prevention:  Patient's Body mass index is 37.59 kg/m². indicating that she is obese (BMI >30). Obesity-related health conditions include the following: hypertension, diabetes mellitus and dyslipidemias. We discussed portion control and increasing exercise..    Return in about 3 months (around 1/14/2022).          This document has been electronically signed by Aryan Hoang MD on October 14, 2021 11:22 CDT

## 2021-10-22 ENCOUNTER — TELEPHONE (OUTPATIENT)
Dept: FAMILY MEDICINE CLINIC | Facility: CLINIC | Age: 71
End: 2021-10-22

## 2021-10-22 RX ORDER — FLUCONAZOLE 150 MG/1
150 TABLET ORAL ONCE
Qty: 1 TABLET | Refills: 0 | Status: SHIPPED | OUTPATIENT
Start: 2021-10-22 | End: 2021-10-22

## 2021-10-22 NOTE — TELEPHONE ENCOUNTER
Patient is requesting for a medication for yeast due to taking antibiotics sent to [facility] in [facility]

## 2021-10-25 RX ORDER — DULOXETIN HYDROCHLORIDE 60 MG/1
60 CAPSULE, DELAYED RELEASE ORAL DAILY
Qty: 90 CAPSULE | Refills: 3 | Status: SHIPPED | OUTPATIENT
Start: 2021-10-25 | End: 2021-12-09 | Stop reason: SDUPTHER

## 2021-11-04 DIAGNOSIS — M15.9 OSTEOARTHRITIS OF MULTIPLE JOINTS, UNSPECIFIED OSTEOARTHRITIS TYPE: Chronic | ICD-10-CM

## 2021-11-04 DIAGNOSIS — M51.36 DEGENERATIVE DISC DISEASE, LUMBAR: Chronic | ICD-10-CM

## 2021-11-04 RX ORDER — HYDROCODONE BITARTRATE AND ACETAMINOPHEN 7.5; 325 MG/1; MG/1
1 TABLET ORAL EVERY 6 HOURS PRN
Qty: 120 TABLET | Refills: 0 | OUTPATIENT
Start: 2021-11-04

## 2021-11-04 NOTE — TELEPHONE ENCOUNTER
Patient called stating she has a migraine and needs a refill on     HYDROcodone-acetaminophen (NORCO) 7.5-325 MG per tablet    [facility]     Thanks

## 2021-11-04 NOTE — TELEPHONE ENCOUNTER
Last Script  07/29/2021  #120, NR    No Future OV Rc     Last Ov 07/29/2021    PC appt on  10/29/2021

## 2021-12-03 ENCOUNTER — TELEPHONE (OUTPATIENT)
Dept: FAMILY MEDICINE CLINIC | Facility: CLINIC | Age: 71
End: 2021-12-03

## 2021-12-06 ENCOUNTER — LAB (OUTPATIENT)
Dept: LAB | Facility: HOSPITAL | Age: 71
End: 2021-12-06

## 2021-12-06 DIAGNOSIS — E11.65 TYPE 2 DIABETES MELLITUS WITH HYPERGLYCEMIA, WITH LONG-TERM CURRENT USE OF INSULIN (HCC): ICD-10-CM

## 2021-12-06 DIAGNOSIS — Z79.4 TYPE 2 DIABETES MELLITUS WITH HYPERGLYCEMIA, WITH LONG-TERM CURRENT USE OF INSULIN (HCC): ICD-10-CM

## 2021-12-06 PROCEDURE — 80053 COMPREHEN METABOLIC PANEL: CPT

## 2021-12-06 PROCEDURE — 83036 HEMOGLOBIN GLYCOSYLATED A1C: CPT

## 2021-12-07 LAB
ALBUMIN SERPL-MCNC: 3.9 G/DL (ref 3.5–5.2)
ALBUMIN/GLOB SERPL: 1.1 G/DL
ALP SERPL-CCNC: 111 U/L (ref 39–117)
ALT SERPL W P-5'-P-CCNC: 12 U/L (ref 1–33)
ANION GAP SERPL CALCULATED.3IONS-SCNC: 14.4 MMOL/L (ref 5–15)
AST SERPL-CCNC: 10 U/L (ref 1–32)
BILIRUB SERPL-MCNC: 0.3 MG/DL (ref 0–1.2)
BUN SERPL-MCNC: 23 MG/DL (ref 8–23)
BUN/CREAT SERPL: 20.4 (ref 7–25)
CALCIUM SPEC-SCNC: 10 MG/DL (ref 8.6–10.5)
CHLORIDE SERPL-SCNC: 97 MMOL/L (ref 98–107)
CO2 SERPL-SCNC: 22.6 MMOL/L (ref 22–29)
CREAT SERPL-MCNC: 1.13 MG/DL (ref 0.57–1)
GFR SERPL CREATININE-BSD FRML MDRD: 47 ML/MIN/1.73
GLOBULIN UR ELPH-MCNC: 3.4 GM/DL
GLUCOSE SERPL-MCNC: 321 MG/DL (ref 65–99)
HBA1C MFR BLD: 13.41 % (ref 4.8–5.6)
POTASSIUM SERPL-SCNC: 4.9 MMOL/L (ref 3.5–5.2)
PROT SERPL-MCNC: 7.3 G/DL (ref 6–8.5)
SODIUM SERPL-SCNC: 134 MMOL/L (ref 136–145)

## 2021-12-09 ENCOUNTER — OFFICE VISIT (OUTPATIENT)
Dept: FAMILY MEDICINE CLINIC | Facility: CLINIC | Age: 71
End: 2021-12-09

## 2021-12-09 VITALS
DIASTOLIC BLOOD PRESSURE: 70 MMHG | OXYGEN SATURATION: 98 % | WEIGHT: 198.5 LBS | BODY MASS INDEX: 35.17 KG/M2 | SYSTOLIC BLOOD PRESSURE: 110 MMHG | HEIGHT: 63 IN | HEART RATE: 78 BPM

## 2021-12-09 DIAGNOSIS — F33.41 MAJOR DEPRESSIVE DISORDER, RECURRENT EPISODE, IN PARTIAL REMISSION (HCC): Chronic | ICD-10-CM

## 2021-12-09 DIAGNOSIS — M15.9 OSTEOARTHRITIS OF MULTIPLE JOINTS, UNSPECIFIED OSTEOARTHRITIS TYPE: Chronic | ICD-10-CM

## 2021-12-09 DIAGNOSIS — E11.42 DIABETIC POLYNEUROPATHY ASSOCIATED WITH TYPE 2 DIABETES MELLITUS (HCC): ICD-10-CM

## 2021-12-09 DIAGNOSIS — E11.65 TYPE 2 DIABETES MELLITUS WITH HYPERGLYCEMIA, WITH LONG-TERM CURRENT USE OF INSULIN (HCC): Primary | ICD-10-CM

## 2021-12-09 DIAGNOSIS — Z12.11 SCREENING FOR COLON CANCER: ICD-10-CM

## 2021-12-09 DIAGNOSIS — M51.36 DEGENERATIVE DISC DISEASE, LUMBAR: Chronic | ICD-10-CM

## 2021-12-09 DIAGNOSIS — E78.2 MIXED HYPERLIPIDEMIA: Chronic | ICD-10-CM

## 2021-12-09 DIAGNOSIS — I10 ESSENTIAL HYPERTENSION: Chronic | ICD-10-CM

## 2021-12-09 DIAGNOSIS — Z79.4 TYPE 2 DIABETES MELLITUS WITH HYPERGLYCEMIA, WITH LONG-TERM CURRENT USE OF INSULIN (HCC): Primary | ICD-10-CM

## 2021-12-09 PROCEDURE — 99214 OFFICE O/P EST MOD 30 MIN: CPT | Performed by: GENERAL PRACTICE

## 2021-12-09 RX ORDER — HYDROCODONE BITARTRATE AND ACETAMINOPHEN 7.5; 325 MG/1; MG/1
1 TABLET ORAL EVERY 6 HOURS PRN
Qty: 120 TABLET | Refills: 0 | Status: SHIPPED | OUTPATIENT
Start: 2021-12-09 | End: 2022-04-12 | Stop reason: SDUPTHER

## 2021-12-09 RX ORDER — DULAGLUTIDE 1.5 MG/.5ML
1.5 INJECTION, SOLUTION SUBCUTANEOUS WEEKLY
Qty: 12 PEN | Refills: 3 | Status: SHIPPED | OUTPATIENT
Start: 2021-12-09 | End: 2022-07-18

## 2021-12-09 RX ORDER — ROSUVASTATIN CALCIUM 10 MG/1
10 TABLET, COATED ORAL DAILY
Qty: 90 TABLET | Refills: 0 | Status: SHIPPED | OUTPATIENT
Start: 2021-12-09 | End: 2022-07-18 | Stop reason: SDUPTHER

## 2021-12-09 RX ORDER — BUPROPION HYDROCHLORIDE 150 MG/1
150 TABLET, EXTENDED RELEASE ORAL 2 TIMES DAILY
Qty: 180 TABLET | Refills: 0 | Status: SHIPPED | OUTPATIENT
Start: 2021-12-09 | End: 2022-04-12

## 2021-12-09 RX ORDER — DULOXETIN HYDROCHLORIDE 60 MG/1
60 CAPSULE, DELAYED RELEASE ORAL DAILY
Qty: 90 CAPSULE | Refills: 3 | Status: SHIPPED | OUTPATIENT
Start: 2021-12-09 | End: 2022-11-03

## 2021-12-09 NOTE — PROGRESS NOTES
Subjective   Bertha Hyde is a 71 y.o. female.   Chief Complaint   Patient presents with   • Dizziness     fall   • Med Refill     Has been having trouble with dizziness, thought related to a new medication but quit taking and did not get better. Has not been checking her sugars. Not taking her medications as scheduled.  Dizziness  This is a new problem. The current episode started more than 1 month ago. The problem occurs intermittently. The problem has been unchanged. Pertinent negatives include no fatigue, joint swelling, myalgias, neck pain or rash. Nothing (position) aggravates the symptoms. Treatments tried: discontinued med. The treatment provided no relief.   Diabetes  She presents for her follow-up diabetic visit. She has type 2 diabetes mellitus. Her disease course has been stable. Pertinent negatives for hypoglycemia include no nervousness/anxiousness. There are no diabetic associated symptoms. Pertinent negatives for diabetes include no fatigue. There are no hypoglycemic complications. There are no diabetic complications. Current diabetic treatment includes insulin injections and oral agent (monotherapy). She is compliant with treatment some of the time. Her weight is stable. She is following a generally healthy diet. Meal planning includes avoidance of concentrated sweets. She participates in exercise intermittently. Her home blood glucose trend is increasing rapidly (Due to being out of her medications). An ACE inhibitor/angiotensin II receptor blocker is being taken.   Hypertension  This is a chronic problem. The current episode started more than 1 year ago. The problem is unchanged. The problem is controlled. Associated symptoms include anxiety. Pertinent negatives include no neck pain. There are no known risk factors for coronary artery disease. Current antihypertension treatment includes ACE inhibitors, diuretics and calcium channel blockers. The current treatment provides significant  improvement. There are no compliance problems.    Hyperlipidemia  This is a chronic problem. The current episode started more than 1 year ago. The problem is uncontrolled. Recent lipid tests were reviewed and are high. Exacerbating diseases include obesity. There are no known factors aggravating her hyperlipidemia. Pertinent negatives include no myalgias. Current antihyperlipidemic treatment includes statins. The current treatment provides moderate improvement of lipids. Compliance problems include medication cost.    Back Pain  This is a chronic problem. The current episode started more than 1 year ago. The problem occurs constantly. The problem has been gradually worsening since onset. The pain is present in the lumbar spine. The pain does not radiate. The pain is at a severity of 7/10. The pain is severe. The pain is worse during the day. The symptoms are aggravated by bending and position. Stiffness is present in the morning. Pertinent negatives include no dysuria. Risk factors include recent trauma. She has tried analgesics for the symptoms. The treatment provided moderate relief.   Arthritis  Presents for follow-up visit. She complains of pain. She reports no joint swelling. The symptoms have been stable. Affected locations include the right knee and left knee. Her pain is at a severity of 7/10. Pertinent negatives include no diarrhea, dysuria, fatigue or rash. Compliance with total regimen is %. Compliance with medications is %.      The following portions of the patient's history were reviewed and updated as appropriate: allergies, current medications, past social history and problem list.    Outpatient Medications Prior to Visit   Medication Sig Dispense Refill   • Alcohol Swabs pads USE 4 TIMES DAILY 150 each 11   • aspirin 81 MG EC tablet Take 81 mg by mouth Daily.     • b complex-C-folic acid 1 MG capsule Take 1 capsule by mouth Daily.     • Blood Glucose Monitoring Suppl (BLOOD GLUCOSE  MONITOR SYSTEM) W/DEVICE kit Test Blood Sugars Once Daily 1 each 0   • gabapentin (NEURONTIN) 300 MG capsule Take 600 mg by mouth 3 (Three) Times a Day.     • glucose blood test strip Test Blood Sugars tid prn 100 each 5   • hyoscyamine (OSCIMIN) 0.125 MG SL tablet Place 0.125-0.25 mg under the tongue every 4 (four) hours as needed. (max 12 per 24 hours)     • insulin degludec (TRESIBA FLEXTOUCH) 100 UNIT/ML solution pen-injector injection Inject 50 Units under the skin into the appropriate area as directed Every Night. 4 pen 3   • Insulin Pen Needle (Global Ease Inject Pen Needles) 31G X 5 MM misc Use one daily with Tresiba 90 each 3   • LANCETS MICRO THIN 33G misc Test Blood Sugars Once Daily 100 each 3   • lisinopril-hydrochlorothiazide (PRINZIDE,ZESTORETIC) 20-12.5 MG per tablet Take 1 tablet by mouth Daily. Indication: Essential (primary) hypertension 90 tablet 3   • loratadine (CLARITIN) 10 MG tablet Take 10 mg by mouth daily as needed. Indication: Allergic rhinitis     • metoprolol tartrate (LOPRESSOR) 25 MG tablet Take 2 tablets by mouth 2 (Two) Times a Day. 60 tablet 2   • nitroglycerin (Nitrostat) 0.4 MG SL tablet Place 1 tablet under the tongue Every 5 (Five) Minutes As Needed for Chest Pain. Take no more than 3 doses in 15 minutes. 25 tablet 0   • pantoprazole (Protonix) 40 MG EC tablet Take 1 tablet by mouth Daily As Needed (heartburn). 90 tablet 3   • promethazine (PHENERGAN) 25 MG tablet Take 1 tablet by mouth Every 6 (Six) Hours As Needed for Nausea. 30 tablet 0   • rizatriptan MLT (MAXALT-MLT) 10 MG disintegrating tablet Place 1 tablet on the tongue Daily As Needed for Migraine. Take 1 tab at onset of headache, can repeat x 1 in 2 hrs prn 27 tablet 3   • buPROPion SR (Wellbutrin SR) 150 MG 12 hr tablet Take 1 tablet by mouth 2 (Two) Times a Day. 180 tablet 0   • DULoxetine (CYMBALTA) 60 MG capsule Take 1 capsule by mouth Daily. 90 capsule 3   • Empagliflozin (Jardiance) 10 MG tablet Take 10 mg by  "mouth Daily. Lot [id number]  Apr 2021 90 tablet 1   • HYDROcodone-acetaminophen (NORCO) 7.5-325 MG per tablet Take 1 tablet by mouth Every 6 (Six) Hours As Needed for Moderate Pain . 120 tablet 0   • rosuvastatin (Crestor) 10 MG tablet Take 1 tablet by mouth Daily. For cholesterol 90 tablet 0     No facility-administered medications prior to visit.       Review of Systems   Constitutional: Negative for fatigue.   Gastrointestinal: Negative for diarrhea.   Genitourinary: Negative for dysuria.   Musculoskeletal: Positive for arthritis. Negative for joint swelling, myalgias and neck pain.   Skin: Negative for rash.   Psychiatric/Behavioral: The patient is not nervous/anxious.      I have reviewed 12 systems with patient. Findings were negative except what is noted below and/or in history of present illness.     Objective   Visit Vitals  /70   Pulse 78   Ht 160 cm (63\")   Wt 90 kg (198 lb 8 oz)   SpO2 98%   BMI 35.16 kg/m²     Physical Exam    Notes brought forward are reviewed and updated if indicated.     Assessment/Plan   Problems Addressed this Visit        Cardiac and Vasculature    Essential hypertension (Chronic)    Relevant Orders    CBC & Differential    Comprehensive Metabolic Panel    Hemoglobin A1c    Lipid Panel    Urinalysis With Culture If Indicated -    Hyperlipidemia (Chronic)    Relevant Medications    rosuvastatin (Crestor) 10 MG tablet    Other Relevant Orders    CBC & Differential    Comprehensive Metabolic Panel    Hemoglobin A1c    Lipid Panel    Urinalysis With Culture If Indicated -       Endocrine and Metabolic    Type 2 diabetes mellitus with hyperglycemia, with long-term current use of insulin (HCC) - Primary    Relevant Medications    Dulaglutide (Trulicity) 1.5 MG/0.5ML solution pen-injector    empagliflozin (Jardiance) 10 MG tablet tablet    Other Relevant Orders    Ambulatory Referral to Endocrinology    CBC & Differential    Comprehensive Metabolic Panel    Hemoglobin A1c    Lipid " Panel    Urinalysis With Culture If Indicated -       Musculoskeletal and Injuries    Degenerative joint disease involving multiple joints    Relevant Medications    HYDROcodone-acetaminophen (NORCO) 7.5-325 MG per tablet       Neuro    Degenerative disc disease, lumbar (Chronic)    Relevant Medications    HYDROcodone-acetaminophen (NORCO) 7.5-325 MG per tablet    Diabetic polyneuropathy associated with type 2 diabetes mellitus (HCC)    Relevant Medications    Dulaglutide (Trulicity) 1.5 MG/0.5ML solution pen-injector    DULoxetine (CYMBALTA) 60 MG capsule    empagliflozin (Jardiance) 10 MG tablet tablet      Other Visit Diagnoses     Screening for colon cancer        Relevant Orders    Cologuard - Stool, Per Rectum    Major depressive disorder, recurrent episode, in partial remission (HCC)  (Chronic)       Relevant Medications    buPROPion SR (Wellbutrin SR) 150 MG 12 hr tablet    DULoxetine (CYMBALTA) 60 MG capsule      Diagnoses       Codes Comments    Type 2 diabetes mellitus with hyperglycemia, with long-term current use of insulin (HCC)    -  Primary ICD-10-CM: E11.65, Z79.4  ICD-9-CM: 250.00, 790.29, V58.67     Screening for colon cancer     ICD-10-CM: Z12.11  ICD-9-CM: V76.51     Major depressive disorder, recurrent episode, in partial remission (HCC)     ICD-10-CM: F33.41  ICD-9-CM: 296.35     Osteoarthritis of multiple joints, unspecified osteoarthritis type     ICD-10-CM: M15.9  ICD-9-CM: 715.89     Degenerative disc disease, lumbar     ICD-10-CM: M51.36  ICD-9-CM: 722.52     Mixed hyperlipidemia     ICD-10-CM: E78.2  ICD-9-CM: 272.2     Diabetic polyneuropathy associated with type 2 diabetes mellitus (HCC)     ICD-10-CM: E11.42  ICD-9-CM: 250.60, 357.2     Essential hypertension     ICD-10-CM: I10  ICD-9-CM: 401.9         Suspect dizziness is related to high sugars. Advised has to take her medications as prescribed. Continue current medications. Chris reviewed and appropriate. Not recommended to drive  or operate heavy equipment while taking potentially sedating meds.  Patient understands the risks associated with this controlled medication, including tolerance and addiction. They also agree to obtain this medication only from me, and not from a another provider, unless that provider is covering for me in my absence. They also agree to be compliant in dosing, and not self adjust the dose of medication.  A signed controlled substance agreement is on file, and they have received a controlled substance education sheet at this or a previous visit. They have also signed a consent for treatment with a controlled substance as per [facility] policy.        New Medications Ordered This Visit   Medications   • Dulaglutide (Trulicity) 1.5 MG/0.5ML solution pen-injector     Sig: Inject 1.5 mg under the skin into the appropriate area as directed 1 (One) Time Per Week.     Dispense:  12 pen     Refill:  3   • buPROPion SR (Wellbutrin SR) 150 MG 12 hr tablet     Sig: Take 1 tablet by mouth 2 (Two) Times a Day.     Dispense:  180 tablet     Refill:  0   • DULoxetine (CYMBALTA) 60 MG capsule     Sig: Take 1 capsule by mouth Daily.     Dispense:  90 capsule     Refill:  3   • HYDROcodone-acetaminophen (NORCO) 7.5-325 MG per tablet     Sig: Take 1 tablet by mouth Every 6 (Six) Hours As Needed for Moderate Pain .     Dispense:  120 tablet     Refill:  0   • empagliflozin (Jardiance) 10 MG tablet tablet     Sig: Take 1 tablet by mouth Daily. Lot [id number]  Apr 2021     Dispense:  90 tablet     Refill:  1   • rosuvastatin (Crestor) 10 MG tablet     Sig: Take 1 tablet by mouth Daily. For cholesterol     Dispense:  90 tablet     Refill:  0     Return in about 2 months (around 2/9/2022) for Annual physical, medicare wellness visit.        This document has been electronically signed by Belinda Finn MD on December 14, 2021 21:39 CST

## 2021-12-16 DIAGNOSIS — Z12.31 ENCOUNTER FOR SCREENING MAMMOGRAM FOR MALIGNANT NEOPLASM OF BREAST: Primary | ICD-10-CM

## 2022-02-08 ENCOUNTER — OFFICE VISIT (OUTPATIENT)
Dept: CARDIOLOGY | Facility: CLINIC | Age: 72
End: 2022-02-08

## 2022-02-08 VITALS
WEIGHT: 195 LBS | BODY MASS INDEX: 34.55 KG/M2 | HEART RATE: 76 BPM | OXYGEN SATURATION: 98 % | SYSTOLIC BLOOD PRESSURE: 118 MMHG | DIASTOLIC BLOOD PRESSURE: 74 MMHG | HEIGHT: 63 IN

## 2022-02-08 DIAGNOSIS — R42 DIZZINESS: ICD-10-CM

## 2022-02-08 DIAGNOSIS — R07.9 CHEST PAIN, UNSPECIFIED TYPE: Primary | ICD-10-CM

## 2022-02-08 DIAGNOSIS — E66.2 CLASS 1 OBESITY WITH ALVEOLAR HYPOVENTILATION AND BODY MASS INDEX (BMI) OF 34.0 TO 34.9 IN ADULT, UNSPECIFIED WHETHER SERIOUS COMORBIDITY PRESENT: ICD-10-CM

## 2022-02-08 DIAGNOSIS — I10 HYPERTENSION, UNSPECIFIED TYPE: ICD-10-CM

## 2022-02-08 PROCEDURE — 99214 OFFICE O/P EST MOD 30 MIN: CPT | Performed by: INTERNAL MEDICINE

## 2022-02-08 NOTE — PROGRESS NOTES
"  [facility] Cardiology  OFFICE NOTE    Cardiovascular Medicine  Aryan Hoang M.D., [facility]         No referring provider defined for this encounter.    Past Cardiac History:  1.  Chest pain  2.  Hypertension  3.  Dyslipidemia  4.  Diabetes mellitus    Bertha Hyde is a 71 y.o. female who presents for consultation today. She has a known h/o diabetes mellitus, hypertension, dyslipidemia, anxiety and had COVID infection some time ago.    She has been having episodes of substernal chest discomfort for the past few months. These episodes are not provoked by exertion, are described as \"squeezing\", non-radiating and not associated with dyspnea or diaphoresis. They occur 3-4 times/week. She was prescribed SL nitro by her PCP, she has not tried that yet for these episodes.     She denies having any PND, orthopnea, leg swelling or palpitations.     10/14/2021:  We ordered a pharmacological nuclear stress test at her last visit for evaluation of chest discomfort.  This study showed normal LV function, no definite evidence of fixed or reversible myocardial perfusion defects and borderline ECG criteria for ischemia in inferior leads on Lexiscan stress.  It appeared low risk overall.  We decided to proceed with medical therapy with Lopressor 25 mg orally daily.    She states today that her chest pain has completely resolved after initiation of metoprolol tartrate.  He denies having any exertional dyspnea, PND, orthopnea, leg swelling or palpitations.  Review of systems is positive for fatigue, she requires to take a few naps during the day when she feels tired.    2/8/2022:  She presents for a follow-up visit today.  She has not had any further episodes of chest discomfort.  She has been walking more and is currently at 2500 steps a day.  Her ultimate goal is to increase it to 10,000 steps a day.  She has not had any palpitations, PND, orthopnea or leg swelling.  She does report feeling dizzy at least once " every day.  There have been some occasions she has felt presyncopal.  She has been checking her blood sugars regularly and denies having any episodes of hypoglycemia.    Review of Systems -   Constitution: Negative for weight gain and weight loss.    HENT: Negative for congestion.    Eyes: Negative for blurred vision.   Cardiovascular: As mentioned above  Respiratory: Negative for cough and hemoptysis.    Endocrine: Negative for polydipsia and polyuria.   Hematologic/Lymphatic: Negative for bleeding problem. Does not bruise/bleed easily.   Skin: Negative for flushing.   Musculoskeletal: Negative for neck pain and stiffness.   Gastrointestinal: Negative for abdominal pain, diarrhea, jaundice, melena, nausea and vomiting.   Genitourinary: Negative for dysuria and hematuria.   Neurological: Negative for dizziness, focal weakness and numbness.   Psychiatric/Behavioral: Negative for altered mental status and depression.      All other systems were reviewed and were negative.    family history includes Arthritis in her father and mother; Cancer in her maternal aunt and paternal aunt; Coronary artery disease in an other family member; Heart disease in her mother; Hypertension in her mother and another family member; Mental illness in her maternal aunt and paternal aunt; Migraines in her maternal grandmother; Obesity in her maternal grandmother; Osteoporosis in her mother; Stroke in her mother.     reports that she has never smoked. She has never used smokeless tobacco. She reports that she does not drink alcohol and does not use drugs.    No Known Allergies      Current Outpatient Medications:   •  Alcohol Swabs pads, USE 4 TIMES DAILY, Disp: 150 each, Rfl: 11  •  aspirin 81 MG EC tablet, Take 81 mg by mouth Daily., Disp: , Rfl:   •  b complex-C-folic acid 1 MG capsule, Take 1 capsule by mouth Daily., Disp: , Rfl:   •  Blood Glucose Monitoring Suppl (BLOOD GLUCOSE MONITOR SYSTEM) W/DEVICE kit, Test Blood Sugars Once Daily,  Disp: 1 each, Rfl: 0  •  buPROPion SR (Wellbutrin SR) 150 MG 12 hr tablet, Take 1 tablet by mouth 2 (Two) Times a Day., Disp: 180 tablet, Rfl: 0  •  Dulaglutide (Trulicity) 1.5 MG/0.5ML solution pen-injector, Inject 1.5 mg under the skin into the appropriate area as directed 1 (One) Time Per Week., Disp: 12 pen, Rfl: 3  •  DULoxetine (CYMBALTA) 60 MG capsule, Take 1 capsule by mouth Daily., Disp: 90 capsule, Rfl: 3  •  empagliflozin (Jardiance) 10 MG tablet tablet, Take 1 tablet by mouth Daily. Lot [id number]  Apr 2021, Disp: 90 tablet, Rfl: 1  •  gabapentin (NEURONTIN) 300 MG capsule, Take 600 mg by mouth 3 (Three) Times a Day., Disp: , Rfl:   •  glucose blood test strip, Test Blood Sugars tid prn, Disp: 100 each, Rfl: 5  •  HYDROcodone-acetaminophen (NORCO) 7.5-325 MG per tablet, Take 1 tablet by mouth Every 6 (Six) Hours As Needed for Moderate Pain ., Disp: 120 tablet, Rfl: 0  •  hyoscyamine (OSCIMIN) 0.125 MG SL tablet, Place 0.125-0.25 mg under the tongue every 4 (four) hours as needed. (max 12 per 24 hours), Disp: , Rfl:   •  insulin degludec (TRESIBA FLEXTOUCH) 100 UNIT/ML solution pen-injector injection, Inject 50 Units under the skin into the appropriate area as directed Every Night., Disp: 4 pen, Rfl: 3  •  Insulin Pen Needle (Global Ease Inject Pen Needles) 31G X 5 MM misc, Use one daily with Tresiba, Disp: 90 each, Rfl: 3  •  LANCETS MICRO THIN 33G misc, Test Blood Sugars Once Daily, Disp: 100 each, Rfl: 3  •  lisinopril-hydrochlorothiazide (PRINZIDE,ZESTORETIC) 20-12.5 MG per tablet, Take 1 tablet by mouth Daily. Indication: Essential (primary) hypertension, Disp: 90 tablet, Rfl: 3  •  loratadine (CLARITIN) 10 MG tablet, Take 10 mg by mouth daily as needed. Indication: Allergic rhinitis, Disp: , Rfl:   •  metoprolol tartrate (LOPRESSOR) 25 MG tablet, Take 2 tablets by mouth 2 (Two) Times a Day., Disp: 60 tablet, Rfl: 2  •  nitroglycerin (Nitrostat) 0.4 MG SL tablet, Place 1 tablet under the tongue Every 5  "(Five) Minutes As Needed for Chest Pain. Take no more than 3 doses in 15 minutes., Disp: 25 tablet, Rfl: 0  •  pantoprazole (Protonix) 40 MG EC tablet, Take 1 tablet by mouth Daily As Needed (heartburn)., Disp: 90 tablet, Rfl: 3  •  promethazine (PHENERGAN) 25 MG tablet, Take 1 tablet by mouth Every 6 (Six) Hours As Needed for Nausea., Disp: 30 tablet, Rfl: 0  •  rizatriptan MLT (MAXALT-MLT) 10 MG disintegrating tablet, Place 1 tablet on the tongue Daily As Needed for Migraine. Take 1 tab at onset of headache, can repeat x 1 in 2 hrs prn, Disp: 27 tablet, Rfl: 3  •  rosuvastatin (Crestor) 10 MG tablet, Take 1 tablet by mouth Daily. For cholesterol, Disp: 90 tablet, Rfl: 0    Physical Exam:  Vitals:    02/08/22 1015   BP: 118/74   BP Location: Left arm   Patient Position: Sitting   Cuff Size: Adult   Pulse: 76   SpO2: 98%   Weight: 88.5 kg (195 lb)   Height: 160 cm (63\")   PainSc: 0-No pain     Current Pain Level: none  Pulse Ox: Normal  on room air  General: alert, appears stated age and cooperative     Body Habitus: Obese  HEENT: Head: Normocephalic, no lesions, without obvious abnormality.   Neuro: alert, oriented x3  Pulses: 2+ and symmetric  JVP: Volume/Pulsation: Normal.  Normal waveforms.   Appropriate inspiratory decrease.    Carotid Exam: no bruit normal pulsation bilaterally   Carotid Volume: normal.     Respirations: no increased work of breathing   Chest:  Normal    Pulmonary:Normal   Precordium: Normal impulses.   [place]:  normal size and placement  Heart rate: normal   Heart Rhythm: regular     Heart Sounds: S1: normal  S2: normal    Abdomen:   Appearance: normal .  Palpation: Soft, non-tender to palpation, bowel sounds positive in all four quadrants; no guarding or rebound tenderness  Extremity: no edema.   LE Skin: no rashes  LE Hair:  normal  LE Pulses: well perfused with normal pulses in the distal extremities  Pallor on elevation: Absent. Rubor on dependency: None      DATA REVIEWED:     EKG. I " personally reviewed and interpreted the EKG.  Normal sinus rhythm, non-specific ST-T wave changes.    ECG/EMG Results (all)     None        ---------------------------------------------------  -----------------------------------------------------  CXR/Imaging:   Imaging Results (Most Recent)     None        ----------------------------------------------------      --------------------------------------------------------------------------------------------------  LABS:     The 10-year CVD risk score (Aminta et al., 2008) is: 29%    Values used to calculate the score:      Age: 70 years      Sex: Female      Diabetic: Yes      Tobacco smoker: No      Systolic Blood Pressure: 124 mmHg      Is BP treated: Yes      HDL Cholesterol: 55 mg/dL      Total Cholesterol: 264 mg/dL         Lab Results   Component Value Date    GLUCOSE 321 (H) 12/06/2021    BUN 23 12/06/2021    CREATININE 1.13 (H) 12/06/2021    EGFRIFNONA 47 (L) 12/06/2021    BCR 20.4 12/06/2021    K 4.9 12/06/2021    CO2 22.6 12/06/2021    CALCIUM 10.0 12/06/2021    ALBUMIN 3.90 12/06/2021    AST 10 12/06/2021    ALT 12 12/06/2021     Lab Results   Component Value Date    WBC 8.61 05/23/2021    HGB 12.9 05/23/2021    HCT 39.2 05/23/2021    MCV 89.1 05/23/2021     05/23/2021     Lab Results   Component Value Date    CHOL 264 (H) 01/07/2021    CHLPL 238 (H) 08/18/2016    TRIG 225 (H) 01/07/2021    HDL 55 01/07/2021     (H) 07/29/2021     Lab Results   Component Value Date    TSH 2.720 03/13/2018     Lab Results   Component Value Date    CKTOTAL 35 02/25/2016    CKMB 0.4 02/25/2016    TROPONINI <0.012 02/25/2016     Lab Results   Component Value Date    HGBA1C 13.41 (H) 12/06/2021     No results found for: DDIMER  Lab Results   Component Value Date    ALT 12 12/06/2021     Lab Results   Component Value Date    HGBA1C 13.41 (H) 12/06/2021    HGBA1C 9.05 (H) 07/29/2021    HGBA1C 10.20 (H) 05/06/2021     Lab Results   Component Value Date     MICROALBUR <1.2 01/07/2021    CREATININE 1.13 (H) 12/06/2021     Lab Results   Component Value Date    IRON <10 (L) 10/02/2017    TIBC 173 (L) 10/02/2017     No results found for: INR, PROTIME    Assessment/Plan     1. Chest pain, unspecified type  She was evaluated with a pharmacological nuclear stress test in 8/2021 for evaluation of chest discomfort.  This study showed normal LV function, no definite evidence of fixed or reversible myocardial perfusion defects and borderline ECG criteria for ischemia in inferior leads on Lexiscan stress.  It appeared low risk overall.  We decided to proceed with medical therapy with Lopressor 25 mg orally daily.  She has not had any further episodes of chest discomfort on Lopressor.  Continue baby aspirin, rosuvastatin and current doses of Lopressor.  She was informed that although her stress test appeared low risk, in some cases the stress test may be falsely negative.  She has been informed of further options including coronary angiography for definitive evaluation.  We have mutually decided to continue with medical therapy for now and consider coronary angiography if her symptoms recur.  Optimal control of coronary risk factors is recommended.    2. Essential hypertension  Clinic BP is at goal. Continue current therapy.    3. Dizziness  She does report feeling dizzy at least once every day.  There have been some occasions she has felt presyncopal.  She has been checking her blood sugars regularly and denies having any episodes of hypoglycemia.  Orthostatic vital signs in the clinic today did not suggest orthostasis.  We will proceed with a transthoracic echocardiogram to look for underlying structural heart disease and a 2 week live cardiac monitor to look for underlying hemodynamically significant susan arrhythmias or tachyarrhythmias.    4.  Class I obesity  Dietary modification and regular physical activity were discussed as detailed below.    Prevention:  Patient's Body  mass index is 34.54 kg/m². indicating that she is obese (BMI >30). Obesity-related health conditions include the following: hypertension, diabetes mellitus and dyslipidemias. We discussed portion control and increasing exercise..    Return in about 6 months (around 8/8/2022).          This document has been electronically signed by Aryan Hoang MD on February 8, 2022 10:35 CST

## 2022-02-28 ENCOUNTER — TELEPHONE (OUTPATIENT)
Dept: CARDIOLOGY | Facility: CLINIC | Age: 72
End: 2022-02-28

## 2022-02-28 NOTE — TELEPHONE ENCOUNTER
Called patient. Informed patient of test results. Patient voiced their understandings.     ----- Message from Aryan Hoang MD sent at 2/26/2022 10:58 AM CST -----  Please call and let her know that her heart monitor showed normal heart rhythm.  Some extra heartbeats coming from top and bottom chambers of the heart were noted.  These findings do not explain her dizziness.  We await the results of her transthoracic echocardiogram.

## 2022-03-22 DIAGNOSIS — M51.36 DEGENERATIVE DISC DISEASE, LUMBAR: Chronic | ICD-10-CM

## 2022-03-22 DIAGNOSIS — M15.9 OSTEOARTHRITIS OF MULTIPLE JOINTS, UNSPECIFIED OSTEOARTHRITIS TYPE: Chronic | ICD-10-CM

## 2022-03-22 NOTE — TELEPHONE ENCOUNTER
Last Script  12/09/2021  #120, NR    Next Appt  With Family Medicine (Belinda Finn MD)  03/24/2022 at 11:00 AM    Last OV 12/09/2021

## 2022-03-22 NOTE — TELEPHONE ENCOUNTER
Incoming Refill Request      Medication requested (name and dose): HYDROcodone-acetaminophen (NORCO) 7.5-325 MG per tablet    Pharmacy where request should be sent: [facility], [place], [place]    Additional details provided by patient: NONE    Best call back number: [phone]    Does the patient have less than a 3 day supply:  [x] Yes  [] No    Pauline Kirk Rep  03/22/22, 16:33 CDT

## 2022-03-30 RX ORDER — HYDROCODONE BITARTRATE AND ACETAMINOPHEN 7.5; 325 MG/1; MG/1
1 TABLET ORAL EVERY 6 HOURS PRN
Qty: 120 TABLET | Refills: 0 | OUTPATIENT
Start: 2022-03-30

## 2022-04-06 NOTE — TELEPHONE ENCOUNTER
5 She already has something planned for tomorrow (Friday 11/05/2021)   I did schedule her for Tuesday 11/09/2021 where you had an opening.     I did offer to see if you could work her in earlier but she said Tuesday was good for her.

## 2022-04-12 ENCOUNTER — OFFICE VISIT (OUTPATIENT)
Dept: FAMILY MEDICINE CLINIC | Facility: CLINIC | Age: 72
End: 2022-04-12

## 2022-04-12 ENCOUNTER — LAB (OUTPATIENT)
Dept: LAB | Facility: HOSPITAL | Age: 72
End: 2022-04-12

## 2022-04-12 VITALS
BODY MASS INDEX: 35.97 KG/M2 | HEART RATE: 99 BPM | HEIGHT: 63 IN | DIASTOLIC BLOOD PRESSURE: 70 MMHG | WEIGHT: 203 LBS | OXYGEN SATURATION: 96 % | SYSTOLIC BLOOD PRESSURE: 120 MMHG

## 2022-04-12 DIAGNOSIS — M15.9 OSTEOARTHRITIS OF MULTIPLE JOINTS, UNSPECIFIED OSTEOARTHRITIS TYPE: Chronic | ICD-10-CM

## 2022-04-12 DIAGNOSIS — E78.2 MIXED HYPERLIPIDEMIA: ICD-10-CM

## 2022-04-12 DIAGNOSIS — I10 ESSENTIAL HYPERTENSION: ICD-10-CM

## 2022-04-12 DIAGNOSIS — M51.36 DEGENERATIVE DISC DISEASE, LUMBAR: Chronic | ICD-10-CM

## 2022-04-12 DIAGNOSIS — E11.65 TYPE 2 DIABETES MELLITUS WITH HYPERGLYCEMIA, WITH LONG-TERM CURRENT USE OF INSULIN: Primary | ICD-10-CM

## 2022-04-12 DIAGNOSIS — Z79.4 TYPE 2 DIABETES MELLITUS WITH HYPERGLYCEMIA, WITH LONG-TERM CURRENT USE OF INSULIN: Primary | ICD-10-CM

## 2022-04-12 DIAGNOSIS — G43.009 MIGRAINE WITHOUT AURA AND WITHOUT STATUS MIGRAINOSUS, NOT INTRACTABLE: Chronic | ICD-10-CM

## 2022-04-12 PROCEDURE — 99214 OFFICE O/P EST MOD 30 MIN: CPT | Performed by: GENERAL PRACTICE

## 2022-04-12 PROCEDURE — 83721 ASSAY OF BLOOD LIPOPROTEIN: CPT | Performed by: GENERAL PRACTICE

## 2022-04-12 PROCEDURE — 36415 COLL VENOUS BLD VENIPUNCTURE: CPT | Performed by: GENERAL PRACTICE

## 2022-04-12 PROCEDURE — 83036 HEMOGLOBIN GLYCOSYLATED A1C: CPT | Performed by: GENERAL PRACTICE

## 2022-04-12 PROCEDURE — 80053 COMPREHEN METABOLIC PANEL: CPT | Performed by: GENERAL PRACTICE

## 2022-04-12 RX ORDER — HYDROCODONE BITARTRATE AND ACETAMINOPHEN 7.5; 325 MG/1; MG/1
1 TABLET ORAL EVERY 6 HOURS PRN
Qty: 120 TABLET | Refills: 0 | Status: SHIPPED | OUTPATIENT
Start: 2022-04-12 | End: 2022-07-18 | Stop reason: SDUPTHER

## 2022-04-12 RX ORDER — LISINOPRIL AND HYDROCHLOROTHIAZIDE 20; 12.5 MG/1; MG/1
1 TABLET ORAL DAILY
Qty: 90 TABLET | Refills: 3 | Status: SHIPPED | OUTPATIENT
Start: 2022-04-12 | End: 2022-07-18 | Stop reason: SDUPTHER

## 2022-04-12 RX ORDER — RIZATRIPTAN BENZOATE 10 MG/1
10 TABLET, ORALLY DISINTEGRATING ORAL DAILY PRN
Qty: 27 TABLET | Refills: 3 | Status: SHIPPED | OUTPATIENT
Start: 2022-04-12

## 2022-04-13 LAB
ALBUMIN SERPL-MCNC: 3.9 G/DL (ref 3.5–5.2)
ALBUMIN/GLOB SERPL: 1.1 G/DL
ALP SERPL-CCNC: 93 U/L (ref 39–117)
ALT SERPL W P-5'-P-CCNC: 13 U/L (ref 1–33)
ANION GAP SERPL CALCULATED.3IONS-SCNC: 15.7 MMOL/L (ref 5–15)
ARTICHOKE IGE QN: 104 MG/DL (ref 0–100)
AST SERPL-CCNC: 12 U/L (ref 1–32)
BILIRUB SERPL-MCNC: 0.2 MG/DL (ref 0–1.2)
BUN SERPL-MCNC: 14 MG/DL (ref 8–23)
BUN/CREAT SERPL: 20 (ref 7–25)
CALCIUM SPEC-SCNC: 9.7 MG/DL (ref 8.6–10.5)
CHLORIDE SERPL-SCNC: 100 MMOL/L (ref 98–107)
CO2 SERPL-SCNC: 26.3 MMOL/L (ref 22–29)
CREAT SERPL-MCNC: 0.7 MG/DL (ref 0.57–1)
EGFRCR SERPLBLD CKD-EPI 2021: 92.6 ML/MIN/1.73
GLOBULIN UR ELPH-MCNC: 3.4 GM/DL
GLUCOSE SERPL-MCNC: 174 MG/DL (ref 65–99)
HBA1C MFR BLD: 8.8 % (ref 4.8–5.6)
POTASSIUM SERPL-SCNC: 5.3 MMOL/L (ref 3.5–5.2)
PROT SERPL-MCNC: 7.3 G/DL (ref 6–8.5)
SODIUM SERPL-SCNC: 142 MMOL/L (ref 136–145)

## 2022-04-14 ENCOUNTER — TELEPHONE (OUTPATIENT)
Dept: FAMILY MEDICINE CLINIC | Facility: CLINIC | Age: 72
End: 2022-04-14

## 2022-04-14 NOTE — TELEPHONE ENCOUNTER
Per Dr. Finn, Ms. Hyde has been called with recent lab results & recommendations.  Continue current medications and follow-up as planned or sooner if any problems.       ----- Message from Belinda Finn MD sent at 4/13/2022 11:45 AM CDT -----  Call and tell labs look better although her potassium is a smidge high.  Will need high potassium foods.  See endocrinology as scheduled.

## 2022-04-18 DIAGNOSIS — Z12.31 ENCOUNTER FOR SCREENING MAMMOGRAM FOR MALIGNANT NEOPLASM OF BREAST: ICD-10-CM

## 2022-04-18 DIAGNOSIS — Z13.820 ENCOUNTER FOR SCREENING FOR OSTEOPOROSIS: ICD-10-CM

## 2022-04-18 DIAGNOSIS — Z78.0 POSTMENOPAUSAL: Primary | ICD-10-CM

## 2022-04-22 ENCOUNTER — TELEPHONE (OUTPATIENT)
Dept: CARDIOLOGY | Facility: CLINIC | Age: 72
End: 2022-04-22

## 2022-04-22 NOTE — TELEPHONE ENCOUNTER
Tried to call patient. No answer. voicemial left.    ----- Message from Aryan Hoang MD sent at 4/21/2022  1:36 PM CDT -----  Echocardiogram showed normal function of the pumping chamber of the heart.  There were no hemodynamically significant abnormalities of the visualized valves.

## 2022-05-16 DIAGNOSIS — M51.36 DEGENERATIVE DISC DISEASE, LUMBAR: Chronic | ICD-10-CM

## 2022-05-16 DIAGNOSIS — M15.9 OSTEOARTHRITIS OF MULTIPLE JOINTS, UNSPECIFIED OSTEOARTHRITIS TYPE: Chronic | ICD-10-CM

## 2022-05-16 RX ORDER — HYDROCODONE BITARTRATE AND ACETAMINOPHEN 7.5; 325 MG/1; MG/1
1 TABLET ORAL EVERY 6 HOURS PRN
Qty: 120 TABLET | Refills: 0 | Status: CANCELLED | OUTPATIENT
Start: 2022-05-16

## 2022-05-16 NOTE — TELEPHONE ENCOUNTER
Incoming Refill Request      Medication requested (name and dose):   HYDROcodone-acetaminophen (NORCO) 7.5-325 MG per tablet    Pharmacy where request should be sent: [facility], [place]    Additional details provided by patient: NONE    Best call back number: [phone]    Does the patient have less than a 3 day supply:  [x] Yes  [] No    Pauline Kirk Rep  05/16/22, 14:10 CDT

## 2022-05-16 NOTE — TELEPHONE ENCOUNTER
Last Script  04/12/2022  #120, NR    Next Appt  With Family Medicine (Belinda Finn MD)  07/18/2022 at 9:00 AM    Last OV 04/12/2022

## 2022-05-20 ENCOUNTER — OFFICE VISIT (OUTPATIENT)
Dept: ENDOCRINOLOGY | Facility: CLINIC | Age: 72
End: 2022-05-20

## 2022-05-20 VITALS
HEIGHT: 63 IN | BODY MASS INDEX: 36.38 KG/M2 | HEART RATE: 93 BPM | WEIGHT: 205.3 LBS | OXYGEN SATURATION: 97 % | SYSTOLIC BLOOD PRESSURE: 120 MMHG | DIASTOLIC BLOOD PRESSURE: 76 MMHG

## 2022-05-20 DIAGNOSIS — E11.42 DIABETIC POLYNEUROPATHY ASSOCIATED WITH TYPE 2 DIABETES MELLITUS: ICD-10-CM

## 2022-05-20 DIAGNOSIS — E78.2 MIXED HYPERLIPIDEMIA: Chronic | ICD-10-CM

## 2022-05-20 DIAGNOSIS — E11.65 TYPE 2 DIABETES MELLITUS WITH HYPERGLYCEMIA, WITH LONG-TERM CURRENT USE OF INSULIN: Primary | ICD-10-CM

## 2022-05-20 DIAGNOSIS — I10 ESSENTIAL HYPERTENSION: Chronic | ICD-10-CM

## 2022-05-20 DIAGNOSIS — Z79.4 TYPE 2 DIABETES MELLITUS WITH HYPERGLYCEMIA, WITH LONG-TERM CURRENT USE OF INSULIN: Primary | ICD-10-CM

## 2022-05-20 PROCEDURE — 99214 OFFICE O/P EST MOD 30 MIN: CPT | Performed by: NURSE PRACTITIONER

## 2022-05-20 RX ORDER — SEMAGLUTIDE 1.34 MG/ML
0.5 INJECTION, SOLUTION SUBCUTANEOUS WEEKLY
Qty: 1 PEN | Refills: 11 | Status: SHIPPED | OUTPATIENT
Start: 2022-05-20

## 2022-05-20 RX ORDER — INSULIN LISPRO 100 [IU]/ML
INJECTION, SOLUTION INTRAVENOUS; SUBCUTANEOUS
Qty: 6 PEN | Refills: 11 | Status: ON HOLD | OUTPATIENT
Start: 2022-05-20 | End: 2023-03-22

## 2022-05-20 NOTE — PROGRESS NOTES
"Chief Complaint  Diabetes    Subjective          Bertha Hyde presents to [facility] ENDOCRINOLOGY  History of Present Illness       In Office visit    Referring provider Belinda Finn MD    Chief Complaint   Patient presents with   • Diabetes       HPI    71-year-old female presents for consultation    Reason diabetes mellitus type 2      Duration--- diagnosed  In 2016       Context routine lab work       Timing constant       Quality  Not controlled      Severity high       Macrovascular complications---none         Microvascular complications ---neuropathy , no DR , no renal disease         Current diabetes regimen         Insulin, glp-1     Current glucose monitoring    Fingerstick's     Checks 4 times daily        150 up to 200    No lows    Review of Systems - General ROS: negative                  Objective   Vital Signs:   /76   Pulse 93   Ht 160 cm (63\")   Wt 93.1 kg (205 lb 4.8 oz)   SpO2 97%   BMI 36.37 kg/m²     Physical Exam  Constitutional:       Appearance: Normal appearance.   Cardiovascular:      Rate and Rhythm: Regular rhythm.      Heart sounds: Normal heart sounds.   Pulmonary:      Breath sounds: Normal breath sounds.   Musculoskeletal:      Cervical back: Normal range of motion.   Neurological:      Mental Status: She is alert.        Result Review :   The following data was reviewed by: PAYTON Alexis on 05/20/2022:  Common labs    Common Labsle 7/29/21 7/29/21 7/29/21 12/6/21 12/6/21 4/12/22 4/12/22 4/12/22    1136 1136 1136 1202 1202 1147 1147 1147   Glucose   151 (A)  321 (A) 174 (A)     BUN   17  23 14     Creatinine   1.17 (A)  1.13 (A) 0.70     eGFR Non  Am   46 (A)  47 (A)      Sodium   139  134 (A) 142     Potassium   4.0  4.9 5.3 (A)     Chloride   101  97 (A) 100     Calcium   9.8  10.0 9.7     Albumin   4.20  3.90 3.90     Total Bilirubin   0.3  0.3 0.2     Alkaline Phosphatase   107  111 93     AST (SGOT)   10  10 12  "    ALT (SGPT)   11  12 13     LDL Cholesterol   161 (A)     104 (A)    Hemoglobin A1C 9.05 (A)   13.41 (A)    8.80 (A)   (A) Abnormal value                      Assessment and Plan    Diagnoses and all orders for this visit:    1. Type 2 diabetes mellitus with hyperglycemia, with long-term current use of insulin (HCC) (Primary)    2. Diabetic polyneuropathy associated with type 2 diabetes mellitus (HCC)    3. Essential hypertension    4. Mixed hyperlipidemia    Other orders  -     Semaglutide,0.25 or 0.5MG/DOS, (Ozempic, 0.25 or 0.5 MG/DOSE,) 2 MG/1.5ML solution pen-injector; Inject 0.5 mg under the skin into the appropriate area as directed 1 (One) Time Per Week. 0.5 mg weekly  Dispense: 1 pen; Refill: 11  -     Insulin Lispro, 1 Unit Dial, (HumaLOG KwikPen) 100 UNIT/ML solution pen-injector; Up to 10 units with meals  Dispense: 6 pen; Refill: 11             Glycemic Management:    Lab Results   Component Value Date    HGBA1C 8.80 (H) 04/12/2022       Taking Tresiba  50 units once at night     Taking Trulicity 1.5 mg weekly ---change Ozempic 0.5 mg once weekly     Taking jardiance 10 mg daily     Start Humalog before meals     Give 4 units before meals     +    Scale       151-200          2 units   201-250            3 units  251-300            4 units   Above 301         5 units         Goals for sugar    Fasting and before meals 80 to 130    2 hours after meals 180 or less      Aim for 45 grams of carbohydrate per meal    Aim for 15 grams of carbohydrate per snack        approve olivier 2      The patient has diabetes mellitus, insulin-dependent.     Our Diabetes Department has evaluated the patient in the last six months and will continue counseling on insulin adjustment.      The patient performs blood glucose testing at least four times daily with proven glucose variability from 50 to 300 mg per dl.     The patient is administering basal insulin and prandial insulin four times per day for more than six  months.     The patient uses a home blood glucose monitor to assess blood glucose at least four times daily for more than six months.     The patient requires frequent adjustment of insulin treatment regimen based on blood glucose readings.     The patient has frequent variability in blood glucose readings due to activity and variability in meal content and time.      The patient has completed a diabetes education program with us.     The patient has demonstrated the ability to self-monitor her glucose.      The patient is motivated in improving diabetes control      The patient has hypoglycemia unawareness     Microvascular Complications Monitoring       Last eye exam---Nov. 2021, no DR     Neuropathy --yes     Taking gabapentin 300 mg TID       No renal disease     Lipid Management:       Taking crestor 10 mg one at night     Total Cholesterol   Date Value Ref Range Status   01/07/2021 264 (H) 0 - 200 mg/dL Final     Triglycerides   Date Value Ref Range Status   01/07/2021 225 (H) 0 - 150 mg/dL Final     HDL Cholesterol   Date Value Ref Range Status   01/07/2021 55 40 - 60 mg/dL Final     LDL Cholesterol    Date Value Ref Range Status   04/12/2022 104 (H) 0 - 100 mg/dL Final   01/07/2021 167 (H) 0 - 100 mg/dL Final     LDLCALC ELECT   Date Value Ref Range Status   05/05/2015 106 0 - 129 mg/dl Final     Comment:     LDL DESIRED: < 130 MG/DL         Blood Pressure Management:      Taking lisinopril-hctz 20-12.5 one daily     Taking lopressor 25 mg daily           Thyroid Health    Lab Results   Component Value Date    TSH 2.720 03/13/2018           Bone Health       Vitamin d def     Taking vitamin d def       Weight Management:    Obesity     Body mass index is 36.37 kg/m².        Preventive Care:     Non smoker       Records from  received and reviewed from 2022  Thank you for this consultation         Follow Up   Return in about 3 months (around 8/20/2022) for Recheck.  Patient was given instructions  and counseling regarding her condition or for health maintenance advice. Please see specific information pulled into the AVS if appropriate.         This document has been electronically signed by PAYTON Alexis on May 20, 2022 15:30 CDT.

## 2022-05-20 NOTE — PATIENT INSTRUCTIONS
Start Humalog before meals     +          151-200          2 units   201-250            3 units  251-300            4 units   Above 301         5 units

## 2022-05-24 DIAGNOSIS — K21.9 GERD WITHOUT ESOPHAGITIS: ICD-10-CM

## 2022-05-24 RX ORDER — PANTOPRAZOLE SODIUM 40 MG/1
40 TABLET, DELAYED RELEASE ORAL DAILY PRN
Qty: 90 TABLET | Refills: 0 | Status: SHIPPED | OUTPATIENT
Start: 2022-05-24 | End: 2022-11-07 | Stop reason: SDUPTHER

## 2022-06-22 ENCOUNTER — TELEPHONE (OUTPATIENT)
Dept: ENDOCRINOLOGY | Facility: CLINIC | Age: 72
End: 2022-06-22

## 2022-06-30 ENCOUNTER — TELEPHONE (OUTPATIENT)
Dept: ENDOCRINOLOGY | Facility: CLINIC | Age: 72
End: 2022-06-30

## 2022-07-11 ENCOUNTER — DOCUMENTATION (OUTPATIENT)
Dept: ENDOCRINOLOGY | Facility: CLINIC | Age: 72
End: 2022-07-11

## 2022-07-11 NOTE — PROGRESS NOTES
RX FOR DIABETIC SUPPLIES SENT TO [facility] WITH CHART NOTES [phone]    CONFIRMATION RECEIVED     SENT TO MR

## 2022-07-15 ENCOUNTER — DOCUMENTATION (OUTPATIENT)
Dept: ENDOCRINOLOGY | Facility: CLINIC | Age: 72
End: 2022-07-15

## 2022-07-18 ENCOUNTER — OFFICE VISIT (OUTPATIENT)
Dept: FAMILY MEDICINE CLINIC | Facility: CLINIC | Age: 72
End: 2022-07-18

## 2022-07-18 ENCOUNTER — LAB (OUTPATIENT)
Dept: LAB | Facility: HOSPITAL | Age: 72
End: 2022-07-18

## 2022-07-18 VITALS
BODY MASS INDEX: 35.22 KG/M2 | DIASTOLIC BLOOD PRESSURE: 62 MMHG | RESPIRATION RATE: 18 BRPM | OXYGEN SATURATION: 98 % | HEART RATE: 73 BPM | HEIGHT: 63 IN | WEIGHT: 198.8 LBS | SYSTOLIC BLOOD PRESSURE: 112 MMHG

## 2022-07-18 DIAGNOSIS — I10 ESSENTIAL HYPERTENSION: Chronic | ICD-10-CM

## 2022-07-18 DIAGNOSIS — I10 ESSENTIAL HYPERTENSION: ICD-10-CM

## 2022-07-18 DIAGNOSIS — M51.36 DEGENERATIVE DISC DISEASE, LUMBAR: Chronic | ICD-10-CM

## 2022-07-18 DIAGNOSIS — M15.9 OSTEOARTHRITIS OF MULTIPLE JOINTS, UNSPECIFIED OSTEOARTHRITIS TYPE: Chronic | ICD-10-CM

## 2022-07-18 DIAGNOSIS — Z79.4 TYPE 2 DIABETES MELLITUS WITH HYPERGLYCEMIA, WITH LONG-TERM CURRENT USE OF INSULIN: ICD-10-CM

## 2022-07-18 DIAGNOSIS — E11.65 TYPE 2 DIABETES MELLITUS WITH HYPERGLYCEMIA, WITH LONG-TERM CURRENT USE OF INSULIN: ICD-10-CM

## 2022-07-18 DIAGNOSIS — E78.2 MIXED HYPERLIPIDEMIA: Chronic | ICD-10-CM

## 2022-07-18 DIAGNOSIS — Z00.00 MEDICARE ANNUAL WELLNESS VISIT, SUBSEQUENT: Primary | ICD-10-CM

## 2022-07-18 LAB
ALBUMIN UR-MCNC: <1.2 MG/DL
BILIRUB UR QL STRIP: NEGATIVE
CLARITY UR: CLEAR
COLOR UR: YELLOW
GLUCOSE UR STRIP-MCNC: ABNORMAL MG/DL
HGB UR QL STRIP.AUTO: NEGATIVE
KETONES UR QL STRIP: NEGATIVE
LEUKOCYTE ESTERASE UR QL STRIP.AUTO: NEGATIVE
NITRITE UR QL STRIP: NEGATIVE
PH UR STRIP.AUTO: 6 [PH] (ref 5–8)
PROT UR QL STRIP: NEGATIVE
SP GR UR STRIP: 1.02 (ref 1–1.03)
UROBILINOGEN UR QL STRIP: ABNORMAL

## 2022-07-18 PROCEDURE — 80307 DRUG TEST PRSMV CHEM ANLYZR: CPT

## 2022-07-18 PROCEDURE — 1125F AMNT PAIN NOTED PAIN PRSNT: CPT | Performed by: GENERAL PRACTICE

## 2022-07-18 PROCEDURE — 1159F MED LIST DOCD IN RCRD: CPT | Performed by: GENERAL PRACTICE

## 2022-07-18 PROCEDURE — 82043 UR ALBUMIN QUANTITATIVE: CPT

## 2022-07-18 PROCEDURE — G0439 PPPS, SUBSEQ VISIT: HCPCS | Performed by: GENERAL PRACTICE

## 2022-07-18 PROCEDURE — 1170F FXNL STATUS ASSESSED: CPT | Performed by: GENERAL PRACTICE

## 2022-07-18 PROCEDURE — G0481 DRUG TEST DEF 8-14 CLASSES: HCPCS

## 2022-07-18 PROCEDURE — 81003 URINALYSIS AUTO W/O SCOPE: CPT

## 2022-07-18 RX ORDER — ROSUVASTATIN CALCIUM 10 MG/1
10 TABLET, COATED ORAL DAILY
Qty: 90 TABLET | Refills: 0 | Status: SHIPPED | OUTPATIENT
Start: 2022-07-18 | End: 2022-10-11

## 2022-07-18 RX ORDER — LISINOPRIL AND HYDROCHLOROTHIAZIDE 20; 12.5 MG/1; MG/1
1 TABLET ORAL DAILY
Qty: 90 TABLET | Refills: 3 | Status: SHIPPED | OUTPATIENT
Start: 2022-07-18 | End: 2023-03-23 | Stop reason: HOSPADM

## 2022-07-18 RX ORDER — HYDROCODONE BITARTRATE AND ACETAMINOPHEN 7.5; 325 MG/1; MG/1
1 TABLET ORAL EVERY 6 HOURS PRN
Qty: 120 TABLET | Refills: 0 | Status: SHIPPED | OUTPATIENT
Start: 2022-07-18 | End: 2022-09-02

## 2022-07-18 NOTE — PATIENT INSTRUCTIONS
Calorie Counting for Weight Loss  Calories are units of energy. Your body needs a certain number of calories from food to keep going throughout the day. When you eat or drink more calories than your body needs, your body stores the extra calories mostly as fat. When you eat or drink fewer calories than your body needs, your body burns fat to get the energy it needs.  Calorie counting means keeping track of how many calories you eat and drink each day. Calorie counting can be helpful if you need to lose weight. If you eat fewer calories than your body needs, you should lose weight. Ask your health care provider what a healthy weight is for you.  For calorie counting to work, you will need to eat the right number of calories each day to lose a healthy amount of weight per week. A dietitian can help you figure out how many calories you need in a day and will suggest ways to reach your calorie goal.  A healthy amount of weight to lose each week is usually 1-2 lb (0.5-0.9 kg). This usually means that your daily calorie intake should be reduced by 500-750 calories.  Eating 1,200-1,500 calories a day can help most women lose weight.  Eating 1,500-1,800 calories a day can help most men lose weight.  What do I need to know about calorie counting?  Work with your health care provider or dietitian to determine how many calories you should get each day. To meet your daily calorie goal, you will need to:  Find out how many calories are in each food that you would like to eat. Try to do this before you eat.  Decide how much of the food you plan to eat.  Keep a food log. Do this by writing down what you ate and how many calories it had.  To successfully lose weight, it is important to balance calorie counting with a healthy lifestyle that includes regular activity.  Where do I find calorie information?    The number of calories in a food can be found on a Nutrition Facts label. If a food does not have a Nutrition Facts label, try  to look up the calories online or ask your dietitian for help.  Remember that calories are listed per serving. If you choose to have more than one serving of a food, you will have to multiply the calories per serving by the number of servings you plan to eat. For example, the label on a package of bread might say that a serving size is 1 slice and that there are 90 calories in a serving. If you eat 1 slice, you will have eaten 90 calories. If you eat 2 slices, you will have eaten 180 calories.  How do I keep a food log?  After each time that you eat, record the following in your food log as soon as possible:  What you ate. Be sure to include toppings, sauces, and other extras on the food.  How much you ate. This can be measured in cups, ounces, or number of items.  How many calories were in each food and drink.  The total number of calories in the food you ate.  Keep your food log near you, such as in a pocket-sized notebook or on an jazmin or website on your mobile phone. Some programs will calculate calories for you and show you how many calories you have left to meet your daily goal.  What are some portion-control tips?  Know how many calories are in a serving. This will help you know how many servings you can have of a certain food.  Use a measuring cup to measure serving sizes. You could also try weighing out portions on a kitchen scale. With time, you will be able to estimate serving sizes for some foods.  Take time to put servings of different foods on your favorite plates or in your favorite bowls and cups so you know what a serving looks like.  Try not to eat straight from a food's packaging, such as from a bag or box. Eating straight from the package makes it hard to see how much you are eating and can lead to overeating. Put the amount you would like to eat in a cup or on a plate to make sure you are eating the right portion.  Use smaller plates, glasses, and bowls for smaller portions and to prevent  overeating.  Try not to multitask. For example, avoid watching TV or using your computer while eating. If it is time to eat, sit down at a table and enjoy your food. This will help you recognize when you are full. It will also help you be more mindful of what and how much you are eating.  What are tips for following this plan?  Reading food labels  Check the calorie count compared with the serving size. The serving size may be smaller than what you are used to eating.  Check the source of the calories. Try to choose foods that are high in protein, fiber, and vitamins, and low in saturated fat, trans fat, and sodium.  Shopping  Read nutrition labels while you shop. This will help you make healthy decisions about which foods to buy.  Pay attention to nutrition labels for low-fat or fat-free foods. These foods sometimes have the same number of calories or more calories than the full-fat versions. They also often have added sugar, starch, or salt to make up for flavor that was removed with the fat.  Make a grocery list of lower-calorie foods and stick to it.  Cooking  Try to cook your favorite foods in a healthier way. For example, try baking instead of frying.  Use low-fat dairy products.  Meal planning  Use more fruits and vegetables. One-half of your plate should be fruits and vegetables.  Include lean proteins, such as chicken, turkey, and fish.  Lifestyle  Each week, aim to do one of the followin minutes of moderate exercise, such as walking.  75 minutes of vigorous exercise, such as running.  General information  Know how many calories are in the foods you eat most often. This will help you calculate calorie counts faster.  Find a way of tracking calories that works for you. Get creative. Try different apps or programs if writing down calories does not work for you.  What foods should I eat?    Eat nutritious foods. It is better to have a nutritious, high-calorie food, such as an avocado, than a food with  few nutrients, such as a bag of potato chips.  Use your calories on foods and drinks that will fill you up and will not leave you hungry soon after eating.  Examples of foods that fill you up are nuts and nut butters, vegetables, lean proteins, and high-fiber foods such as whole grains. High-fiber foods are foods with more than 5 g of fiber per serving.  Pay attention to calories in drinks. Low-calorie drinks include water and unsweetened drinks.  The items listed above may not be a complete list of foods and beverages you can eat. Contact a dietitian for more information.  What foods should I limit?  Limit foods or drinks that are not good sources of vitamins, minerals, or protein or that are high in unhealthy fats. These include:  Candy.  Other sweets.  Sodas, specialty coffee drinks, alcohol, and juice.  The items listed above may not be a complete list of foods and beverages you should avoid. Contact a dietitian for more information.  How do I count calories when eating out?  Pay attention to portions. Often, portions are much larger when eating out. Try these tips to keep portions smaller:  Consider sharing a meal instead of getting your own.  If you get your own meal, eat only half of it. Before you start eating, ask for a container and put half of your meal into it.  When available, consider ordering smaller portions from the menu instead of full portions.  Pay attention to your food and drink choices. Knowing the way food is cooked and what is included with the meal can help you eat fewer calories.  If calories are listed on the menu, choose the lower-calorie options.  Choose dishes that include vegetables, fruits, whole grains, low-fat dairy products, and lean proteins.  Choose items that are boiled, broiled, grilled, or steamed. Avoid items that are buttered, battered, fried, or served with cream sauce. Items labeled as crispy are usually fried, unless stated otherwise.  Choose water, low-fat milk,  unsweetened iced tea, or other drinks without added sugar. If you want an alcoholic beverage, choose a lower-calorie option, such as a glass of wine or light beer.  Ask for dressings, sauces, and syrups on the side. These are usually high in calories, so you should limit the amount you eat.  If you want a salad, choose a garden salad and ask for grilled meats. Avoid extra toppings such as wei, cheese, or fried items. Ask for the dressing on the side, or ask for olive oil and vinegar or lemon to use as dressing.  Estimate how many servings of a food you are given. Knowing serving sizes will help you be aware of how much food you are eating at restaurants.  Where to find more information  Centers for Disease Control and Prevention: www.cdc.gov  [place] Department of Agriculture: myplate.gov  Summary  Calorie counting means keeping track of how many calories you eat and drink each day. If you eat fewer calories than your body needs, you should lose weight.  A healthy amount of weight to lose per week is usually 1-2 lb (0.5-0.9 kg). This usually means reducing your daily calorie intake by 500-750 calories.  The number of calories in a food can be found on a Nutrition Facts label. If a food does not have a Nutrition Facts label, try to look up the calories online or ask your dietitian for help.  Use smaller plates, glasses, and bowls for smaller portions and to prevent overeating.  Use your calories on foods and drinks that will fill you up and not leave you hungry shortly after a meal.  This information is not intended to replace advice given to you by your health care provider. Make sure you discuss any questions you have with your health care provider.  Document Revised: 01/28/2021 Document Reviewed: 01/28/2021  [employer] Patient Education © 2021 [employer]    Medicare Wellness  Personal Prevention Plan of Service     Date of Office Visit:    Encounter Provider:  Belinda Finn MD  Place of Service:  [facility]  [facility]  Patient Name: Bertha Hyde  :  1950    As part of the Medicare Wellness portion of your visit today, we are providing you with this personalized preventive plan of services (PPPS). This plan is based upon recommendations of the [place] Preventive Services Task Force (USPSTF) and the Advisory Committee on Immunization Practices (ACIP).    This lists the preventive care services that should be considered, and provides dates of when you are due. Items listed as completed are up-to-date and do not require any further intervention.    Health Maintenance   Topic Date Due    ZOSTER VACCINE (2 of 2) 2017    DIABETIC FOOT EXAM  2020    COVID-19 Vaccine (2 - Booster for [place] series) 2021    ANNUAL WELLNESS VISIT  2022    URINE MICROALBUMIN  2022    INFLUENZA VACCINE  10/01/2022    HEMOGLOBIN A1C  10/12/2022    DIABETIC EYE EXAM  2022    MAMMOGRAM  2023    LIPID PANEL  2023    DXA SCAN  2023    COLORECTAL CANCER SCREENING  2025    TDAP/TD VACCINES (3 - Td or Tdap) 10/27/2031    HEPATITIS C SCREENING  Completed    Pneumococcal Vaccine 65+  Completed    PAP SMEAR  Discontinued       No orders of the defined types were placed in this encounter.      No follow-ups on file.

## 2022-07-18 NOTE — PROGRESS NOTES
The ABCs of the Annual Wellness Visit  Subsequent Medicare Wellness Visit    Chief Complaint   Patient presents with   • Medicare Wellness-subsequent     M & D today       Subjective    History of Present Illness:  Bertha Hyde is a 71 y.o. female who presents for a Subsequent Medicare Wellness Visit. Due for mammogram and bone density.     The following portions of the patient's history were reviewed and   updated as appropriate: allergies, current medications, past family history, past medical history, past social history, past surgical history and problem list.    Compared to one year ago, the patient feels her physical   health is the same.    Compared to one year ago, the patient feels her mental   health is worse.    Recent Hospitalizations:  She was admitted within the past 365 days at [facility].     Current Medical Providers:  Patient Care Team:  Belinda Finn MD as PCP - General    Outpatient Medications Prior to Visit   Medication Sig Dispense Refill   • Alcohol Swabs pads USE 4 TIMES DAILY 150 each 11   • aspirin 81 MG EC tablet Take 81 mg by mouth Daily.     • b complex-C-folic acid 1 MG capsule Take 1 capsule by mouth Daily.     • Blood Glucose Monitoring Suppl (BLOOD GLUCOSE MONITOR SYSTEM) W/DEVICE kit Test Blood Sugars Once Daily 1 each 0   • DULoxetine (CYMBALTA) 60 MG capsule Take 1 capsule by mouth Daily. 90 capsule 3   • empagliflozin (Jardiance) 10 MG tablet tablet Take 1 tablet by mouth Daily. Lot [id number]  Apr 2021 90 tablet 1   • gabapentin (NEURONTIN) 300 MG capsule Take 600 mg by mouth 3 (Three) Times a Day.     • glucose blood test strip Test Blood Sugars tid prn 100 each 5   • hyoscyamine (LEVSIN) 0.125 MG SL tablet Place 0.125-0.25 mg under the tongue Every 4 (Four) Hours As Needed. (max 12 per 24 hours)     • insulin degludec (TRESIBA FLEXTOUCH) 100 UNIT/ML solution pen-injector injection Inject 50 Units under the skin into the appropriate area as directed Every  Night. 4 pen 3   • Insulin Lispro, 1 Unit Dial, (HumaLOG KwikPen) 100 UNIT/ML solution pen-injector Up to 10 units with meals 6 pen 11   • Insulin Pen Needle (Global Ease Inject Pen Needles) 31G X 5 MM misc Use one daily with Tresiba 90 each 3   • LANCETS MICRO THIN 33G misc Test Blood Sugars Once Daily 100 each 3   • loratadine (CLARITIN) 10 MG tablet Take 10 mg by mouth Daily As Needed. Indication: Allergic rhinitis     • nitroglycerin (Nitrostat) 0.4 MG SL tablet Place 1 tablet under the tongue Every 5 (Five) Minutes As Needed for Chest Pain. Take no more than 3 doses in 15 minutes. 25 tablet 0   • pantoprazole (Protonix) 40 MG EC tablet Take 1 tablet by mouth Daily As Needed (heartburn). 90 tablet 0   • promethazine (PHENERGAN) 25 MG tablet Take 1 tablet by mouth Every 6 (Six) Hours As Needed for Nausea. 30 tablet 0   • rizatriptan MLT (MAXALT-MLT) 10 MG disintegrating tablet Place 1 tablet on the tongue Daily As Needed for Migraine. Take 1 tab at onset of headache, can repeat x 1 in 2 hrs prn 27 tablet 3   • Semaglutide,0.25 or 0.5MG/DOS, (Ozempic, 0.25 or 0.5 MG/DOSE,) 2 MG/1.5ML solution pen-injector Inject 0.5 mg under the skin into the appropriate area as directed 1 (One) Time Per Week. 0.5 mg weekly 1 pen 11   • HYDROcodone-acetaminophen (NORCO) 7.5-325 MG per tablet Take 1 tablet by mouth Every 6 (Six) Hours As Needed for Moderate Pain . 120 tablet 0   • lisinopril-hydrochlorothiazide (PRINZIDE,ZESTORETIC) 20-12.5 MG per tablet Take 1 tablet by mouth Daily. Indication: Essential (primary) hypertension 90 tablet 3   • metoprolol tartrate (LOPRESSOR) 25 MG tablet Take 2 tablets by mouth 2 (Two) Times a Day. 60 tablet 2   • rosuvastatin (Crestor) 10 MG tablet Take 1 tablet by mouth Daily. For cholesterol 90 tablet 0   • Dulaglutide (Trulicity) 1.5 MG/0.5ML solution pen-injector Inject 1.5 mg under the skin into the appropriate area as directed 1 (One) Time Per Week. 12 pen 3     No facility-administered  "medications prior to visit.       Opioid medication/s are on active medication list.  and I have evaluated her active treatment plan and pain score trends (see table).  Vitals:    07/18/22 0858   PainSc:   8   PainLoc: Hip     I have reviewed the chart for potential of high risk medication and harmful drug interactions in the elderly.            Aspirin is on active medication list. Aspirin use is indicated based on review of current medical condition/s. Pros and cons of this therapy have been discussed today. Benefits of this medication outweigh potential harm.  Patient has been encouraged to continue taking this medication.  .      Patient Active Problem List   Diagnosis   • Degenerative joint disease involving multiple joints   • Depressive disorder   • Essential hypertension   • Hyperlipidemia   • Insomnia   • Migraine   • Type 2 diabetes mellitus with hyperglycemia, with long-term current use of insulin ([facility])   • Open-angle glaucoma   • Cataract   • Open-angle glaucoma of right eye   • Chronic right shoulder pain   • Colitis   • Degenerative disc disease, lumbar   • Diabetic polyneuropathy associated with type 2 diabetes mellitus ([facility])   • Class 2 severe obesity due to excess calories with serious comorbidity and body mass index (BMI) of 37.0 to 37.9 in adult ([facility])     Advance Care Planning  Advance Directive is not on file.  ACP discussion was held with the patient during this visit. Patient does not have an advance directive, information provided.          Objective    Vitals:    07/18/22 0858   BP: 112/62   Pulse: 73   Resp: 18   SpO2: 98%   Weight: 90.2 kg (198 lb 12.8 oz)   Height: 160 cm (63\")   PainSc:   8   PainLoc: Hip     Estimated body mass index is 35.22 kg/m² as calculated from the following:    Height as of this encounter: 160 cm (63\").    Weight as of this encounter: 90.2 kg (198 lb 12.8 oz).    Class 2 Severe Obesity (BMI >=35 and <=39.9). Obesity-related health conditions include the following: " hypertension, diabetes mellitus and dyslipidemias. Obesity is improving with lifestyle modifications. BMI is is above average; BMI management plan is completed. We discussed portion control and increasing exercise.      Does the patient have evidence of cognitive impairment? No    Physical Exam  Vitals and nursing note reviewed.   Constitutional:       General: She is not in acute distress.     Appearance: She is well-developed.   HENT:      Head: Normocephalic and atraumatic.      Nose: Nose normal.   Eyes:      General:         Right eye: No discharge.         Left eye: No discharge.      Conjunctiva/sclera: Conjunctivae normal.      Pupils: Pupils are equal, round, and reactive to light.   Neck:      Thyroid: No thyromegaly.      Trachea: No tracheal deviation.   Cardiovascular:      Rate and Rhythm: Normal rate and regular rhythm.      Heart sounds: Normal heart sounds. No murmur heard.  Pulmonary:      Effort: Pulmonary effort is normal. No respiratory distress.      Breath sounds: Normal breath sounds. No wheezing or rales.   Chest:      Chest wall: No tenderness.   Breasts:      Right: No inverted nipple, mass, nipple discharge, skin change or tenderness.      Left: No inverted nipple, mass, nipple discharge, skin change or tenderness.       Abdominal:      General: Bowel sounds are normal. There is no distension.      Palpations: Abdomen is soft. There is no mass.      Tenderness: There is no abdominal tenderness.      Hernia: No hernia is present.   Musculoskeletal:         General: No deformity. Normal range of motion.   Lymphadenopathy:      Cervical: No cervical adenopathy.   Skin:     General: Skin is warm and dry.   Neurological:      Mental Status: She is alert and oriented to person, place, and time.      Deep Tendon Reflexes: Reflexes are normal and symmetric.   Psychiatric:         Behavior: Behavior normal.         Thought Content: Thought content normal.         Judgment: Judgment normal.                  HEALTH RISK ASSESSMENT    Smoking Status:  Social History     Tobacco Use   Smoking Status Never Smoker   Smokeless Tobacco Never Used     Alcohol Consumption:  Social History     Substance and Sexual Activity   Alcohol Use No     Fall Risk Screen:    CEZARADI Fall Risk Assessment was completed, and patient is at HIGH risk for falls. Assessment completed on:7/18/2022    Depression Screening:  PHQ-2/PHQ-9 Depression Screening 7/18/2022   Retired Total Score -   Little Interest or Pleasure in Doing Things 0-->not at all   Feeling Down, Depressed or Hopeless 1-->several days   PHQ-9: Brief Depression Severity Measure Score 1       Health Habits and Functional and Cognitive Screening:  Functional & Cognitive Status 7/18/2022   Do you have difficulty preparing food and eating? No   Do you have difficulty bathing yourself, getting dressed or grooming yourself? No   Do you have difficulty using the toilet? No   Do you have difficulty moving around from place to place? No   Do you have trouble with steps or getting out of a bed or a chair? No   Current Diet Well Balanced Diet   Dental Exam Up to date   Eye Exam Up to date   Exercise (times per week) 7 times per week   Current Exercises Include Walking   Current Exercise Activities Include -   Do you need help using the phone?  No   Are you deaf or do you have serious difficulty hearing?  No   Do you need help with transportation? No   Do you need help shopping? No   Do you need help preparing meals?  No   Do you need help with housework?  Yes   Do you need help with laundry? Yes   Do you need help taking your medications? No   Do you need help managing money? No   Do you ever drive or ride in a car without wearing a seat belt? No   Have you felt unusual stress, anger or loneliness in the last month? Yes   Who do you live with? Alone   If you need help, do you have trouble finding someone available to you? No   Have you been bothered in the last four weeks by sexual  problems? No   Do you have difficulty concentrating, remembering or making decisions? Yes       Age-appropriate Screening Schedule:  Refer to the list below for future screening recommendations based on patient's age, sex and/or medical conditions. Orders for these recommended tests are listed in the plan section. The patient has been provided with a written plan.    Health Maintenance   Topic Date Due   • ZOSTER VACCINE (2 of 2) 08/11/2017   • DIABETIC FOOT EXAM  04/08/2020   • URINE MICROALBUMIN  01/07/2022   • INFLUENZA VACCINE  10/01/2022   • HEMOGLOBIN A1C  10/12/2022   • DIABETIC EYE EXAM  11/08/2022   • MAMMOGRAM  01/07/2023   • LIPID PANEL  04/12/2023   • DXA SCAN  06/22/2023   • TDAP/TD VACCINES (3 - Td or Tdap) 10/27/2031   • PAP SMEAR  Discontinued              Assessment & Plan   CMS Preventative Services Quick Reference  Risk Factors Identified During Encounter  Depression/Dysphoria  Fall Risk-High or Moderate  Immunizations Discussed/Encouraged (specific Immunizations; Shingrix and COVID19  Obesity/Overweight   The above risks/problems have been discussed with the patient.  Follow up actions/plans if indicated are seen below in the Assessment/Plan Section.  Pertinent information has been shared with the patient in the After Visit Summary.    Diagnoses and all orders for this visit:    1. Medicare annual wellness visit, subsequent (Primary)    2. Mixed hyperlipidemia  -     rosuvastatin (Crestor) 10 MG tablet; Take 1 tablet by mouth Daily. For cholesterol  Dispense: 90 tablet; Refill: 0    3. Osteoarthritis of multiple joints, unspecified osteoarthritis type  -     HYDROcodone-acetaminophen (NORCO) 7.5-325 MG per tablet; Take 1 tablet by mouth Every 6 (Six) Hours As Needed for Moderate Pain .  Dispense: 120 tablet; Refill: 0  -     ToxASSURE Select 13 (MW) - Urine, Clean Catch; Future    4. Degenerative disc disease, lumbar  -     HYDROcodone-acetaminophen (NORCO) 7.5-325 MG per tablet; Take 1 tablet by  mouth Every 6 (Six) Hours As Needed for Moderate Pain .  Dispense: 120 tablet; Refill: 0  -     ToxASSURE Select 13 (MW) - Urine, Clean Catch; Future    5. Essential hypertension  -     lisinopril-hydrochlorothiazide (PRINZIDE,ZESTORETIC) 20-12.5 MG per tablet; Take 1 tablet by mouth Daily. Indication: Essential (primary) hypertension  Dispense: 90 tablet; Refill: 3  -     metoprolol tartrate (LOPRESSOR) 25 MG tablet; Take 2 tablets by mouth 2 (Two) Times a Day.  Dispense: 60 tablet; Refill: 2    6. Type 2 diabetes mellitus with hyperglycemia, with long-term current use of insulin (HCC)  -     MicroAlbumin, Urine, Random - Urine, Clean Catch; Future  -     Urinalysis With Culture If Indicated - Urine, Clean Catch; Future        Follow Up:   Return in about 3 months (around 10/18/2022) for Recheck.   Continue current medications. Chris reviewed and appropriate. Not recommended to drive or operate heavy equipment while taking potentially sedating meds.  Patient understands the risks associated with this controlled medication, including tolerance and addiction. They also agree to obtain this medication only from me, and not from a another provider, unless that provider is covering for me in my absence. They also agree to be compliant in dosing, and not self adjust the dose of medication.  A signed controlled substance agreement is on file, and they have received a controlled substance education sheet at this or a previous visit. They have also signed a consent for treatment with a controlled substance as per [facility] policy.      An After Visit Summary and PPPS were made available to the patient.  Information has been scanned into chart. Discussed importance of taking medications as prescribed. Encouraged healthy eating habits with low fat, low salt choices and working towards maintaining a healthy weight. Recommended regular exercise if able as well as care to prevent falls including avoiding anything on the  floor that they could slip or trip on such as throw rugs, making sure they have a bathmat to step onto when their feet are wet and having grab bars and railings where needed.

## 2022-07-21 LAB — DRUGS UR: NORMAL

## 2022-08-03 ENCOUNTER — TELEPHONE (OUTPATIENT)
Dept: ENDOCRINOLOGY | Facility: CLINIC | Age: 72
End: 2022-08-03

## 2022-08-03 RX ORDER — INSULIN ASPART 100 [IU]/ML
10 INJECTION, SOLUTION INTRAVENOUS; SUBCUTANEOUS
Qty: 3 PEN | Refills: 11 | Status: SHIPPED | OUTPATIENT
Start: 2022-08-03 | End: 2022-08-17 | Stop reason: SDUPTHER

## 2022-08-03 NOTE — TELEPHONE ENCOUNTER
PT called and is having trouble getting her insurance to cover Insulin lispro (Humalog), and is needing an Rx called in instead for Insulin Aspart (Novolog).      Pharmacy Info:  [facility] - [place], [place] - [address] - [phone]  - [phone] FX   [address] [address]   Phone:  [phone]  Fax:  [phone]

## 2022-08-15 ENCOUNTER — OFFICE VISIT (OUTPATIENT)
Dept: CARDIOLOGY | Facility: CLINIC | Age: 72
End: 2022-08-15

## 2022-08-15 VITALS
HEIGHT: 63 IN | BODY MASS INDEX: 35.08 KG/M2 | HEART RATE: 77 BPM | DIASTOLIC BLOOD PRESSURE: 80 MMHG | WEIGHT: 198 LBS | SYSTOLIC BLOOD PRESSURE: 130 MMHG | OXYGEN SATURATION: 96 %

## 2022-08-15 DIAGNOSIS — R55 SYNCOPE AND COLLAPSE: ICD-10-CM

## 2022-08-15 DIAGNOSIS — R07.9 CHEST PAIN, UNSPECIFIED TYPE: Primary | ICD-10-CM

## 2022-08-15 DIAGNOSIS — E66.2 CLASS 2 OBESITY WITH ALVEOLAR HYPOVENTILATION AND BODY MASS INDEX (BMI) OF 35.0 TO 35.9 IN ADULT, UNSPECIFIED WHETHER SERIOUS COMORBIDITY PRESENT: ICD-10-CM

## 2022-08-15 DIAGNOSIS — I10 HYPERTENSION, UNSPECIFIED TYPE: ICD-10-CM

## 2022-08-15 LAB
QT INTERVAL: 346 MS
QTC INTERVAL: 391 MS

## 2022-08-15 PROCEDURE — 93000 ELECTROCARDIOGRAM COMPLETE: CPT | Performed by: INTERNAL MEDICINE

## 2022-08-15 PROCEDURE — 99214 OFFICE O/P EST MOD 30 MIN: CPT | Performed by: INTERNAL MEDICINE

## 2022-08-15 RX ORDER — QUETIAPINE FUMARATE 25 MG/1
25 TABLET, FILM COATED ORAL
COMMUNITY
Start: 2022-08-01 | End: 2023-03-03

## 2022-08-15 NOTE — PROGRESS NOTES
"  [facility] Cardiology  OFFICE NOTE    Cardiovascular Medicine  Aryan Hoang M.D., [facility]         No referring provider defined for this encounter.    Past Cardiac History:  1.  Chest pain  2.  Hypertension  3.  Dyslipidemia  4.  Diabetes mellitus    Bertha Hyde is a 71 y.o. female who presents for consultation today. She has a known h/o diabetes mellitus, hypertension, dyslipidemia, anxiety and had COVID infection some time ago.    She has been having episodes of substernal chest discomfort for the past few months. These episodes are not provoked by exertion, are described as \"squeezing\", non-radiating and not associated with dyspnea or diaphoresis. They occur 3-4 times/week. She was prescribed SL nitro by her PCP, she has not tried that yet for these episodes.     She denies having any PND, orthopnea, leg swelling or palpitations.     10/14/2021:  We ordered a pharmacological nuclear stress test at her last visit for evaluation of chest discomfort.  This study showed normal LV function, no definite evidence of fixed or reversible myocardial perfusion defects and borderline ECG criteria for ischemia in inferior leads on Lexiscan stress.  It appeared low risk overall.  We decided to proceed with medical therapy with Lopressor 25 mg orally daily.    She states today that her chest pain has completely resolved after initiation of metoprolol tartrate.  He denies having any exertional dyspnea, PND, orthopnea, leg swelling or palpitations.  Review of systems is positive for fatigue, she requires to take a few naps during the day when she feels tired.    2/8/2022:  She presents for a follow-up visit today.  She has not had any further episodes of chest discomfort.  She has been walking more and is currently at 2500 steps a day.  Her ultimate goal is to increase it to 10,000 steps a day.  She has not had any palpitations, PND, orthopnea or leg swelling.  She does report feeling dizzy at least once " every day.  There have been some occasions she has felt presyncopal.  She has been checking her blood sugars regularly and denies having any episodes of hypoglycemia.    8/15/2022:  She presented today for a follow-up visit.  About 2 weeks ago, she had 3 episodes of syncope resulting in falls at home.  She was moving her house that day; reports moving stuff around the house that entire day.  She denies having any warning symptoms prior to these episodes (no chest discomfort, palpitations, dizziness or nausea prior to the episodes).  The first episode occurred around noon time, the second episode occurred in the evening (around 6 PM) and the third episode occurred late at night (around 1 AM, she was still moving stuff inside the house at that time).  She has not had any recurrence of these episodes or any episodes of dizziness since then.  She has not had any chest discomfort or dyspnea since her last visit.  She does not have any other cardiovascular concerns today.  Notably, she has only been taking metoprolol once daily because the second dose of metoprolol makes her feel very weak.    Review of Systems -   Constitution: Negative for weight gain and weight loss.    HENT: Negative for congestion.    Eyes: Negative for blurred vision.   Cardiovascular: As mentioned above  Respiratory: Negative for cough and hemoptysis.    Endocrine: Negative for polydipsia and polyuria.   Hematologic/Lymphatic: Negative for bleeding problem. Does not bruise/bleed easily.   Skin: Negative for flushing.   Musculoskeletal: Negative for neck pain and stiffness.   Gastrointestinal: Negative for abdominal pain, diarrhea, jaundice, melena, nausea and vomiting.   Genitourinary: Negative for dysuria and hematuria.   Neurological: Negative for focal weakness and numbness.   Psychiatric/Behavioral: Negative for altered mental status and depression.      All other systems were reviewed and were negative.    family history includes Arthritis in  her father and mother; Cancer in her maternal aunt and paternal aunt; Coronary artery disease in an other family member; Heart disease in her mother; Hypertension in her mother and another family member; Mental illness in her maternal aunt and paternal aunt; Migraines in her maternal grandmother; Obesity in her maternal grandmother; Osteoporosis in her mother; Stroke in her mother.     reports that she has never smoked. She has never used smokeless tobacco. She reports that she does not drink alcohol and does not use drugs.    No Known Allergies      Current Outpatient Medications:   •  Alcohol Swabs pads, USE 4 TIMES DAILY, Disp: 150 each, Rfl: 11  •  aspirin 81 MG EC tablet, Take 81 mg by mouth Daily., Disp: , Rfl:   •  b complex-C-folic acid 1 MG capsule, Take 1 capsule by mouth Daily., Disp: , Rfl:   •  Blood Glucose Monitoring Suppl (BLOOD GLUCOSE MONITOR SYSTEM) W/DEVICE kit, Test Blood Sugars Once Daily, Disp: 1 each, Rfl: 0  •  DULoxetine (CYMBALTA) 60 MG capsule, Take 1 capsule by mouth Daily., Disp: 90 capsule, Rfl: 3  •  empagliflozin (Jardiance) 10 MG tablet tablet, Take 1 tablet by mouth Daily. Lot [id number]  Apr 2021, Disp: 90 tablet, Rfl: 1  •  gabapentin (NEURONTIN) 300 MG capsule, Take 600 mg by mouth 3 (Three) Times a Day., Disp: , Rfl:   •  glucose blood test strip, Test Blood Sugars tid prn, Disp: 100 each, Rfl: 5  •  HYDROcodone-acetaminophen (NORCO) 7.5-325 MG per tablet, Take 1 tablet by mouth Every 6 (Six) Hours As Needed for Moderate Pain ., Disp: 120 tablet, Rfl: 0  •  hyoscyamine (LEVSIN) 0.125 MG SL tablet, Place 0.125-0.25 mg under the tongue Every 4 (Four) Hours As Needed. (max 12 per 24 hours), Disp: , Rfl:   •  insulin aspart (NovoLOG FlexPen) 100 UNIT/ML solution pen-injector sc pen, Inject 10 Units under the skin into the appropriate area as directed 3 (Three) Times a Day With Meals., Disp: 3 pen, Rfl: 11  •  insulin degludec (TRESIBA FLEXTOUCH) 100 UNIT/ML solution pen-injector  injection, Inject 50 Units under the skin into the appropriate area as directed Every Night., Disp: 4 pen, Rfl: 3  •  Insulin Lispro, 1 Unit Dial, (HumaLOG KwikPen) 100 UNIT/ML solution pen-injector, Up to 10 units with meals, Disp: 6 pen, Rfl: 11  •  Insulin Pen Needle (Global Ease Inject Pen Needles) 31G X 5 MM misc, Use one daily with Tresiba, Disp: 90 each, Rfl: 3  •  LANCETS MICRO THIN 33G misc, Test Blood Sugars Once Daily, Disp: 100 each, Rfl: 3  •  lisinopril-hydrochlorothiazide (PRINZIDE,ZESTORETIC) 20-12.5 MG per tablet, Take 1 tablet by mouth Daily. Indication: Essential (primary) hypertension, Disp: 90 tablet, Rfl: 3  •  loratadine (CLARITIN) 10 MG tablet, Take 10 mg by mouth Daily As Needed. Indication: Allergic rhinitis, Disp: , Rfl:   •  metoprolol tartrate (LOPRESSOR) 25 MG tablet, Take 2 tablets by mouth 2 (Two) Times a Day. (Patient taking differently: Take 50 mg by mouth Daily.), Disp: 60 tablet, Rfl: 2  •  nitroglycerin (Nitrostat) 0.4 MG SL tablet, Place 1 tablet under the tongue Every 5 (Five) Minutes As Needed for Chest Pain. Take no more than 3 doses in 15 minutes., Disp: 25 tablet, Rfl: 0  •  pantoprazole (Protonix) 40 MG EC tablet, Take 1 tablet by mouth Daily As Needed (heartburn)., Disp: 90 tablet, Rfl: 0  •  promethazine (PHENERGAN) 25 MG tablet, Take 1 tablet by mouth Every 6 (Six) Hours As Needed for Nausea., Disp: 30 tablet, Rfl: 0  •  QUEtiapine (SEROquel) 25 MG tablet, Take 25 mg by mouth every night at bedtime., Disp: , Rfl:   •  rizatriptan MLT (MAXALT-MLT) 10 MG disintegrating tablet, Place 1 tablet on the tongue Daily As Needed for Migraine. Take 1 tab at onset of headache, can repeat x 1 in 2 hrs prn, Disp: 27 tablet, Rfl: 3  •  rosuvastatin (Crestor) 10 MG tablet, Take 1 tablet by mouth Daily. For cholesterol, Disp: 90 tablet, Rfl: 0  •  Semaglutide,0.25 or 0.5MG/DOS, (Ozempic, 0.25 or 0.5 MG/DOSE,) 2 MG/1.5ML solution pen-injector, Inject 0.5 mg under the skin into the  "appropriate area as directed 1 (One) Time Per Week. 0.5 mg weekly, Disp: 1 pen, Rfl: 11    Physical Exam:  Vitals:    08/15/22 1133   BP: 130/80   BP Location: Left arm   Patient Position: Sitting   Cuff Size: Adult   Pulse: 77   SpO2: 96%   Weight: 89.8 kg (198 lb)   Height: 160 cm (63\")   PainSc: 0-No pain     Current Pain Level: none  Pulse Ox: Normal  on room air  General: alert, appears stated age and cooperative     Body Habitus: Obese  HEENT: Head: Normocephalic, no lesions, without obvious abnormality.   Neuro: alert, oriented x3  JVP: Volume/Pulsation: Normal.  Normal waveforms.   Appropriate inspiratory decrease.    Carotid Exam: no bruit normal pulsation bilaterally   Carotid Volume: normal.     Respirations: no increased work of breathing   Chest:  Normal    Pulmonary:Normal   Heart rate: normal   Heart Rhythm: regular     Heart Sounds: S1: normal  S2: normal    Abdomen:   Appearance: normal .  Palpation: Soft, non-tender to palpation, bowel sounds positive in all four quadrants; no guarding or rebound tenderness  Extremity: no edema.       DATA REVIEWED:     EKG. I personally reviewed and interpreted the EKG.  Normal sinus rhythm, non-specific ST-T wave changes.  Normal sinus rhythm, nonspecific T wave abnormality on 8/15/2022    ECG/EMG Results (all)     None        ---------------------------------------------------  -----------------------------------------------------  CXR/Imaging:   Imaging Results (Most Recent)     None        ----------------------------------------------------      --------------------------------------------------------------------------------------------------  LABS:     The 10-year CVD risk score (Aminta et al., 2008) is: 29%    Values used to calculate the score:      Age: 70 years      Sex: Female      Diabetic: Yes      Tobacco smoker: No      Systolic Blood Pressure: 124 mmHg      Is BP treated: Yes      HDL Cholesterol: 55 mg/dL      Total Cholesterol: 264 mg/dL     "     Lab Results   Component Value Date    GLUCOSE 174 (H) 04/12/2022    BUN 14 04/12/2022    CREATININE 0.70 04/12/2022    EGFRIFNONA 47 (L) 12/06/2021    BCR 20.0 04/12/2022    K 5.3 (H) 04/12/2022    CO2 26.3 04/12/2022    CALCIUM 9.7 04/12/2022    ALBUMIN 3.90 04/12/2022    AST 12 04/12/2022    ALT 13 04/12/2022     Lab Results   Component Value Date    WBC 8.61 05/23/2021    HGB 12.9 05/23/2021    HCT 39.2 05/23/2021    MCV 89.1 05/23/2021     05/23/2021     Lab Results   Component Value Date    CHOL 264 (H) 01/07/2021    CHLPL 238 (H) 08/18/2016    TRIG 225 (H) 01/07/2021    HDL 55 01/07/2021     (H) 04/12/2022     Lab Results   Component Value Date    TSH 2.720 03/13/2018     Lab Results   Component Value Date    CKTOTAL 35 02/25/2016    CKMB 0.4 02/25/2016    TROPONINI <0.012 02/25/2016     Lab Results   Component Value Date    HGBA1C 8.80 (H) 04/12/2022     No results found for: DDIMER  Lab Results   Component Value Date    ALT 13 04/12/2022     Lab Results   Component Value Date    HGBA1C 8.80 (H) 04/12/2022    HGBA1C 13.41 (H) 12/06/2021    HGBA1C 9.05 (H) 07/29/2021     Lab Results   Component Value Date    MICROALBUR <1.2 07/18/2022    CREATININE 0.70 04/12/2022     Lab Results   Component Value Date    IRON <10 (L) 10/02/2017    TIBC 173 (L) 10/02/2017     No results found for: INR, PROTIME    Assessment/Plan     1. Chest pain, unspecified type  She was evaluated with a pharmacological nuclear stress test in 8/2021 for evaluation of chest discomfort.  This study showed normal LV function, no definite evidence of fixed or reversible myocardial perfusion defects and borderline ECG criteria for ischemia in inferior leads on Lexiscan stress.  It appeared low risk overall.  We decided to proceed with medical therapy with Lopressor 25 mg orally daily; she is currently on Lopressor 50 mg orally once daily (does not take medication in the evening because it makes her feel very weak).  She has not  had any further episodes of chest discomfort on Lopressor therapy.  Continue baby aspirin, rosuvastatin and current doses of Lopressor.  At previous visits, she has been informed of further options including coronary angiography for definitive evaluation.  We mutually decided to continue with medical therapy for now and consider coronary angiography if her symptoms recur.  Optimal control of coronary risk factors is recommended.    2. Essential hypertension  Clinic BP is at goal. Continue current therapy with lisinopril, HCTZ and Lopressor.    3.  Syncope and collapse  About 2 weeks ago, she had 3 episodes of syncope resulting in falls at home.  She was moving her house that day; reports moving stuff around the house that entire day.  She denies having any warning symptoms prior to these episodes (no chest discomfort, palpitations, dizziness or nausea prior to the episodes).  The first episode occurred around noon time, the second episode occurred in the evening (around 6 PM) and the third episode occurred late at night (around 1 AM, she was still moving stuff inside the house at that time).  She has not had any recurrence of these episodes or any episodes of dizziness since then.  Given that she was moving stuff around her house for a long time that day (when syncope and falls occurred), it is possible that those episodes may have been related to hypoglycemia or hypotension.  Underlying arrhythmic etiology cannot be entirely ruled out.  She had a 2-week cardiac monitor in February 2022; this did not show any hemodynamically significant arrhythmias that would explain dizziness or syncope.  Transthoracic echocardiogram in April 2022 did not show any significant structural heart disease; the study was overall technically difficult.  She was advised on fall precautions and keeping herself well-hydrated for now.  She was offered placement of loop recorder for long-term cardiac monitoring to exclude underlying cardiac  arrhythmias that caused her syncopal episodes; she would like to hold off on this for now.  She was asked to contact us for a sooner appointment in case she notices recurrence of those episodes.    4.  Class II obesity  Dietary modification and regular physical activity were discussed as detailed below.    Prevention:  Patient's Body mass index is 35.07 kg/m². indicating that she is obese (BMI >30). Obesity-related health conditions include the following: hypertension, diabetes mellitus and dyslipidemias. We discussed portion control and increasing exercise..    Return in about 6 months (around 2/15/2023).          This document has been electronically signed by Aryan Hoang MD on August 15, 2022 11:54 CDT

## 2022-08-17 ENCOUNTER — TELEPHONE (OUTPATIENT)
Dept: ENDOCRINOLOGY | Facility: CLINIC | Age: 72
End: 2022-08-17

## 2022-08-17 DIAGNOSIS — I10 ESSENTIAL HYPERTENSION: Primary | ICD-10-CM

## 2022-08-17 DIAGNOSIS — M51.36 DEGENERATIVE DISC DISEASE, LUMBAR: ICD-10-CM

## 2022-08-17 DIAGNOSIS — I10 ESSENTIAL HYPERTENSION: ICD-10-CM

## 2022-08-17 DIAGNOSIS — M15.9 OSTEOARTHRITIS OF MULTIPLE JOINTS, UNSPECIFIED OSTEOARTHRITIS TYPE: Chronic | ICD-10-CM

## 2022-08-17 DIAGNOSIS — M51.36 DEGENERATIVE DISC DISEASE, LUMBAR: Chronic | ICD-10-CM

## 2022-08-17 DIAGNOSIS — M15.9 PRIMARY OSTEOARTHRITIS INVOLVING MULTIPLE JOINTS: Primary | ICD-10-CM

## 2022-08-17 RX ORDER — METOPROLOL SUCCINATE 25 MG/1
25 TABLET, EXTENDED RELEASE ORAL DAILY
Qty: 90 TABLET | Refills: 1 | Status: SHIPPED | OUTPATIENT
Start: 2022-08-17 | End: 2022-08-18 | Stop reason: SDUPTHER

## 2022-08-17 RX ORDER — INSULIN ASPART 100 [IU]/ML
10 INJECTION, SOLUTION INTRAVENOUS; SUBCUTANEOUS
Qty: 3 PEN | Refills: 11 | Status: SHIPPED | OUTPATIENT
Start: 2022-08-17

## 2022-08-17 RX ORDER — HYDROCODONE BITARTRATE AND ACETAMINOPHEN 7.5; 325 MG/1; MG/1
TABLET ORAL
Qty: 120 TABLET | Refills: 0 | OUTPATIENT
Start: 2022-08-17

## 2022-08-17 NOTE — TELEPHONE ENCOUNTER
Called and we lost connection said was waiting on Dr Finn to come out of a room and lost connection and called back and before she gave me what she was wanting we lost connection again.

## 2022-08-17 NOTE — TELEPHONE ENCOUNTER
See how she is taking her metoprolol. Also has gone from refilling her hydrocodone every 3-4 months to now 1 month. Is too early. Am going to send a referral to the pain clinic for her. Does she want to go here in [place] or where?

## 2022-08-18 DIAGNOSIS — I10 ESSENTIAL HYPERTENSION: ICD-10-CM

## 2022-08-18 RX ORDER — METOPROLOL SUCCINATE 25 MG/1
25 TABLET, EXTENDED RELEASE ORAL DAILY
Qty: 90 TABLET | Refills: 1 | Status: SHIPPED | OUTPATIENT
Start: 2022-08-18

## 2022-08-30 ENCOUNTER — OFFICE VISIT (OUTPATIENT)
Dept: ENDOCRINOLOGY | Facility: CLINIC | Age: 72
End: 2022-08-30

## 2022-08-30 VITALS
BODY MASS INDEX: 32.12 KG/M2 | DIASTOLIC BLOOD PRESSURE: 80 MMHG | SYSTOLIC BLOOD PRESSURE: 130 MMHG | OXYGEN SATURATION: 99 % | HEIGHT: 63 IN | HEART RATE: 100 BPM | WEIGHT: 181.3 LBS

## 2022-08-30 DIAGNOSIS — Z79.4 TYPE 2 DIABETES MELLITUS WITH HYPERGLYCEMIA, WITH LONG-TERM CURRENT USE OF INSULIN: ICD-10-CM

## 2022-08-30 DIAGNOSIS — E78.2 MIXED HYPERLIPIDEMIA: Primary | ICD-10-CM

## 2022-08-30 DIAGNOSIS — E11.42 DIABETIC POLYNEUROPATHY ASSOCIATED WITH TYPE 2 DIABETES MELLITUS: ICD-10-CM

## 2022-08-30 DIAGNOSIS — E11.65 TYPE 2 DIABETES MELLITUS WITH HYPERGLYCEMIA, WITH LONG-TERM CURRENT USE OF INSULIN: ICD-10-CM

## 2022-08-30 DIAGNOSIS — I10 ESSENTIAL HYPERTENSION: ICD-10-CM

## 2022-08-30 PROCEDURE — 99214 OFFICE O/P EST MOD 30 MIN: CPT | Performed by: NURSE PRACTITIONER

## 2022-08-30 NOTE — PROGRESS NOTES
"Chief Complaint  Diabetes    Subjective          Bertha Hyde presents to [facility] ENDOCRINOLOGY  History of Present Illness         In Office visit    Referring provider Belinda Finn MD    Chief Complaint   Patient presents with   • Diabetes       HPI    71-year-old female presents for follow up     Reason diabetes mellitus type 2      Duration--- diagnosed  In 2016       Context routine lab work       Timing constant       Quality  Not controlled      Severity high       Macrovascular complications---none         Microvascular complications ---neuropathy , no DR , no renal disease         Current diabetes regimen         Insulin, glp-1     Current glucose monitoring    Fingerstick's     Checks 4 times daily       Lows of 69 -- after supper       Frances 2 using but forgot reader                   Objective   Vital Signs:   /80   Pulse 100   Ht 160 cm (63\")   Wt 82.2 kg (181 lb 4.8 oz)   SpO2 99%   BMI 32.12 kg/m²     Physical Exam  Constitutional:       Appearance: Normal appearance.   Cardiovascular:      Rate and Rhythm: Regular rhythm.      Heart sounds: Normal heart sounds.   Pulmonary:      Breath sounds: Normal breath sounds.   Musculoskeletal:      Cervical back: Normal range of motion.   Neurological:      Mental Status: She is alert.        Result Review :   The following data was reviewed by: PAYTON Alexis on 05/20/2022:  Common labs    Common Labsle 12/6/21 12/6/21 4/12/22 4/12/22 4/12/22 7/18/22    1202 1202 1147 1147 1147    Glucose  321 (A) 174 (A)      BUN  23 14      Creatinine  1.13 (A) 0.70      eGFR Non African Am  47 (A)       Sodium  134 (A) 142      Potassium  4.9 5.3 (A)      Chloride  97 (A) 100      Calcium  10.0 9.7      Albumin  3.90 3.90      Total Bilirubin  0.3 0.2      Alkaline Phosphatase  111 93      AST (SGOT)  10 12      ALT (SGPT)  12 13      LDL Cholesterol     104 (A)     Hemoglobin A1C 13.41 (A)    8.80 (A)  "   Microalbumin, Urine      <1.2   (A) Abnormal value                        Assessment and Plan    Diagnoses and all orders for this visit:    1. Mixed hyperlipidemia (Primary)  -     CBC & Differential; Future  -     Comprehensive Metabolic Panel; Future  -     Hemoglobin A1c; Future  -     Lipid Panel; Future  -     Microalbumin / Creatinine Urine Ratio - Urine, Clean Catch; Future  -     TSH; Future    2. Essential hypertension  -     CBC & Differential; Future  -     Comprehensive Metabolic Panel; Future  -     Hemoglobin A1c; Future  -     Lipid Panel; Future  -     Microalbumin / Creatinine Urine Ratio - Urine, Clean Catch; Future  -     TSH; Future    3. Type 2 diabetes mellitus with hyperglycemia, with long-term current use of insulin (HCC)  -     CBC & Differential; Future  -     Comprehensive Metabolic Panel; Future  -     Hemoglobin A1c; Future  -     Lipid Panel; Future  -     Microalbumin / Creatinine Urine Ratio - Urine, Clean Catch; Future  -     TSH; Future    4. Diabetic polyneuropathy associated with type 2 diabetes mellitus (HCC)  -     CBC & Differential; Future  -     Comprehensive Metabolic Panel; Future  -     Hemoglobin A1c; Future  -     Lipid Panel; Future  -     Microalbumin / Creatinine Urine Ratio - Urine, Clean Catch; Future  -     TSH; Future             Glycemic Management:    Lab Results   Component Value Date    HGBA1C 8.80 (H) 04/12/2022                 Taking Tresiba  50 units once at night --- decrease to 45 units         Ozempic 0.5 mg once weekly     Taking jardiance 10 mg daily      Humalog before meals --giving 10 but having lows     Give 4 units before meals if you have carb loaded meals      +    Scale       151-200          2 units   201-250            3 units  251-300            4 units   Above 301         5 units         Goals for sugar    Fasting and before meals 80 to 130    2 hours after meals 180 or less      Aim for 45 grams of carbohydrate per meal    Aim for 15  grams of carbohydrate per snack         PATIENT IS USING THE [employer] PERSONAL SYSTEM AND THIS IS CLEARLY STATED IN THE HISTORY     SHE FORGOT THE READER SO WE COULD NOT DOWNLOAD     Frances has allowed the patient to minimize hypoglycemia as alarms have predicted and avoided them    She also uses the arrows on the frances  as follows:    If arrow is horizontal , she uses the predicted bolus    If the arrow is slanted up or down-- she increase or decrease by 2  units    If the arrow is vertical up or down - she increases or decreases by 2 unit               This document has been electronically signed by PAYTON Alexis on October 19, 2022 13:45 CDT      Microvascular Complications Monitoring       Last eye exam---Nov. 2021, no DR     Neuropathy --yes     Taking gabapentin 300 mg TID       No renal disease     Lipid Management:       Taking crestor 10 mg one at night     Total Cholesterol   Date Value Ref Range Status   01/07/2021 264 (H) 0 - 200 mg/dL Final     Triglycerides   Date Value Ref Range Status   01/07/2021 225 (H) 0 - 150 mg/dL Final     HDL Cholesterol   Date Value Ref Range Status   01/07/2021 55 40 - 60 mg/dL Final     LDL Cholesterol    Date Value Ref Range Status   04/12/2022 104 (H) 0 - 100 mg/dL Final   01/07/2021 167 (H) 0 - 100 mg/dL Final     LDLCALC ELECT   Date Value Ref Range Status   05/05/2015 106 0 - 129 mg/dl Final     Comment:     LDL DESIRED: < 130 MG/DL         Blood Pressure Management:      Taking lisinopril-hctz 20-12.5 one daily     Taking lopressor 25 mg daily           Thyroid Health    Lab Results   Component Value Date    TSH 2.720 03/13/2018           Bone Health       Vitamin d def     Taking vitamin d def       Weight Management:    Obesity     Body mass index is 32.12 kg/m².        Preventive Care:     Non smoker               Follow Up   Return in about 3 months (around 11/30/2022) for Recheck.  Patient was given instructions and counseling regarding her condition or  for health maintenance advice. Please see specific information pulled into the AVS if appropriate.         This document has been electronically signed by PAYTON Alexis on August 30, 2022 11:57 CDT.

## 2022-09-01 DIAGNOSIS — I10 ESSENTIAL HYPERTENSION: ICD-10-CM

## 2022-09-01 DIAGNOSIS — M51.36 DEGENERATIVE DISC DISEASE, LUMBAR: Chronic | ICD-10-CM

## 2022-09-01 DIAGNOSIS — M15.9 OSTEOARTHRITIS OF MULTIPLE JOINTS, UNSPECIFIED OSTEOARTHRITIS TYPE: Chronic | ICD-10-CM

## 2022-09-02 RX ORDER — HYDROCODONE BITARTRATE AND ACETAMINOPHEN 7.5; 325 MG/1; MG/1
TABLET ORAL
Qty: 60 TABLET | Refills: 0 | Status: SHIPPED | OUTPATIENT
Start: 2022-09-02

## 2022-09-14 DIAGNOSIS — M15.9 OSTEOARTHRITIS OF MULTIPLE JOINTS, UNSPECIFIED OSTEOARTHRITIS TYPE: Chronic | ICD-10-CM

## 2022-09-14 DIAGNOSIS — M51.36 DEGENERATIVE DISC DISEASE, LUMBAR: Chronic | ICD-10-CM

## 2022-09-14 RX ORDER — HYDROCODONE BITARTRATE AND ACETAMINOPHEN 7.5; 325 MG/1; MG/1
1 TABLET ORAL EVERY 6 HOURS PRN
Qty: 60 TABLET | Refills: 0 | OUTPATIENT
Start: 2022-09-14

## 2022-09-14 NOTE — TELEPHONE ENCOUNTER
Incoming Refill Request      Medication requested (name and dose):    HYDROcodone-acetaminophen (NORCO) 7.5-325 MG per tablet       Pharmacy where request should be sent:[facility]     Additional details provided by patient:     Best call back number: [phone]    Does the patient have less than a 3 day supply:  [x] Yes  [] No    Pauline Conde Rep  09/14/22, 10:56 CDT

## 2022-09-14 NOTE — TELEPHONE ENCOUNTER
Advised patient of Dr. Finn's notes, Pt voiced understanding. Will keep appt in Oct and have medication refilled then.

## 2022-09-14 NOTE — TELEPHONE ENCOUNTER
Call and tell is too early to fill her hydrocodone, just got on 9/6, will have to see me before I will refill again as is not

## 2022-10-06 ENCOUNTER — DOCUMENTATION (OUTPATIENT)
Dept: ENDOCRINOLOGY | Facility: CLINIC | Age: 72
End: 2022-10-06

## 2022-10-11 DIAGNOSIS — E78.2 MIXED HYPERLIPIDEMIA: Chronic | ICD-10-CM

## 2022-10-11 RX ORDER — ROSUVASTATIN CALCIUM 10 MG/1
TABLET, COATED ORAL
Qty: 90 TABLET | Refills: 0 | Status: SHIPPED | OUTPATIENT
Start: 2022-10-11 | End: 2023-01-23

## 2022-10-13 ENCOUNTER — LAB (OUTPATIENT)
Dept: LAB | Facility: HOSPITAL | Age: 72
End: 2022-10-13

## 2022-10-13 DIAGNOSIS — E11.65 TYPE 2 DIABETES MELLITUS WITH HYPERGLYCEMIA, WITH LONG-TERM CURRENT USE OF INSULIN: ICD-10-CM

## 2022-10-13 DIAGNOSIS — E11.42 DIABETIC POLYNEUROPATHY ASSOCIATED WITH TYPE 2 DIABETES MELLITUS: ICD-10-CM

## 2022-10-13 DIAGNOSIS — E78.2 MIXED HYPERLIPIDEMIA: ICD-10-CM

## 2022-10-13 DIAGNOSIS — I10 ESSENTIAL HYPERTENSION: ICD-10-CM

## 2022-10-13 DIAGNOSIS — Z79.4 TYPE 2 DIABETES MELLITUS WITH HYPERGLYCEMIA, WITH LONG-TERM CURRENT USE OF INSULIN: ICD-10-CM

## 2022-10-13 LAB
ALBUMIN SERPL-MCNC: 4.4 G/DL (ref 3.5–5.2)
ALBUMIN UR-MCNC: <1.2 MG/DL
ALBUMIN/GLOB SERPL: 1.3 G/DL
ALP SERPL-CCNC: 90 U/L (ref 39–117)
ALT SERPL W P-5'-P-CCNC: 11 U/L (ref 1–33)
ANION GAP SERPL CALCULATED.3IONS-SCNC: 16 MMOL/L (ref 5–15)
AST SERPL-CCNC: 17 U/L (ref 1–32)
BASOPHILS # BLD AUTO: 0.06 10*3/MM3 (ref 0–0.2)
BASOPHILS NFR BLD AUTO: 0.5 % (ref 0–1.5)
BILIRUB SERPL-MCNC: 0.4 MG/DL (ref 0–1.2)
BUN SERPL-MCNC: 14 MG/DL (ref 8–23)
BUN/CREAT SERPL: 17.1 (ref 7–25)
CALCIUM SPEC-SCNC: 10 MG/DL (ref 8.6–10.5)
CHLORIDE SERPL-SCNC: 96 MMOL/L (ref 98–107)
CHOLEST SERPL-MCNC: 203 MG/DL (ref 0–200)
CO2 SERPL-SCNC: 28 MMOL/L (ref 22–29)
CREAT SERPL-MCNC: 0.82 MG/DL (ref 0.57–1)
CREAT UR-MCNC: 82.2 MG/DL
DEPRECATED RDW RBC AUTO: 46.8 FL (ref 37–54)
EGFRCR SERPLBLD CKD-EPI 2021: 76.6 ML/MIN/1.73
EOSINOPHIL # BLD AUTO: 0.15 10*3/MM3 (ref 0–0.4)
EOSINOPHIL NFR BLD AUTO: 1.2 % (ref 0.3–6.2)
ERYTHROCYTE [DISTWIDTH] IN BLOOD BY AUTOMATED COUNT: 14.4 % (ref 12.3–15.4)
GLOBULIN UR ELPH-MCNC: 3.5 GM/DL
GLUCOSE SERPL-MCNC: 218 MG/DL (ref 65–99)
HBA1C MFR BLD: 8.4 % (ref 4.8–5.6)
HCT VFR BLD AUTO: 40.8 % (ref 34–46.6)
HDLC SERPL-MCNC: 44 MG/DL (ref 40–60)
HGB BLD-MCNC: 13.9 G/DL (ref 12–15.9)
IMM GRANULOCYTES # BLD AUTO: 0.06 10*3/MM3 (ref 0–0.05)
IMM GRANULOCYTES NFR BLD AUTO: 0.5 % (ref 0–0.5)
LDLC SERPL CALC-MCNC: 95 MG/DL (ref 0–100)
LDLC/HDLC SERPL: 1.85 {RATIO}
LYMPHOCYTES # BLD AUTO: 3.23 10*3/MM3 (ref 0.7–3.1)
LYMPHOCYTES NFR BLD AUTO: 26.7 % (ref 19.6–45.3)
MCH RBC QN AUTO: 30.7 PG (ref 26.6–33)
MCHC RBC AUTO-ENTMCNC: 34.1 G/DL (ref 31.5–35.7)
MCV RBC AUTO: 90.1 FL (ref 79–97)
MICROALBUMIN/CREAT UR: NORMAL MG/G{CREAT}
MONOCYTES # BLD AUTO: 0.53 10*3/MM3 (ref 0.1–0.9)
MONOCYTES NFR BLD AUTO: 4.4 % (ref 5–12)
NEUTROPHILS NFR BLD AUTO: 66.7 % (ref 42.7–76)
NEUTROPHILS NFR BLD AUTO: 8.06 10*3/MM3 (ref 1.7–7)
NRBC BLD AUTO-RTO: 0 /100 WBC (ref 0–0.2)
PLATELET # BLD AUTO: 348 10*3/MM3 (ref 140–450)
PMV BLD AUTO: 10.5 FL (ref 6–12)
POTASSIUM SERPL-SCNC: 3.7 MMOL/L (ref 3.5–5.2)
PROT SERPL-MCNC: 7.9 G/DL (ref 6–8.5)
RBC # BLD AUTO: 4.53 10*6/MM3 (ref 3.77–5.28)
SODIUM SERPL-SCNC: 140 MMOL/L (ref 136–145)
TRIGL SERPL-MCNC: 387 MG/DL (ref 0–150)
TSH SERPL DL<=0.05 MIU/L-ACNC: 1.27 UIU/ML (ref 0.27–4.2)
VLDLC SERPL-MCNC: 64 MG/DL (ref 5–40)
WBC NRBC COR # BLD: 12.09 10*3/MM3 (ref 3.4–10.8)

## 2022-10-13 PROCEDURE — 85025 COMPLETE CBC W/AUTO DIFF WBC: CPT

## 2022-10-13 PROCEDURE — 84443 ASSAY THYROID STIM HORMONE: CPT

## 2022-10-13 PROCEDURE — 80053 COMPREHEN METABOLIC PANEL: CPT

## 2022-10-13 PROCEDURE — 82570 ASSAY OF URINE CREATININE: CPT

## 2022-10-13 PROCEDURE — 83036 HEMOGLOBIN GLYCOSYLATED A1C: CPT

## 2022-10-13 PROCEDURE — 82043 UR ALBUMIN QUANTITATIVE: CPT

## 2022-10-13 PROCEDURE — 36415 COLL VENOUS BLD VENIPUNCTURE: CPT

## 2022-10-13 PROCEDURE — 80061 LIPID PANEL: CPT

## 2022-10-20 ENCOUNTER — DOCUMENTATION (OUTPATIENT)
Dept: ENDOCRINOLOGY | Facility: CLINIC | Age: 72
End: 2022-10-20

## 2022-11-03 DIAGNOSIS — E11.42 DIABETIC POLYNEUROPATHY ASSOCIATED WITH TYPE 2 DIABETES MELLITUS: ICD-10-CM

## 2022-11-03 RX ORDER — DULOXETIN HYDROCHLORIDE 60 MG/1
CAPSULE, DELAYED RELEASE ORAL
Qty: 90 CAPSULE | Refills: 11 | Status: SHIPPED | OUTPATIENT
Start: 2022-11-03 | End: 2022-11-04

## 2022-11-04 DIAGNOSIS — E11.42 DIABETIC POLYNEUROPATHY ASSOCIATED WITH TYPE 2 DIABETES MELLITUS: ICD-10-CM

## 2022-11-04 RX ORDER — DULOXETIN HYDROCHLORIDE 60 MG/1
CAPSULE, DELAYED RELEASE ORAL
Qty: 90 CAPSULE | Refills: 11 | Status: SHIPPED | OUTPATIENT
Start: 2022-11-04

## 2022-11-07 ENCOUNTER — OFFICE VISIT (OUTPATIENT)
Dept: FAMILY MEDICINE CLINIC | Facility: CLINIC | Age: 72
End: 2022-11-07

## 2022-11-07 DIAGNOSIS — I10 ESSENTIAL HYPERTENSION: Primary | Chronic | ICD-10-CM

## 2022-11-07 DIAGNOSIS — K21.9 GERD WITHOUT ESOPHAGITIS: ICD-10-CM

## 2022-11-07 DIAGNOSIS — Z79.4 TYPE 2 DIABETES MELLITUS WITH HYPERGLYCEMIA, WITH LONG-TERM CURRENT USE OF INSULIN: Chronic | ICD-10-CM

## 2022-11-07 DIAGNOSIS — E11.65 TYPE 2 DIABETES MELLITUS WITH HYPERGLYCEMIA, WITH LONG-TERM CURRENT USE OF INSULIN: Chronic | ICD-10-CM

## 2022-11-07 DIAGNOSIS — E78.2 MIXED HYPERLIPIDEMIA: Chronic | ICD-10-CM

## 2022-11-07 PROCEDURE — 99214 OFFICE O/P EST MOD 30 MIN: CPT | Performed by: GENERAL PRACTICE

## 2022-11-07 RX ORDER — PANTOPRAZOLE SODIUM 40 MG/1
40 TABLET, DELAYED RELEASE ORAL DAILY PRN
Qty: 90 TABLET | Refills: 1 | Status: SHIPPED | OUTPATIENT
Start: 2022-11-07

## 2022-11-07 NOTE — PROGRESS NOTES
Subjective   Bertha Hyde is a 71 y.o. female.   Chief Complaint   Patient presents with   • Hypertension     For review and evaluation of management of chronic medical problems. Records reviewed. Any recent labs, xrays reviewed and medications reconciled.   Hypertension  This is a chronic problem. The current episode started more than 1 year ago. The problem is unchanged. The problem is controlled. Associated symptoms include anxiety. There are no known risk factors for coronary artery disease. Current antihypertension treatment includes ACE inhibitors, diuretics and calcium channel blockers. The current treatment provides significant improvement. There are no compliance problems.    Hyperlipidemia  This is a chronic problem. The current episode started more than 1 year ago. The problem is uncontrolled. Recent lipid tests were reviewed and are high. Exacerbating diseases include obesity. There are no known factors aggravating her hyperlipidemia. Current antihyperlipidemic treatment includes statins. The current treatment provides moderate improvement of lipids. Compliance problems include medication cost.    Diabetes  She presents for her follow-up diabetic visit. She has type 2 diabetes mellitus. Her disease course has been stable. Pertinent negatives for hypoglycemia include no nervousness/anxiousness. There are no diabetic associated symptoms. There are no hypoglycemic complications. Diabetic complications include peripheral neuropathy. Current diabetic treatment includes insulin injections and oral agent (monotherapy) (ozempic). She is compliant with treatment some of the time. Her weight is stable. She is following a generally healthy diet. Meal planning includes avoidance of concentrated sweets. She participates in exercise intermittently. There is no change (Due to being out of her medications) in her home blood glucose trend. An ACE inhibitor/angiotensin II receptor blocker is being taken.      The  following portions of the patient's history were reviewed and updated as appropriate: allergies, current medications, past social history and problem list.    Outpatient Medications Prior to Visit   Medication Sig Dispense Refill   • Alcohol Swabs pads USE 4 TIMES DAILY 150 each 11   • aspirin 81 MG EC tablet Take 81 mg by mouth Daily.     • b complex-C-folic acid 1 MG capsule Take 1 capsule by mouth Daily.     • Blood Glucose Monitoring Suppl (BLOOD GLUCOSE MONITOR SYSTEM) W/DEVICE kit Test Blood Sugars Once Daily 1 each 0   • DULoxetine (CYMBALTA) 60 MG capsule TAKE ONE CAPSULE BY MOUTH ONCE DAILY 90 capsule 11   • gabapentin (NEURONTIN) 300 MG capsule Take 600 mg by mouth 3 (Three) Times a Day.     • glucose blood test strip Test Blood Sugars tid prn 100 each 5   • HYDROcodone-acetaminophen (NORCO) 7.5-325 MG per tablet TAKE ONE TABLET BY MOUTH EVERY 6 HOURS AS NEEDED 60 tablet 0   • hyoscyamine (LEVSIN) 0.125 MG SL tablet Place 0.125-0.25 mg under the tongue Every 4 (Four) Hours As Needed. (max 12 per 24 hours)     • insulin aspart (NovoLOG FlexPen) 100 UNIT/ML solution pen-injector sc pen Inject 10 Units under the skin into the appropriate area as directed 3 (Three) Times a Day With Meals. 3 pen 11   • insulin degludec (TRESIBA FLEXTOUCH) 100 UNIT/ML solution pen-injector injection Inject 50 Units under the skin into the appropriate area as directed Every Night. 4 pen 3   • Insulin Lispro, 1 Unit Dial, (HumaLOG KwikPen) 100 UNIT/ML solution pen-injector Up to 10 units with meals 6 pen 11   • Insulin Pen Needle (Global Ease Inject Pen Needles) 31G X 5 MM misc Use one daily with Tresiba 90 each 3   • LANCETS MICRO THIN 33G misc Test Blood Sugars Once Daily 100 each 3   • lisinopril-hydrochlorothiazide (PRINZIDE,ZESTORETIC) 20-12.5 MG per tablet Take 1 tablet by mouth Daily. Indication: Essential (primary) hypertension 90 tablet 3   • loratadine (CLARITIN) 10 MG tablet Take 10 mg by mouth Daily As Needed.  "Indication: Allergic rhinitis     • metoprolol succinate XL (Toprol XL) 25 MG 24 hr tablet Take 1 tablet by mouth Daily. 90 tablet 1   • nitroglycerin (Nitrostat) 0.4 MG SL tablet Place 1 tablet under the tongue Every 5 (Five) Minutes As Needed for Chest Pain. Take no more than 3 doses in 15 minutes. 25 tablet 0   • promethazine (PHENERGAN) 25 MG tablet Take 1 tablet by mouth Every 6 (Six) Hours As Needed for Nausea. 30 tablet 0   • QUEtiapine (SEROquel) 25 MG tablet Take 25 mg by mouth every night at bedtime.     • rizatriptan MLT (MAXALT-MLT) 10 MG disintegrating tablet Place 1 tablet on the tongue Daily As Needed for Migraine. Take 1 tab at onset of headache, can repeat x 1 in 2 hrs prn 27 tablet 3   • rosuvastatin (CRESTOR) 10 MG tablet TAKE ONE TABLET BY MOUTH ONCE DAILY 90 tablet 0   • Semaglutide,0.25 or 0.5MG/DOS, (Ozempic, 0.25 or 0.5 MG/DOSE,) 2 MG/1.5ML solution pen-injector Inject 0.5 mg under the skin into the appropriate area as directed 1 (One) Time Per Week. 0.5 mg weekly 1 pen 11   • empagliflozin (Jardiance) 10 MG tablet tablet Take 1 tablet by mouth Daily. Lot [id number]  Apr 2021 90 tablet 1   • pantoprazole (Protonix) 40 MG EC tablet Take 1 tablet by mouth Daily As Needed (heartburn). 90 tablet 0     No facility-administered medications prior to visit.       Review of Systems   Psychiatric/Behavioral: The patient is not nervous/anxious.      I have reviewed 12 systems with patient. Findings were negative except what is noted below and/or in history of present illness.     Objective   Visit Vitals  /64   Pulse 92   Ht 160 cm (63\")   Wt 84.6 kg (186 lb 8 oz)   SpO2 97%   BMI 33.04 kg/m²     Physical Exam  Vitals and nursing note reviewed.   Constitutional:       General: She is not in acute distress.     Appearance: She is well-developed.   HENT:      Head: Normocephalic and atraumatic.      Nose: Nose normal.   Eyes:      General:         Right eye: No discharge.         Left eye: No " discharge.      Conjunctiva/sclera: Conjunctivae normal.      Pupils: Pupils are equal, round, and reactive to light.   Neck:      Thyroid: No thyromegaly.   Cardiovascular:      Rate and Rhythm: Normal rate and regular rhythm.      Heart sounds: Normal heart sounds.   Pulmonary:      Effort: Pulmonary effort is normal.      Breath sounds: Normal breath sounds.   Lymphadenopathy:      Cervical: No cervical adenopathy.   Skin:     General: Skin is warm and dry.   Neurological:      Mental Status: She is alert and oriented to person, place, and time.       Results for orders placed or performed in visit on 10/13/22   Hemoglobin A1c    Specimen: Blood   Result Value Ref Range    Hemoglobin A1C 8.40 (H) 4.80 - 5.60 %   Lipid Panel    Specimen: Blood   Result Value Ref Range    Total Cholesterol 203 (H) 0 - 200 mg/dL    Triglycerides 387 (H) 0 - 150 mg/dL    HDL Cholesterol 44 40 - 60 mg/dL    LDL Cholesterol  95 0 - 100 mg/dL    VLDL Cholesterol 64 (H) 5 - 40 mg/dL    LDL/HDL Ratio 1.85    Comprehensive Metabolic Panel    Specimen: Blood   Result Value Ref Range    Glucose 218 (H) 65 - 99 mg/dL    BUN 14 8 - 23 mg/dL    Creatinine 0.82 0.57 - 1.00 mg/dL    Sodium 140 136 - 145 mmol/L    Potassium 3.7 3.5 - 5.2 mmol/L    Chloride 96 (L) 98 - 107 mmol/L    CO2 28.0 22.0 - 29.0 mmol/L    Calcium 10.0 8.6 - 10.5 mg/dL    Total Protein 7.9 6.0 - 8.5 g/dL    Albumin 4.40 3.50 - 5.20 g/dL    ALT (SGPT) 11 1 - 33 U/L    AST (SGOT) 17 1 - 32 U/L    Alkaline Phosphatase 90 39 - 117 U/L    Total Bilirubin 0.4 0.0 - 1.2 mg/dL    Globulin 3.5 gm/dL    A/G Ratio 1.3 g/dL    BUN/Creatinine Ratio 17.1 7.0 - 25.0    Anion Gap 16.0 (H) 5.0 - 15.0 mmol/L    eGFR 76.6 >60.0 mL/min/1.73   TSH    Specimen: Blood   Result Value Ref Range    TSH 1.270 0.270 - 4.200 uIU/mL   CBC Auto Differential    Specimen: Blood   Result Value Ref Range    WBC 12.09 (H) 3.40 - 10.80 10*3/mm3    RBC 4.53 3.77 - 5.28 10*6/mm3    Hemoglobin 13.9 12.0 - 15.9  g/dL    Hematocrit 40.8 34.0 - 46.6 %    MCV 90.1 79.0 - 97.0 fL    MCH 30.7 26.6 - 33.0 pg    MCHC 34.1 31.5 - 35.7 g/dL    RDW 14.4 12.3 - 15.4 %    RDW-SD 46.8 37.0 - 54.0 fl    MPV 10.5 6.0 - 12.0 fL    Platelets 348 140 - 450 10*3/mm3    Neutrophil % 66.7 42.7 - 76.0 %    Lymphocyte % 26.7 19.6 - 45.3 %    Monocyte % 4.4 (L) 5.0 - 12.0 %    Eosinophil % 1.2 0.3 - 6.2 %    Basophil % 0.5 0.0 - 1.5 %    Immature Grans % 0.5 0.0 - 0.5 %    Neutrophils, Absolute 8.06 (H) 1.70 - 7.00 10*3/mm3    Lymphocytes, Absolute 3.23 (H) 0.70 - 3.10 10*3/mm3    Monocytes, Absolute 0.53 0.10 - 0.90 10*3/mm3    Eosinophils, Absolute 0.15 0.00 - 0.40 10*3/mm3    Basophils, Absolute 0.06 0.00 - 0.20 10*3/mm3    Immature Grans, Absolute 0.06 (H) 0.00 - 0.05 10*3/mm3    nRBC 0.0 0.0 - 0.2 /100 WBC   Microalbumin / Creatinine Urine Ratio - Urine, Clean Catch    Specimen: Urine, Clean Catch   Result Value Ref Range    Microalbumin/Creatinine Ratio      Creatinine, Urine 82.2 mg/dL    Microalbumin, Urine <1.2 mg/dL      Notes brought forward are reviewed and updated if indicated.     Assessment & Plan   Problems Addressed this Visit        Cardiac and Vasculature    Essential hypertension - Primary (Chronic)    Hyperlipidemia (Chronic)       Endocrine and Metabolic    Type 2 diabetes mellitus with hyperglycemia, with long-term current use of insulin (HCC)    Relevant Medications    empagliflozin (Jardiance) 10 MG tablet tablet   Other Visit Diagnoses     GERD without esophagitis        Relevant Medications    pantoprazole (Protonix) 40 MG EC tablet      Diagnoses       Codes Comments    Essential hypertension    -  Primary ICD-10-CM: I10  ICD-9-CM: 401.9     Type 2 diabetes mellitus with hyperglycemia, with long-term current use of insulin (HCC)     ICD-10-CM: E11.65, Z79.4  ICD-9-CM: 250.00, 790.29, V58.67     GERD without esophagitis     ICD-10-CM: K21.9  ICD-9-CM: 530.81     Mixed hyperlipidemia     ICD-10-CM: E78.2  ICD-9-CM: 272.2           Continue current medications. Follow up with endocrinology and Pain management      New Medications Ordered This Visit   Medications   • empagliflozin (Jardiance) 10 MG tablet tablet     Sig: Take 1 tablet by mouth Daily. Lot [id number]  Apr 2021     Dispense:  90 tablet     Refill:  1   • pantoprazole (Protonix) 40 MG EC tablet     Sig: Take 1 tablet by mouth Daily As Needed (heartburn).     Dispense:  90 tablet     Refill:  1     Return in about 8 months (around 7/19/2023) for medicare wellness visit, Annual physical.        This document has been electronically signed by Belinda Finn MD on November 20, 2022 16:41 CST

## 2022-11-20 VITALS
HEART RATE: 92 BPM | HEIGHT: 63 IN | SYSTOLIC BLOOD PRESSURE: 130 MMHG | WEIGHT: 186.5 LBS | DIASTOLIC BLOOD PRESSURE: 64 MMHG | BODY MASS INDEX: 33.04 KG/M2 | OXYGEN SATURATION: 97 %

## 2022-12-13 ENCOUNTER — OFFICE VISIT (OUTPATIENT)
Dept: ENDOCRINOLOGY | Facility: CLINIC | Age: 72
End: 2022-12-13

## 2022-12-13 VITALS
SYSTOLIC BLOOD PRESSURE: 140 MMHG | DIASTOLIC BLOOD PRESSURE: 76 MMHG | HEIGHT: 63 IN | HEART RATE: 75 BPM | WEIGHT: 183 LBS | BODY MASS INDEX: 32.43 KG/M2 | OXYGEN SATURATION: 97 %

## 2022-12-13 DIAGNOSIS — E55.9 VITAMIN D DEFICIENCY, UNSPECIFIED: ICD-10-CM

## 2022-12-13 DIAGNOSIS — E78.2 MIXED HYPERLIPIDEMIA: Primary | Chronic | ICD-10-CM

## 2022-12-13 DIAGNOSIS — E11.65 TYPE 2 DIABETES MELLITUS WITH HYPERGLYCEMIA, WITH LONG-TERM CURRENT USE OF INSULIN: Chronic | ICD-10-CM

## 2022-12-13 DIAGNOSIS — Z79.4 TYPE 2 DIABETES MELLITUS WITH HYPERGLYCEMIA, WITH LONG-TERM CURRENT USE OF INSULIN: Chronic | ICD-10-CM

## 2022-12-13 DIAGNOSIS — E11.42 DIABETIC POLYNEUROPATHY ASSOCIATED WITH TYPE 2 DIABETES MELLITUS: ICD-10-CM

## 2022-12-13 DIAGNOSIS — I10 ESSENTIAL HYPERTENSION: Chronic | ICD-10-CM

## 2022-12-13 PROCEDURE — 99214 OFFICE O/P EST MOD 30 MIN: CPT | Performed by: NURSE PRACTITIONER

## 2022-12-13 NOTE — PROGRESS NOTES
"Chief Complaint  Diabetes (Did not bring Frances reader)    Subjective          Bertha Hyde presents to [facility] ENDOCRINOLOG[facility]  Diabetes           In Office visit    Referring provider Belinda Finn MD    Chief Complaint   Patient presents with   • Diabetes     Did not bring Frances reader       HPI    72 year old female presents for follow up     Reason diabetes mellitus type 2      Duration--- diagnosed  In 2016       Context routine lab work       Timing constant       Quality  Not controlled      Severity high       Macrovascular complications---none         Microvascular complications ---neuropathy , no DR , no renal disease         Current diabetes regimen         Insulin, glp-1     Current glucose monitoring    Fingerstick's     Checks 4 times daily     Has the frances 2     She is using the frances -- but forgot the reader         She states variable this month -- eating more candy           Review of Systems - General ROS: negative        Objective   Vital Signs:   /76   Pulse 75   Ht 160 cm (63\")   Wt 83 kg (183 lb)   SpO2 97%   BMI 32.42 kg/m²     Physical Exam  Constitutional:       Appearance: Normal appearance.   Cardiovascular:      Rate and Rhythm: Regular rhythm.      Heart sounds: Normal heart sounds.   Pulmonary:      Breath sounds: Normal breath sounds.   Musculoskeletal:      Cervical back: Normal range of motion.   Neurological:      Mental Status: She is alert.        Result Review :   The following data was reviewed by: PAYTON Alexis on 05/20/2022:  Common labs    Common Labs 4/12/22 4/12/22 4/12/22 7/18/22 10/13/22 10/13/22 10/13/22 10/13/22 10/13/22    1147 1147 1147  1039 1039 1039 1039 1145   Glucose 174 (A)     218 (A)      BUN 14     14      Creatinine 0.70     0.82      Sodium 142     140      Potassium 5.3 (A)     3.7      Chloride 100     96 (A)      Calcium 9.7     10.0      Albumin 3.90     4.40      Total Bilirubin 0.2     0.4  "     Alkaline Phosphatase 93     90      AST (SGOT) 12     17      ALT (SGPT) 13     11      WBC     12.09 (A)       Hemoglobin     13.9       Hematocrit     40.8       Platelets     348       Total Cholesterol        203 (A)    Triglycerides        387 (A)    HDL Cholesterol        44    LDL Cholesterol   104 (A)      95    Hemoglobin A1C   8.80 (A)    8.40 (A)     Microalbumin, Urine    <1.2     <1.2   (A) Abnormal value                        Assessment and Plan    Diagnoses and all orders for this visit:    1. Mixed hyperlipidemia (Primary)  -     CBC & Differential  -     Comprehensive Metabolic Panel  -     Hemoglobin A1c  -     Lipid Panel  -     Microalbumin / Creatinine Urine Ratio - Urine, Clean Catch  -     TSH  -     Vitamin D,25-Hydroxy    2. Type 2 diabetes mellitus with hyperglycemia, with long-term current use of insulin (HCC)  -     empagliflozin (Jardiance) 10 MG tablet tablet; Take 1 tablet by mouth Daily. Lot [id number]  Apr 2021  Dispense: 90 tablet; Refill: 3  -     CBC & Differential  -     Comprehensive Metabolic Panel  -     Hemoglobin A1c  -     Lipid Panel  -     Microalbumin / Creatinine Urine Ratio - Urine, Clean Catch  -     TSH  -     Vitamin D,25-Hydroxy    3. Essential hypertension  -     CBC & Differential  -     Comprehensive Metabolic Panel  -     Hemoglobin A1c  -     Lipid Panel  -     Microalbumin / Creatinine Urine Ratio - Urine, Clean Catch  -     TSH  -     Vitamin D,25-Hydroxy    4. Diabetic polyneuropathy associated with type 2 diabetes mellitus (HCC)  -     CBC & Differential  -     Comprehensive Metabolic Panel  -     Hemoglobin A1c  -     Lipid Panel  -     Microalbumin / Creatinine Urine Ratio - Urine, Clean Catch  -     TSH  -     Vitamin D,25-Hydroxy    5. Vitamin D deficiency, unspecified  -     Vitamin D,25-Hydroxy    Other orders  -     insulin degludec (TRESIBA FLEXTOUCH) 100 UNIT/ML solution pen-injector injection; Inject 50 Units under the skin into the appropriate  area as directed Every Night.  Dispense: 30 mL; Refill: 11             Glycemic Management:    Diabetes mellitus type 2         Lab Results   Component Value Date    HGBA1C 8.40 (H) 10/13/2022         Using the frances but forgot reader         Taking Tresiba   45 units --she has been out for over a month   Restart tresiba       Ozempic 0.5 mg once weekly     Taking jardiance 10 mg daily     Taking Novolog before meals --giving 10 but having lows     Give 4 units before meals if you have carb loaded meals      +    Scale       151-200          2 units   201-250            3 units  251-300            4 units   Above 301         5 units         Goals for sugar    Fasting and before meals 80 to 130    2 hours after meals 180 or less      Aim for 45 grams of carbohydrate per meal    Aim for 15 grams of carbohydrate per snack           Frances has allowed the patient to minimize hypoglycemia as alarms have predicted and avoided them    She also uses the arrows on the frances  as follows:    If arrow is horizontal , she uses the predicted bolus    If the arrow is slanted up or down-- she increase or decrease by 2  units    If the arrow is vertical up or down - she increases or decreases by 2 unit             Microvascular Complications Monitoring       Last eye exam---Nov. 2021, no DR     Neuropathy --yes     Taking gabapentin 300 mg TID       No renal disease     Lipid Management:       Taking crestor 10 mg one at night     Total Cholesterol   Date Value Ref Range Status   10/13/2022 203 (H) 0 - 200 mg/dL Final     Triglycerides   Date Value Ref Range Status   10/13/2022 387 (H) 0 - 150 mg/dL Final     HDL Cholesterol   Date Value Ref Range Status   10/13/2022 44 40 - 60 mg/dL Final     LDL Cholesterol    Date Value Ref Range Status   10/13/2022 95 0 - 100 mg/dL Final     LDLCALC ELECT   Date Value Ref Range Status   05/05/2015 106 0 - 129 mg/dl Final     Comment:     LDL DESIRED: < 130 MG/DL         Blood Pressure  Management:      Taking lisinopril-hctz 20-12.5 one daily     Taking lopressor 25 mg daily           Thyroid Health    Lab Results   Component Value Date    TSH 1.270 10/13/2022           Bone Health       Vitamin d def     Taking vitamin d def       Weight Management:    Obesity     Body mass index is 32.42 kg/m².        Preventive Care:     Non smoker               Follow Up   No follow-ups on file.  Patient was given instructions and counseling regarding her condition or for health maintenance advice. Please see specific information pulled into the AVS if appropriate.         This document has been electronically signed by PAYTON Alexis on December 13, 2022 14:42 CST.

## 2023-01-03 RX ORDER — RIZATRIPTAN BENZOATE 10 MG/1
TABLET ORAL
Qty: 27 TABLET | Refills: 2 | OUTPATIENT
Start: 2023-01-03

## 2023-01-23 DIAGNOSIS — E78.2 MIXED HYPERLIPIDEMIA: Chronic | ICD-10-CM

## 2023-01-23 RX ORDER — ROSUVASTATIN CALCIUM 10 MG/1
TABLET, COATED ORAL
Qty: 90 TABLET | Refills: 4 | Status: SHIPPED | OUTPATIENT
Start: 2023-01-23

## 2023-02-02 DIAGNOSIS — Z12.31 ENCOUNTER FOR SCREENING MAMMOGRAM FOR MALIGNANT NEOPLASM OF BREAST: Primary | ICD-10-CM

## 2023-02-09 ENCOUNTER — DOCUMENTATION (OUTPATIENT)
Dept: ENDOCRINOLOGY | Facility: CLINIC | Age: 73
End: 2023-02-09
Payer: MEDICARE

## 2023-03-03 RX ORDER — QUETIAPINE FUMARATE 25 MG/1
TABLET, FILM COATED ORAL
Qty: 30 TABLET | Refills: 6 | Status: SHIPPED | OUTPATIENT
Start: 2023-03-03

## 2023-03-17 ENCOUNTER — OFFICE VISIT (OUTPATIENT)
Dept: CARDIOLOGY | Facility: CLINIC | Age: 73
End: 2023-03-17
Payer: MEDICARE

## 2023-03-17 VITALS
BODY MASS INDEX: 34.68 KG/M2 | SYSTOLIC BLOOD PRESSURE: 132 MMHG | WEIGHT: 195.7 LBS | HEIGHT: 63 IN | OXYGEN SATURATION: 99 % | DIASTOLIC BLOOD PRESSURE: 70 MMHG | HEART RATE: 110 BPM

## 2023-03-17 DIAGNOSIS — I10 ESSENTIAL HYPERTENSION: ICD-10-CM

## 2023-03-17 DIAGNOSIS — R07.2 PRECORDIAL PAIN: Primary | ICD-10-CM

## 2023-03-17 DIAGNOSIS — R55 SYNCOPE AND COLLAPSE: ICD-10-CM

## 2023-03-17 DIAGNOSIS — E66.2 CLASS 1 OBESITY WITH ALVEOLAR HYPOVENTILATION AND BODY MASS INDEX (BMI) OF 34.0 TO 34.9 IN ADULT, UNSPECIFIED WHETHER SERIOUS COMORBIDITY PRESENT: ICD-10-CM

## 2023-03-17 PROCEDURE — 3075F SYST BP GE 130 - 139MM HG: CPT | Performed by: INTERNAL MEDICINE

## 2023-03-17 PROCEDURE — 1160F RVW MEDS BY RX/DR IN RCRD: CPT | Performed by: INTERNAL MEDICINE

## 2023-03-17 PROCEDURE — 93000 ELECTROCARDIOGRAM COMPLETE: CPT | Performed by: INTERNAL MEDICINE

## 2023-03-17 PROCEDURE — 1159F MED LIST DOCD IN RCRD: CPT | Performed by: INTERNAL MEDICINE

## 2023-03-17 PROCEDURE — 99214 OFFICE O/P EST MOD 30 MIN: CPT | Performed by: INTERNAL MEDICINE

## 2023-03-17 PROCEDURE — 3078F DIAST BP <80 MM HG: CPT | Performed by: INTERNAL MEDICINE

## 2023-03-17 NOTE — PROGRESS NOTES
"  [facility]  OFFICE NOTE    Cardiovascular Medicine  Aryan Hoang M.D., [facility]         No referring provider defined for this encounter.    Past Cardiac History:  1.  Chest pain  2.  Hypertension  3.  Dyslipidemia  4.  Diabetes mellitus    Bertha Hyde is a 72 y.o. female who presents for consultation today. She has a known h/o diabetes mellitus, hypertension, dyslipidemia, anxiety and had COVID infection some time ago.    She has been having episodes of substernal chest discomfort for the past few months. These episodes are not provoked by exertion, are described as \"squeezing\", non-radiating and not associated with dyspnea or diaphoresis. They occur 3-4 times/week. She was prescribed SL nitro by her PCP, she has not tried that yet for these episodes.     She denies having any PND, orthopnea, leg swelling or palpitations.     10/14/2021:  We ordered a pharmacological nuclear stress test at her last visit for evaluation of chest discomfort.  This study showed normal LV function, no definite evidence of fixed or reversible myocardial perfusion defects and borderline ECG criteria for ischemia in inferior leads on Lexiscan stress.  It appeared low risk overall.  We decided to proceed with medical therapy with Lopressor 25 mg orally daily.    She states today that her chest pain has completely resolved after initiation of metoprolol tartrate.  He denies having any exertional dyspnea, PND, orthopnea, leg swelling or palpitations.  Review of systems is positive for fatigue, she requires to take a few naps during the day when she feels tired.    2/8/2022:  She presents for a follow-up visit today.  She has not had any further episodes of chest discomfort.  She has been walking more and is currently at 2500 steps a day.  Her ultimate goal is to increase it to 10,000 steps a day.  She has not had any palpitations, PND, orthopnea or leg swelling.  She does report feeling dizzy at least once " every day.  There have been some occasions she has felt presyncopal.  She has been checking her blood sugars regularly and denies having any episodes of hypoglycemia.    8/15/2022:  She presented today for a follow-up visit.  About 2 weeks ago, she had 3 episodes of syncope resulting in falls at home.  She was moving her house that day; reports moving stuff around the house that entire day.  She denies having any warning symptoms prior to these episodes (no chest discomfort, palpitations, dizziness or nausea prior to the episodes).  The first episode occurred around noon time, the second episode occurred in the evening (around 6 PM) and the third episode occurred late at night (around 1 AM, she was still moving stuff inside the house at that time).  She has not had any recurrence of these episodes or any episodes of dizziness since then.  She has not had any chest discomfort or dyspnea since her last visit.  She does not have any other cardiovascular concerns today.  Notably, she has only been taking metoprolol once daily because the second dose of metoprolol makes her feel very weak.    3/17/2023:  She presented today for a follow-up visit.  She reports having a couple of episodes of syncope since her last visit with us.  Both of these episodes occurred without any prior warning symptoms.  She reports regaining consciousness within a couple of minutes.  For the past 3 months, she has had several episodes of uneasiness in the left side of the chest.  These episodes have become more frequent lately.  She has also had dyspnea.  She denies having any PND, orthopnea or leg swelling.  She has been taking all medications as prescribed.    Review of Systems -   Constitution: Negative for weight gain and weight loss.    HENT: Negative for congestion.    Eyes: Negative for blurred vision.   Cardiovascular: As mentioned above  Respiratory: Negative for cough and hemoptysis.    Endocrine: Negative for polydipsia and polyuria.    Hematologic/Lymphatic: Negative for bleeding problem. Does not bruise/bleed easily.   Skin: Negative for flushing.   Musculoskeletal: Negative for neck pain and stiffness.   Gastrointestinal: Negative for abdominal pain, nausea and vomiting.   Genitourinary: Negative for dysuria and hematuria.   Neurological: Negative for focal weakness and numbness.   Psychiatric/Behavioral: Negative for altered mental status and depression.      All other systems were reviewed and were negative.    family history includes Arthritis in her father and mother; Cancer in her maternal aunt and paternal aunt; Coronary artery disease in an other family member; Heart disease in her mother; Hypertension in her mother and another family member; Mental illness in her maternal aunt and paternal aunt; Migraines in her maternal grandmother; Obesity in her maternal grandmother; Osteoporosis in her mother; Stroke in her mother.     reports that she has never smoked. She has never used smokeless tobacco. She reports that she does not drink alcohol and does not use drugs.    No Known Allergies      Current Outpatient Medications:   •  Alcohol Swabs pads, USE 4 TIMES DAILY, Disp: 150 each, Rfl: 11  •  aspirin 81 MG EC tablet, Take 1 tablet by mouth Daily., Disp: , Rfl:   •  b complex-C-folic acid 1 MG capsule, Take 1 capsule by mouth Daily., Disp: , Rfl:   •  Blood Glucose Monitoring Suppl (BLOOD GLUCOSE MONITOR SYSTEM) W/DEVICE kit, Test Blood Sugars Once Daily, Disp: 1 each, Rfl: 0  •  DULoxetine (CYMBALTA) 60 MG capsule, TAKE ONE CAPSULE BY MOUTH ONCE DAILY, Disp: 90 capsule, Rfl: 11  •  empagliflozin (Jardiance) 10 MG tablet tablet, Take 1 tablet by mouth Daily. Lot [id number]  Apr 2021, Disp: 90 tablet, Rfl: 3  •  gabapentin (NEURONTIN) 300 MG capsule, Take 2 capsules by mouth 3 (Three) Times a Day., Disp: , Rfl:   •  glucose blood test strip, Test Blood Sugars tid prn, Disp: 100 each, Rfl: 5  •  HYDROcodone-acetaminophen (NORCO) 7.5-325 MG per  tablet, TAKE ONE TABLET BY MOUTH EVERY 6 HOURS AS NEEDED, Disp: 60 tablet, Rfl: 0  •  hyoscyamine (LEVSIN) 0.125 MG SL tablet, Place 1-2 tablets under the tongue Every 4 (Four) Hours As Needed. (max 12 per 24 hours), Disp: , Rfl:   •  insulin aspart (NovoLOG FlexPen) 100 UNIT/ML solution pen-injector sc pen, Inject 10 Units under the skin into the appropriate area as directed 3 (Three) Times a Day With Meals., Disp: 3 pen, Rfl: 11  •  insulin degludec (TRESIBA FLEXTOUCH) 100 UNIT/ML solution pen-injector injection, Inject 50 Units under the skin into the appropriate area as directed Every Night., Disp: 30 mL, Rfl: 11  •  Insulin Lispro, 1 Unit Dial, (HumaLOG KwikPen) 100 UNIT/ML solution pen-injector, Up to 10 units with meals, Disp: 6 pen, Rfl: 11  •  Insulin Pen Needle (Global Ease Inject Pen Needles) 31G X 5 MM misc, Use one daily with Tresiba, Disp: 90 each, Rfl: 3  •  LANCETS MICRO THIN 33G misc, Test Blood Sugars Once Daily, Disp: 100 each, Rfl: 3  •  lisinopril-hydrochlorothiazide (PRINZIDE,ZESTORETIC) 20-12.5 MG per tablet, Take 1 tablet by mouth Daily. Indication: Essential (primary) hypertension, Disp: 90 tablet, Rfl: 3  •  loratadine (CLARITIN) 10 MG tablet, Take 1 tablet by mouth Daily As Needed. Indication: Allergic rhinitis, Disp: , Rfl:   •  metoprolol succinate XL (Toprol XL) 25 MG 24 hr tablet, Take 1 tablet by mouth Daily., Disp: 90 tablet, Rfl: 1  •  nitroglycerin (Nitrostat) 0.4 MG SL tablet, Place 1 tablet under the tongue Every 5 (Five) Minutes As Needed for Chest Pain. Take no more than 3 doses in 15 minutes., Disp: 25 tablet, Rfl: 0  •  pantoprazole (Protonix) 40 MG EC tablet, Take 1 tablet by mouth Daily As Needed (heartburn)., Disp: 90 tablet, Rfl: 1  •  Prenatal Vit-Fe Fumarate-FA (PRENATAL VITAMIN PO), Take 1 tablet by mouth., Disp: , Rfl:   •  promethazine (PHENERGAN) 25 MG tablet, Take 1 tablet by mouth Every 6 (Six) Hours As Needed for Nausea., Disp: 30 tablet, Rfl: 0  •  QUEtiapine  "(SEROquel) 25 MG tablet, TAKE ONE TABLET BY MOUTH EACH EVENING AT BEDTIME, Disp: 30 tablet, Rfl: 6  •  rizatriptan MLT (MAXALT-MLT) 10 MG disintegrating tablet, Place 1 tablet on the tongue Daily As Needed for Migraine. Take 1 tab at onset of headache, can repeat x 1 in 2 hrs prn, Disp: 27 tablet, Rfl: 3  •  rosuvastatin (CRESTOR) 10 MG tablet, TAKE ONE TABLET BY MOUTH ONCE DAILY, Disp: 90 tablet, Rfl: 4  •  Semaglutide,0.25 or 0.5MG/DOS, (Ozempic, 0.25 or 0.5 MG/DOSE,) 2 MG/1.5ML solution pen-injector, Inject 0.5 mg under the skin into the appropriate area as directed 1 (One) Time Per Week. 0.5 mg weekly, Disp: 1 pen, Rfl: 11    Physical Exam:  Vitals:    03/17/23 1126   BP: 132/70   BP Location: Left arm   Pulse: 110   SpO2: 99%   Weight: 88.8 kg (195 lb 11.2 oz)   Height: 160 cm (63\")     Current Pain Level: none  Pulse Ox: Normal  on room air  General: alert, appears stated age and cooperative     Body Habitus: Obese  HEENT: Head: Normocephalic, no lesions, without obvious abnormality.   Neuro: alert, oriented x3  JVP: Volume/Pulsation: Normal.  Normal waveforms.   Carotid Exam: no bruit normal pulsation bilaterally   Carotid Volume: normal.     Respirations: no increased work of breathing   Chest:  Normal    Pulmonary:Normal   Heart rate: tachycardic  Heart Rhythm: regular     Heart Sounds: S1: normal  S2: normal    Abdomen:   Appearance: normal .  Palpation: Soft, non-tender to palpation, bowel sounds positive in all four quadrants  Extremity: no edema.       DATA REVIEWED:     EKG. I personally reviewed and interpreted the EKG.  Normal sinus rhythm, non-specific ST-T wave changes.  Normal sinus rhythm, nonspecific T wave abnormality on 8/15/2022    ECG/EMG Results (all)     None        ---------------------------------------------------  -----------------------------------------------------  CXR/Imaging:   Imaging Results (Most Recent)     None    "     ----------------------------------------------------      --------------------------------------------------------------------------------------------------  LABS:     The 10-year CVD risk score (Aminta et al., 2008) is: 29%    Values used to calculate the score:      Age: 70 years      Sex: Female      Diabetic: Yes      Tobacco smoker: No      Systolic Blood Pressure: 124 mmHg      Is BP treated: Yes      HDL Cholesterol: 55 mg/dL      Total Cholesterol: 264 mg/dL         Lab Results   Component Value Date    GLUCOSE 218 (H) 10/13/2022    BUN 14 10/13/2022    CREATININE 0.82 10/13/2022    EGFRIFNONA 47 (L) 12/06/2021    BCR 17.1 10/13/2022    K 3.7 10/13/2022    CO2 28.0 10/13/2022    CALCIUM 10.0 10/13/2022    ALBUMIN 4.40 10/13/2022    AST 17 10/13/2022    ALT 11 10/13/2022     Lab Results   Component Value Date    WBC 12.09 (H) 10/13/2022    HGB 13.9 10/13/2022    HCT 40.8 10/13/2022    MCV 90.1 10/13/2022     10/13/2022     Lab Results   Component Value Date    CHOL 203 (H) 10/13/2022    CHLPL 238 (H) 08/18/2016    TRIG 387 (H) 10/13/2022    HDL 44 10/13/2022    LDL 95 10/13/2022     Lab Results   Component Value Date    TSH 1.270 10/13/2022     Lab Results   Component Value Date    CKTOTAL 35 02/25/2016    CKMB 0.4 02/25/2016    TROPONINI <0.012 02/25/2016     Lab Results   Component Value Date    HGBA1C 8.40 (H) 10/13/2022     No results found for: DDIMER  Lab Results   Component Value Date    ALT 11 10/13/2022     Lab Results   Component Value Date    HGBA1C 8.40 (H) 10/13/2022    HGBA1C 8.80 (H) 04/12/2022    HGBA1C 13.41 (H) 12/06/2021     Lab Results   Component Value Date    MICROALBUR <1.2 10/13/2022    CREATININE 0.82 10/13/2022     Lab Results   Component Value Date    IRON <10 (L) 10/02/2017    TIBC 173 (L) 10/02/2017     No results found for: INR, PROTIME    Assessment/Plan     1. Chest pain, unspecified type  She was evaluated with a pharmacological nuclear stress test in 8/2021  for chest discomfort.  This study showed normal LV systolic function, no definite evidence of fixed or reversible myocardial perfusion defects and borderline ECG criteria for ischemia in inferior leads on Lexiscan stress.  It appeared low risk overall.  We decided to proceed with medical therapy with metoprolol.  Over the past few visits, she denied having any significant chest discomfort on metoprolol therapy.  However, for the past 3 months or so, she has had several episodes of left-sided chest discomfort.  These episodes have progressively gotten more frequent and intense.  She has also had significant dizziness and a few episodes of unexplained syncope.  She appeared tachycardic in the clinic today.  Considering her concerning symptoms, definitive evaluation with LHC/coronary angiography was recommended to her.  No up-titration of antianginal medications was done today because of dizziness and syncope.  She appeared to be tachycardic in clinic today; evaluation with coronary CTA would be difficult in this setting.  I discussed the risks, benefits and alternatives of coronary angiography/LHC in detail with her today and she opted to proceed with the procedure after understanding these.  We will get this procedure scheduled for her.  Continue baby aspirin, rosuvastatin and Toprol-XL.    2. Essential hypertension  It appears to be under reasonable control based on recent clinic BP readings.  Continue current regimen of Toprol-XL, lisinopril and HCTZ.    3.  Syncope and collapse  She has had multiple episodes of syncope over the past few months.  Most of these episodes have occurred without any warning signs/symptoms.  She had a 2-week cardiac monitor in February 2022; this did not show any hemodynamically significant arrhythmias that would explain dizziness or syncope.  Transthoracic echocardiogram in April 2022 did not show any significant structural heart disease; the study was overall technically  difficult.  She was advised on fall precautions and keeping herself well-hydrated for now.  We will obtain a tilt table test and a 30-day live cardiac monitor for further evaluation.    4.  Class I obesity  Dietary modification and regular physical activity were discussed as detailed below.    Prevention:  Patient's Body mass index is 34.67 kg/m². indicating that she is obese (BMI >30). Obesity-related health conditions include the following: hypertension, diabetes mellitus and dyslipidemias. We discussed portion control and increasing exercise..    Return in about 3 months (around 6/17/2023).           This document has been electronically signed by Aryan Hoang MD on March 17, 2023 15:25 CDT

## 2023-03-17 NOTE — H&P (VIEW-ONLY)
"  [facility] Cardiology  OFFICE NOTE    Cardiovascular Medicine  Aryan Hoang M.D., [facility]         No referring provider defined for this encounter.    Past Cardiac History:  1.  Chest pain  2.  Hypertension  3.  Dyslipidemia  4.  Diabetes mellitus    Bertha Hyde is a 72 y.o. female who presents for consultation today. She has a known h/o diabetes mellitus, hypertension, dyslipidemia, anxiety and had COVID infection some time ago.    She has been having episodes of substernal chest discomfort for the past few months. These episodes are not provoked by exertion, are described as \"squeezing\", non-radiating and not associated with dyspnea or diaphoresis. They occur 3-4 times/week. She was prescribed SL nitro by her PCP, she has not tried that yet for these episodes.     She denies having any PND, orthopnea, leg swelling or palpitations.     10/14/2021:  We ordered a pharmacological nuclear stress test at her last visit for evaluation of chest discomfort.  This study showed normal LV function, no definite evidence of fixed or reversible myocardial perfusion defects and borderline ECG criteria for ischemia in inferior leads on Lexiscan stress.  It appeared low risk overall.  We decided to proceed with medical therapy with Lopressor 25 mg orally daily.    She states today that her chest pain has completely resolved after initiation of metoprolol tartrate.  He denies having any exertional dyspnea, PND, orthopnea, leg swelling or palpitations.  Review of systems is positive for fatigue, she requires to take a few naps during the day when she feels tired.    2/8/2022:  She presents for a follow-up visit today.  She has not had any further episodes of chest discomfort.  She has been walking more and is currently at 2500 steps a day.  Her ultimate goal is to increase it to 10,000 steps a day.  She has not had any palpitations, PND, orthopnea or leg swelling.  She does report feeling dizzy at least once " every day.  There have been some occasions she has felt presyncopal.  She has been checking her blood sugars regularly and denies having any episodes of hypoglycemia.    8/15/2022:  She presented today for a follow-up visit.  About 2 weeks ago, she had 3 episodes of syncope resulting in falls at home.  She was moving her house that day; reports moving stuff around the house that entire day.  She denies having any warning symptoms prior to these episodes (no chest discomfort, palpitations, dizziness or nausea prior to the episodes).  The first episode occurred around noon time, the second episode occurred in the evening (around 6 PM) and the third episode occurred late at night (around 1 AM, she was still moving stuff inside the house at that time).  She has not had any recurrence of these episodes or any episodes of dizziness since then.  She has not had any chest discomfort or dyspnea since her last visit.  She does not have any other cardiovascular concerns today.  Notably, she has only been taking metoprolol once daily because the second dose of metoprolol makes her feel very weak.    3/17/2023:  She presented today for a follow-up visit.  She reports having a couple of episodes of syncope since her last visit with us.  Both of these episodes occurred without any prior warning symptoms.  She reports regaining consciousness within a couple of minutes.  For the past 3 months, she has had several episodes of uneasiness in the left side of the chest.  These episodes have become more frequent lately.  She has also had dyspnea.  She denies having any PND, orthopnea or leg swelling.  She has been taking all medications as prescribed.    Review of Systems -   Constitution: Negative for weight gain and weight loss.    HENT: Negative for congestion.    Eyes: Negative for blurred vision.   Cardiovascular: As mentioned above  Respiratory: Negative for cough and hemoptysis.    Endocrine: Negative for polydipsia and polyuria.    Hematologic/Lymphatic: Negative for bleeding problem. Does not bruise/bleed easily.   Skin: Negative for flushing.   Musculoskeletal: Negative for neck pain and stiffness.   Gastrointestinal: Negative for abdominal pain, nausea and vomiting.   Genitourinary: Negative for dysuria and hematuria.   Neurological: Negative for focal weakness and numbness.   Psychiatric/Behavioral: Negative for altered mental status and depression.      All other systems were reviewed and were negative.    family history includes Arthritis in her father and mother; Cancer in her maternal aunt and paternal aunt; Coronary artery disease in an other family member; Heart disease in her mother; Hypertension in her mother and another family member; Mental illness in her maternal aunt and paternal aunt; Migraines in her maternal grandmother; Obesity in her maternal grandmother; Osteoporosis in her mother; Stroke in her mother.     reports that she has never smoked. She has never used smokeless tobacco. She reports that she does not drink alcohol and does not use drugs.    No Known Allergies      Current Outpatient Medications:   •  Alcohol Swabs pads, USE 4 TIMES DAILY, Disp: 150 each, Rfl: 11  •  aspirin 81 MG EC tablet, Take 1 tablet by mouth Daily., Disp: , Rfl:   •  b complex-C-folic acid 1 MG capsule, Take 1 capsule by mouth Daily., Disp: , Rfl:   •  Blood Glucose Monitoring Suppl (BLOOD GLUCOSE MONITOR SYSTEM) W/DEVICE kit, Test Blood Sugars Once Daily, Disp: 1 each, Rfl: 0  •  DULoxetine (CYMBALTA) 60 MG capsule, TAKE ONE CAPSULE BY MOUTH ONCE DAILY, Disp: 90 capsule, Rfl: 11  •  empagliflozin (Jardiance) 10 MG tablet tablet, Take 1 tablet by mouth Daily. Lot [id number]  Apr 2021, Disp: 90 tablet, Rfl: 3  •  gabapentin (NEURONTIN) 300 MG capsule, Take 2 capsules by mouth 3 (Three) Times a Day., Disp: , Rfl:   •  glucose blood test strip, Test Blood Sugars tid prn, Disp: 100 each, Rfl: 5  •  HYDROcodone-acetaminophen (NORCO) 7.5-325 MG per  tablet, TAKE ONE TABLET BY MOUTH EVERY 6 HOURS AS NEEDED, Disp: 60 tablet, Rfl: 0  •  hyoscyamine (LEVSIN) 0.125 MG SL tablet, Place 1-2 tablets under the tongue Every 4 (Four) Hours As Needed. (max 12 per 24 hours), Disp: , Rfl:   •  insulin aspart (NovoLOG FlexPen) 100 UNIT/ML solution pen-injector sc pen, Inject 10 Units under the skin into the appropriate area as directed 3 (Three) Times a Day With Meals., Disp: 3 pen, Rfl: 11  •  insulin degludec (TRESIBA FLEXTOUCH) 100 UNIT/ML solution pen-injector injection, Inject 50 Units under the skin into the appropriate area as directed Every Night., Disp: 30 mL, Rfl: 11  •  Insulin Lispro, 1 Unit Dial, (HumaLOG KwikPen) 100 UNIT/ML solution pen-injector, Up to 10 units with meals, Disp: 6 pen, Rfl: 11  •  Insulin Pen Needle (Global Ease Inject Pen Needles) 31G X 5 MM misc, Use one daily with Tresiba, Disp: 90 each, Rfl: 3  •  LANCETS MICRO THIN 33G misc, Test Blood Sugars Once Daily, Disp: 100 each, Rfl: 3  •  lisinopril-hydrochlorothiazide (PRINZIDE,ZESTORETIC) 20-12.5 MG per tablet, Take 1 tablet by mouth Daily. Indication: Essential (primary) hypertension, Disp: 90 tablet, Rfl: 3  •  loratadine (CLARITIN) 10 MG tablet, Take 1 tablet by mouth Daily As Needed. Indication: Allergic rhinitis, Disp: , Rfl:   •  metoprolol succinate XL (Toprol XL) 25 MG 24 hr tablet, Take 1 tablet by mouth Daily., Disp: 90 tablet, Rfl: 1  •  nitroglycerin (Nitrostat) 0.4 MG SL tablet, Place 1 tablet under the tongue Every 5 (Five) Minutes As Needed for Chest Pain. Take no more than 3 doses in 15 minutes., Disp: 25 tablet, Rfl: 0  •  pantoprazole (Protonix) 40 MG EC tablet, Take 1 tablet by mouth Daily As Needed (heartburn)., Disp: 90 tablet, Rfl: 1  •  Prenatal Vit-Fe Fumarate-FA (PRENATAL VITAMIN PO), Take 1 tablet by mouth., Disp: , Rfl:   •  promethazine (PHENERGAN) 25 MG tablet, Take 1 tablet by mouth Every 6 (Six) Hours As Needed for Nausea., Disp: 30 tablet, Rfl: 0  •  QUEtiapine  "(SEROquel) 25 MG tablet, TAKE ONE TABLET BY MOUTH EACH EVENING AT BEDTIME, Disp: 30 tablet, Rfl: 6  •  rizatriptan MLT (MAXALT-MLT) 10 MG disintegrating tablet, Place 1 tablet on the tongue Daily As Needed for Migraine. Take 1 tab at onset of headache, can repeat x 1 in 2 hrs prn, Disp: 27 tablet, Rfl: 3  •  rosuvastatin (CRESTOR) 10 MG tablet, TAKE ONE TABLET BY MOUTH ONCE DAILY, Disp: 90 tablet, Rfl: 4  •  Semaglutide,0.25 or 0.5MG/DOS, (Ozempic, 0.25 or 0.5 MG/DOSE,) 2 MG/1.5ML solution pen-injector, Inject 0.5 mg under the skin into the appropriate area as directed 1 (One) Time Per Week. 0.5 mg weekly, Disp: 1 pen, Rfl: 11    Physical Exam:  Vitals:    03/17/23 1126   BP: 132/70   BP Location: Left arm   Pulse: 110   SpO2: 99%   Weight: 88.8 kg (195 lb 11.2 oz)   Height: 160 cm (63\")     Current Pain Level: none  Pulse Ox: Normal  on room air  General: alert, appears stated age and cooperative     Body Habitus: Obese  HEENT: Head: Normocephalic, no lesions, without obvious abnormality.   Neuro: alert, oriented x3  JVP: Volume/Pulsation: Normal.  Normal waveforms.   Carotid Exam: no bruit normal pulsation bilaterally   Carotid Volume: normal.     Respirations: no increased work of breathing   Chest:  Normal    Pulmonary:Normal   Heart rate: tachycardic  Heart Rhythm: regular     Heart Sounds: S1: normal  S2: normal    Abdomen:   Appearance: normal .  Palpation: Soft, non-tender to palpation, bowel sounds positive in all four quadrants  Extremity: no edema.       DATA REVIEWED:     EKG. I personally reviewed and interpreted the EKG.  Normal sinus rhythm, non-specific ST-T wave changes.  Normal sinus rhythm, nonspecific T wave abnormality on 8/15/2022    ECG/EMG Results (all)     None        ---------------------------------------------------  -----------------------------------------------------  CXR/Imaging:   Imaging Results (Most Recent)     None    "     ----------------------------------------------------      --------------------------------------------------------------------------------------------------  LABS:     The 10-year CVD risk score (Aminta et al., 2008) is: 29%    Values used to calculate the score:      Age: 70 years      Sex: Female      Diabetic: Yes      Tobacco smoker: No      Systolic Blood Pressure: 124 mmHg      Is BP treated: Yes      HDL Cholesterol: 55 mg/dL      Total Cholesterol: 264 mg/dL         Lab Results   Component Value Date    GLUCOSE 218 (H) 10/13/2022    BUN 14 10/13/2022    CREATININE 0.82 10/13/2022    EGFRIFNONA 47 (L) 12/06/2021    BCR 17.1 10/13/2022    K 3.7 10/13/2022    CO2 28.0 10/13/2022    CALCIUM 10.0 10/13/2022    ALBUMIN 4.40 10/13/2022    AST 17 10/13/2022    ALT 11 10/13/2022     Lab Results   Component Value Date    WBC 12.09 (H) 10/13/2022    HGB 13.9 10/13/2022    HCT 40.8 10/13/2022    MCV 90.1 10/13/2022     10/13/2022     Lab Results   Component Value Date    CHOL 203 (H) 10/13/2022    CHLPL 238 (H) 08/18/2016    TRIG 387 (H) 10/13/2022    HDL 44 10/13/2022    LDL 95 10/13/2022     Lab Results   Component Value Date    TSH 1.270 10/13/2022     Lab Results   Component Value Date    CKTOTAL 35 02/25/2016    CKMB 0.4 02/25/2016    TROPONINI <0.012 02/25/2016     Lab Results   Component Value Date    HGBA1C 8.40 (H) 10/13/2022     No results found for: DDIMER  Lab Results   Component Value Date    ALT 11 10/13/2022     Lab Results   Component Value Date    HGBA1C 8.40 (H) 10/13/2022    HGBA1C 8.80 (H) 04/12/2022    HGBA1C 13.41 (H) 12/06/2021     Lab Results   Component Value Date    MICROALBUR <1.2 10/13/2022    CREATININE 0.82 10/13/2022     Lab Results   Component Value Date    IRON <10 (L) 10/02/2017    TIBC 173 (L) 10/02/2017     No results found for: INR, PROTIME    Assessment/Plan     1. Chest pain, unspecified type  She was evaluated with a pharmacological nuclear stress test in 8/2021  for chest discomfort.  This study showed normal LV systolic function, no definite evidence of fixed or reversible myocardial perfusion defects and borderline ECG criteria for ischemia in inferior leads on Lexiscan stress.  It appeared low risk overall.  We decided to proceed with medical therapy with metoprolol.  Over the past few visits, she denied having any significant chest discomfort on metoprolol therapy.  However, for the past 3 months or so, she has had several episodes of left-sided chest discomfort.  These episodes have progressively gotten more frequent and intense.  She has also had significant dizziness and a few episodes of unexplained syncope.  She appeared tachycardic in the clinic today.  Considering her concerning symptoms, definitive evaluation with LHC/coronary angiography was recommended to her.  No up-titration of antianginal medications was done today because of dizziness and syncope.  She appeared to be tachycardic in clinic today; evaluation with coronary CTA would be difficult in this setting.  I discussed the risks, benefits and alternatives of coronary angiography/LHC in detail with her today and she opted to proceed with the procedure after understanding these.  We will get this procedure scheduled for her.  Continue baby aspirin, rosuvastatin and Toprol-XL.    2. Essential hypertension  It appears to be under reasonable control based on recent clinic BP readings.  Continue current regimen of Toprol-XL, lisinopril and HCTZ.    3.  Syncope and collapse  She has had multiple episodes of syncope over the past few months.  Most of these episodes have occurred without any warning signs/symptoms.  She had a 2-week cardiac monitor in February 2022; this did not show any hemodynamically significant arrhythmias that would explain dizziness or syncope.  Transthoracic echocardiogram in April 2022 did not show any significant structural heart disease; the study was overall technically  difficult.  She was advised on fall precautions and keeping herself well-hydrated for now.  We will obtain a tilt table test and a 30-day live cardiac monitor for further evaluation.    4.  Class I obesity  Dietary modification and regular physical activity were discussed as detailed below.    Prevention:  Patient's Body mass index is 34.67 kg/m². indicating that she is obese (BMI >30). Obesity-related health conditions include the following: hypertension, diabetes mellitus and dyslipidemias. We discussed portion control and increasing exercise..    Return in about 3 months (around 6/17/2023).           This document has been electronically signed by Aryan Hoang MD on March 17, 2023 15:25 CDT

## 2023-03-20 ENCOUNTER — PREP FOR SURGERY (OUTPATIENT)
Dept: OTHER | Facility: HOSPITAL | Age: 73
End: 2023-03-20
Payer: MEDICARE

## 2023-03-20 ENCOUNTER — OFFICE VISIT (OUTPATIENT)
Dept: ENDOCRINOLOGY | Facility: CLINIC | Age: 73
End: 2023-03-20
Payer: MEDICARE

## 2023-03-20 VITALS
SYSTOLIC BLOOD PRESSURE: 138 MMHG | OXYGEN SATURATION: 97 % | HEIGHT: 63 IN | HEART RATE: 77 BPM | BODY MASS INDEX: 35.14 KG/M2 | DIASTOLIC BLOOD PRESSURE: 72 MMHG | WEIGHT: 198.3 LBS

## 2023-03-20 DIAGNOSIS — R07.2 PRECORDIAL PAIN: Primary | ICD-10-CM

## 2023-03-20 DIAGNOSIS — I10 ESSENTIAL HYPERTENSION: Chronic | ICD-10-CM

## 2023-03-20 DIAGNOSIS — E78.2 MIXED HYPERLIPIDEMIA: Chronic | ICD-10-CM

## 2023-03-20 DIAGNOSIS — E11.42 DIABETIC POLYNEUROPATHY ASSOCIATED WITH TYPE 2 DIABETES MELLITUS: ICD-10-CM

## 2023-03-20 DIAGNOSIS — Z79.4 TYPE 2 DIABETES MELLITUS WITH HYPOGLYCEMIA WITHOUT COMA, WITH LONG-TERM CURRENT USE OF INSULIN: Primary | ICD-10-CM

## 2023-03-20 DIAGNOSIS — E11.649 TYPE 2 DIABETES MELLITUS WITH HYPOGLYCEMIA WITHOUT COMA, WITH LONG-TERM CURRENT USE OF INSULIN: Primary | ICD-10-CM

## 2023-03-20 PROCEDURE — 3075F SYST BP GE 130 - 139MM HG: CPT | Performed by: NURSE PRACTITIONER

## 2023-03-20 PROCEDURE — 3078F DIAST BP <80 MM HG: CPT | Performed by: NURSE PRACTITIONER

## 2023-03-20 PROCEDURE — 1159F MED LIST DOCD IN RCRD: CPT | Performed by: NURSE PRACTITIONER

## 2023-03-20 PROCEDURE — 1160F RVW MEDS BY RX/DR IN RCRD: CPT | Performed by: NURSE PRACTITIONER

## 2023-03-20 PROCEDURE — 95251 CONT GLUC MNTR ANALYSIS I&R: CPT | Performed by: NURSE PRACTITIONER

## 2023-03-20 PROCEDURE — 99214 OFFICE O/P EST MOD 30 MIN: CPT | Performed by: NURSE PRACTITIONER

## 2023-03-20 RX ORDER — ASPIRIN 325 MG
325 TABLET ORAL ONCE
Status: CANCELLED | OUTPATIENT
Start: 2023-03-20 | End: 2023-03-20

## 2023-03-20 RX ORDER — ASPIRIN 81 MG/1
81 TABLET ORAL DAILY
Status: CANCELLED | OUTPATIENT
Start: 2023-03-21

## 2023-03-20 RX ORDER — SODIUM CHLORIDE 9 MG/ML
40 INJECTION, SOLUTION INTRAVENOUS AS NEEDED
Status: CANCELLED | OUTPATIENT
Start: 2023-03-22

## 2023-03-20 RX ORDER — SODIUM CHLORIDE 9 MG/ML
50 INJECTION, SOLUTION INTRAVENOUS ONCE
Status: CANCELLED | OUTPATIENT
Start: 2023-03-22 | End: 2023-03-20

## 2023-03-20 RX ORDER — SODIUM CHLORIDE 0.9 % (FLUSH) 0.9 %
10 SYRINGE (ML) INJECTION AS NEEDED
Status: CANCELLED | OUTPATIENT
Start: 2023-03-22

## 2023-03-20 RX ORDER — SODIUM CHLORIDE 0.9 % (FLUSH) 0.9 %
3 SYRINGE (ML) INJECTION EVERY 12 HOURS SCHEDULED
Status: CANCELLED | OUTPATIENT
Start: 2023-03-22

## 2023-03-20 NOTE — PROGRESS NOTES
"Chief Complaint  Diabetes    Subjective          Bertha Hyde presents to [facility] ENDOCRINOLOGY  Diabetes           In Office visit    Referring provider Belinda Finn MD    Chief Complaint   Patient presents with   • Diabetes       HPI    72 year old female presents for follow up     Reason diabetes mellitus type 2      Duration--- diagnosed  In 2016       Context routine lab work       Timing constant     Quality  Not controlled      Severity high       Microvascular complications ---neuropathy , no DR , no renal disease         Current diabetes regimen         Insulin, glp-1     Current glucose monitoring    Fingerstick's     Checks 4 times daily     Has the olivier 2         See below     Review of Systems - General ROS: negative          Objective   Vital Signs:   /72   Pulse 77   Ht 160 cm (63\")   Wt 89.9 kg (198 lb 4.8 oz)   SpO2 97%   BMI 35.13 kg/m²     Physical Exam  Constitutional:       Appearance: Normal appearance.   Cardiovascular:      Rate and Rhythm: Regular rhythm.      Heart sounds: Normal heart sounds.   Pulmonary:      Breath sounds: Normal breath sounds.   Musculoskeletal:      Cervical back: Normal range of motion.   Neurological:      Mental Status: She is alert.        Result Review :   The following data was reviewed by: PAYTON Alexis on 05/20/2022:  Common labs    Common Labs 4/12/22 4/12/22 4/12/22 7/18/22 10/13/22 10/13/22 10/13/22 10/13/22 10/13/22    1147 1147 1147  1039 1039 1039 1039 1145   Glucose 174 (A)     218 (A)      BUN 14     14      Creatinine 0.70     0.82      Sodium 142     140      Potassium 5.3 (A)     3.7      Chloride 100     96 (A)      Calcium 9.7     10.0      Albumin 3.90     4.40      Total Bilirubin 0.2     0.4      Alkaline Phosphatase 93     90      AST (SGOT) 12     17      ALT (SGPT) 13     11      WBC     12.09 (A)       Hemoglobin     13.9       Hematocrit     40.8       Platelets     348     "   Total Cholesterol        203 (A)    Triglycerides        387 (A)    HDL Cholesterol        44    LDL Cholesterol   104 (A)      95    Hemoglobin A1C   8.80 (A)    8.40 (A)     Microalbumin, Urine    <1.2     <1.2   (A) Abnormal value                        Assessment and Plan    Diagnoses and all orders for this visit:    1. Type 2 diabetes mellitus with hypoglycemia without coma, with long-term current use of insulin (HCC) (Primary)  -     CBC & Differential; Future  -     Comprehensive Metabolic Panel; Future  -     Hemoglobin A1c; Future  -     Lipid Panel; Future  -     Microalbumin / Creatinine Urine Ratio - Urine, Clean Catch; Future  -     TSH; Future  -     Vitamin B12; Future    2. Mixed hyperlipidemia  -     CBC & Differential; Future  -     Comprehensive Metabolic Panel; Future  -     Hemoglobin A1c; Future  -     Lipid Panel; Future  -     Microalbumin / Creatinine Urine Ratio - Urine, Clean Catch; Future  -     TSH; Future  -     Vitamin B12; Future    3. Essential hypertension  -     CBC & Differential; Future  -     Comprehensive Metabolic Panel; Future  -     Hemoglobin A1c; Future  -     Lipid Panel; Future  -     Microalbumin / Creatinine Urine Ratio - Urine, Clean Catch; Future  -     TSH; Future  -     Vitamin B12; Future    4. Diabetic polyneuropathy associated with type 2 diabetes mellitus (HCC)  -     CBC & Differential; Future  -     Comprehensive Metabolic Panel; Future  -     Hemoglobin A1c; Future  -     Lipid Panel; Future  -     Microalbumin / Creatinine Urine Ratio - Urine, Clean Catch; Future  -     TSH; Future  -     Vitamin B12; Future             Glycemic Management:    Diabetes mellitus type 2         Lab Results   Component Value Date    HGBA1C 8.40 (H) 10/13/2022         Using the frances          Ambulatory Glucose Profile Report    Days Analyzed : 2 week period ending on 03/20/23      Continuous Glucose Monitory Device:  FreeStyle Frances 2     - 1% very high target range  - 13%  high target range  - 84% in target range  - 2% below target range  - GMI n/a  %  - Average glucose 128 mg/dl    Interpretation : Diabetes Type 2      she has lows overnight --decrease basal     At goal         Taking Tresiba   40 units --decrease to 35 units     Ozempic 0.5 mg once weekly -keep     Taking jardiance 10 mg daily --keep     Taking Novolog before meals -    Give 4 units before meals if you have carb loaded meals      +    Scale       151-200          2 units   201-250            3 units  251-300            4 units   Above 301         5 units         Goals for sugar    Fasting and before meals 80 to 130    2 hours after meals 180 or less      Aim for 45 grams of carbohydrate per meal    Aim for 15 grams of carbohydrate per snack           Frances has allowed the patient to minimize hypoglycemia as alarms have predicted and avoided them    She also uses the arrows on the frances  as follows:    If arrow is horizontal , she uses the predicted bolus    If the arrow is slanted up or down-- she increase or decrease by 2  units    If the arrow is vertical up or down - she increases or decreases by 2 unit             Microvascular Complications Monitoring       Last eye exam---Nov. 2021, no DR     Neuropathy --yes     Taking gabapentin 300 mg TID       No renal disease     Lipid Management:       Taking crestor 10 mg one at night     Total Cholesterol   Date Value Ref Range Status   10/13/2022 203 (H) 0 - 200 mg/dL Final     Triglycerides   Date Value Ref Range Status   10/13/2022 387 (H) 0 - 150 mg/dL Final     HDL Cholesterol   Date Value Ref Range Status   10/13/2022 44 40 - 60 mg/dL Final     LDL Cholesterol    Date Value Ref Range Status   10/13/2022 95 0 - 100 mg/dL Final     LDLCALC ELECT   Date Value Ref Range Status   05/05/2015 106 0 - 129 mg/dl Final     Comment:     LDL DESIRED: < 130 MG/DL         Blood Pressure Management:      Taking lisinopril-hctz 20-12.5 one daily     Taking lopressor 25 mg daily            Thyroid Health    Lab Results   Component Value Date    TSH 1.270 10/13/2022           Bone Health       Vitamin d def     Taking vitamin d def       Weight Management:    Obesity     Body mass index is 35.13 kg/m².        Preventive Care:     Non smoker               Follow Up   Return in about 3 months (around 6/20/2023) for Recheck.  Patient was given instructions and counseling regarding her condition or for health maintenance advice. Please see specific information pulled into the AVS if appropriate.         This document has been electronically signed by PAYTON Alexis on March 20, 2023 14:41 CDT.

## 2023-03-22 ENCOUNTER — HOSPITAL ENCOUNTER (OUTPATIENT)
Facility: HOSPITAL | Age: 73
Discharge: HOME | End: 2023-03-23
Attending: INTERNAL MEDICINE | Admitting: INTERNAL MEDICINE
Payer: MEDICARE

## 2023-03-22 DIAGNOSIS — R07.2 PRECORDIAL PAIN: ICD-10-CM

## 2023-03-22 PROBLEM — Z98.61 CAD S/P PERCUTANEOUS CORONARY ANGIOPLASTY: Status: ACTIVE | Noted: 2023-03-22

## 2023-03-22 PROBLEM — I25.10 CAD S/P PERCUTANEOUS CORONARY ANGIOPLASTY: Status: ACTIVE | Noted: 2023-03-22

## 2023-03-22 LAB
ANION GAP SERPL CALCULATED.3IONS-SCNC: 11 MMOL/L (ref 5–15)
BUN SERPL-MCNC: 13 MG/DL (ref 8–23)
BUN/CREAT SERPL: 18.3 (ref 7–25)
CALCIUM SPEC-SCNC: 9.1 MG/DL (ref 8.6–10.5)
CHLORIDE SERPL-SCNC: 106 MMOL/L (ref 98–107)
CO2 SERPL-SCNC: 25 MMOL/L (ref 22–29)
CREAT SERPL-MCNC: 0.71 MG/DL (ref 0.57–1)
DEPRECATED RDW RBC AUTO: 50.4 FL (ref 37–54)
EGFRCR SERPLBLD CKD-EPI 2021: 90.5 ML/MIN/1.73
ERYTHROCYTE [DISTWIDTH] IN BLOOD BY AUTOMATED COUNT: 14.2 % (ref 12.3–15.4)
GLUCOSE BLDC GLUCOMTR-MCNC: 120 MG/DL (ref 70–130)
GLUCOSE BLDC GLUCOMTR-MCNC: 59 MG/DL (ref 70–130)
GLUCOSE BLDC GLUCOMTR-MCNC: 88 MG/DL (ref 70–130)
GLUCOSE SERPL-MCNC: 73 MG/DL (ref 65–99)
HCT VFR BLD AUTO: 32.5 % (ref 34–46.6)
HGB BLD-MCNC: 10.6 G/DL (ref 12–15.9)
INR PPP: 1.05 (ref 0.8–1.2)
MCH RBC QN AUTO: 31.5 PG (ref 26.6–33)
MCHC RBC AUTO-ENTMCNC: 32.6 G/DL (ref 31.5–35.7)
MCV RBC AUTO: 96.7 FL (ref 79–97)
PLATELET # BLD AUTO: 338 10*3/MM3 (ref 140–450)
PMV BLD AUTO: 9.2 FL (ref 6–12)
POTASSIUM SERPL-SCNC: 3.6 MMOL/L (ref 3.5–5.2)
PROTHROMBIN TIME: 13.6 SECONDS (ref 11.1–15.3)
RBC # BLD AUTO: 3.36 10*6/MM3 (ref 3.77–5.28)
SODIUM SERPL-SCNC: 142 MMOL/L (ref 136–145)
WBC NRBC COR # BLD: 10.78 10*3/MM3 (ref 3.4–10.8)

## 2023-03-22 PROCEDURE — A9270 NON-COVERED ITEM OR SERVICE: HCPCS | Performed by: INTERNAL MEDICINE

## 2023-03-22 PROCEDURE — 63710000001 PANTOPRAZOLE 40 MG TABLET DELAYED-RELEASE: Performed by: INTERNAL MEDICINE

## 2023-03-22 PROCEDURE — 63710000001 QUETIAPINE 25 MG TABLET: Performed by: INTERNAL MEDICINE

## 2023-03-22 PROCEDURE — 93458 L HRT ARTERY/VENTRICLE ANGIO: CPT | Performed by: INTERNAL MEDICINE

## 2023-03-22 PROCEDURE — 99153 MOD SED SAME PHYS/QHP EA: CPT | Performed by: INTERNAL MEDICINE

## 2023-03-22 PROCEDURE — 80048 BASIC METABOLIC PNL TOTAL CA: CPT | Performed by: INTERNAL MEDICINE

## 2023-03-22 PROCEDURE — 63710000001 GABAPENTIN 300 MG CAPSULE: Performed by: INTERNAL MEDICINE

## 2023-03-22 PROCEDURE — 63710000001 NITROGLYCERIN 0.4 MG SUBLINGUAL TABLET: Performed by: INTERNAL MEDICINE

## 2023-03-22 PROCEDURE — C1887 CATHETER, GUIDING: HCPCS | Performed by: INTERNAL MEDICINE

## 2023-03-22 PROCEDURE — 85610 PROTHROMBIN TIME: CPT | Performed by: INTERNAL MEDICINE

## 2023-03-22 PROCEDURE — C1769 GUIDE WIRE: HCPCS | Performed by: INTERNAL MEDICINE

## 2023-03-22 PROCEDURE — 82962 GLUCOSE BLOOD TEST: CPT

## 2023-03-22 PROCEDURE — 25510000001 IOPAMIDOL PER 1 ML: Performed by: INTERNAL MEDICINE

## 2023-03-22 PROCEDURE — 99152 MOD SED SAME PHYS/QHP 5/>YRS: CPT | Performed by: INTERNAL MEDICINE

## 2023-03-22 PROCEDURE — 25010000002 MIDAZOLAM PER 1 MG: Performed by: INTERNAL MEDICINE

## 2023-03-22 PROCEDURE — 93010 ELECTROCARDIOGRAM REPORT: CPT | Performed by: INTERNAL MEDICINE

## 2023-03-22 PROCEDURE — C1725 CATH, TRANSLUMIN NON-LASER: HCPCS | Performed by: INTERNAL MEDICINE

## 2023-03-22 PROCEDURE — 25010000002 FENTANYL CITRATE (PF) 50 MCG/ML SOLUTION: Performed by: INTERNAL MEDICINE

## 2023-03-22 PROCEDURE — 63710000001 HYDROCODONE-ACETAMINOPHEN 7.5-325 MG TABLET: Performed by: INTERNAL MEDICINE

## 2023-03-22 PROCEDURE — 93005 ELECTROCARDIOGRAM TRACING: CPT | Performed by: INTERNAL MEDICINE

## 2023-03-22 PROCEDURE — 85027 COMPLETE CBC AUTOMATED: CPT | Performed by: INTERNAL MEDICINE

## 2023-03-22 PROCEDURE — 92920 PRQ TRLUML C ANGIOP 1ART&/BR: CPT | Performed by: INTERNAL MEDICINE

## 2023-03-22 PROCEDURE — C1894 INTRO/SHEATH, NON-LASER: HCPCS | Performed by: INTERNAL MEDICINE

## 2023-03-22 PROCEDURE — 63710000001 INSULIN DETEMIR PER 5 UNITS: Performed by: INTERNAL MEDICINE

## 2023-03-22 PROCEDURE — 25010000002 HEPARIN (PORCINE) PER 1000 UNITS: Performed by: INTERNAL MEDICINE

## 2023-03-22 PROCEDURE — 63710000001 ISOSORBIDE MONONITRATE 30 MG TABLET SUSTAINED-RELEASE 24 HOUR: Performed by: INTERNAL MEDICINE

## 2023-03-22 RX ORDER — CLOPIDOGREL BISULFATE 75 MG/1
75 TABLET ORAL DAILY
Status: DISCONTINUED | OUTPATIENT
Start: 2023-03-23 | End: 2023-03-23 | Stop reason: HOSPADM

## 2023-03-22 RX ORDER — MIDAZOLAM HYDROCHLORIDE 1 MG/ML
INJECTION INTRAMUSCULAR; INTRAVENOUS
Status: DISCONTINUED | OUTPATIENT
Start: 2023-03-22 | End: 2023-03-22 | Stop reason: HOSPADM

## 2023-03-22 RX ORDER — LISINOPRIL 20 MG/1
20 TABLET ORAL
Status: DISCONTINUED | OUTPATIENT
Start: 2023-03-23 | End: 2023-03-23

## 2023-03-22 RX ORDER — QUETIAPINE FUMARATE 25 MG/1
25 TABLET, FILM COATED ORAL NIGHTLY
Status: DISCONTINUED | OUTPATIENT
Start: 2023-03-22 | End: 2023-03-23 | Stop reason: HOSPADM

## 2023-03-22 RX ORDER — HEPARIN SODIUM 1000 [USP'U]/ML
INJECTION, SOLUTION INTRAVENOUS; SUBCUTANEOUS
Status: DISCONTINUED | OUTPATIENT
Start: 2023-03-22 | End: 2023-03-22 | Stop reason: HOSPADM

## 2023-03-22 RX ORDER — INSULIN ASPART 100 [IU]/ML
0-7 INJECTION, SOLUTION INTRAVENOUS; SUBCUTANEOUS
Status: DISCONTINUED | OUTPATIENT
Start: 2023-03-22 | End: 2023-03-23 | Stop reason: HOSPADM

## 2023-03-22 RX ORDER — SODIUM CHLORIDE 9 MG/ML
50 INJECTION, SOLUTION INTRAVENOUS ONCE
Status: COMPLETED | OUTPATIENT
Start: 2023-03-22 | End: 2023-03-22

## 2023-03-22 RX ORDER — ASPIRIN 81 MG/1
81 TABLET ORAL DAILY
Status: DISCONTINUED | OUTPATIENT
Start: 2023-03-23 | End: 2023-03-23 | Stop reason: HOSPADM

## 2023-03-22 RX ORDER — HYDROCODONE BITARTRATE AND ACETAMINOPHEN 7.5; 325 MG/1; MG/1
1 TABLET ORAL EVERY 6 HOURS PRN
Status: DISCONTINUED | OUTPATIENT
Start: 2023-03-22 | End: 2023-03-23 | Stop reason: HOSPADM

## 2023-03-22 RX ORDER — PROMETHAZINE HYDROCHLORIDE 25 MG/1
25 TABLET ORAL EVERY 6 HOURS PRN
Status: DISCONTINUED | OUTPATIENT
Start: 2023-03-22 | End: 2023-03-23 | Stop reason: HOSPADM

## 2023-03-22 RX ORDER — NITROGLYCERIN 0.4 MG/1
0.4 TABLET SUBLINGUAL
Status: DISCONTINUED | OUTPATIENT
Start: 2023-03-22 | End: 2023-03-23 | Stop reason: HOSPADM

## 2023-03-22 RX ORDER — NICOTINE POLACRILEX 4 MG
15 LOZENGE BUCCAL
Status: DISCONTINUED | OUTPATIENT
Start: 2023-03-22 | End: 2023-03-23 | Stop reason: HOSPADM

## 2023-03-22 RX ORDER — GABAPENTIN 300 MG/1
600 CAPSULE ORAL 3 TIMES DAILY
Status: DISCONTINUED | OUTPATIENT
Start: 2023-03-22 | End: 2023-03-23 | Stop reason: HOSPADM

## 2023-03-22 RX ORDER — GLUCAGON 1 MG/ML
1 KIT INJECTION
Status: DISCONTINUED | OUTPATIENT
Start: 2023-03-22 | End: 2023-03-23 | Stop reason: HOSPADM

## 2023-03-22 RX ORDER — PANTOPRAZOLE SODIUM 40 MG/1
40 TABLET, DELAYED RELEASE ORAL DAILY PRN
Status: DISCONTINUED | OUTPATIENT
Start: 2023-03-23 | End: 2023-03-23 | Stop reason: HOSPADM

## 2023-03-22 RX ORDER — CLOPIDOGREL BISULFATE 75 MG/1
TABLET ORAL
Status: DISCONTINUED | OUTPATIENT
Start: 2023-03-22 | End: 2023-03-22 | Stop reason: HOSPADM

## 2023-03-22 RX ORDER — ISOSORBIDE MONONITRATE 30 MG/1
30 TABLET, EXTENDED RELEASE ORAL
Status: DISCONTINUED | OUTPATIENT
Start: 2023-03-22 | End: 2023-03-23 | Stop reason: HOSPADM

## 2023-03-22 RX ORDER — ASPIRIN 325 MG
325 TABLET ORAL ONCE
Status: COMPLETED | OUTPATIENT
Start: 2023-03-22 | End: 2023-03-22

## 2023-03-22 RX ORDER — LIDOCAINE HYDROCHLORIDE 20 MG/ML
INJECTION, SOLUTION INFILTRATION; PERINEURAL
Status: DISCONTINUED | OUTPATIENT
Start: 2023-03-22 | End: 2023-03-22 | Stop reason: HOSPADM

## 2023-03-22 RX ORDER — HYDROCHLOROTHIAZIDE 12.5 MG/1
12.5 TABLET ORAL
Status: DISCONTINUED | OUTPATIENT
Start: 2023-03-23 | End: 2023-03-23

## 2023-03-22 RX ORDER — DULOXETIN HYDROCHLORIDE 60 MG/1
60 CAPSULE, DELAYED RELEASE ORAL DAILY
Status: DISCONTINUED | OUTPATIENT
Start: 2023-03-23 | End: 2023-03-23 | Stop reason: HOSPADM

## 2023-03-22 RX ORDER — METOPROLOL SUCCINATE 25 MG/1
25 TABLET, EXTENDED RELEASE ORAL DAILY
Status: DISCONTINUED | OUTPATIENT
Start: 2023-03-23 | End: 2023-03-23 | Stop reason: HOSPADM

## 2023-03-22 RX ORDER — ASPIRIN 81 MG/1
81 TABLET ORAL DAILY
Status: DISCONTINUED | OUTPATIENT
Start: 2023-03-23 | End: 2023-03-22 | Stop reason: HOSPADM

## 2023-03-22 RX ORDER — SODIUM CHLORIDE 0.9 % (FLUSH) 0.9 %
3 SYRINGE (ML) INJECTION EVERY 12 HOURS SCHEDULED
Status: DISCONTINUED | OUTPATIENT
Start: 2023-03-22 | End: 2023-03-22 | Stop reason: HOSPADM

## 2023-03-22 RX ORDER — SODIUM CHLORIDE 0.9 % (FLUSH) 0.9 %
10 SYRINGE (ML) INJECTION AS NEEDED
Status: DISCONTINUED | OUTPATIENT
Start: 2023-03-22 | End: 2023-03-22 | Stop reason: HOSPADM

## 2023-03-22 RX ORDER — ROSUVASTATIN CALCIUM 10 MG/1
10 TABLET, COATED ORAL DAILY
Status: DISCONTINUED | OUTPATIENT
Start: 2023-03-23 | End: 2023-03-23 | Stop reason: HOSPADM

## 2023-03-22 RX ORDER — SODIUM CHLORIDE 9 MG/ML
40 INJECTION, SOLUTION INTRAVENOUS AS NEEDED
Status: DISCONTINUED | OUTPATIENT
Start: 2023-03-22 | End: 2023-03-22 | Stop reason: HOSPADM

## 2023-03-22 RX ORDER — SODIUM CHLORIDE 9 MG/ML
100 INJECTION, SOLUTION INTRAVENOUS CONTINUOUS
Status: ACTIVE | OUTPATIENT
Start: 2023-03-22 | End: 2023-03-22

## 2023-03-22 RX ORDER — ACETAMINOPHEN 325 MG/1
650 TABLET ORAL EVERY 4 HOURS PRN
Status: DISCONTINUED | OUTPATIENT
Start: 2023-03-22 | End: 2023-03-23 | Stop reason: HOSPADM

## 2023-03-22 RX ORDER — FENTANYL CITRATE 50 UG/ML
INJECTION, SOLUTION INTRAMUSCULAR; INTRAVENOUS
Status: DISCONTINUED | OUTPATIENT
Start: 2023-03-22 | End: 2023-03-22 | Stop reason: HOSPADM

## 2023-03-22 RX ORDER — PANTOPRAZOLE SODIUM 40 MG/1
40 TABLET, DELAYED RELEASE ORAL ONCE
Status: COMPLETED | OUTPATIENT
Start: 2023-03-22 | End: 2023-03-22

## 2023-03-22 RX ORDER — DEXTROSE MONOHYDRATE 25 G/50ML
25 INJECTION, SOLUTION INTRAVENOUS
Status: DISCONTINUED | OUTPATIENT
Start: 2023-03-22 | End: 2023-03-23 | Stop reason: HOSPADM

## 2023-03-22 RX ORDER — NITROGLYCERIN 5 MG/ML
INJECTION, SOLUTION INTRAVENOUS
Status: DISCONTINUED | OUTPATIENT
Start: 2023-03-22 | End: 2023-03-22 | Stop reason: HOSPADM

## 2023-03-22 RX ADMIN — GABAPENTIN 600 MG: 300 CAPSULE ORAL at 11:23

## 2023-03-22 RX ADMIN — ASPIRIN 325 MG: 325 TABLET ORAL at 07:51

## 2023-03-22 RX ADMIN — INSULIN DETEMIR 25 UNITS: 100 INJECTION, SOLUTION SUBCUTANEOUS at 20:38

## 2023-03-22 RX ADMIN — PANTOPRAZOLE SODIUM 40 MG: 40 TABLET, DELAYED RELEASE ORAL at 11:13

## 2023-03-22 RX ADMIN — GABAPENTIN 600 MG: 300 CAPSULE ORAL at 20:38

## 2023-03-22 RX ADMIN — NITROGLYCERIN 0.4 MG: 0.4 TABLET, ORALLY DISINTEGRATING SUBLINGUAL at 11:14

## 2023-03-22 RX ADMIN — NITROGLYCERIN 0.4 MG: 0.4 TABLET, ORALLY DISINTEGRATING SUBLINGUAL at 13:29

## 2023-03-22 RX ADMIN — QUETIAPINE FUMARATE 25 MG: 25 TABLET ORAL at 20:38

## 2023-03-22 RX ADMIN — SODIUM CHLORIDE 50 ML/HR: 9 INJECTION, SOLUTION INTRAVENOUS at 07:46

## 2023-03-22 RX ADMIN — HYDROCODONE BITARTRATE AND ACETAMINOPHEN 1 TABLET: 7.5; 325 TABLET ORAL at 18:49

## 2023-03-22 RX ADMIN — ISOSORBIDE MONONITRATE 30 MG: 30 TABLET, EXTENDED RELEASE ORAL at 11:23

## 2023-03-22 NOTE — NURSING NOTE
"Dr Hoang at bs due to pt report of chest pain \"15\" out of 10. She is holding midsternal and reporting burning and pressure. EKG is being done at bs. Plan to give protonix and nitro as soon as medications are confirmed by pharmacy.   "

## 2023-03-22 NOTE — INTERVAL H&P NOTE
H&P reviewed. The patient was examined and there are no changes to the H&P.      [facility] Pre-Op:    Invasive coronary angiography was recommended to the patient.  The patientdenies  bleeding issues. The patient reports use of antiplatelet agents.  The patient denies  CKD. The patient denies  contrast allergy. The patient reports use of diabetes medications.    Pre-Cath Surgical History: No prior history of CABG or PCI    The risks (bleeding, infection, heart attack, stroke, kidney failure, need for urgent open heart bypass surgery, death), benefits (definitive diagnosis and possible treatment) and alternatives (medical therapy) of this procedure were discussed with the patient in detail today and she opted to proceed after understanding these.    The indications, risks/benefits and alternatives of diagnostic left heart cardiac catheterization, angiography, conscious sedation, and possible blood transfusion were discussed in detail with the patient. The potential complications of 1/2000 chance of death, 1/1000 chance of heart attack or stroke, 1/500 chance of bleeding or clotting of the femoral artery, and 1/500 chance of allergic reaction to contrast were discussed. We also reviewed possible complications of infection and kidney dysfunction. If PCI were performed and intra-coronary stents indicated, we discussed the details about MARV. This included a review of the risks of the infrequent, but relatively higher incidence of late thrombosis with MARV. The importance of maintaining a consistent daily regimen of aspirin and an additional anti-platelet agent for as long as directed after implantation was emphasized. No contraindications were found. The patient  appeared to understand and agreed to the above.  -Left heart catheterization, Coronary angiography, In-situ LIMA angiography, Left ventriculography, Intravascular ultrasound, Optical coherence tomography, Flow wire, Balloon Angioplasty, Coronary stent, Renal  angiography, Iliofemoral angiography, Device closure, femoral artery, Intra-aortic balloon pump, Impella LV assist device implantation, Transvenous pacemaker, and Pericardiocentesis    ASA Class: 2  Mallampati Score: 2    Contraindications to DAPT: None

## 2023-03-22 NOTE — POST-PROCEDURE NOTE
Pre-Op Diagnosis:  Angina pectoris  Post-Op Diagnosis:  Severe one-vessel coronary disease involving apical LAD; moderate CAD involving distal RCA  Procedure:  Left heart catheterization and left ventriculography, selective left and right coronary angiography, plain old balloon angioplasty to apical LAD  Anesthesia: Local  EBL: Minimal   Specimens:  None  Findings: Severe one-vessel coronary disease involving apical LAD; moderate CAD involving distal RCA  Complications: None  Disposition:  Patient tolerated the procedure well    This document has been electronically signed by Aryan Hoang MD on March 22, 2023 10:45 CDT

## 2023-03-23 VITALS
HEIGHT: 63 IN | BODY MASS INDEX: 34.91 KG/M2 | WEIGHT: 197 LBS | RESPIRATION RATE: 18 BRPM | SYSTOLIC BLOOD PRESSURE: 128 MMHG | HEART RATE: 93 BPM | TEMPERATURE: 98.6 F | DIASTOLIC BLOOD PRESSURE: 58 MMHG | OXYGEN SATURATION: 92 %

## 2023-03-23 LAB
ANION GAP SERPL CALCULATED.3IONS-SCNC: 8 MMOL/L (ref 5–15)
BUN SERPL-MCNC: 16 MG/DL (ref 8–23)
BUN/CREAT SERPL: 21.6 (ref 7–25)
CALCIUM SPEC-SCNC: 9.3 MG/DL (ref 8.6–10.5)
CHLORIDE SERPL-SCNC: 104 MMOL/L (ref 98–107)
CO2 SERPL-SCNC: 26 MMOL/L (ref 22–29)
CREAT SERPL-MCNC: 0.74 MG/DL (ref 0.57–1)
DEPRECATED RDW RBC AUTO: 49.4 FL (ref 37–54)
EGFRCR SERPLBLD CKD-EPI 2021: 86.1 ML/MIN/1.73
ERYTHROCYTE [DISTWIDTH] IN BLOOD BY AUTOMATED COUNT: 14.3 % (ref 12.3–15.4)
GLUCOSE BLDC GLUCOMTR-MCNC: 151 MG/DL (ref 70–130)
GLUCOSE BLDC GLUCOMTR-MCNC: 92 MG/DL (ref 70–130)
GLUCOSE SERPL-MCNC: 90 MG/DL (ref 65–99)
HCT VFR BLD AUTO: 33.3 % (ref 34–46.6)
HGB BLD-MCNC: 10.9 G/DL (ref 12–15.9)
MCH RBC QN AUTO: 31.5 PG (ref 26.6–33)
MCHC RBC AUTO-ENTMCNC: 32.7 G/DL (ref 31.5–35.7)
MCV RBC AUTO: 96.2 FL (ref 79–97)
PLATELET # BLD AUTO: 316 10*3/MM3 (ref 140–450)
PMV BLD AUTO: 9 FL (ref 6–12)
POTASSIUM SERPL-SCNC: 4.3 MMOL/L (ref 3.5–5.2)
QT INTERVAL: 402 MS
QT INTERVAL: 410 MS
QTC INTERVAL: 442 MS
QTC INTERVAL: 448 MS
RBC # BLD AUTO: 3.46 10*6/MM3 (ref 3.77–5.28)
SODIUM SERPL-SCNC: 138 MMOL/L (ref 136–145)
WBC NRBC COR # BLD: 9.85 10*3/MM3 (ref 3.4–10.8)

## 2023-03-23 PROCEDURE — 63710000001 CLOPIDOGREL 75 MG TABLET: Performed by: INTERNAL MEDICINE

## 2023-03-23 PROCEDURE — A9270 NON-COVERED ITEM OR SERVICE: HCPCS | Performed by: INTERNAL MEDICINE

## 2023-03-23 PROCEDURE — 63710000001 ROSUVASTATIN 10 MG TABLET: Performed by: INTERNAL MEDICINE

## 2023-03-23 PROCEDURE — 63710000001 GABAPENTIN 300 MG CAPSULE: Performed by: INTERNAL MEDICINE

## 2023-03-23 PROCEDURE — 63710000001 METOPROLOL SUCCINATE XL 25 MG TABLET SUSTAINED-RELEASE 24 HOUR: Performed by: INTERNAL MEDICINE

## 2023-03-23 PROCEDURE — 63710000001 INSULIN ASPART PER 5 UNITS: Performed by: INTERNAL MEDICINE

## 2023-03-23 PROCEDURE — 63710000001 ISOSORBIDE MONONITRATE 30 MG TABLET SUSTAINED-RELEASE 24 HOUR: Performed by: INTERNAL MEDICINE

## 2023-03-23 PROCEDURE — 80048 BASIC METABOLIC PNL TOTAL CA: CPT | Performed by: INTERNAL MEDICINE

## 2023-03-23 PROCEDURE — 63710000001 HYDROCHLOROTHIAZIDE 12.5 MG TABLET: Performed by: INTERNAL MEDICINE

## 2023-03-23 PROCEDURE — 82962 GLUCOSE BLOOD TEST: CPT

## 2023-03-23 PROCEDURE — 63710000001 LISINOPRIL 20 MG TABLET: Performed by: INTERNAL MEDICINE

## 2023-03-23 PROCEDURE — 63710000001 ASPIRIN 81 MG TABLET DELAYED-RELEASE: Performed by: INTERNAL MEDICINE

## 2023-03-23 PROCEDURE — 85027 COMPLETE CBC AUTOMATED: CPT | Performed by: INTERNAL MEDICINE

## 2023-03-23 PROCEDURE — 99239 HOSP IP/OBS DSCHRG MGMT >30: CPT | Performed by: INTERNAL MEDICINE

## 2023-03-23 PROCEDURE — 63710000001 DULOXETINE 60 MG CAPSULE DELAYED-RELEASE PARTICLES: Performed by: INTERNAL MEDICINE

## 2023-03-23 RX ORDER — CLOPIDOGREL BISULFATE 75 MG/1
75 TABLET ORAL DAILY
Qty: 30 TABLET | Refills: 3 | Status: SHIPPED | OUTPATIENT
Start: 2023-03-24

## 2023-03-23 RX ORDER — ISOSORBIDE MONONITRATE 30 MG/1
30 TABLET, EXTENDED RELEASE ORAL
Qty: 90 TABLET | Refills: 3 | Status: SHIPPED | OUTPATIENT
Start: 2023-03-24

## 2023-03-23 RX ORDER — LISINOPRIL 10 MG/1
10 TABLET ORAL
Status: DISCONTINUED | OUTPATIENT
Start: 2023-03-24 | End: 2023-03-23 | Stop reason: HOSPADM

## 2023-03-23 RX ORDER — LISINOPRIL 10 MG/1
10 TABLET ORAL
Qty: 90 TABLET | Refills: 3 | Status: SHIPPED | OUTPATIENT
Start: 2023-03-24

## 2023-03-23 RX ORDER — HYDROCHLOROTHIAZIDE 12.5 MG/1
12.5 TABLET ORAL
Qty: 90 TABLET | Refills: 3 | Status: SHIPPED | OUTPATIENT
Start: 2023-03-24

## 2023-03-23 RX ORDER — HYDROCHLOROTHIAZIDE 12.5 MG/1
12.5 TABLET ORAL
Status: DISCONTINUED | OUTPATIENT
Start: 2023-03-24 | End: 2023-03-23 | Stop reason: HOSPADM

## 2023-03-23 RX ADMIN — CLOPIDOGREL BISULFATE 75 MG: 75 TABLET ORAL at 08:59

## 2023-03-23 RX ADMIN — ROSUVASTATIN CALCIUM 10 MG: 10 TABLET, FILM COATED ORAL at 08:59

## 2023-03-23 RX ADMIN — INSULIN ASPART 2 UNITS: 100 INJECTION, SOLUTION INTRAVENOUS; SUBCUTANEOUS at 11:36

## 2023-03-23 RX ADMIN — GABAPENTIN 600 MG: 300 CAPSULE ORAL at 08:59

## 2023-03-23 RX ADMIN — ISOSORBIDE MONONITRATE 30 MG: 30 TABLET, EXTENDED RELEASE ORAL at 08:59

## 2023-03-23 RX ADMIN — LISINOPRIL 20 MG: 20 TABLET ORAL at 08:59

## 2023-03-23 RX ADMIN — METOPROLOL SUCCINATE 25 MG: 25 TABLET, FILM COATED, EXTENDED RELEASE ORAL at 08:59

## 2023-03-23 RX ADMIN — HYDROCHLOROTHIAZIDE 12.5 MG: 12.5 TABLET ORAL at 08:59

## 2023-03-23 RX ADMIN — Medication 81 MG: at 08:59

## 2023-03-23 RX ADMIN — DULOXETINE HYDROCHLORIDE 60 MG: 60 CAPSULE, DELAYED RELEASE ORAL at 08:59

## 2023-03-23 NOTE — DISCHARGE SUMMARY
[facility] CARDIOLOGY  [address] [place] [address]. [id number]  T - [phone]     DISCHARGE SUMMARY         PATIENT  DEMOGRAPHICS   PATIENT NAME: Bertha Hyde                      PHYSICIAN: Dr. AGUSTIN Hoang  : 1950  MRN: [id number]    ADMISSION/DISCHARGE INFO   ADMISSION DATE: 3/22/2023     DISCHARGE DATE: 23    ADMISSION DIAGNOSES:   Precordial pain [R07.2]    DISCHARGE DIAGNOSES:   1. Precordial pain        Precordial pain    CAD S/P percutaneous coronary angioplasty      ATTENDING PROVIDER:   Dr. Hoang       CONSULTS   Consult Orders (all) (From admission, onward)     Start     Ordered    23 1158  Inpatient Diabetes Educator Consult  Once        Provider:  (Not yet assigned)    23 1157    23 1054  Cardiac Rehab Evaluation and Enrollment  Once        Provider:  (Not yet assigned)    23 1053               Consults     No orders found for last 30 day(s).          PROCEDURES     ECG/EMG Results (last 24 hours)     Procedure Component Value Units Date/Time    ECG 12 Lead Chest Pain [[id number]] Collected: 23 1333     Updated: 23 1401     QT Interval 402 ms      QTC Interval 442 ms     Narrative:      Test Reason : Chest Pain  Blood Pressure :   */*   mmHG  Vent. Rate :  73 BPM     Atrial Rate :  73 BPM     P-R Int : 148 ms          QRS Dur :  80 ms      QT Int : 402 ms       P-R-T Axes :   *  37  82 degrees     QTc Int : 442 ms    Normal sinus rhythm  Nonspecific T wave abnormality  Abnormal ECG  When compared with ECG of 22-MAR-2023 11:04,  Nonspecific T wave abnormality now evident in Anterior leads    Referred By:            Confirmed By:           Results for orders placed in visit on 22    Adult Transthoracic Echo Complete w/ Color, Spectral and Contrast if Necessary Per Protocol    Interpretation Summary  · Estimated left ventricular EF = 53% Left ventricular ejection fraction appears to be 51 - 55%. Left ventricular systolic  function is normal.      No radiology results for the last 30 days.    Cardiac Catheterization/Vascular Study    Result Date: 3/22/2023  Narrative: Images from the original result were not included. [facility] Cardiology CARDIAC CATHETERIZATION NOTE Date of procedure: 3/22/2023  Procedures performed:  1. Selective left and right coronary angiography 2. Left heart catheterization and left ventriculography 3. Plain old balloon angioplasty to the apical LAD 4. Ultrasound guided vascular access  Indication: Persistent anginal chest discomfort with unstable features Access site: Right radial artery Catheters: 5F Tiger 4.0, 5F pigtail catheter Guiding catheter: 6F XB LAD 3.5 Procedural details: Informed consent was obtained from the patient after explaining risks, benefits and alternatives of the procedure. The patient was brought to the cath lab and prepped and draped in the sterile fashion. Timeout was performed. Lidocaine was used for local anesthesia. Access was obtained in the access: Right radial artery using micropuncture and modified Seldinger technique and a  6Fr sheath was placed over the wire. Catheter exchanges were made over a 0.035 inch guidewire under fluoroscopic guidance. 5F Tiger 4.0 was used to engage the Left main. Multiple cineographic images were taken in orthogonal views. Subsequently 5F Tiger 4.0 was used to engage the RCA and multiple cineographic image were taken in orthogonal views.  5F pigtail catheter was used to cross the aortic valve. Left ventriculography was performed. Filling pressures were recorded and pull back was performed. The catheter was then removed over guidewire. The sheath was then removed and arterial hemostasis was obtained using TR band. There were no obvious complications and the patient was transferred to the recovery unit in hemodynamically stable condition. Findings: Hemodynamics:  Ao 147/70 mmHg, /4 mmHg, LVEDP 27 mmHg, no  hemodynamically significant gradient on pullback across the aortic valve Left ventriculography: Suboptimal LV injection.  LV systolic function appears normal on ventriculography. Visually estimated LVEF of 55-60%. Selective coronary angiography: Dominance: Right Left Main coronary artery: Angiographically normal. It divides into LAD and LCx. Left anterior descending artery: It reaches the apex and wraps around it.  Proximal LAD has a 20-30% stenosis.  Distal LAD has a tubular 99% stenosis as it wraps around the apex.  This was the target for POBA today. The vessel appears very small caliber distal to this lesion. First diagonal: Very small vessel. Second diagonal: Medium sized vessel.  It exhibits considerable tortuosity in the mid and distal segments. Angiographically normal.  Left circumflex: Minimal luminal irregularities are noted in mid LCx.  Rest of the vessel appears angiographically normal. OM1: Small caliber vessel.  No significant angiographic stenosis is noted. OM2: Small vessel.  Angiographically normal. OM3: Small caliber vessel.  No significant angiographic stenosis is noted. OM4: Medium sized vessel.  Angiographically normal. Right coronary artery: Dominant vessel.  Minor luminal irregularities are noted in proximal RCA.  There is a discrete 40-50% stenosis in the distal RCA. RPL: Medium sized vessel.  No significant angiographic stenosis is noted. RPDA: Medium sized vessel.  It has a 30% stenosis in its proximal segment. Plain old balloon angioplasty: A 6F XB LAD 3.5 guide catheter was used to engage the ostium of the left main coronary artery. IV heparin was used for anticoagulation. A run-through coronary guidewire was used to successfully cross the lesion in the apical LAD.  Multiple pre-dilations were performed with a 1.2 x 8 mm compliant balloon.  Stent placement was not attempted due to small caliber of apical LAD distal to the lesion.  There were no obvious complications on subsequent  angiographic images. The run-through coronary guidewire was then removed from the apical LAD. Final angiographic images did not show any obvious complications; CHEMA III flow was noted in the LAD system. Therapeutic ACT was documented at the end of the procedure. Complications: None Specimen Removed: None Estimated Blood Loss: Minimal  Conclusion : Severe one-vessel coronary disease involving apical LAD (99% stenosis).  Successful POBA to apical LAD with improvement in angiographic stenosis to 20-30%. Mild CAD involving distal RCA and RPDA branch of RCA.  Plan: Continue dual antiplatelet therapy with aspirin and Plavix.  Loading dose of Plavix was administered in the Cath Lab. Optimize medical therapy for CAD and CAD risk factors. Routine post cath care instructions (no driving for 48 hrs, no lifting >10 lbs from right arm for 5 days) were discussed with the patient and family. TR band release per protocol.  IV fluid hydration. Overnight observation. This document has been electronically signed by Aryan Hoang MD on March 22, 2023 11:36 CDT   Part of this note may be an electronic transcription/translation of spoken language to printed text using the Dragon Dictation System.       HISTORY OF PRESENT ILLNESS   Mrs. Bertha Hyde is a 72 year old female with history of hypertension, dyslipidemia, diabetes mellitus, anxiety, and chest pain.  She is a patient of Dr. Hoang's.  He had previously seen him for chest pain.  Over the last few months she has had worsening left-sided chest pain.  She was on good medications.  Due to tachycardia, it was recommended that she undergo left heart catheterization as an outpatient.  She presented through same-day surgery for elective left heart catheterization.  She was found to have a distal LAD lesion of 99% stenosis.  She received POBA to this lesion due to its small caliber.  He was loaded with Plavix in the Cath Lab.  She was admitted to 3 W telemetry unit for  observation..    DIAGNOSTIC DATA     Procedure(s):  Left Heart Cath         Results from last 7 days   Lab Units 03/23/23  0632 03/22/23  0736   SODIUM mmol/L 138 142   POTASSIUM mmol/L 4.3 3.6   CHLORIDE mmol/L 104 106   CO2 mmol/L 26.0 25.0   BUN mg/dL 16 13   CREATININE mg/dL 0.74 0.71   CALCIUM mg/dL 9.3 9.1   GLUCOSE mg/dL 90 73       Estimated Creatinine Clearance: 72.9 mL/min (by C-G formula based on SCr of 0.74 mg/dL).          Results from last 7 days   Lab Units 03/23/23  0632 03/22/23  0736   WBC 10*3/mm3 9.85 10.78   HEMOGLOBIN g/dL 10.9* 10.6*   PLATELETS 10*3/mm3 316 338       Results from last 7 days   Lab Units 03/22/23  0736   INR  1.05       Lab Results   Component Value Date    CKTOTAL 35 02/25/2016    CKMB 0.4 02/25/2016    TROPONINI <0.012 02/25/2016     No results found for: PROBNP    I/O last 3 completed shifts:  In: 780 [P.O.:780]  Out: 1000 [Urine:1000]      HOSPITAL COURSE   Patient is a 72 y.o. female presented for elective left heart catheterization.  Found to have severe one-vessel CAD involving the distal LAD with 99% stenosis.  She received POBA to this lesion was started on DAPT with aspirin and Plavix.  She was admitted overnight for observation on 3 W.  No overnight complications developed.  Right radial site without evidence of bleeding or hematoma.  Vital signs are stable, cardiac telemetry showing normal sinus rhythm without ectopy.   He did experience some epigastric burning when lying down.  Encouraged her to walk.  Walk the unit without chest pain or return of epigastric burning.     DISCHARGE CONDITION   Condition on Discharge: Stable    Vital Signs  Temp:  [96.9 °F (36.1 °C)-98.2 °F (36.8 °C)] 98.2 °F (36.8 °C)  Heart Rate:  [] 103  Resp:  [18] 18  BP: ()/(47-65) 112/53    Physical Exam:  Physical Exam  Vitals and nursing note reviewed.   Constitutional:       Appearance: Normal appearance. She is well-developed and well-groomed. She is obese. She is not  ill-appearing or diaphoretic.   HENT:      Head: Normocephalic and atraumatic.      Right Ear: External ear normal.      Left Ear: External ear normal.      Nose: Nose normal. No congestion.      Mouth/Throat:      Mouth: Mucous membranes are moist.      Pharynx: Oropharynx is clear. No oropharyngeal exudate.   Eyes:      General:         Right eye: No discharge.         Left eye: No discharge.      Conjunctiva/sclera: Conjunctivae normal.   Cardiovascular:      Rate and Rhythm: Normal rate and regular rhythm.      Heart sounds: Normal heart sounds, S1 normal and S2 normal. No murmur heard.  Pulmonary:      Effort: Pulmonary effort is normal.      Breath sounds: Normal breath sounds and air entry.   Musculoskeletal:         General: Normal range of motion.      Right lower leg: No edema.      Left lower leg: No edema.   Skin:     General: Skin is warm and dry.      Capillary Refill: Capillary refill takes less than 2 seconds.      Coloration: Skin is not pale.      Findings: No rash.   Neurological:      Mental Status: She is alert and oriented to person, place, and time.   Psychiatric:         Attention and Perception: Attention normal.         Mood and Affect: Mood normal.         Speech: Speech normal.         DISPOSITION   To Home      DISCHARGE MEDICATIONS        Your medication list      START taking these medications      Instructions Last Dose Given Next Dose Due   clopidogrel 75 MG tablet  Commonly known as: PLAVIX  Start taking on: March 24, 2023      Take 1 tablet by mouth Daily.       hydroCHLOROthiazide 12.5 MG tablet  Commonly known as: HYDRODIURIL  Start taking on: March 24, 2023      Take 1 tablet by mouth Daily.       isosorbide mononitrate 30 MG 24 hr tablet  Commonly known as: IMDUR  Start taking on: March 24, 2023      Take 1 tablet by mouth Daily.       lisinopril 10 MG tablet  Commonly known as: PRINIVIL,ZESTRIL  Start taking on: March 24, 2023      Take 1 tablet by mouth Daily.           CONTINUE taking these medications      Instructions Last Dose Given Next Dose Due   Alcohol Swabs pads      USE 4 TIMES DAILY       aspirin 81 MG EC tablet      Take 1 tablet by mouth Daily.       b complex-C-folic acid 1 MG capsule      Take 1 capsule by mouth Daily.       Blood Glucose Monitor System w/Device kit      Test Blood Sugars Once Daily       DULoxetine 60 MG capsule  Commonly known as: CYMBALTA      TAKE ONE CAPSULE BY MOUTH ONCE DAILY       empagliflozin 10 MG tablet tablet  Commonly known as: Jardiance      Take 1 tablet by mouth Daily. Lot [id number]  Apr 2021       gabapentin 300 MG capsule  Commonly known as: NEURONTIN      Take 2 capsules by mouth 3 (Three) Times a Day.       Global Ease Inject Pen Needles 31G X 5 MM misc  Generic drug: Insulin Pen Needle      Use one daily with Tresiba       glucose blood test strip      Test Blood Sugars tid prn       HYDROcodone-acetaminophen 7.5-325 MG per tablet  Commonly known as: NORCO      TAKE ONE TABLET BY MOUTH EVERY 6 HOURS AS NEEDED       insulin degludec 100 UNIT/ML solution pen-injector injection  Commonly known as: TRESIBA FLEXTOUCH      Inject 50 Units under the skin into the appropriate area as directed Every Night.       Lancets Micro Thin 33G misc      Test Blood Sugars Once Daily       loratadine 10 MG tablet  Commonly known as: CLARITIN      Take 1 tablet by mouth Daily As Needed. Indication: Allergic rhinitis       metoprolol succinate XL 25 MG 24 hr tablet  Commonly known as: Toprol XL      Take 1 tablet by mouth Daily.       nitroglycerin 0.4 MG SL tablet  Commonly known as: Nitrostat      Place 1 tablet under the tongue Every 5 (Five) Minutes As Needed for Chest Pain. Take no more than 3 doses in 15 minutes.       NovoLOG FlexPen 100 UNIT/ML solution pen-injector sc pen  Generic drug: insulin aspart      Inject 10 Units under the skin into the appropriate area as directed 3 (Three) Times a Day With Meals.       Ozempic (0.25 or 0.5  MG/DOSE) 2 MG/1.5ML solution pen-injector  Generic drug: Semaglutide(0.25 or 0.5MG/DOS)      Inject 0.5 mg under the skin into the appropriate area as directed 1 (One) Time Per Week. 0.5 mg weekly       pantoprazole 40 MG EC tablet  Commonly known as: Protonix      Take 1 tablet by mouth Daily As Needed (heartburn).       PRENATAL VITAMIN PO      Take 1 tablet by mouth.       promethazine 25 MG tablet  Commonly known as: PHENERGAN      Take 1 tablet by mouth Every 6 (Six) Hours As Needed for Nausea.       QUEtiapine 25 MG tablet  Commonly known as: SEROquel      TAKE ONE TABLET BY MOUTH EACH EVENING AT BEDTIME       rizatriptan MLT 10 MG disintegrating tablet  Commonly known as: MAXALT-MLT      Place 1 tablet on the tongue Daily As Needed for Migraine. Take 1 tab at onset of headache, can repeat x 1 in 2 hrs prn       rosuvastatin 10 MG tablet  Commonly known as: CRESTOR      TAKE ONE TABLET BY MOUTH ONCE DAILY          STOP taking these medications    lisinopril-hydrochlorothiazide 20-12.5 MG per tablet  Commonly known as: BERNADETTE MOJICA              Where to Get Your Medications      These medications were sent to [facility]  [address] [place] [address]    Hours: Monday through Friday 7:00am to 5:00pm Phone: [phone] ·   clopidogrel 75 MG tablet  · hydroCHLOROthiazide 12.5 MG tablet  · isosorbide mononitrate 30 MG 24 hr tablet  · lisinopril 10 MG tablet         INSTRUCTIONS   Activity:   Activity Instructions     Activity as Tolerated      Bathing Restrictions      Type of Restriction: Bathing    Bathing Restrictions: Other    Explain Bathing Restrictions: showers only for 1 week    Driving Restrictions      Type of Restriction: Driving    Driving Restrictions: No Driving (Time Limited)    Length: Other    Indicate Length of Restriction: 48 hours    Lifting Restrictions      Type of Restriction: Lifting    Lifting Restrictions: Lifting  Restriction (Indicate Limit)    Weight Limit (Pounds): 5    Length of Lifting Restriction: for 1 week with right hand, wrist and arm          Diet:   Diet Instructions     Diet: Diabetic Diets, Cardiac Diets; Healthy Heart (2-3 Na+); Consistent Carbohydrate; Texture: Regular Texture (IDDSI 7); Fluid Consistency: Pudding Thick      Discharge Diet:  Diabetic Diets  Cardiac Diets       Cardiac Diet: Healthy Heart (2-3 Na+)    Diabetic Diet: Consistent Carbohydrate    Texture: Regular Texture (IDDSI 7)    Fluid Consistency: Pudding Thick          Patient instructions not to lift more than 5 pounds for 1 week.  Patient instructions not to soak cath site for 1 week.  Patient given instructions to report signs and symptoms of infection including but not limited to purulent drainage, tenderness, fever greater than 101 degrees.   Patient instructions regarding importance of medication compliance.  Patient was counseled on medications (new and old) with side effects.  Discussed signs symptoms to report.   Should you experience return of chest pain, present to the nearest emergency room.     Special Instructions: Patient instructed to call M.D. or return to ED with worsening shortness of breath, chest pain, fever greater than 101°F or any other medical concerns.    FOLLOW UP   Additional Instructions for the Follow-ups that You Need to Schedule     Discharge Follow-up with Specified Provider: Dr. Hoang; 1 Month   As directed      To: Dr. Hoang    Follow Up: 1 Month            Follow-up Information     Belinda Finn MD .    Specialties: Family Medicine, Hospitalist, Emergency Medicine, Urgent Care  Contact information:  [address]  [address]  [facility] [id number] [phone]                         Follow-up Appointments  Future Appointments   Date Time Provider Department Center   4/18/2023  1:00 PM [facility] STRESS LAB 1  [facility] CARDS [facility]   6/12/2023  2:00 PM Aryan Hoang MD [facility] CD [facility] None   7/10/2023   9:00 AM Carlos Enrique Lrod APRN [facility] END [facility]   7/19/2023 11:00 AM Belinda Finn MD [facility] [place] [facility]   7/19/2023  1:00 PM [facility] WOMEN CTR [facility] 1 [facility]     Additional Instructions for the Follow-ups that You Need to Schedule     Discharge Follow-up with Specified Provider: Dr. Hoang; 1 Month   As directed      To: Dr. Hoang    Follow Up: 1 Month                PENDING TEST RESULTS AT DISCHARGE          TIME   Time: 31  minutes were spent planning this discharge.              Dr. Hoang is the attending at time of discharge, He is aware of the patient's status and agrees with the above discharge summary.           This document has been electronically signed by PAYTON Mckeon on March 23, 2023 12:16 CDT   Electronically signed by PAYTON Mckeon, 03/23/23, 12:16 PM CDT.    I personally saw and examined Bertha Hyde after the APRN.  I personally performed a history and physical examination of the patient.  I personally reviewed independent findings and plan of care.  I discussed management with the APRN.  I agree with the APRN's documentation.    No acute overnight events.  The patient denied any significant chest discomfort today morning.  Right radial cath access site was examined.  Right radial pulse was intact.  There was no evidence of ecchymosis or hematoma.  Post procedure labs and ECG were reviewed.    Vitals:    03/23/23 0752 03/23/23 0755 03/23/23 0901 03/23/23 1239   BP: 112/53   128/58   BP Location: Left arm   Left arm   Patient Position: Lying   Lying   Pulse: 85 82 103 93   Resp: 18   18   Temp: 98.2 °F (36.8 °C)   98.6 °F (37 °C)   TempSrc: Temporal   Temporal   SpO2: 92%  93% 92%   Weight:       Height:         Agree with discharge medication reconciliation as detailed above.  Continue dual antiplatelet therapy with aspirin and Plavix.  Continue Crestor therapy.  Continue Toprol-XL therapy.  Initiate Imdur therapy.  Decrease the dose of lisinopril.   Continue HCTZ at current doses.  Routine post-cath care instructions were personally reviewed with the patient.  Recommend outpatient follow-up with cardiology within one month after hospital discharge.    Electronically signed by Aryan Hoang MD, 03/23/23, 5:19 PM CDT.

## 2023-03-24 ENCOUNTER — DOCUMENTATION (OUTPATIENT)
Dept: CARDIAC REHAB | Facility: HOSPITAL | Age: 73
End: 2023-03-24
Payer: MEDICARE

## 2023-03-24 NOTE — PROGRESS NOTES
Cardiac Rehab Evaluation: Invite letter   I have personally performed a face to face diagnostic evaluation on this patient. I have reviewed the ACP note and agree with the history, exam and plan of care, except as noted. Attending Only

## 2023-03-28 ENCOUNTER — OFFICE VISIT (OUTPATIENT)
Dept: FAMILY MEDICINE CLINIC | Facility: CLINIC | Age: 73
End: 2023-03-28
Payer: MEDICARE

## 2023-03-28 VITALS
WEIGHT: 198.3 LBS | DIASTOLIC BLOOD PRESSURE: 72 MMHG | SYSTOLIC BLOOD PRESSURE: 122 MMHG | OXYGEN SATURATION: 97 % | BODY MASS INDEX: 35.14 KG/M2 | RESPIRATION RATE: 18 BRPM | HEIGHT: 63 IN | HEART RATE: 81 BPM

## 2023-03-28 DIAGNOSIS — Z98.61 CAD S/P PERCUTANEOUS CORONARY ANGIOPLASTY: Primary | ICD-10-CM

## 2023-03-28 DIAGNOSIS — R13.19 ESOPHAGEAL DYSPHAGIA: ICD-10-CM

## 2023-03-28 DIAGNOSIS — I25.10 CAD S/P PERCUTANEOUS CORONARY ANGIOPLASTY: Primary | ICD-10-CM

## 2023-03-28 NOTE — PROGRESS NOTES
Transitional Care Follow Up Visit  Subjective     Bertha Hyde is a 72 y.o. female who presents for a transitional care management visit.    Within 48 business hours after discharge our office contacted her via telephone to coordinate her care and needs.      I reviewed and discussed the details of that call along with the discharge summary, hospital problems, inpatient lab results, inpatient diagnostic studies, and consultation reports with Bertha.     Current outpatient and discharge medications have been reconciled for the patient.  Reviewed by: Belinda Finn MD      No flowsheet data found.  Risk for Readmission (LACE) Score: 4 (3/23/2023  5:01 AM)      History of Present Illness   Course During Hospital Stay: Recent hospitalization, labs, xrays reviewed and medications reconciled. Had elective heart catheterization with POBA. Has been having some trouble with swallowing which may be causing some chest pain.      Copied from hospital record:   Patient is a 72 y.o. female presented for elective left heart catheterization.  Found to have severe one-vessel CAD involving the distal LAD with 99% stenosis.  She received POBA to this lesion was started on DAPT with aspirin and Plavix.  She was admitted overnight for observation on 3 W.  No overnight complications developed.  Right radial site without evidence of bleeding or hematoma.  Vital signs are stable, cardiac telemetry showing normal sinus rhythm without ectopy.   He did experience some epigastric burning when lying down.  Encouraged her to walk.  Walk the unit without chest pain or return of epigastric burning.     The following portions of the patient's history were reviewed and updated as appropriate: allergies, current medications, past family history, past medical history, past social history, past surgical history and problem list.     Outpatient Medications Prior to Visit   Medication Sig Dispense Refill   • Alcohol Swabs pads USE 4 TIMES  DAILY 150 each 11   • aspirin 81 MG EC tablet Take 1 tablet by mouth Daily.     • b complex-C-folic acid 1 MG capsule Take 1 capsule by mouth Daily.     • Blood Glucose Monitoring Suppl (BLOOD GLUCOSE MONITOR SYSTEM) W/DEVICE kit Test Blood Sugars Once Daily 1 each 0   • clopidogrel (PLAVIX) 75 MG tablet Take 1 tablet by mouth Daily. 30 tablet 3   • DULoxetine (CYMBALTA) 60 MG capsule TAKE ONE CAPSULE BY MOUTH ONCE DAILY 90 capsule 11   • empagliflozin (Jardiance) 10 MG tablet tablet Take 1 tablet by mouth Daily. Lot [id number]  Apr 2021 90 tablet 3   • gabapentin (NEURONTIN) 300 MG capsule Take 2 capsules by mouth 3 (Three) Times a Day.     • glucose blood test strip Test Blood Sugars tid prn 100 each 5   • hydroCHLOROthiazide (HYDRODIURIL) 12.5 MG tablet Take 1 tablet by mouth Daily. 90 tablet 3   • insulin aspart (NovoLOG FlexPen) 100 UNIT/ML solution pen-injector sc pen Inject 10 Units under the skin into the appropriate area as directed 3 (Three) Times a Day With Meals. 3 pen 11   • insulin degludec (TRESIBA FLEXTOUCH) 100 UNIT/ML solution pen-injector injection Inject 50 Units under the skin into the appropriate area as directed Every Night. 30 mL 11   • Insulin Pen Needle (Global Ease Inject Pen Needles) 31G X 5 MM misc Use one daily with Tresiba 90 each 3   • isosorbide mononitrate (IMDUR) 30 MG 24 hr tablet Take 1 tablet by mouth Daily. 90 tablet 3   • LANCETS MICRO THIN 33G misc Test Blood Sugars Once Daily 100 each 3   • lisinopril (PRINIVIL,ZESTRIL) 10 MG tablet Take 1 tablet by mouth Daily. 90 tablet 3   • loratadine (CLARITIN) 10 MG tablet Take 1 tablet by mouth Daily As Needed. Indication: Allergic rhinitis     • metoprolol succinate XL (Toprol XL) 25 MG 24 hr tablet Take 1 tablet by mouth Daily. 90 tablet 1   • nitroglycerin (Nitrostat) 0.4 MG SL tablet Place 1 tablet under the tongue Every 5 (Five) Minutes As Needed for Chest Pain. Take no more than 3 doses in 15 minutes. 25 tablet 0   • pantoprazole  "(Protonix) 40 MG EC tablet Take 1 tablet by mouth Daily As Needed (heartburn). 90 tablet 1   • Prenatal Vit-Fe Fumarate-FA (PRENATAL VITAMIN PO) Take 1 tablet by mouth.     • promethazine (PHENERGAN) 25 MG tablet Take 1 tablet by mouth Every 6 (Six) Hours As Needed for Nausea. 30 tablet 0   • QUEtiapine (SEROquel) 25 MG tablet TAKE ONE TABLET BY MOUTH EACH EVENING AT BEDTIME 30 tablet 6   • rizatriptan MLT (MAXALT-MLT) 10 MG disintegrating tablet Place 1 tablet on the tongue Daily As Needed for Migraine. Take 1 tab at onset of headache, can repeat x 1 in 2 hrs prn 27 tablet 3   • rosuvastatin (CRESTOR) 10 MG tablet TAKE ONE TABLET BY MOUTH ONCE DAILY 90 tablet 4   • Semaglutide,0.25 or 0.5MG/DOS, (Ozempic, 0.25 or 0.5 MG/DOSE,) 2 MG/1.5ML solution pen-injector Inject 0.5 mg under the skin into the appropriate area as directed 1 (One) Time Per Week. 0.5 mg weekly 1 pen 11   • HYDROcodone-acetaminophen (NORCO) 7.5-325 MG per tablet TAKE ONE TABLET BY MOUTH EVERY 6 HOURS AS NEEDED (Patient not taking: Reported on 3/28/2023) 60 tablet 0     No facility-administered medications prior to visit.       Review of Systems  I have reviewed 12 systems with patient. Findings were negative except what is noted below and/or in history of present illness.    Objective   Visit Vitals  /72   Pulse 81   Resp 18   Ht 160 cm (63\")   Wt 89.9 kg (198 lb 4.8 oz)   SpO2 97%   BMI 35.13 kg/m²         Physical Exam  Vitals and nursing note reviewed.   Constitutional:       General: She is not in acute distress.     Appearance: She is well-developed.   HENT:      Head: Normocephalic and atraumatic.      Nose: Nose normal.   Eyes:      General:         Right eye: No discharge.         Left eye: No discharge.      Conjunctiva/sclera: Conjunctivae normal.      Pupils: Pupils are equal, round, and reactive to light.   Neck:      Thyroid: No thyromegaly.   Cardiovascular:      Rate and Rhythm: Normal rate and regular rhythm.      Heart " sounds: Normal heart sounds.   Pulmonary:      Effort: Pulmonary effort is normal.      Breath sounds: Normal breath sounds.   Lymphadenopathy:      Cervical: No cervical adenopathy.   Skin:     General: Skin is warm and dry.   Neurological:      Mental Status: She is alert and oriented to person, place, and time.       Assessment & Plan   Diagnoses and all orders for this visit:    1. CAD S/P percutaneous coronary angioplasty (Primary)    2. Esophageal dysphagia  -     Ambulatory Referral to Gastroenterology    Continue current medications. Ref gastro for dysphagia.      No orders of the defined types were placed in this encounter.    Follow up with cardiology. Referral to gastro.     Current outpatient and discharge medications have been reconciled for the patient.  Reviewed by: Belinda Finn MD      Return if symptoms worsen or fail to improve, for Next scheduled follow up.

## 2023-03-29 LAB
QT INTERVAL: 364 MS
QTC INTERVAL: 459 MS

## 2023-04-05 ENCOUNTER — DOCUMENTATION (OUTPATIENT)
Dept: ENDOCRINOLOGY | Facility: CLINIC | Age: 73
End: 2023-04-05
Payer: MEDICARE

## 2023-04-18 ENCOUNTER — HOSPITAL ENCOUNTER (OUTPATIENT)
Dept: CARDIOLOGY | Facility: HOSPITAL | Age: 73
Discharge: HOME | End: 2023-04-18
Admitting: INTERNAL MEDICINE
Payer: MEDICARE

## 2023-04-18 DIAGNOSIS — R55 SYNCOPE AND COLLAPSE: ICD-10-CM

## 2023-04-18 PROCEDURE — 93660 TILT TABLE EVALUATION: CPT

## 2023-04-19 ENCOUNTER — TELEPHONE (OUTPATIENT)
Dept: CARDIOLOGY | Facility: CLINIC | Age: 73
End: 2023-04-19
Payer: MEDICARE

## 2023-04-19 DIAGNOSIS — R07.2 PRECORDIAL PAIN: Primary | ICD-10-CM

## 2023-04-19 LAB
MAXIMAL PREDICTED HEART RATE: 148 BPM
STRESS TARGET HR: 126 BPM

## 2023-04-19 NOTE — PROGRESS NOTES
Tilt-table test was negative for POTS or neurocardiogenic etiologies of syncope.  Significant discrepancies were noted in BP readings between the right and left arms.  I would like her to get a CTA of the chest to rule out coarctation of the aorta and peripheral arterial disease involving thoracic vasculature.  I have ordered it, can you get it scheduled if she is agreeable?

## 2023-04-20 ENCOUNTER — TELEPHONE (OUTPATIENT)
Dept: CARDIOLOGY | Facility: CLINIC | Age: 73
End: 2023-04-20
Payer: MEDICARE

## 2023-04-20 NOTE — TELEPHONE ENCOUNTER
Pt returned call and advised her Per Dr. Hoang your Tilt-table test was negative for POTS or neurocardiogenic etiologies of syncope.  Significant discrepancies were noted in BP readings between the right and left arms.  I would like her to get a CTA of the chest to rule out coarctation of the aorta and peripheral arterial disease involving thoracic vasculature. If you are agreeable to do this CTA Chest, it has been scheduled 5/3/2023 @ 2:00PM. You will enter at same day surgery and arrive about 15 minutes early. You will not be able to eat or drink 4 hours before the exam. She was understanding.             Returned patient call. She was not available so left generic VM for return call.   ----- Message from Rachana Alvarez sent at 4/20/2023  4:04 PM CDT -----  Contact: [phone]  Sophia hall a message on the v/m 4/19 @ 4:34- she has not read your message to her on my chart

## 2023-04-24 ENCOUNTER — TELEPHONE (OUTPATIENT)
Dept: CARDIOLOGY | Facility: CLINIC | Age: 73
End: 2023-04-24

## 2023-04-24 NOTE — TELEPHONE ENCOUNTER
Contacted patient per Dr. Hoang Heart monitor did not show any hemodynamically significant abnormal heart rhythms.   She was not available so left generic VM for return call.     ----- Message from Aryan Hoang MD sent at 4/24/2023  2:29 PM CDT -----  Heart monitor did not show any hemodynamically significant abnormal heart rhythms.

## 2023-04-28 ENCOUNTER — TRANSCRIBE ORDERS (OUTPATIENT)
Dept: CT IMAGING | Facility: HOSPITAL | Age: 73
End: 2023-04-28
Payer: MEDICARE

## 2023-04-28 DIAGNOSIS — R07.2 PRECORDIAL PAIN: Primary | ICD-10-CM

## 2023-05-03 ENCOUNTER — HOSPITAL ENCOUNTER (OUTPATIENT)
Dept: CT IMAGING | Facility: HOSPITAL | Age: 73
Discharge: HOME | End: 2023-05-03
Payer: MEDICARE

## 2023-05-03 ENCOUNTER — LAB (OUTPATIENT)
Dept: LAB | Facility: HOSPITAL | Age: 73
End: 2023-05-03
Payer: MEDICARE

## 2023-05-03 DIAGNOSIS — R07.2 PRECORDIAL PAIN: ICD-10-CM

## 2023-05-03 LAB
BUN SERPL-MCNC: 14 MG/DL (ref 8–23)
CREAT SERPL-MCNC: 0.88 MG/DL (ref 0.57–1)
EGFRCR SERPLBLD CKD-EPI 2021: 69.9 ML/MIN/1.73

## 2023-05-03 PROCEDURE — 25510000001 IOPAMIDOL PER 1 ML: Performed by: INTERNAL MEDICINE

## 2023-05-03 PROCEDURE — 36415 COLL VENOUS BLD VENIPUNCTURE: CPT

## 2023-05-03 PROCEDURE — 71275 CT ANGIOGRAPHY CHEST: CPT

## 2023-05-03 PROCEDURE — 84520 ASSAY OF UREA NITROGEN: CPT

## 2023-05-03 PROCEDURE — 82565 ASSAY OF CREATININE: CPT

## 2023-05-03 RX ADMIN — IOPAMIDOL 90 ML: 755 INJECTION, SOLUTION INTRAVENOUS at 15:05

## 2023-05-05 ENCOUNTER — OFFICE VISIT (OUTPATIENT)
Dept: CARDIOLOGY | Facility: CLINIC | Age: 73
End: 2023-05-05
Payer: MEDICARE

## 2023-05-05 VITALS
BODY MASS INDEX: 34.61 KG/M2 | WEIGHT: 195.3 LBS | HEIGHT: 63 IN | HEART RATE: 63 BPM | DIASTOLIC BLOOD PRESSURE: 66 MMHG | SYSTOLIC BLOOD PRESSURE: 122 MMHG | OXYGEN SATURATION: 99 %

## 2023-05-05 DIAGNOSIS — E66.2 CLASS 1 OBESITY WITH ALVEOLAR HYPOVENTILATION, SERIOUS COMORBIDITY, AND BODY MASS INDEX (BMI) OF 34.0 TO 34.9 IN ADULT: ICD-10-CM

## 2023-05-05 DIAGNOSIS — I25.118 CORONARY ARTERY DISEASE OF NATIVE ARTERY OF NATIVE HEART WITH STABLE ANGINA PECTORIS: Primary | ICD-10-CM

## 2023-05-05 DIAGNOSIS — R55 SYNCOPE AND COLLAPSE: ICD-10-CM

## 2023-05-05 DIAGNOSIS — I10 ESSENTIAL HYPERTENSION: ICD-10-CM

## 2023-05-05 DIAGNOSIS — E78.2 MIXED HYPERLIPIDEMIA: ICD-10-CM

## 2023-05-05 RX ORDER — OMEGA-3-ACID ETHYL ESTERS 1 G/1
2 CAPSULE, LIQUID FILLED ORAL 2 TIMES DAILY
Qty: 120 CAPSULE | Refills: 3 | Status: SHIPPED | OUTPATIENT
Start: 2023-05-05

## 2023-05-05 RX ORDER — ROSUVASTATIN CALCIUM 40 MG/1
40 TABLET, COATED ORAL DAILY
Qty: 90 TABLET | Refills: 3 | Status: SHIPPED | OUTPATIENT
Start: 2023-05-05

## 2023-05-05 NOTE — PROGRESS NOTES
"  [facility]  OFFICE NOTE    Cardiovascular Medicine  Aryan Hoang M.D., [facility]         No referring provider defined for this encounter.    Past Cardiac History:  1.  Coronary artery disease  2.  Hypertension  3.  Dyslipidemia  4.  Diabetes mellitus    Bertha Hyde is a 72 y.o. female who presents for consultation today. She has a known h/o diabetes mellitus, hypertension, dyslipidemia, anxiety and had COVID infection some time ago.    She has been having episodes of substernal chest discomfort for the past few months. These episodes are not provoked by exertion, are described as \"squeezing\", non-radiating and not associated with dyspnea or diaphoresis. They occur 3-4 times/week. She was prescribed SL nitro by her PCP, she has not tried that yet for these episodes.     She denies having any PND, orthopnea, leg swelling or palpitations.     10/14/2021:  We ordered a pharmacological nuclear stress test at her last visit for evaluation of chest discomfort.  This study showed normal LV function, no definite evidence of fixed or reversible myocardial perfusion defects and borderline ECG criteria for ischemia in inferior leads on Lexiscan stress.  It appeared low risk overall.  We decided to proceed with medical therapy with Lopressor 25 mg orally daily.    She states today that her chest pain has completely resolved after initiation of metoprolol tartrate.  He denies having any exertional dyspnea, PND, orthopnea, leg swelling or palpitations.  Review of systems is positive for fatigue, she requires to take a few naps during the day when she feels tired.    2/8/2022:  She presents for a follow-up visit today.  She has not had any further episodes of chest discomfort.  She has been walking more and is currently at 2500 steps a day.  Her ultimate goal is to increase it to 10,000 steps a day.  She has not had any palpitations, PND, orthopnea or leg swelling.  She does report feeling dizzy " at least once every day.  There have been some occasions she has felt presyncopal.  She has been checking her blood sugars regularly and denies having any episodes of hypoglycemia.    8/15/2022:  She presented today for a follow-up visit.  About 2 weeks ago, she had 3 episodes of syncope resulting in falls at home.  She was moving her house that day; reports moving stuff around the house that entire day.  She denies having any warning symptoms prior to these episodes (no chest discomfort, palpitations, dizziness or nausea prior to the episodes).  The first episode occurred around noon time, the second episode occurred in the evening (around 6 PM) and the third episode occurred late at night (around 1 AM, she was still moving stuff inside the house at that time).  She has not had any recurrence of these episodes or any episodes of dizziness since then.  She has not had any chest discomfort or dyspnea since her last visit.  She does not have any other cardiovascular concerns today.  Notably, she has only been taking metoprolol once daily because the second dose of metoprolol makes her feel very weak.    3/17/2023:  She presented today for a follow-up visit.  She reports having a couple of episodes of syncope since her last visit with us.  Both of these episodes occurred without any prior warning symptoms.  She reports regaining consciousness within a couple of minutes.  For the past 3 months, she has had several episodes of uneasiness in the left side of the chest.  These episodes have become more frequent lately.  She has also had dyspnea.  She denies having any PND, orthopnea or leg swelling.  She has been taking all medications as prescribed.    5/5/2023:  She presented today for a follow-up visit.  She reports having one brief episode of chest discomfort since her last visit with us; this episode promptly resolved with SL nitroglycerin.  She has not had any other episodes of chest discomfort since then.  She has  not had any episodes of fainting or syncope since her last visit.  She does report having brief episodes of dizziness (described as spinning) once or twice a week.  She has not had any palpitations, dyspnea, PND or orthopnea.  She has been taking all medications regularly as prescribed.    Review of Systems -   Constitution: Negative for weight gain and weight loss.    HENT: Negative for congestion.    Eyes: Negative for blurred vision.   Cardiovascular: As mentioned above  Respiratory: Negative for cough and hemoptysis.    Endocrine: Negative for polydipsia and polyuria.   Hematologic/Lymphatic: Negative for bleeding problem. Does not bruise/bleed easily.   Skin: Negative for flushing.   Musculoskeletal: Negative for neck pain and stiffness.   Gastrointestinal: Negative for abdominal pain, nausea and vomiting.   Genitourinary: Negative for dysuria and hematuria.   Neurological: Negative for focal weakness and numbness.   Psychiatric/Behavioral: Negative for altered mental status and depression.      All other systems were reviewed and were negative.    family history includes Arthritis in her father and mother; Cancer in her maternal aunt and paternal aunt; Coronary artery disease in an other family member; Heart disease in her mother; Hypertension in her mother and another family member; Mental illness in her maternal aunt and paternal aunt; Migraines in her maternal grandmother; Obesity in her maternal grandmother; Osteoporosis in her mother; Stroke in her mother.     reports that she has never smoked. She has never used smokeless tobacco. She reports that she does not drink alcohol and does not use drugs.    No Known Allergies      Current Outpatient Medications:   •  Alcohol Swabs pads, USE 4 TIMES DAILY, Disp: 150 each, Rfl: 11  •  aspirin 81 MG EC tablet, Take 1 tablet by mouth Daily., Disp: , Rfl:   •  b complex-C-folic acid 1 MG capsule, Take 1 capsule by mouth Daily., Disp: , Rfl:   •  Blood Glucose  Monitoring Suppl (BLOOD GLUCOSE MONITOR SYSTEM) W/DEVICE kit, Test Blood Sugars Once Daily, Disp: 1 each, Rfl: 0  •  clopidogrel (PLAVIX) 75 MG tablet, Take 1 tablet by mouth Daily., Disp: 30 tablet, Rfl: 3  •  DULoxetine (CYMBALTA) 60 MG capsule, TAKE ONE CAPSULE BY MOUTH ONCE DAILY, Disp: 90 capsule, Rfl: 11  •  empagliflozin (Jardiance) 10 MG tablet tablet, Take 1 tablet by mouth Daily. Lot [id number]  Apr 2021, Disp: 90 tablet, Rfl: 3  •  gabapentin (NEURONTIN) 300 MG capsule, Take 2 capsules by mouth 3 (Three) Times a Day., Disp: , Rfl:   •  glucose blood test strip, Test Blood Sugars tid prn, Disp: 100 each, Rfl: 5  •  hydroCHLOROthiazide (HYDRODIURIL) 12.5 MG tablet, Take 1 tablet by mouth Daily., Disp: 90 tablet, Rfl: 3  •  HYDROcodone-acetaminophen (NORCO) 7.5-325 MG per tablet, TAKE ONE TABLET BY MOUTH EVERY 6 HOURS AS NEEDED, Disp: 60 tablet, Rfl: 0  •  insulin aspart (NovoLOG FlexPen) 100 UNIT/ML solution pen-injector sc pen, Inject 10 Units under the skin into the appropriate area as directed 3 (Three) Times a Day With Meals., Disp: 3 pen, Rfl: 11  •  insulin degludec (TRESIBA FLEXTOUCH) 100 UNIT/ML solution pen-injector injection, Inject 50 Units under the skin into the appropriate area as directed Every Night., Disp: 30 mL, Rfl: 11  •  Insulin Pen Needle (Global Ease Inject Pen Needles) 31G X 5 MM misc, Use one daily with Tresiba, Disp: 90 each, Rfl: 3  •  isosorbide mononitrate (IMDUR) 30 MG 24 hr tablet, Take 1 tablet by mouth Daily., Disp: 90 tablet, Rfl: 3  •  LANCETS MICRO THIN 33G misc, Test Blood Sugars Once Daily, Disp: 100 each, Rfl: 3  •  lisinopril (PRINIVIL,ZESTRIL) 10 MG tablet, Take 1 tablet by mouth Daily., Disp: 90 tablet, Rfl: 3  •  loratadine (CLARITIN) 10 MG tablet, Take 1 tablet by mouth Daily As Needed. Indication: Allergic rhinitis, Disp: , Rfl:   •  metoprolol succinate XL (Toprol XL) 25 MG 24 hr tablet, Take 1 tablet by mouth Daily., Disp: 90 tablet, Rfl: 1  •  nitroglycerin  "(Nitrostat) 0.4 MG SL tablet, Place 1 tablet under the tongue Every 5 (Five) Minutes As Needed for Chest Pain. Take no more than 3 doses in 15 minutes., Disp: 25 tablet, Rfl: 0  •  pantoprazole (Protonix) 40 MG EC tablet, Take 1 tablet by mouth Daily As Needed (heartburn)., Disp: 90 tablet, Rfl: 1  •  Prenatal Vit-Fe Fumarate-FA (PRENATAL VITAMIN PO), Take 1 tablet by mouth., Disp: , Rfl:   •  promethazine (PHENERGAN) 25 MG tablet, Take 1 tablet by mouth Every 6 (Six) Hours As Needed for Nausea., Disp: 30 tablet, Rfl: 0  •  QUEtiapine (SEROquel) 25 MG tablet, TAKE ONE TABLET BY MOUTH EACH EVENING AT BEDTIME, Disp: 30 tablet, Rfl: 6  •  rizatriptan MLT (MAXALT-MLT) 10 MG disintegrating tablet, Place 1 tablet on the tongue Daily As Needed for Migraine. Take 1 tab at onset of headache, can repeat x 1 in 2 hrs prn, Disp: 27 tablet, Rfl: 3  •  Semaglutide,0.25 or 0.5MG/DOS, (Ozempic, 0.25 or 0.5 MG/DOSE,) 2 MG/1.5ML solution pen-injector, Inject 0.5 mg under the skin into the appropriate area as directed 1 (One) Time Per Week. 0.5 mg weekly, Disp: 1 pen, Rfl: 11  •  omega-3 acid ethyl esters (Lovaza) 1 g capsule, Take 2 capsules by mouth 2 (Two) Times a Day., Disp: 120 capsule, Rfl: 3  •  rosuvastatin (CRESTOR) 40 MG tablet, Take 1 tablet by mouth Daily., Disp: 90 tablet, Rfl: 3    Physical Exam:  Vitals:    05/05/23 0915   BP: 122/66   BP Location: Left arm   Patient Position: Sitting   Cuff Size: Adult   Pulse: 63   SpO2: 99%   Weight: 88.6 kg (195 lb 4.8 oz)   Height: 160 cm (63\")   PainSc: 0-No pain     Current Pain Level: none  Pulse Ox: Normal  on room air  General: alert, appears stated age and cooperative     Body Habitus: Obese  HEENT: Head: Normocephalic, no lesions, without obvious abnormality.   Neuro: alert, oriented x3  JVP: Volume/Pulsation: Normal.  Normal waveforms.   Carotid Exam: no bruit normal pulsation bilaterally   Carotid Volume: normal.     Respirations: no increased work of breathing   Chest:  " Normal    Pulmonary:Normal   Heart rate: Normal  Heart Rhythm: regular     Heart Sounds: S1: normal  S2: normal    Abdomen:   Appearance: normal .  Palpation: Soft, non-tender to palpation, bowel sounds positive in all four quadrants  Extremity: no significant pitting lower extremity edema.       DATA REVIEWED:     EKG. I personally reviewed and interpreted the EKG.  Normal sinus rhythm, non-specific ST-T wave changes.  Normal sinus rhythm, nonspecific T wave abnormality on 8/15/2022    ECG/EMG Results (all)     None        ---------------------------------------------------  -----------------------------------------------------  CXR/Imaging:   Imaging Results (Most Recent)     None        ----------------------------------------------------      --------------------------------------------------------------------------------------------------  LABS:     The 10-year CVD risk score (Aminta et al., 2008) is: 29%    Values used to calculate the score:      Age: 70 years      Sex: Female      Diabetic: Yes      Tobacco smoker: No      Systolic Blood Pressure: 124 mmHg      Is BP treated: Yes      HDL Cholesterol: 55 mg/dL      Total Cholesterol: 264 mg/dL         Lab Results   Component Value Date    GLUCOSE 90 03/23/2023    BUN 14 05/03/2023    CREATININE 0.88 05/03/2023    EGFRIFNONA 47 (L) 12/06/2021    BCR 21.6 03/23/2023    K 4.3 03/23/2023    CO2 26.0 03/23/2023    CALCIUM 9.3 03/23/2023    ALBUMIN 4.40 10/13/2022    AST 17 10/13/2022    ALT 11 10/13/2022     Lab Results   Component Value Date    WBC 9.85 03/23/2023    HGB 10.9 (L) 03/23/2023    HCT 33.3 (L) 03/23/2023    MCV 96.2 03/23/2023     03/23/2023     Lab Results   Component Value Date    CHOL 203 (H) 10/13/2022    CHLPL 238 (H) 08/18/2016    TRIG 387 (H) 10/13/2022    HDL 44 10/13/2022    LDL 95 10/13/2022     Lab Results   Component Value Date    TSH 1.270 10/13/2022     Lab Results   Component Value Date    CKTOTAL 35 02/25/2016    CKMB 0.4  02/25/2016    TROPONINI <0.012 02/25/2016     Lab Results   Component Value Date    HGBA1C 8.40 (H) 10/13/2022     No results found for: DDIMER  Lab Results   Component Value Date    ALT 11 10/13/2022     Lab Results   Component Value Date    HGBA1C 8.40 (H) 10/13/2022    HGBA1C 8.80 (H) 04/12/2022    HGBA1C 13.41 (H) 12/06/2021     Lab Results   Component Value Date    MICROALBUR <1.2 10/13/2022    CREATININE 0.88 05/03/2023     Lab Results   Component Value Date    IRON <10 (L) 10/02/2017    TIBC 173 (L) 10/02/2017     Lab Results   Component Value Date    INR 1.05 03/22/2023    PROTIME 13.6 03/22/2023       Assessment/Plan     1. Coronary artery disease  She was evaluated with a pharmacological nuclear stress test in 8/2021 for chest discomfort.  This study showed normal LV systolic function, no definite evidence of fixed or reversible myocardial perfusion defects and borderline ECG criteria for ischemia in inferior leads on Lexiscan stress.  It appeared low risk overall.  We decided to proceed with medical therapy with metoprolol at that time.  In March 2023, she underwent coronary angiography for evaluation of persistent chest discomfort with unstable features.  This showed severe one-vessel coronary artery disease involving apical LAD (99% stenosis).  She underwent successful balloon angioplasty to apical LAD with significant improvement in angiographic stenosis.  Stent placement was not attempted due to small caliber of distal-apical LAD.  Mild CAD was noted in distal RCA and RPDA branch of RCA at that time.  Her chest discomfort appears adequately controlled on current therapy.  Continue baby aspirin, Plavix, Imdur and Toprol-XL therapy.  Increase the dose of rosuvastatin from 10 mg orally daily to 40 mg oral daily to further optimize LDL control.  Initiate Lovaza for hypertriglyceridemia.    2. Essential hypertension  It appears to be under reasonable control based on recent clinic BP readings.  Continue  current regimen of Toprol-XL, lisinopril, Imdur and HCTZ.  She was noted to have significant discrepancies in BP readings between the right and left arms on recent tilt table test.  This was evaluated further with a CTA of the chest in May 2023.  According to the report, there was no evidence of thoracic aortic coarctation.  The subclavian and axillary arteries were patent bilaterally.    3.  Dizziness/syncope and collapse  She had a 2-week cardiac monitor in February 2022; this did not show any hemodynamically significant arrhythmias that would explain dizziness or syncope.  Transthoracic echocardiogram in April 2022 did not show any significant structural heart disease; the study was overall technically difficult.  She was monitored on a cardiac monitor for 24 days in March 2023.  No hemodynamically significant supraventricular or ventricular arrhythmias were noted on this study.   Tilt table testing in April 2023 was negative for neurocardiogenic syncope and POTS.  She was advised on fall precautions and keeping herself well-hydrated for now.   She will discuss further management of vertigo with Dr. Finn's office.    4.  Class I obesity  Dietary modification and regular physical activity were discussed as detailed below.    Prevention:  Patient's Body mass index is 34.6 kg/m². indicating that she is obese (BMI >30). Obesity-related health conditions include the following: hypertension, coronary heart disease, diabetes mellitus and dyslipidemias. We discussed portion control and increasing exercise..    Return in about 6 months (around 11/5/2023).           This document has been electronically signed by Aryan Hoang MD on May 5, 2023 10:08 CDT

## 2023-05-15 RX ORDER — SEMAGLUTIDE 0.68 MG/ML
INJECTION, SOLUTION SUBCUTANEOUS
Qty: 3 ML | Refills: 1 | Status: SHIPPED | OUTPATIENT
Start: 2023-05-15

## 2023-05-17 DIAGNOSIS — I10 ESSENTIAL HYPERTENSION: ICD-10-CM

## 2023-05-17 NOTE — TELEPHONE ENCOUNTER
The original prescription was discontinued on 8/17/2022 by Belinda Finn MD. Renewing this prescription may not be appropriate.

## 2023-05-24 ENCOUNTER — DOCUMENTATION (OUTPATIENT)
Dept: ENDOCRINOLOGY | Facility: CLINIC | Age: 73
End: 2023-05-24
Payer: MEDICARE

## 2023-05-30 ENCOUNTER — TELEPHONE (OUTPATIENT)
Dept: ENDOCRINOLOGY | Facility: CLINIC | Age: 73
End: 2023-05-30

## 2023-05-30 NOTE — TELEPHONE ENCOUNTER
Patient is advised that Carlos Enrique did not prescribe Gabapentin so she needs to check with the one that prescribed it.

## 2023-05-30 NOTE — TELEPHONE ENCOUNTER
Pt called to see if Carlos Enrique can increase the dosage on her Gabapentin because she is still having trouble with neuropathy in her toes. She uses [facility].    Please advise.    Thanks

## 2023-08-15 ENCOUNTER — OFFICE VISIT (OUTPATIENT)
Dept: FAMILY MEDICINE CLINIC | Facility: CLINIC | Age: 73
End: 2023-08-15
Payer: MEDICARE

## 2023-08-15 VITALS
HEIGHT: 63 IN | WEIGHT: 198 LBS | BODY MASS INDEX: 35.08 KG/M2 | DIASTOLIC BLOOD PRESSURE: 68 MMHG | HEART RATE: 104 BPM | OXYGEN SATURATION: 98 % | SYSTOLIC BLOOD PRESSURE: 120 MMHG

## 2023-08-15 DIAGNOSIS — E53.8 VITAMIN B12 DEFICIENCY: ICD-10-CM

## 2023-08-15 DIAGNOSIS — E55.9 VITAMIN D DEFICIENCY: ICD-10-CM

## 2023-08-15 DIAGNOSIS — Z00.00 MEDICARE ANNUAL WELLNESS VISIT, SUBSEQUENT: Primary | ICD-10-CM

## 2023-08-15 DIAGNOSIS — I10 ESSENTIAL HYPERTENSION: Chronic | ICD-10-CM

## 2023-08-15 DIAGNOSIS — E78.2 MIXED HYPERLIPIDEMIA: Chronic | ICD-10-CM

## 2023-08-15 RX ORDER — CYANOCOBALAMIN 1000 UG/ML
1000 INJECTION, SOLUTION INTRAMUSCULAR; SUBCUTANEOUS
Status: SHIPPED | OUTPATIENT
Start: 2023-08-15

## 2023-08-15 RX ADMIN — CYANOCOBALAMIN 1000 MCG: 1000 INJECTION, SOLUTION INTRAMUSCULAR; SUBCUTANEOUS at 14:33

## 2023-08-15 NOTE — PROGRESS NOTES
Subjective   Bertha Hyde is a 72 y.o. female.   Chief Complaint   Patient presents with    Medicare Wellness-[facility]     For review and evaluation of management of chronic medical problems. Records reviewed. Any recent labs, xrays reviewed and medications reconciled. Vitamin B12 is low, does have numbness in her big toes. Vitamin D is also low.   Hypertension  This is a chronic problem. The current episode started more than 1 year ago. The problem is unchanged. The problem is controlled. Associated symptoms include anxiety. There are no known risk factors for coronary artery disease. Current antihypertension treatment includes ACE inhibitors, diuretics, calcium channel blockers and beta blockers. The current treatment provides significant improvement. There are no compliance problems.    Hyperlipidemia  This is a chronic problem. The current episode started more than 1 year ago. The problem is uncontrolled. Recent lipid tests were reviewed and are high. Exacerbating diseases include obesity. There are no known factors aggravating her hyperlipidemia. Current antihyperlipidemic treatment includes statins. The current treatment provides significant improvement of lipids. Compliance problems include medication cost.     The following portions of the patient's history were reviewed and updated as appropriate: allergies, current medications, past social history and problem list.    Outpatient Medications Prior to Visit   Medication Sig Dispense Refill    Alcohol Swabs pads USE 4 TIMES DAILY 150 each 11    aspirin 81 MG EC tablet Take 1 tablet by mouth Daily.      b complex-C-folic acid 1 MG capsule Take 1 capsule by mouth Daily.      Blood Glucose Monitoring Suppl (BLOOD GLUCOSE MONITOR SYSTEM) W/DEVICE kit Test Blood Sugars Once Daily 1 each 0    clopidogrel (PLAVIX) 75 MG tablet Take 1 tablet by mouth Daily. 30 tablet 3    DULoxetine (CYMBALTA) 60 MG capsule TAKE ONE CAPSULE BY MOUTH ONCE DAILY 90 capsule 11     empagliflozin (Jardiance) 10 MG tablet tablet Take 1 tablet by mouth Daily. Lot [id number]  Apr 2021 90 tablet 3    gabapentin (NEURONTIN) 300 MG capsule Take 2 capsules by mouth 3 (Three) Times a Day.      glucose blood test strip Test Blood Sugars tid prn 100 each 5    hydroCHLOROthiazide (HYDRODIURIL) 12.5 MG tablet Take 1 tablet by mouth Daily. 90 tablet 3    HYDROcodone-acetaminophen (NORCO) 7.5-325 MG per tablet TAKE ONE TABLET BY MOUTH EVERY 6 HOURS AS NEEDED 60 tablet 0    insulin aspart (NovoLOG FlexPen) 100 UNIT/ML solution pen-injector sc pen Inject 10 Units under the skin into the appropriate area as directed 3 (Three) Times a Day With Meals. 3 pen 11    insulin degludec (TRESIBA FLEXTOUCH) 100 UNIT/ML solution pen-injector injection Inject 50 Units under the skin into the appropriate area as directed Every Night. 30 mL 11    Insulin Pen Needle (Global Ease Inject Pen Needles) 31G X 5 MM misc Use one daily with Tresiba 90 each 3    isosorbide mononitrate (IMDUR) 30 MG 24 hr tablet Take 1 tablet by mouth Daily. 90 tablet 3    LANCETS MICRO THIN 33G misc Test Blood Sugars Once Daily 100 each 3    lisinopril (PRINIVIL,ZESTRIL) 10 MG tablet Take 1 tablet by mouth Daily. 90 tablet 3    loratadine (CLARITIN) 10 MG tablet Take 1 tablet by mouth Daily As Needed. Indication: Allergic rhinitis      metoprolol succinate XL (Toprol XL) 25 MG 24 hr tablet Take 1 tablet by mouth Daily. 90 tablet 1    nitroglycerin (Nitrostat) 0.4 MG SL tablet Place 1 tablet under the tongue Every 5 (Five) Minutes As Needed for Chest Pain. Take no more than 3 doses in 15 minutes. 25 tablet 0    omega-3 acid ethyl esters (Lovaza) 1 g capsule Take 2 capsules by mouth 2 (Two) Times a Day. 120 capsule 3    Ozempic, 0.25 or 0.5 MG/DOSE, 2 MG/3ML solution pen-injector INJECT 0.5 MG ONCE WEEKLY 3 mL 1    pantoprazole (Protonix) 40 MG EC tablet Take 1 tablet by mouth Daily As Needed (heartburn). 90 tablet 1    Prenatal Vit-Fe Fumarate-FA  "(PRENATAL VITAMIN PO) Take 1 tablet by mouth.      promethazine (PHENERGAN) 25 MG tablet Take 1 tablet by mouth Every 6 (Six) Hours As Needed for Nausea. 30 tablet 0    QUEtiapine (SEROquel) 25 MG tablet TAKE ONE TABLET BY MOUTH EACH EVENING AT BEDTIME 30 tablet 6    rizatriptan MLT (MAXALT-MLT) 10 MG disintegrating tablet Place 1 tablet on the tongue Daily As Needed for Migraine. Take 1 tab at onset of headache, can repeat x 1 in 2 hrs prn 27 tablet 3    rosuvastatin (CRESTOR) 40 MG tablet Take 1 tablet by mouth Daily. 90 tablet 3    Semaglutide,0.25 or 0.5MG/DOS, (Ozempic, 0.25 or 0.5 MG/DOSE,) 2 MG/1.5ML solution pen-injector Inject 0.5 mg under the skin into the appropriate area as directed 1 (One) Time Per Week. 0.5 mg weekly 1 pen 11     No facility-administered medications prior to visit.       Review of Systems  I have reviewed 12 systems with patient. Findings were negative except what is noted below and/or in history of present illness.     Objective   Visit Vitals  /68   Pulse 104   Ht 160 cm (63\")   Wt 89.8 kg (198 lb)   SpO2 98%   BMI 35.07 kg/mý     Physical Exam  Vitals and nursing note reviewed.   Constitutional:       General: She is not in acute distress.     Appearance: She is well-developed.   HENT:      Head: Normocephalic and atraumatic.      Nose: Nose normal.   Eyes:      General:         Right eye: No discharge.         Left eye: No discharge.      Conjunctiva/sclera: Conjunctivae normal.      Pupils: Pupils are equal, round, and reactive to light.   Neck:      Thyroid: No thyromegaly.   Cardiovascular:      Rate and Rhythm: Normal rate and regular rhythm.      Heart sounds: Normal heart sounds.   Pulmonary:      Effort: Pulmonary effort is normal.      Breath sounds: Normal breath sounds.   Lymphadenopathy:      Cervical: No cervical adenopathy.   Skin:     General: Skin is warm and dry.   Neurological:      Mental Status: She is alert and oriented to person, place, and time. "       Notes brought forward are reviewed and updated if indicated.     Assessment & Plan   Problems Addressed this Visit          Cardiac and Vasculature    Essential hypertension (Chronic)    Hyperlipidemia (Chronic)     Other Visit Diagnoses       Medicare annual wellness visit, subsequent    -  Primary    Vitamin D deficiency        Relevant Orders    Vitamin D,25-Hydroxy    Vitamin B12 deficiency        Relevant Medications    cyanocobalamin injection 1,000 mcg    Other Relevant Orders    Vitamin B12          Diagnoses         Codes Comments    Medicare annual wellness visit, subsequent    -  Primary ICD-10-CM: Z00.00  ICD-9-CM: V70.0     Vitamin D deficiency     ICD-10-CM: E55.9  ICD-9-CM: 268.9     Vitamin B12 deficiency     ICD-10-CM: E53.8  ICD-9-CM: 266.2     Essential hypertension     ICD-10-CM: I10  ICD-9-CM: 401.9     Mixed hyperlipidemia     ICD-10-CM: E78.2  ICD-9-CM: 272.2            Continue current medications. Start vit D 2000 units daily. Start vitamin B12 1000 mcg daily.  Weekly B12 shots for the next 4 weeks.    New Medications Ordered This Visit   Medications    cyanocobalamin injection 1,000 mcg     Return in about 6 months (around 2/15/2024) for Recheck.        This document has been electronically signed by Belinda Finn MD on August 15, 2023 16:58 CDT

## 2023-08-15 NOTE — PROGRESS NOTES
The ABCs of the Annual Wellness Visit  Subsequent Medicare Wellness Visit    Chief Complaint   Patient presents with    Medicare Wellness-subsequent      Subjective    History of Present Illness:  Bertha Hyde is a 72 y.o. female who presents for a Subsequent Medicare Wellness Visit. Due for mammogram.      The following portions of the patient's history were reviewed and   updated as appropriate: allergies, current medications, past family history, past medical history, past social history, past surgical history, and problem list.    Compared to one year ago, the patient feels her physical   health is worse.    Compared to one year ago, the patient feels her mental   health is the same.    Recent Hospitalizations:  She was not admitted to the hospital during the last year.       Current Medical Providers:  Patient Care Team:  Belinda Finn MD as PCP - General    Outpatient Medications Prior to Visit   Medication Sig Dispense Refill    Alcohol Swabs pads USE 4 TIMES DAILY 150 each 11    aspirin 81 MG EC tablet Take 1 tablet by mouth Daily.      b complex-C-folic acid 1 MG capsule Take 1 capsule by mouth Daily.      Blood Glucose Monitoring Suppl (BLOOD GLUCOSE MONITOR SYSTEM) W/DEVICE kit Test Blood Sugars Once Daily 1 each 0    clopidogrel (PLAVIX) 75 MG tablet Take 1 tablet by mouth Daily. 30 tablet 3    DULoxetine (CYMBALTA) 60 MG capsule TAKE ONE CAPSULE BY MOUTH ONCE DAILY 90 capsule 11    empagliflozin (Jardiance) 10 MG tablet tablet Take 1 tablet by mouth Daily. Lot [id number]  Apr 2021 90 tablet 3    gabapentin (NEURONTIN) 300 MG capsule Take 2 capsules by mouth 3 (Three) Times a Day.      glucose blood test strip Test Blood Sugars tid prn 100 each 5    hydroCHLOROthiazide (HYDRODIURIL) 12.5 MG tablet Take 1 tablet by mouth Daily. 90 tablet 3    HYDROcodone-acetaminophen (NORCO) 7.5-325 MG per tablet TAKE ONE TABLET BY MOUTH EVERY 6 HOURS AS NEEDED 60 tablet 0    insulin aspart (NovoLOG FlexPen)  100 UNIT/ML solution pen-injector sc pen Inject 10 Units under the skin into the appropriate area as directed 3 (Three) Times a Day With Meals. 3 pen 11    insulin degludec (TRESIBA FLEXTOUCH) 100 UNIT/ML solution pen-injector injection Inject 50 Units under the skin into the appropriate area as directed Every Night. 30 mL 11    Insulin Pen Needle (Global Ease Inject Pen Needles) 31G X 5 MM misc Use one daily with Tresiba 90 each 3    isosorbide mononitrate (IMDUR) 30 MG 24 hr tablet Take 1 tablet by mouth Daily. 90 tablet 3    LANCETS MICRO THIN 33G misc Test Blood Sugars Once Daily 100 each 3    lisinopril (PRINIVIL,ZESTRIL) 10 MG tablet Take 1 tablet by mouth Daily. 90 tablet 3    loratadine (CLARITIN) 10 MG tablet Take 1 tablet by mouth Daily As Needed. Indication: Allergic rhinitis      metoprolol succinate XL (Toprol XL) 25 MG 24 hr tablet Take 1 tablet by mouth Daily. 90 tablet 1    nitroglycerin (Nitrostat) 0.4 MG SL tablet Place 1 tablet under the tongue Every 5 (Five) Minutes As Needed for Chest Pain. Take no more than 3 doses in 15 minutes. 25 tablet 0    omega-3 acid ethyl esters (Lovaza) 1 g capsule Take 2 capsules by mouth 2 (Two) Times a Day. 120 capsule 3    Ozempic, 0.25 or 0.5 MG/DOSE, 2 MG/3ML solution pen-injector INJECT 0.5 MG ONCE WEEKLY 3 mL 1    pantoprazole (Protonix) 40 MG EC tablet Take 1 tablet by mouth Daily As Needed (heartburn). 90 tablet 1    Prenatal Vit-Fe Fumarate-FA (PRENATAL VITAMIN PO) Take 1 tablet by mouth.      promethazine (PHENERGAN) 25 MG tablet Take 1 tablet by mouth Every 6 (Six) Hours As Needed for Nausea. 30 tablet 0    QUEtiapine (SEROquel) 25 MG tablet TAKE ONE TABLET BY MOUTH EACH EVENING AT BEDTIME 30 tablet 6    rizatriptan MLT (MAXALT-MLT) 10 MG disintegrating tablet Place 1 tablet on the tongue Daily As Needed for Migraine. Take 1 tab at onset of headache, can repeat x 1 in 2 hrs prn 27 tablet 3    rosuvastatin (CRESTOR) 40 MG tablet Take 1 tablet by mouth  "Daily. 90 tablet 3    Semaglutide,0.25 or 0.5MG/DOS, (Ozempic, 0.25 or 0.5 MG/DOSE,) 2 MG/1.5ML solution pen-injector Inject 0.5 mg under the skin into the appropriate area as directed 1 (One) Time Per Week. 0.5 mg weekly 1 pen 11     No facility-administered medications prior to visit.       Opioid medication/s are on active medication list.  and I have evaluated her active treatment plan and pain score trends (see table).  Vitals:    08/15/23 1321   PainSc:   9   PainLoc: Back     I have reviewed the chart for potential of high risk medication and harmful drug interactions in the elderly.          Aspirin is on active medication list. Aspirin use is indicated based on review of current medical condition/s. Pros and cons of this therapy have been discussed today. Benefits of this medication outweigh potential harm.  Patient has been encouraged to continue taking this medication.  .      Patient Active Problem List   Diagnosis    Degenerative joint disease involving multiple joints    Depressive disorder    Essential hypertension    Hyperlipidemia    Insomnia    Migraine    Type 2 diabetes mellitus with hyperglycemia, with long-term current use of insulin    Open-angle glaucoma    Cataract    Open-angle glaucoma of right eye    Chronic right shoulder pain    Colitis    Degenerative disc disease, lumbar    Diabetic polyneuropathy associated with type 2 diabetes mellitus    Class 2 severe obesity due to excess calories with serious comorbidity and body mass index (BMI) of 37.0 to 37.9 in adult    Precordial pain    CAD S/P percutaneous coronary angioplasty     Advance Care Planning  Advance Directive is not on file.  ACP discussion was held with the patient during this visit. Patient does not have an advance directive, information provided.          Objective    Vitals:    08/15/23 1321   BP: 120/68   Pulse: 104   SpO2: 98%   Weight: 89.8 kg (198 lb)   Height: 160 cm (63\")   PainSc:   9   PainLoc: Back     Estimated " "body mass index is 35.07 kg/mý as calculated from the following:    Height as of this encounter: 160 cm (63\").    Weight as of this encounter: 89.8 kg (198 lb).    Does the patient have evidence of cognitive impairment? No    Physical Exam  Vitals and nursing note reviewed.   Constitutional:       General: She is not in acute distress.     Appearance: She is well-developed.   HENT:      Head: Normocephalic and atraumatic.      Nose: Nose normal.   Eyes:      General:         Right eye: No discharge.         Left eye: No discharge.      Conjunctiva/sclera: Conjunctivae normal.      Pupils: Pupils are equal, round, and reactive to light.   Neck:      Thyroid: No thyromegaly.      Trachea: No tracheal deviation.   Cardiovascular:      Rate and Rhythm: Normal rate and regular rhythm.      Pulses:           Dorsalis pedis pulses are 2+ on the right side and 2+ on the left side.      Heart sounds: Normal heart sounds. No murmur heard.  Pulmonary:      Effort: Pulmonary effort is normal. No respiratory distress.      Breath sounds: Normal breath sounds. No wheezing or rales.   Chest:      Chest wall: No tenderness.   Breasts:     Right: No inverted nipple, mass, nipple discharge, skin change or tenderness.      Left: No inverted nipple, mass, nipple discharge, skin change or tenderness.   Abdominal:      General: Bowel sounds are normal. There is no distension.      Palpations: Abdomen is soft. There is no mass.      Tenderness: There is no abdominal tenderness.      Hernia: No hernia is present.   Musculoskeletal:         General: No deformity. Normal range of motion.   Feet:      Right foot:      Protective Sensation: 5 sites tested.  5 sites sensed.      Skin integrity: Callus present.      Left foot:      Protective Sensation: 5 sites tested.  5 sites sensed.      Skin integrity: Callus present.   Lymphadenopathy:      Cervical: No cervical adenopathy.   Skin:     General: Skin is warm and dry.   Neurological:      " Mental Status: She is alert and oriented to person, place, and time.      Deep Tendon Reflexes: Reflexes are normal and symmetric.   Psychiatric:         Behavior: Behavior normal.         Thought Content: Thought content normal.         Judgment: Judgment normal.     Lab Results   Component Value Date    TRIG 214 (H) 2023    HDL 50 2023    LDL 80 2023    VLDL 36 2023    HGBA1C 8.40 (H) 2023     HEALTH RISK ASSESSMENT    Smoking Status:  Social History     Tobacco Use   Smoking Status Never   Smokeless Tobacco Never     Alcohol Consumption:  Social History     Substance and Sexual Activity   Alcohol Use No     Fall Risk Screen:    STEADI Fall Risk Assessment was completed, and patient is at MODERATE risk for falls. Assessment completed on:8/15/2023    Depression Screenin/15/2023     1:24 PM   PHQ-2/PHQ-9 Depression Screening   Little Interest or Pleasure in Doing Things 0-->not at all   Feeling Down, Depressed or Hopeless 1-->several days   PHQ-9: Brief Depression Severity Measure Score 1       Health Habits and Functional and Cognitive Screenin/15/2023     1:24 PM   Functional & Cognitive Status   Do you have difficulty preparing food and eating? Yes   Do you have difficulty bathing yourself, getting dressed or grooming yourself? No   Do you have difficulty using the toilet? No   Do you have difficulty moving around from place to place? No   Do you have trouble with steps or getting out of a bed or a chair? Yes   Current Diet Limited Junk Food   Dental Exam Up to date   Eye Exam Up to date   Exercise (times per week) 3 times per week   Current Exercises Include Walking   Do you need help using the phone?  No   Are you deaf or do you have serious difficulty hearing?  No   Do you need help to go to places out of walking distance? No   Do you need help shopping? No   Do you need help preparing meals?  No   Do you need help with housework?  No   Do you need help with  laundry? No   Do you need help taking your medications? No   Do you need help managing money? No   Do you ever drive or ride in a car without wearing a seat belt? No   Have you felt unusual stress, anger or loneliness in the last month? Yes   Who do you live with? Alone   If you need help, do you have trouble finding someone available to you? No   Have you been bothered in the last four weeks by sexual problems? No   Do you have difficulty concentrating, remembering or making decisions? Yes       Age-appropriate Screening Schedule:  Refer to the list below for future screening recommendations based on patient's age, sex and/or medical conditions. Orders for these recommended tests are listed in the plan section. The patient has been provided with a written plan.    Health Maintenance   Topic Date Due    ZOSTER VACCINE (2 of 2) 08/11/2017    DIABETIC FOOT EXAM  04/08/2020    COVID-19 Vaccine (2 - Booster for Sekou series) 07/27/2021    ANNUAL WELLNESS VISIT  07/18/2023    INFLUENZA VACCINE  10/01/2023    HEMOGLOBIN A1C  01/17/2024    DIABETIC EYE EXAM  01/17/2024    LIPID PANEL  07/17/2024    URINE MICROALBUMIN  07/17/2024    MAMMOGRAM  07/18/2024    COLORECTAL CANCER SCREENING  01/14/2025    DXA SCAN  07/18/2027    TDAP/TD VACCINES (3 - Td or Tdap) 10/27/2031    HEPATITIS C SCREENING  Completed    Pneumococcal Vaccine 65+  Completed    PAP SMEAR  Discontinued              Assessment & Plan   CMS Preventative Services Quick Reference  Risk Factors Identified During Encounter  Fall Risk-High or Moderate: Discussed Fall Prevention in the home  Immunizations Discussed/Encouraged: Shingrix and COVID19  The above risks/problems have been discussed with the patient.  Follow up actions/plans if indicated are seen below in the Assessment/Plan Section.  Pertinent information has been shared with the patient in the After Visit Summary.    Diagnoses and all orders for this visit:    1. Medicare annual wellness visit,  subsequent (Primary)    2. Vitamin D deficiency  -     Vitamin D,25-Hydroxy; Future    3. Vitamin B12 deficiency  -     Vitamin B12; Future        Follow Up:   Return in about 6 months (around 2/15/2024) for Recheck. Will notify regarding results. Continue current medications.     An After Visit Summary and PPPS were made available to the patient.  Information has been scanned into chart. Discussed importance of taking medications as prescribed. Encouraged healthy eating habits with low fat, low salt choices and working towards maintaining a healthy weight. Recommended regular exercise if able as well as care to prevent falls including avoiding anything on the floor that they could slip or trip on such as throw rugs, making sure they have a bathmat to step onto when their feet are wet and having grab bars and railings where needed.

## 2023-08-15 NOTE — PATIENT INSTRUCTIONS
Check at pharmacy on Shingrix shingles vaccine  Start vitamin D3 2000 units daily  Start vitamin B12 1000 mcg daily  Come to [facility] once a week for 3 more weeks for a B12 shot.     Medicare Wellness  Personal Prevention Plan of Service     Date of Office Visit:    Encounter Provider:  Belinda Finn MD  Place of Service:  [facility]  Patient Name: Bertha Hyde  :  1950    As part of the Medicare Wellness portion of your visit today, we are providing you with this personalized preventive plan of services (PPPS). This plan is based upon recommendations of the [place] Preventive Services Task Force (USPSTF) and the Advisory Committee on Immunization Practices (ACIP).    This lists the preventive care services that should be considered, and provides dates of when you are due. Items listed as completed are up-to-date and do not require any further intervention.    Health Maintenance   Topic Date Due    ZOSTER VACCINE (2 of 2) 2017    DIABETIC FOOT EXAM  2020    COVID-19 Vaccine (2 - Booster for Sekou series) 2021    ANNUAL WELLNESS VISIT  2023    INFLUENZA VACCINE  10/01/2023    HEMOGLOBIN A1C  2024    DIABETIC EYE EXAM  2024    LIPID PANEL  2024    URINE MICROALBUMIN  2024    MAMMOGRAM  2024    COLORECTAL CANCER SCREENING  2025    DXA SCAN  2027    TDAP/TD VACCINES (3 - Td or Tdap) 10/27/2031    HEPATITIS C SCREENING  Completed    Pneumococcal Vaccine 65+  Completed    PAP SMEAR  Discontinued       No orders of the defined types were placed in this encounter.      No follow-ups on file.

## 2023-09-21 ENCOUNTER — TELEPHONE (OUTPATIENT)
Dept: ENDOCRINOLOGY | Facility: CLINIC | Age: 73
End: 2023-09-21
Payer: MEDICARE

## 2023-09-21 ENCOUNTER — TELEMEDICINE (OUTPATIENT)
Dept: ENDOCRINOLOGY | Facility: CLINIC | Age: 73
End: 2023-09-21
Payer: MEDICARE

## 2023-09-21 DIAGNOSIS — E78.2 MIXED HYPERLIPIDEMIA: Chronic | ICD-10-CM

## 2023-09-21 DIAGNOSIS — Z79.4 TYPE 2 DIABETES MELLITUS WITH HYPERGLYCEMIA, WITH LONG-TERM CURRENT USE OF INSULIN: ICD-10-CM

## 2023-09-21 DIAGNOSIS — I10 ESSENTIAL HYPERTENSION: Chronic | ICD-10-CM

## 2023-09-21 DIAGNOSIS — E11.65 TYPE 2 DIABETES MELLITUS WITH HYPERGLYCEMIA, WITH LONG-TERM CURRENT USE OF INSULIN: ICD-10-CM

## 2023-09-21 DIAGNOSIS — E55.9 VITAMIN D DEFICIENCY, UNSPECIFIED: ICD-10-CM

## 2023-09-21 DIAGNOSIS — E11.42 DIABETIC POLYNEUROPATHY ASSOCIATED WITH TYPE 2 DIABETES MELLITUS: Primary | ICD-10-CM

## 2023-09-21 PROCEDURE — 3052F HG A1C>EQUAL 8.0%<EQUAL 9.0%: CPT | Performed by: NURSE PRACTITIONER

## 2023-09-21 PROCEDURE — 1159F MED LIST DOCD IN RCRD: CPT | Performed by: NURSE PRACTITIONER

## 2023-09-21 PROCEDURE — 1160F RVW MEDS BY RX/DR IN RCRD: CPT | Performed by: NURSE PRACTITIONER

## 2023-09-21 PROCEDURE — 99214 OFFICE O/P EST MOD 30 MIN: CPT | Performed by: NURSE PRACTITIONER

## 2023-09-21 RX ORDER — GABAPENTIN 600 MG/1
600 TABLET ORAL 3 TIMES DAILY
Qty: 90 TABLET | Refills: 5 | Status: SHIPPED | OUTPATIENT
Start: 2023-09-21 | End: 2024-09-20

## 2023-09-21 NOTE — PROGRESS NOTES
Chief Complaint  Diabetes    Subjective          Bertha Hyde presents to [facility] ENDOCRINOLOGY  Diabetes     You have chosen to receive care through a telehealth visit.  Do you consent to use a video/audio connection for your medical care today? Yes    Mode of Visit: Video  Location of patient: home  You have chosen to receive care through a telehealth visit.  Does the patient consent to use a video/audio connection for your medical care today? Yes  The visit included audio and video interaction. No technical issues occurred during this visit.      Referring provider Belinda Finn MD    Chief Complaint   Patient presents with    Diabetes       HPI    72 year old female presents for follow up     Reason diabetes mellitus type 2    Diagnosed in 2016    Timing constant     Quality not controlled     Severity high     Complications neuropathy     Current diabetes regimen         Insulin, glp-1     Current glucose monitoring    Fingerstick's     Checks 4 times daily     Has the olivier 2     Most at goal she states    If she eats bad it will rise         Review of Systems - General ROS: negative            Objective   Vital Signs:   There were no vitals taken for this visit.    Physical Exam  Constitutional:       Appearance: Normal appearance.   Cardiovascular:      Rate and Rhythm: Regular rhythm.      Heart sounds: Normal heart sounds.   Pulmonary:      Breath sounds: Normal breath sounds.   Musculoskeletal:      Cervical back: Normal range of motion.   Neurological:      Mental Status: She is alert.      Result Review :   The following data was reviewed by: PAYTON Alexis on 05/20/2022:  Common labs          3/23/2023    06:32 5/3/2023    13:46 7/17/2023    09:05   Common Labs   Glucose 90   156    BUN 16  14  18    Creatinine 0.74  0.88  0.82    Sodium 138   138    Potassium 4.3   3.9    Chloride 104   98    Calcium 9.3   9.9    Albumin   3.8    Total Bilirubin    0.3    Alkaline Phosphatase   101    AST (SGOT)   12    ALT (SGPT)   11    WBC 9.85   14.30    Hemoglobin 10.9   12.7    Hematocrit 33.3   38.5    Platelets 316   414    Total Cholesterol   166    Triglycerides   214    HDL Cholesterol   50    LDL Cholesterol    80    Hemoglobin A1C   8.40    Microalbumin, Urine   1.3              Assessment and Plan    Diagnoses and all orders for this visit:    1. Diabetic polyneuropathy associated with type 2 diabetes mellitus (Primary)  -     gabapentin (Neurontin) 600 MG tablet; Take 1 tablet by mouth 3 (Three) Times a Day.  Dispense: 90 tablet; Refill: 5  -     CBC & Differential; Future  -     Comprehensive Metabolic Panel; Future  -     Hemoglobin A1c; Future  -     Lipid Panel; Future  -     Microalbumin / Creatinine Urine Ratio - Urine, Clean Catch; Future  -     TSH; Future  -     Vitamin B12; Future  -     Vitamin D,25-Hydroxy; Future    2. Essential hypertension  -     CBC & Differential; Future  -     Comprehensive Metabolic Panel; Future  -     Hemoglobin A1c; Future  -     Lipid Panel; Future  -     Microalbumin / Creatinine Urine Ratio - Urine, Clean Catch; Future  -     TSH; Future  -     Vitamin B12; Future  -     Vitamin D,25-Hydroxy; Future    3. Mixed hyperlipidemia  -     CBC & Differential; Future  -     Comprehensive Metabolic Panel; Future  -     Hemoglobin A1c; Future  -     Lipid Panel; Future  -     Microalbumin / Creatinine Urine Ratio - Urine, Clean Catch; Future  -     TSH; Future  -     Vitamin B12; Future  -     Vitamin D,25-Hydroxy; Future    4. Type 2 diabetes mellitus with hyperglycemia, with long-term current use of insulin  -     CBC & Differential; Future  -     Comprehensive Metabolic Panel; Future  -     Hemoglobin A1c; Future  -     Lipid Panel; Future  -     Microalbumin / Creatinine Urine Ratio - Urine, Clean Catch; Future  -     TSH; Future  -     Vitamin B12; Future  -     Vitamin D,25-Hydroxy; Future    5. Vitamin D deficiency,  unspecified  -     Vitamin D,25-Hydroxy; Future             Glycemic Management:    Diabetes mellitus type 2         Lab Results   Component Value Date    HGBA1C 8.40 (H) 07/17/2023         Using the frances- but could not download             Taking Tresiba    35 units     Ozempic 0.5 mg once weekly -keep     Taking jardiance 10 mg daily --keep     Taking Novolog before meals -    Give 4 units before meals if you have carb loaded meals      +    Scale       151-200          2 units   201-250            3 units  251-300            4 units   Above 301         5 units         Goals for sugar    Fasting and before meals 80 to 130    2 hours after meals 180 or less      Aim for 45 grams of carbohydrate per meal    Aim for 15 grams of carbohydrate per snack           Frances has allowed the patient to minimize hypoglycemia as alarms have predicted and avoided them    She also uses the arrows on the frances  as follows:    If arrow is horizontal , she uses the predicted bolus    If the arrow is slanted up or down-- she increase or decrease by 2  units    If the arrow is vertical up or down - she increases or decreases by 2 unit             Microvascular Complications Monitoring       Last eye exam---Nov. 2021, no DR     Neuropathy --yes     Taking gabapentin 600 mg TID       I will take over prescription --refilled         No renal disease     Lipid Management:       Taking crestor 10 mg one at night     Total Cholesterol   Date Value Ref Range Status   07/17/2023 166 0 - 200 mg/dL Final     Triglycerides   Date Value Ref Range Status   07/17/2023 214 (H) 0 - 150 mg/dL Final     HDL Cholesterol   Date Value Ref Range Status   07/17/2023 50 40 - 60 mg/dL Final     LDL Cholesterol    Date Value Ref Range Status   07/17/2023 80 0 - 100 mg/dL Final     LDLCALC ELECT   Date Value Ref Range Status   05/05/2015 106 0 - 129 mg/dl Final     Comment:     LDL DESIRED: < 130 MG/DL         Blood Pressure Management:      Taking  lisinopril-hctz 20-12.5 one daily     Taking lopressor 25 mg daily           Thyroid Health    Lab Results   Component Value Date    TSH 2.470 07/17/2023           Bone Health       Vitamin d def     Taking vitamin d def             Preventive Care:     Non smoker               Follow Up   No follow-ups on file.  Patient was given instructions and counseling regarding her condition or for health maintenance advice. Please see specific information pulled into the AVS if appropriate.         This document has been electronically signed by PAYTON Alexis on September 21, 2023 16:48 CDT.

## 2023-09-21 NOTE — TELEPHONE ENCOUNTER
Called to let Bertha know that she will have to discuss Carlos Enrique taking over her Gabapentin in an appointment. She was advised that the soonest appointment we have is 10/20 @ 2pm for telehealth

## 2023-09-21 NOTE — TELEPHONE ENCOUNTER
Pt called asking for Rx Gabapentin sent to [place] pharmacy. Pt said another provider used to prescribe and she wants it transferred to [facility].      Please advise    Thank you

## 2023-09-25 ENCOUNTER — DOCUMENTATION (OUTPATIENT)
Dept: ENDOCRINOLOGY | Facility: CLINIC | Age: 73
End: 2023-09-25

## 2023-09-28 ENCOUNTER — TELEPHONE (OUTPATIENT)
Dept: ENDOCRINOLOGY | Facility: CLINIC | Age: 73
End: 2023-09-28
Payer: MEDICARE

## 2023-09-28 DIAGNOSIS — E11.65 TYPE 2 DIABETES MELLITUS WITH HYPERGLYCEMIA, WITH LONG-TERM CURRENT USE OF INSULIN: Primary | ICD-10-CM

## 2023-09-28 DIAGNOSIS — Z79.4 TYPE 2 DIABETES MELLITUS WITH HYPERGLYCEMIA, WITH LONG-TERM CURRENT USE OF INSULIN: Primary | ICD-10-CM

## 2023-09-28 RX ORDER — SEMAGLUTIDE 0.68 MG/ML
0.5 INJECTION, SOLUTION SUBCUTANEOUS WEEKLY
Qty: 3 ML | Refills: 3 | Status: SHIPPED | OUTPATIENT
Start: 2023-09-28

## 2023-09-29 ENCOUNTER — DOCUMENTATION (OUTPATIENT)
Dept: ENDOCRINOLOGY | Facility: CLINIC | Age: 73
End: 2023-09-29
Payer: MEDICARE

## (undated) DEVICE — ST CVR PROB PULLUP ULTRASND 5X48IN

## (undated) DEVICE — GW PERIPH GUIDERIGHT STD/EXCHNG/J/TIP SS 0.035IN 5X260CM

## (undated) DEVICE — PK CATH LAB 60

## (undated) DEVICE — A2000 MULTI-USE SYRINGE KIT, P/N 701277-003KIT CONTENTS: 100ML CONTRAST RESERVOIR AND TUBING WITH CONTRAST SPIKE AND CLAMP: Brand: A2000 MULTI-USE SYRINGE KIT

## (undated) DEVICE — GLIDESHEATH SLENDER STAINLESS STEEL KIT: Brand: GLIDESHEATH SLENDER

## (undated) DEVICE — COPILOT KIT INCLUDES BLEEDBACK CONTROL VALVE / GUIDE WIRE INTRODUCER / TORQUE DEVICE: Brand: ACCESSORIES

## (undated) DEVICE — ELECTRODE,RT,STRESS,FOAM,50PK: Brand: MEDLINE

## (undated) DEVICE — RUNTHROUGH NS EXTRA FLOPPY PTCA GUIDEWIRE: Brand: RUNTHROUGH

## (undated) DEVICE — MINI TREK CORONARY DILATATION CATHETER 1.20 MM X 08 MM / RAPID-EXCHANGE: Brand: MINI TREK

## (undated) DEVICE — CATH DIAG EXPO .045 PIG 5F 100CM

## (undated) DEVICE — RADIFOCUS OPTITORQUE ANGIOGRAPHIC CATHETER: Brand: OPTITORQUE

## (undated) DEVICE — 6F .070 XB LAD 3.5 100CM: Brand: VISTA BRITE TIP

## (undated) DEVICE — TR BAND RADIAL ARTERY COMPRESSION DEVICE: Brand: TR BAND